# Patient Record
Sex: FEMALE | Race: WHITE | NOT HISPANIC OR LATINO | Employment: OTHER | URBAN - METROPOLITAN AREA
[De-identification: names, ages, dates, MRNs, and addresses within clinical notes are randomized per-mention and may not be internally consistent; named-entity substitution may affect disease eponyms.]

---

## 2017-03-30 ENCOUNTER — TRANSCRIBE ORDERS (OUTPATIENT)
Dept: ADMINISTRATIVE | Facility: OTHER | Age: 72
End: 2017-03-30

## 2017-10-31 LAB — HCV AB SER-ACNC: 0.02

## 2018-11-12 LAB
LEFT EYE DIABETIC RETINOPATHY: NORMAL
RIGHT EYE DIABETIC RETINOPATHY: NORMAL
SEVERITY (EYE EXAM): NORMAL

## 2018-12-24 LAB
CREAT ?TM UR-SCNC: 136 UMOL/L
EXT MICROALBUMIN URINE RANDOM: 1.7
HBA1C MFR BLD HPLC: 9.9 %
MICROALBUMIN/CREAT UR: 13 MG/G{CREAT}

## 2019-03-13 PROBLEM — G45.1 HEMISPHERIC CAROTID ARTERY SYNDROME: Status: ACTIVE | Noted: 2017-10-10

## 2019-03-13 RX ORDER — OLMESARTAN MEDOXOMIL AND HYDROCHLOROTHIAZIDE 20/12.5 20; 12.5 MG/1; MG/1
1 TABLET ORAL DAILY
COMMUNITY
Start: 2019-02-18 | End: 2019-03-15 | Stop reason: ALTCHOICE

## 2019-03-13 RX ORDER — ALBUTEROL SULFATE 90 UG/1
2 AEROSOL, METERED RESPIRATORY (INHALATION) EVERY 4 HOURS PRN
COMMUNITY
Start: 2017-04-26 | End: 2019-04-29

## 2019-03-13 RX ORDER — FLUOCINONIDE GEL 0.5 MG/G
1 GEL TOPICAL DAILY PRN
COMMUNITY
Start: 2018-12-24 | End: 2019-05-10

## 2019-03-13 RX ORDER — LEVOTHYROXINE SODIUM 112 UG/1
112 TABLET ORAL DAILY
COMMUNITY
Start: 2019-01-07 | End: 2019-03-15

## 2019-03-13 RX ORDER — MULTIVIT-MIN/IRON/FOLIC ACID/K 18-600-40
1 CAPSULE ORAL DAILY
COMMUNITY
Start: 2014-06-23 | End: 2019-03-15 | Stop reason: ALTCHOICE

## 2019-03-13 RX ORDER — CHOLECALCIFEROL (VITAMIN D3) 1250 MCG
50000 CAPSULE ORAL WEEKLY
COMMUNITY
Start: 2018-12-31 | End: 2020-01-22

## 2019-03-13 RX ORDER — ATORVASTATIN CALCIUM 10 MG/1
10 TABLET, FILM COATED ORAL 3 TIMES WEEKLY
COMMUNITY
Start: 2018-07-31 | End: 2019-04-29

## 2019-03-15 ENCOUNTER — OFFICE VISIT (OUTPATIENT)
Dept: FAMILY MEDICINE CLINIC | Facility: CLINIC | Age: 74
End: 2019-03-15
Payer: MEDICARE

## 2019-03-15 VITALS
TEMPERATURE: 97.4 F | BODY MASS INDEX: 31.31 KG/M2 | HEIGHT: 68 IN | HEART RATE: 72 BPM | SYSTOLIC BLOOD PRESSURE: 130 MMHG | DIASTOLIC BLOOD PRESSURE: 70 MMHG | OXYGEN SATURATION: 95 % | WEIGHT: 206.6 LBS | RESPIRATION RATE: 14 BRPM

## 2019-03-15 DIAGNOSIS — Z12.11 COLON CANCER SCREENING: ICD-10-CM

## 2019-03-15 DIAGNOSIS — Z00.00 MEDICARE ANNUAL WELLNESS VISIT, SUBSEQUENT: Primary | ICD-10-CM

## 2019-03-15 DIAGNOSIS — E11.65 UNCONTROLLED TYPE 2 DIABETES MELLITUS WITH HYPERGLYCEMIA (HCC): ICD-10-CM

## 2019-03-15 DIAGNOSIS — E03.9 HYPOTHYROIDISM, UNSPECIFIED TYPE: ICD-10-CM

## 2019-03-15 DIAGNOSIS — E55.9 VITAMIN D DEFICIENCY: ICD-10-CM

## 2019-03-15 DIAGNOSIS — I10 BENIGN ESSENTIAL HYPERTENSION: ICD-10-CM

## 2019-03-15 PROCEDURE — G0439 PPPS, SUBSEQ VISIT: HCPCS | Performed by: FAMILY MEDICINE

## 2019-03-15 PROCEDURE — 99213 OFFICE O/P EST LOW 20 MIN: CPT | Performed by: FAMILY MEDICINE

## 2019-03-15 RX ORDER — BRIMONIDINE TARTRATE/TIMOLOL 0.2%-0.5%
1 DROPS OPHTHALMIC (EYE) 2 TIMES DAILY
Refills: 3 | COMMUNITY
Start: 2018-12-28

## 2019-03-15 RX ORDER — MINERAL OIL AND PETROLATUM 150; 830 MG/G; MG/G
OINTMENT OPHTHALMIC
COMMUNITY
End: 2019-04-29

## 2019-03-15 RX ORDER — LOSARTAN POTASSIUM AND HYDROCHLOROTHIAZIDE 12.5; 5 MG/1; MG/1
TABLET ORAL DAILY
Refills: 1 | COMMUNITY
Start: 2019-01-14 | End: 2019-03-15

## 2019-03-15 RX ORDER — PEN NEEDLE, DIABETIC 32GX 5/32"
NEEDLE, DISPOSABLE MISCELLANEOUS
Qty: 90 EACH | Refills: 2 | Status: SHIPPED | OUTPATIENT
Start: 2019-03-15 | End: 2020-01-22

## 2019-03-15 RX ORDER — BIMATOPROST 0.01 %
1 DROPS OPHTHALMIC (EYE)
Refills: 3 | COMMUNITY
Start: 2018-12-28

## 2019-03-15 RX ORDER — CYCLOSPORINE 0.5 MG/ML
1 EMULSION OPHTHALMIC 2 TIMES DAILY
Refills: 4 | COMMUNITY
Start: 2018-12-19 | End: 2019-05-10

## 2019-03-15 RX ORDER — IBUPROFEN 200 MG
TABLET ORAL EVERY 6 HOURS PRN
COMMUNITY
End: 2021-03-30

## 2019-03-15 RX ORDER — PEN NEEDLE, DIABETIC 32GX 5/32"
NEEDLE, DISPOSABLE MISCELLANEOUS
Refills: 2 | COMMUNITY
Start: 2018-12-31 | End: 2019-03-15 | Stop reason: SDUPTHER

## 2019-03-15 RX ORDER — LOTEPREDNOL ETABONATE 5 MG/ML
SUSPENSION/ DROPS OPHTHALMIC 3 TIMES DAILY
Refills: 0 | COMMUNITY
Start: 2019-02-16 | End: 2019-04-11 | Stop reason: ALTCHOICE

## 2019-03-15 RX ORDER — LEVOTHYROXINE SODIUM 112 UG/1
112 TABLET ORAL DAILY
Qty: 90 TABLET | Refills: 1 | Status: SHIPPED | OUTPATIENT
Start: 2019-03-15 | End: 2019-08-27 | Stop reason: SDUPTHER

## 2019-03-15 RX ORDER — OLMESARTAN MEDOXOMIL AND HYDROCHLOROTHIAZIDE 20/12.5 20; 12.5 MG/1; MG/1
1 TABLET ORAL DAILY
Qty: 90 TABLET | Refills: 1 | Status: SHIPPED | OUTPATIENT
Start: 2019-03-15 | End: 2019-05-10

## 2019-03-15 NOTE — ASSESSMENT & PLAN NOTE
Reviewed last labs  Not tolerating victoza well  Better at 1 2 dose than 1 8  Nausea/vomiting is subsiding somewhat  BS remains high   Encouraged to make endocrine appt  She is reluctantly willing, noting she prefers to see endocrinologist that she has seen at [facility] in the past- she'll make the appt

## 2019-03-15 NOTE — PROGRESS NOTES
Assessment and Plan:        Other Visit Diagnoses     Medicare annual wellness visit, subsequent    -  Primary  See medicare wellness planning as noted  Reviewed health maint  FIT test given to patient  She declines flu vaccine, shingrix, mammography  She will bring in a copy of her advanced directive  Colon cancer screening        Relevant Orders    Occult Blood, Fecal Immunochemical        Health Maintenance Due   Topic Date Due    Medicare Annual Wellness Visit (AWV)  1945    Diabetic Foot Exam  12/10/1956    DM Eye Exam  12/10/1956    URINE MICROALBUMIN  12/10/1956    BMI: Followup Plan  12/10/1964    Urinary Incontinence Screening  12/10/2011         HPI:  Nelson Greene is a 68 y o  female here for her Subsequent Wellness Visit      Patient Active Problem List   Diagnosis    Benign essential hypertension    Hemispheric carotid artery syndrome    Uncontrolled type 2 diabetes mellitus with hyperglycemia ([facility] [address] [facility]    Hypothyroidism    Vitamin D deficiency     Past Medical History:   Diagnosis Date    Diabetes mellitus ([address] )     Glaucoma      Past Surgical History:   Procedure Laterality Date    CATARACT EXTRACTION Right     5 years ago    EAR SURGERY  1970    Left ear cyst removal      Family History   Problem Relation Age of Onset    Rheum arthritis Mother     Cancer Brother     Heart attack Paternal Grandmother      Social History     Tobacco Use   Smoking Status Former Smoker    Packs/day: 3 00    Years: 50 00    Pack years: 150 00    Last attempt to quit: 3/15/2010    Years since quittin 0   Smokeless Tobacco Never Used     Social History     Substance and Sexual Activity   Alcohol Use Yes    Alcohol/week: 0 6 oz    Types: 1 Glasses of wine per week    Comment: very rare      Social History     Substance and Sexual Activity   Drug Use Never       Current Outpatient Medications   Medication Sig Dispense Refill    albuterol (PROAIR HFA) 90 mcg/act inhaler Inhale 2 puffs every 4 (four) hours as needed for wheezing      artificial tear (LUBRIFRESH P M ) 83-15 % ophthalmic ointment daily at bedtime      aspirin 81 MG tablet Take 81 mg by mouth daily      atorvastatin (LIPITOR) 10 mg tablet Take 10 mg by mouth 3 (three) times a week      BD PEN NEEDLE ALPHONSO U/F 32G X 4 MM MISC USE WITH VICTOZA PEN  2    Cholecalciferol (VITAMIN D3) 77896 units CAPS Take 50,000 Units by mouth once a week      COMBIGAN 0 2-0 5 % 2 (two) times a day  3    fluocinonide (LIDEX) 0 05 % gel Apply 1 application topically daily as needed      glucose blood (ONETOUCH VERIO) test strip 1 strip by Does not apply route daily      ibuprofen (MOTRIN) 200 mg tablet Take by mouth every 6 (six) hours as needed for mild pain      levothyroxine 112 mcg tablet Take 112 mcg by mouth daily      liraglutide (VICTOZA) injection Inject 1 2 mg under the skin daily       LOTEMAX 0 5 % ophthalmic suspension 3 (three) times a day  0    LUMIGAN 0 01 % ophthalmic drops 2 (two) times a day  3    RESTASIS 0 05 % ophthalmic emulsion   4     No current facility-administered medications for this visit  Allergies   Allergen Reactions    Albumen, Egg     Antihistamines, Diphenhydramine-Type     Epinephrine     Lidocaine      Syncope, tachycardia with local anesthetic with epinephrine    Zinc Rash     Immunization History   Administered Date(s) Administered    Pneumococcal Conjugate 13-Valent 03/22/2017    Pneumococcal Polysaccharide PPV23 01/01/2009, 07/19/2018    Tdap 03/27/2014       Patient Care Team:  Donny Carvalho MD as PCP - General (Family Medicine)    Medicare Screening Tests and Risk Assessments:  Neftaly Cook is here for her Subsequent Wellness visit  Health Risk Assessment:  Patient rates overall health as fair  Patient feels that their physical health rating is Slightly worse  Eyesight was rated as Much worse  Hearing was rated as Same  Patient feels that their emotional and mental health rating is Same  Pain experienced by patient in the last 7 days has been None  Patient states that she has experienced no weight loss or gain in last 6 months  Emotional/Mental Health:  Patient has been feeling nervous/anxious  PHQ-9 Depression Screening:    Frequency of the following problems over the past two weeks:      1  Little interest or pleasure in doing things: 0 - not at all      2  Feeling down, depressed, or hopeless: 0 - not at all  PHQ-2 Score: 0          Broken Bones/Falls: Fall Risk Assessment:    In the past year, patient has experienced: No history of falling in past year          Bladder/Bowel:  Patient has not leaked urine accidently in the last six months  Patient reports no loss of bowel control  Immunizations:  Patient has not had a flu vaccination within the last year  Patient has received a pneumonia shot  Patient has not received a shingles shot  Patient has received tetanus/diphtheria shot  Home Safety:  Patient does not have trouble with stairs inside or outside of their home  Patient currently reports that there are no safety hazards present in home, working smoke alarms, working carbon monoxide detectors  Preventative Screenings:   No breast cancer screening performed, colon cancer screen completed, cholesterol screen completed, glaucoma eye exam completed,     Nutrition:  Current diet: Diabetic with servings of the following:  (Additional Comments: Soft foods due to dental concerns)    Medications:  Patient is currently taking over-the-counter supplements  List of OTC medications includes: MVI  Patient is able to manage medications  Lifestyle Choices:  Patient reports no tobacco use  Patient has smoked or used tobacco in the past   Patient has stopped her tobacco use  Tobacco use quit date: 2/11/2010  Patient reports alcohol use  Alcohol use per week: 0-1  Patient drives a vehicle  Patient wears seat belt          Activities of Daily Living:  Can get out of bed by his or her self, able to dress self, able to make own meals, able to do own shopping, able to bathe self, can do own laundry/housekeeping, can manage own money, pay bills and track expenses    Previous Hospitalizations:  No hospitalization or ED visit in past 12 months        Advanced Directives:  Patient has decided on a power of   Patient has spoken to designated power of   Patient has completed advanced directive  Additional Comments: Pt to bring in copy    Preventative Screening/Counseling:      Cardiovascular:      General: Screening Current          Colorectal Cancer:      General: Risks and Benefits Discussed      Due for studies: Fecal Occult Blood          Breast Cancer:      General: Patient Declines          Cervical Cancer:      General: Screening Not Indicated          Osteoporosis:      General: Screening Current      Comments: 4/2017- normal bone density        AAA:      General: Screening Not Indicated          Glaucoma:      General: Screening Current      Comments: Following with ophtho        HIV:      General: Patient Declines          Hepatitis C:      General: Screening Current      Additional Comments: 10/2017    Advanced Directives:   Patient has living will for healthcare, Information on ACP and/or AD provided       Immunizations:      Influenza: Patient Declines

## 2019-03-15 NOTE — PROGRESS NOTES
Assessment/Plan:       Problem List Items Addressed This Visit        Endocrine    Uncontrolled type 2 diabetes mellitus with hyperglycemia ([facility] [address] )     Reviewed last labs  Not tolerating victoza well  Better at 1 2 dose than 1 8  Nausea/vomiting is subsiding somewhat  BS remains high   Encouraged to make endocrine appt  She is reluctantly willing, noting she prefers to see endocrinologist that she has seen at [facility] in the past- she'll make the appt  Relevant Medications    liraglutide (VICTOZA) injection    BD PEN NEEDLE ALPHONSO U/F 32G X 4 MM MISC    glucose blood (ONETOUCH VERIO) test strip    Other Relevant Orders    Microalbumin / creatinine urine ratio    Comprehensive metabolic panel    Hemoglobin A1C    Lipid Panel with Direct LDL reflex    Hypothyroidism     Refilled synthroid  Check tsh         Relevant Medications    levothyroxine (SYNTHROID) 112 mcg tablet    Other Relevant Orders    TSH, 3rd generation with Free T4 reflex       Cardiovascular and Mediastinum    Benign essential hypertension     Recall of losartan-hctz, switch to olmesartan-hctz         Relevant Medications    olmesartan-hydrochlorothiazide (BENICAR HCT) 20-12 5 MG per tablet       Other    Vitamin D deficiency     Check labs         Relevant Orders    Vitamin D 25 hydroxy      Other Visit Diagnoses         Colon cancer screening        FIT test given to patient    Relevant Orders    Occult Blood, Fecal Immunochemical         Will write letter for patient regarding vision loss/nausea and vomiting  Most recent labs reviewed from care everywhere        Subjective:     Effie Moreno is a 68 y o  female here today for a follow-up on her chronic conditions:    Patient Active Problem List   Diagnosis    Benign essential hypertension    Hemispheric carotid artery syndrome    Uncontrolled type 2 diabetes mellitus with hyperglycemia ([facility] [address]    Hypothyroidism    Vitamin D deficiency     Current Outpatient Medications   Medication Sig Dispense Refill    albuterol (PROAIR HFA) 90 mcg/act inhaler Inhale 2 puffs every 4 (four) hours as needed for wheezing      artificial tear (LUBRIFRESH P M ) 83-15 % ophthalmic ointment daily at bedtime      aspirin 81 MG tablet Take 81 mg by mouth daily      atorvastatin (LIPITOR) 10 mg tablet Take 10 mg by mouth 3 (three) times a week      BD PEN NEEDLE ALPHONSO U/F 32G X 4 MM MISC USE WITH VICTOZA PEN  2    Cholecalciferol (VITAMIN D3) 47345 units CAPS Take 50,000 Units by mouth once a week      COMBIGAN 0 2-0 5 % 2 (two) times a day  3    fluocinonide (LIDEX) 0 05 % gel Apply 1 application topically daily as needed      glucose blood (ONETOUCH VERIO) test strip 1 strip by Does not apply route daily      ibuprofen (MOTRIN) 200 mg tablet Take by mouth every 6 (six) hours as needed for mild pain      levothyroxine 112 mcg tablet Take 112 mcg by mouth daily      liraglutide (VICTOZA) injection Inject 1 2 mg under the skin daily       losartan-hydrochlorothiazide (HYZAAR) 50-12 5 mg per tablet daily  1    LOTEMAX 0 5 % ophthalmic suspension 3 (three) times a day  0    LUMIGAN 0 01 % ophthalmic drops 2 (two) times a day  3    RESTASIS 0 05 % ophthalmic emulsion   4     No current facility-administered medications for this visit  HPI:  Chief Complaint   Patient presents with   [facility] Wellness Visit     Patient here for Venkata Tijerina  wellness exam      - CC above per clinical staff and reviewed  Trouble tolerating victoza, had to decrease from 1 8 to 1 2  GI upset- nausea/vomiting  The nausea and vomiting is improved at the 1 2 dose  But still has it  Gets afraid to eat  Was keeping a trash can by her desk to vomit  Hard to eat  BS is staying around 200 or so  This is an improvement from previous  Wants to try the lower dose of victoza for a few more weeks before seeing endocrine  Vision difficulty with vision loss in her R eye      Had to go out on short term disability last Monday  Is going to get records from prior office and from today as well as from Dr Adalberto Mcmahan lovely  Needs a letter sent to insurance company stating that she is unable to work  Her losartan was recalled  She didn't want to switch meds  She declines DM foot exam       The following portions of the patient's history were reviewed and updated as appropriate: allergies, current medications, past family history, past medical history, past social history, past surgical history and problem list     ROS:  Review of Systems   Constitutional: Negative for chills and fever  HENT: Negative for congestion, ear pain, postnasal drip, rhinorrhea and sore throat  Eyes: Positive for visual disturbance  Respiratory: Negative for chest tightness and shortness of breath  Cardiovascular: Negative for chest pain and leg swelling  Gastrointestinal: Positive for nausea and vomiting  Negative for abdominal pain, blood in stool, constipation and diarrhea  Genitourinary: Negative for difficulty urinating  Musculoskeletal: Negative for myalgias  Skin: Negative for rash  Neurological: Negative for light-headedness  Psychiatric/Behavioral: Negative for dysphoric mood  The patient is not nervous/anxious  All other systems reviewed and are negative  Objective:      /70   Pulse 72   Temp (!) 97 4 °F (36 3 °C)   Resp 14   Ht 5' 8" (1 727 m)   Wt 93 7 kg (206 lb 9 6 oz)   SpO2 95%   BMI 31 41 kg/m²   BP Readings from Last 3 Encounters:   03/15/19 130/70     Wt Readings from Last 3 Encounters:   03/15/19 93 7 kg (206 lb 9 6 oz)             Physical Exam:   Physical ExamAssessment and Plan:    Problem List Items Addressed This Visit        Endocrine    Uncontrolled type 2 diabetes mellitus with hyperglycemia ([address] )     Reviewed last labs  Not tolerating victoza well  Better at 1 2 dose than 1 8  Nausea/vomiting is subsiding somewhat  BS remains high   Encouraged to make endocrine appt    She is reluctantly willing, noting she prefers to see endocrinologist that she has seen at [facility] in the past- she'll make the appt             Relevant Medications    liraglutide (VICTOZA) injection    BD PEN NEEDLE ALPHONSO U/F 32G X 4 MM MISC    glucose blood (ONETOUCH VERIO) test strip    Other Relevant Orders    Microalbumin / creatinine urine ratio    Comprehensive metabolic panel    Hemoglobin A1C    Lipid Panel with Direct LDL reflex    Hypothyroidism     Refilled synthroid  Check tsh         Relevant Medications    levothyroxine (SYNTHROID) 112 mcg tablet    Other Relevant Orders    TSH, 3rd generation with Free T4 reflex       Cardiovascular and Mediastinum    Benign essential hypertension     Recall of losartan-hctz, switch to olmesartan-hctz         Relevant Medications    olmesartan-hydrochlorothiazide (BENICAR HCT) 20-12 5 MG per tablet       Other    Vitamin D deficiency     Check labs         Relevant Orders    Vitamin D 25 hydroxy      Other Visit Diagnoses         Colon cancer screening        FIT test given to patient    Relevant Orders    Occult Blood, Fecal Immunochemical        Health Maintenance Due   Topic Date Due    Medicare Annual Wellness Visit (AWV)  1945    Diabetic Foot Exam  12/10/1956    DM Eye Exam  12/10/1956    URINE MICROALBUMIN  12/10/1956    BMI: Followup Plan  12/10/1964    Urinary Incontinence Screening  12/10/2011         HPI:  Meka Reina is a 68 y o  female here for her AMW as well as concerns about DM2, vision loss, bp follow up    Patient Active Problem List   Diagnosis    Benign essential hypertension    Hemispheric carotid artery syndrome    Uncontrolled type 2 [facility] mellitus with hyperglycemia ([facility] [address]    Hypothyroidism    Vitamin D deficiency     Past Medical History:   Diagnosis Date    Diabetes mellitus ([address] )     Glaucoma      Past Surgical History:   Procedure Laterality Date    CATARACT EXTRACTION Right     5 years ago   [address]    Left ear cyst removal      Family History   Problem Relation Age of Onset    Rheum arthritis Mother     Cancer Brother     Heart attack Paternal Grandmother      Social History     Tobacco Use   Smoking Status Former Smoker    Packs/day: 3 00    Years: 50 00    Pack years: 150 00    Last attempt to quit: 3/15/2010    Years since quittin 0   Smokeless Tobacco Never Used     Social History     Substance and Sexual Activity   Alcohol Use Yes    Alcohol/week: 0 6 oz    Types: 1 Glasses of wine per week    Comment: very rare      Social History     Substance and Sexual Activity   Drug Use Never       Current Outpatient Medications   Medication Sig Dispense Refill    albuterol (PROAIR HFA) 90 mcg/act inhaler Inhale 2 puffs every 4 (four) hours as needed for wheezing      artificial tear (LUBRIFRESH P M ) 83-15 % ophthalmic ointment daily at bedtime      aspirin 81 MG tablet Take 81 mg by mouth daily      atorvastatin (LIPITOR) 10 mg tablet Take 10 mg by mouth 3 (three) times a week      BD PEN NEEDLE ALPHONSO U/F 32G X 4 MM MISC USE WITH VICTOZA PEN  2    Cholecalciferol (VITAMIN D3) 63329 units CAPS Take 50,000 Units by mouth once a week      COMBIGAN 0 2-0 5 % 2 (two) times a day  3    fluocinonide (LIDEX) 0 05 % gel Apply 1 application topically daily as needed      glucose blood (ONETOUCH VERIO) test strip 1 strip by Does not apply route daily      ibuprofen (MOTRIN) 200 mg tablet Take by mouth every 6 (six) hours as needed for mild pain      levothyroxine 112 mcg tablet Take 112 mcg by mouth daily      liraglutide (VICTOZA) injection Inject 1 2 mg under the skin daily       losartan-hydrochlorothiazide (HYZAAR) 50-12 5 mg per tablet daily  1    LOTEMAX 0 5 % ophthalmic suspension 3 (three) times a day  0    LUMIGAN 0 01 % ophthalmic drops 2 (two) times a day  3    RESTASIS 0 05 % ophthalmic emulsion   4     No current facility-administered medications for this visit        Allergies   Allergen Reactions    Albumen, Egg     Antihistamines, Diphenhydramine-Type     Epinephrine     Lidocaine      Syncope, tachycardia with local anesthetic with epinephrine    Zinc Rash     Immunization History   Administered Date(s) Administered    Pneumococcal Conjugate 13-Valent 03/22/2017    Pneumococcal Polysaccharide PPV23 01/01/2009, 07/19/2018    Tdap 03/27/2014       Patient Care Team:  Nelson Peña MD as PCP - General (Family Medicine)

## 2019-03-18 ENCOUNTER — TELEPHONE (OUTPATIENT)
Dept: INTERNAL MEDICINE CLINIC | Facility: CLINIC | Age: 74
End: 2019-03-18

## 2019-03-18 NOTE — PROGRESS NOTES
Care Everywhere (CE) procedure/test/imaging document can be found within Chart Review under the Encounters Tab

## 2019-03-21 ENCOUNTER — TELEPHONE (OUTPATIENT)
Dept: FAMILY MEDICINE CLINIC | Facility: CLINIC | Age: 74
End: 2019-03-21

## 2019-03-21 ENCOUNTER — OFFICE VISIT (OUTPATIENT)
Dept: FAMILY MEDICINE CLINIC | Facility: CLINIC | Age: 74
End: 2019-03-21
Payer: MEDICARE

## 2019-03-21 VITALS — DIASTOLIC BLOOD PRESSURE: 80 MMHG | OXYGEN SATURATION: 97 % | HEART RATE: 79 BPM | SYSTOLIC BLOOD PRESSURE: 158 MMHG

## 2019-03-21 DIAGNOSIS — I10 BENIGN ESSENTIAL HYPERTENSION: Primary | ICD-10-CM

## 2019-03-21 PROCEDURE — 99211 OFF/OP EST MAY X REQ PHY/QHP: CPT | Performed by: FAMILY MEDICINE

## 2019-03-21 NOTE — TELEPHONE ENCOUNTER
The patient is scheduled next week for wart removal, however I do not believe we have these supplies  If supplies are not stocked by next week, we will be unable to do this visit

## 2019-03-25 NOTE — PROGRESS NOTES
S/w patient   She will start the Benicar 1/2 a dose   Has an appt in 2 weeks for wart removal  Will bring in her readings

## 2019-03-25 NOTE — PROGRESS NOTES
BP quite elevated  She had one episode of a low BP- let her know to start taking half dose of the benicar-hct for now  Be sure to drink enough water  Send in BP log after 1-2 weeks on a half dose daily

## 2019-04-11 ENCOUNTER — OFFICE VISIT (OUTPATIENT)
Dept: FAMILY MEDICINE CLINIC | Facility: CLINIC | Age: 74
End: 2019-04-11
Payer: MEDICARE

## 2019-04-11 ENCOUNTER — TELEPHONE (OUTPATIENT)
Dept: FAMILY MEDICINE CLINIC | Facility: CLINIC | Age: 74
End: 2019-04-11

## 2019-04-11 VITALS
WEIGHT: 206 LBS | HEIGHT: 68 IN | OXYGEN SATURATION: 98 % | HEART RATE: 67 BPM | DIASTOLIC BLOOD PRESSURE: 72 MMHG | BODY MASS INDEX: 31.22 KG/M2 | RESPIRATION RATE: 14 BRPM | TEMPERATURE: 98.3 F | SYSTOLIC BLOOD PRESSURE: 130 MMHG

## 2019-04-11 DIAGNOSIS — R11.2 NON-INTRACTABLE VOMITING WITH NAUSEA, UNSPECIFIED VOMITING TYPE: Primary | ICD-10-CM

## 2019-04-11 DIAGNOSIS — E11.65 UNCONTROLLED TYPE 2 DIABETES MELLITUS WITH HYPERGLYCEMIA (HCC): Primary | ICD-10-CM

## 2019-04-11 PROCEDURE — 99213 OFFICE O/P EST LOW 20 MIN: CPT | Performed by: FAMILY MEDICINE

## 2019-04-12 RX ORDER — ONDANSETRON 4 MG/1
4 TABLET, ORALLY DISINTEGRATING ORAL EVERY 8 HOURS PRN
Qty: 40 TABLET | Refills: 0 | Status: SHIPPED | OUTPATIENT
Start: 2019-04-12 | End: 2020-04-28 | Stop reason: ALTCHOICE

## 2019-04-29 ENCOUNTER — CONSULT (OUTPATIENT)
Dept: ENDOCRINOLOGY | Facility: CLINIC | Age: 74
End: 2019-04-29
Payer: MEDICARE

## 2019-04-29 VITALS
WEIGHT: 206 LBS | HEIGHT: 68 IN | DIASTOLIC BLOOD PRESSURE: 72 MMHG | BODY MASS INDEX: 31.22 KG/M2 | HEART RATE: 90 BPM | SYSTOLIC BLOOD PRESSURE: 128 MMHG

## 2019-04-29 DIAGNOSIS — E55.9 VITAMIN D DEFICIENCY: ICD-10-CM

## 2019-04-29 DIAGNOSIS — I10 BENIGN ESSENTIAL HYPERTENSION: ICD-10-CM

## 2019-04-29 DIAGNOSIS — E03.9 HYPOTHYROIDISM, UNSPECIFIED TYPE: ICD-10-CM

## 2019-04-29 DIAGNOSIS — E11.65 UNCONTROLLED TYPE 2 DIABETES MELLITUS WITH HYPERGLYCEMIA (HCC): Primary | ICD-10-CM

## 2019-04-29 PROCEDURE — 99205 OFFICE O/P NEW HI 60 MIN: CPT | Performed by: INTERNAL MEDICINE

## 2019-04-29 RX ORDER — METFORMIN HYDROCHLORIDE 500 MG/1
TABLET, EXTENDED RELEASE ORAL
Qty: 30 TABLET | Refills: 0 | Status: SHIPPED | OUTPATIENT
Start: 2019-04-29 | End: 2019-05-10

## 2019-05-01 ENCOUNTER — TELEPHONE (OUTPATIENT)
Dept: ENDOCRINOLOGY | Facility: CLINIC | Age: 74
End: 2019-05-01

## 2019-05-07 RX ORDER — ATORVASTATIN CALCIUM 10 MG/1
TABLET, FILM COATED ORAL
COMMUNITY
Start: 2018-07-31 | End: 2019-05-10

## 2019-05-07 RX ORDER — LOSARTAN POTASSIUM AND HYDROCHLOROTHIAZIDE 12.5; 5 MG/1; MG/1
1 TABLET ORAL DAILY
Refills: 1 | COMMUNITY
Start: 2019-04-30 | End: 2019-08-27 | Stop reason: SDUPTHER

## 2019-05-07 RX ORDER — BLOOD SUGAR DIAGNOSTIC
STRIP MISCELLANEOUS
COMMUNITY
Start: 2018-12-31 | End: 2020-01-22

## 2019-05-07 RX ORDER — CYCLOSPORINE 0.5 MG/ML
1 EMULSION OPHTHALMIC EVERY 12 HOURS
COMMUNITY
Start: 2018-12-19 | End: 2021-07-19

## 2019-05-09 ENCOUNTER — TELEPHONE (OUTPATIENT)
Dept: FAMILY MEDICINE CLINIC | Facility: CLINIC | Age: 74
End: 2019-05-09

## 2019-05-10 ENCOUNTER — OFFICE VISIT (OUTPATIENT)
Dept: FAMILY MEDICINE CLINIC | Facility: CLINIC | Age: 74
End: 2019-05-10
Payer: MEDICARE

## 2019-05-10 VITALS
HEIGHT: 68 IN | TEMPERATURE: 97.2 F | OXYGEN SATURATION: 97 % | SYSTOLIC BLOOD PRESSURE: 122 MMHG | HEART RATE: 70 BPM | BODY MASS INDEX: 30.83 KG/M2 | WEIGHT: 203.4 LBS | DIASTOLIC BLOOD PRESSURE: 62 MMHG | RESPIRATION RATE: 16 BRPM

## 2019-05-10 DIAGNOSIS — B07.9 VIRAL WART ON FINGER: ICD-10-CM

## 2019-05-10 DIAGNOSIS — E11.65 UNCONTROLLED TYPE 2 DIABETES MELLITUS WITH HYPERGLYCEMIA (HCC): Primary | ICD-10-CM

## 2019-05-10 PROBLEM — H40.9 GLAUCOMA OF RIGHT EYE: Status: ACTIVE | Noted: 2019-01-04

## 2019-05-10 PROCEDURE — 99214 OFFICE O/P EST MOD 30 MIN: CPT | Performed by: FAMILY MEDICINE

## 2019-05-10 PROCEDURE — 17110 DESTRUCTION B9 LES UP TO 14: CPT | Performed by: FAMILY MEDICINE

## 2019-05-10 RX ORDER — DORZOLAMIDE HCL 20 MG/ML
1 SOLUTION/ DROPS OPHTHALMIC 3 TIMES DAILY
Refills: 1 | COMMUNITY
Start: 2019-05-03

## 2019-05-24 ENCOUNTER — TELEPHONE (OUTPATIENT)
Dept: ENDOCRINOLOGY | Facility: CLINIC | Age: 74
End: 2019-05-24

## 2019-07-23 ENCOUNTER — TELEPHONE (OUTPATIENT)
Dept: ENDOCRINOLOGY | Facility: CLINIC | Age: 74
End: 2019-07-23

## 2019-07-24 ENCOUNTER — TELEPHONE (OUTPATIENT)
Dept: ENDOCRINOLOGY | Facility: CLINIC | Age: 74
End: 2019-07-24

## 2019-07-24 NOTE — TELEPHONE ENCOUNTER
Left patient message received notification that appt for 7/26 has been cancelled and to call back at her earliest convenience to reschedule

## 2019-07-26 DIAGNOSIS — E11.65 UNCONTROLLED TYPE 2 DIABETES MELLITUS WITH HYPERGLYCEMIA (HCC): ICD-10-CM

## 2019-07-26 RX ORDER — LIRAGLUTIDE 6 MG/ML
INJECTION SUBCUTANEOUS
Qty: 9 ML | Refills: 0 | OUTPATIENT
Start: 2019-07-26

## 2019-07-27 NOTE — TELEPHONE ENCOUNTER
Please let her know that she needs to make a follow up appointment with endocrinologist   She has cancelled there multiple times (had appts 5/29, 6/25, 7/26- all cancelled)  She needs to make a follow up appointment and attend the follow up appointment and get her lab work done  She should call endo- make plan for follow up appointment and get the labwork done and ask about refill    If they will not refill, I will refill enough to last her until the appointment, but I want to ensure that she is following up (I worry if I refill it for a longer supply she will continue to avoid follow up)

## 2019-08-23 LAB — HBA1C MFR BLD HPLC: 7.9 %

## 2019-08-26 LAB
25(OH)D3 SERPL-MCNC: 25 NG/ML (ref 30–100)
ALBUMIN SERPL-MCNC: 4.2 G/DL (ref 3.6–5.1)
ALBUMIN/CREAT UR: 6 MCG/MG CREAT
ALBUMIN/GLOB SERPL: 1.5 (CALC) (ref 1–2.5)
ALP SERPL-CCNC: 82 U/L (ref 33–130)
ALT SERPL-CCNC: 17 U/L (ref 6–29)
AST SERPL-CCNC: 14 U/L (ref 10–35)
BILIRUB SERPL-MCNC: 0.8 MG/DL (ref 0.2–1.2)
BUN SERPL-MCNC: 18 MG/DL (ref 7–25)
BUN/CREAT SERPL: ABNORMAL (CALC) (ref 6–22)
C PEPTIDE SERPL-MCNC: 2.16 NG/ML (ref 0.8–3.85)
CALCIUM SERPL-MCNC: 8.9 MG/DL (ref 8.6–10.4)
CHLORIDE SERPL-SCNC: 101 MMOL/L (ref 98–110)
CHOLEST SERPL-MCNC: 142 MG/DL
CHOLEST/HDLC SERPL: 3.4 (CALC)
CO2 SERPL-SCNC: 31 MMOL/L (ref 20–32)
CREAT SERPL-MCNC: 0.86 MG/DL (ref 0.6–0.93)
CREAT UR-MCNC: 84 MG/DL (ref 20–275)
GLOBULIN SER CALC-MCNC: 2.8 G/DL (CALC) (ref 1.9–3.7)
GLUCOSE SERPL-MCNC: 224 MG/DL (ref 65–99)
HBA1C MFR BLD: 7.9 % OF TOTAL HGB
HDLC SERPL-MCNC: 42 MG/DL
LDLC SERPL CALC-MCNC: 78 MG/DL (CALC)
MICROALBUMIN UR-MCNC: 0.5 MG/DL
NONHDLC SERPL-MCNC: 100 MG/DL (CALC)
POTASSIUM SERPL-SCNC: 4.6 MMOL/L (ref 3.5–5.3)
PROT SERPL-MCNC: 7 G/DL (ref 6.1–8.1)
SL AMB EGFR AFRICAN AMERICAN: 78 ML/MIN/1.73M2
SL AMB EGFR NON AFRICAN AMERICAN: 67 ML/MIN/1.73M2
SODIUM SERPL-SCNC: 138 MMOL/L (ref 135–146)
TRIGL SERPL-MCNC: 128 MG/DL
TSH SERPL-ACNC: 0.52 MIU/L (ref 0.4–4.5)

## 2019-08-27 ENCOUNTER — OFFICE VISIT (OUTPATIENT)
Dept: FAMILY MEDICINE CLINIC | Facility: CLINIC | Age: 74
End: 2019-08-27
Payer: MEDICARE

## 2019-08-27 VITALS
SYSTOLIC BLOOD PRESSURE: 126 MMHG | TEMPERATURE: 97.5 F | DIASTOLIC BLOOD PRESSURE: 74 MMHG | WEIGHT: 204 LBS | HEART RATE: 63 BPM | HEIGHT: 68 IN | RESPIRATION RATE: 16 BRPM | OXYGEN SATURATION: 97 % | BODY MASS INDEX: 30.92 KG/M2

## 2019-08-27 DIAGNOSIS — E11.65 UNCONTROLLED TYPE 2 DIABETES MELLITUS WITH HYPERGLYCEMIA (HCC): Primary | ICD-10-CM

## 2019-08-27 DIAGNOSIS — E03.9 HYPOTHYROIDISM, UNSPECIFIED TYPE: ICD-10-CM

## 2019-08-27 DIAGNOSIS — I10 BENIGN ESSENTIAL HYPERTENSION: ICD-10-CM

## 2019-08-27 PROCEDURE — 99214 OFFICE O/P EST MOD 30 MIN: CPT | Performed by: FAMILY MEDICINE

## 2019-08-27 RX ORDER — LIRAGLUTIDE 6 MG/ML
INJECTION SUBCUTANEOUS
Refills: 2 | COMMUNITY
Start: 2019-06-18 | End: 2019-08-27

## 2019-08-27 RX ORDER — LOSARTAN POTASSIUM AND HYDROCHLOROTHIAZIDE 12.5; 5 MG/1; MG/1
1 TABLET ORAL DAILY
Qty: 90 TABLET | Refills: 1 | Status: SHIPPED | OUTPATIENT
Start: 2019-08-27 | End: 2020-01-22 | Stop reason: SDUPTHER

## 2019-08-27 RX ORDER — LEVOTHYROXINE SODIUM 0.1 MG/1
100 TABLET ORAL DAILY
Qty: 90 TABLET | Refills: 1 | Status: SHIPPED | OUTPATIENT
Start: 2019-08-27 | End: 2020-01-22 | Stop reason: SDUPTHER

## 2019-08-27 NOTE — PROGRESS NOTES
Assessment/Plan:       Problem List Items Addressed This Visit        Endocrine    Uncontrolled type 2 diabetes mellitus with hyperglycemia ([facility] [address] ) - Primary     Lab Results   Component Value Date    HGBA1C 7 9 (H) 08/23/2019     Patient has opted to stop medication for her DM  She is monitoring BS, watching diet  I have strongly advised to make an appointment with endo  She thinks she will reschedule with [facility] and would like to see an NP there  She has declined other medications for treatment of her DM2  She is willing to recheck labs in 4 months  She is aware she can call at any time to start medication or review concerns  Refuses DM foot exam         Relevant Medications    glucose blood (ONETOUCH VERIO) test strip    Other Relevant Orders    Comprehensive metabolic panel    Hemoglobin A1C    Lipid Panel with Direct LDL reflex    Microalbumin / creatinine urine ratio    Hypothyroidism     Pt feels uncomfortable with TSH being on low end of normal  Ok to decrease synthroid to 100 mcg daily from 112 mcg and recheck in 2 months  Relevant Medications    levothyroxine 100 mcg tablet    Other Relevant Orders    TSH, 3rd generation with Free T4 reflex    TSH, 3rd generation with Free T4 reflex       Cardiovascular and Mediastinum    Benign essential hypertension     Stable at present, no changes         Relevant Medications    losartan-hydrochlorothiazide (HYZAAR) 50-12 5 mg per tablet    Other Relevant Orders    Comprehensive metabolic panel    Lipid Panel with Direct LDL reflex         Pt refuses mammo, DM foot exam, low dose CT lung cancer screening  Has FIT card at home and says she will complete this      Labs reviewed w/ patient      Subjective:     Fallon Reyes is a 68 y o  female here today and has the below chronic conditions:    Patient Active Problem List   Diagnosis    Benign essential hypertension    Hemispheric carotid artery syndrome    Uncontrolled type 2 diabetes mellitus with hyperglycemia ([address] )    Hypothyroidism    Vitamin D deficiency    Glaucoma of right eye     Current Outpatient Medications   Medication Sig Dispense Refill    aspirin 81 MG tablet Take 81 mg by mouth daily      BD PEN NEEDLE ALPHONSO U/F 32G X 4 MM MISC To use with victoza pen daily 90 each 2    Cholecalciferol (VITAMIN D3) 21489 units CAPS Take 50,000 Units by mouth once a week      COMBIGAN 0 2-0 5 % 2 (two) times a day   3    cycloSPORINE (RESTASIS) 0 05 % ophthalmic emulsion       dorzolamide (TRUSOPT) 2 % ophthalmic solution INSTILL 1 DROP TO OD TID  1    ibuprofen (MOTRIN) 200 mg tablet Take by mouth every 6 (six) hours as needed for mild pain      Insulin Pen Needle (PEN NEEDLES) 32G X 5 MM MISC To use with victoza pen      levothyroxine (SYNTHROID) 112 mcg tablet Take 1 tablet (112 mcg total) by mouth daily BRAND SYNTHROID PLEASE 90 tablet 1    LUMIGAN 0 01 % ophthalmic drops Administer 1 drop to both eyes daily at bedtime   3    ondansetron (ZOFRAN-ODT) 4 mg disintegrating tablet Take 1 tablet (4 mg total) by mouth every 8 (eight) hours as needed for nausea or vomiting 40 tablet 0    ONETOUCH DELICA LANCETS FINE MISC TEST D  5    glucose blood (ONETOUCH VERIO) test strip To test three times daily for uncontrolled DM2 90 each 11    losartan-hydrochlorothiazide (HYZAAR) 50-12 5 mg per tablet Take 1 tablet by mouth daily 90 tablet 1    VICTOZA injection INJECT  1 2 MG SC D  2     No current facility-administered medications for this visit  HPI:  Chief Complaint   Patient presents with    Follow-up     DM f/u,    Wart removal     needs wart removed on finger     - CC above per clinical staff and reviewed  Hypothyroid- would like to decrease synthroid bc tsh is suppressed more  Saw endo- was advised to switch meds, but copay was very high  Not willing to take metformin b/c read that she shouldn't take it with hx stroke  She had trouble keeping appts    Then ran out of victoza bc she hadn't followed up  She says she called here for refill of losartan-hctz but no record of this  Needs refill of losartan-hctz    She says she will find an endocrinologist that she can get to easier  Her A1C has improved to 7 9  She is still seeing numbers in the [id number]  Says that she was eating more to tolerate the side effects of victoza better  Now she is off victoza and is eating better and now her blood sugar is down to 170  She feels she should stay off meds entirely  Feels that the medications 'serve no purpose'  She is not willing to take insulin  Worries about cost     She says she faviola manage her DM the way she wants now  She says she is testing up to 10 times a day  Checks before and after eating  Fasting -200, this is highest number of her day now  Says she is going to manage her DM only by diet now  Says she takes vit D when she remembers    The following portions of the patient's history were reviewed and updated as appropriate: allergies, current medications, past family history, past medical history, past social history, past surgical history and problem list     ROS:  Review of Systems   No fever, chills, congestion, chest pain, shortness of breath, nausea, vomiting, diarrhea, constipation, blood in stool, urinary concerns, mood changes  Rest of ROS neg except as above  Objective:      /74   Pulse 63   Temp 97 5 °F (36 4 °C) (Tympanic)   Resp 16   Ht 5' 8" (1 727 m)   Wt 92 5 kg (204 lb)   SpO2 97%   BMI 31 02 kg/m²   BP Readings from Last 3 Encounters:   08/27/19 126/74   05/10/19 122/62   04/29/19 128/72     Wt Readings from Last 3 Encounters:   08/27/19 92 5 kg (204 lb)   05/10/19 92 3 kg (203 lb 6 4 oz)   04/29/19 93 4 kg (206 lb)               Physical Exam:   Physical Exam   Constitutional: She is oriented to person, place, and time  She appears well-developed and well-nourished  HENT:   Head: Normocephalic and atraumatic  Neck: Neck supple  Cardiovascular: Normal rate, regular rhythm and normal heart sounds  No murmur heard  Pulmonary/Chest: Effort normal and breath sounds normal  No respiratory distress  She has no wheezes  Abdominal: Soft  There is no tenderness  There is no rebound and no guarding  Musculoskeletal: She exhibits no edema  Lymphadenopathy:     She has no cervical adenopathy  Neurological: She is alert and oriented to person, place, and time  Skin: Skin is warm and dry  Psychiatric: She has a normal mood and affect  Her behavior is normal    Nursing note and vitals reviewed  BMI Counseling: Body mass index is 31 02 kg/m²  Discussed the patient's BMI with her  The BMI is above average  BMI counseling and education was provided to the patient  Nutrition recommendations include moderation in carbohydrate intake

## 2019-08-27 NOTE — PATIENT INSTRUCTIONS
Decrease your thyroid medication to 100 mcg and recheck TSH in 2 months  Recheck routine labs in 4 months  Follow up in 4 months

## 2019-08-29 NOTE — ASSESSMENT & PLAN NOTE
Pt feels uncomfortable with TSH being on low end of normal  Ok to decrease synthroid to 100 mcg daily from 112 mcg and recheck in 2 months

## 2019-08-29 NOTE — ASSESSMENT & PLAN NOTE
Lab Results   Component Value Date    HGBA1C 7 9 (H) 08/23/2019     Patient has opted to stop medication for her DM  She is monitoring BS, watching diet  I have strongly advised to make an appointment with endo  She thinks she will reschedule with [facility] and would like to see an NP there  She has declined other medications for treatment of her DM2  She is willing to recheck labs in 4 months  She is aware she can call at any time to start medication or review concerns    Refuses DM foot exam

## 2019-09-10 ENCOUNTER — TELEPHONE (OUTPATIENT)
Dept: FAMILY MEDICINE CLINIC | Facility: CLINIC | Age: 74
End: 2019-09-10

## 2020-01-21 LAB
ALBUMIN SERPL-MCNC: 4 G/DL (ref 3.6–5.1)
ALBUMIN/CREAT UR: 19 MCG/MG CREAT
ALBUMIN/GLOB SERPL: 1.4 (CALC) (ref 1–2.5)
ALP SERPL-CCNC: 83 U/L (ref 33–130)
ALT SERPL-CCNC: 18 U/L (ref 6–29)
AST SERPL-CCNC: 17 U/L (ref 10–35)
BILIRUB SERPL-MCNC: 0.9 MG/DL (ref 0.2–1.2)
BUN SERPL-MCNC: 15 MG/DL (ref 7–25)
BUN/CREAT SERPL: ABNORMAL (CALC) (ref 6–22)
CALCIUM SERPL-MCNC: 9.3 MG/DL (ref 8.6–10.4)
CHLORIDE SERPL-SCNC: 102 MMOL/L (ref 98–110)
CHOLEST SERPL-MCNC: 153 MG/DL
CHOLEST/HDLC SERPL: 3.3 (CALC)
CO2 SERPL-SCNC: 29 MMOL/L (ref 20–32)
CREAT SERPL-MCNC: 0.72 MG/DL (ref 0.6–0.93)
CREAT UR-MCNC: 36 MG/DL (ref 20–275)
GLOBULIN SER CALC-MCNC: 2.9 G/DL (CALC) (ref 1.9–3.7)
GLUCOSE SERPL-MCNC: 234 MG/DL (ref 65–99)
HBA1C MFR BLD: 9.9 % OF TOTAL HGB
HDLC SERPL-MCNC: 46 MG/DL
LDLC SERPL CALC-MCNC: 82 MG/DL (CALC)
MICROALBUMIN UR-MCNC: 0.7 MG/DL
NONHDLC SERPL-MCNC: 107 MG/DL (CALC)
POTASSIUM SERPL-SCNC: 4.6 MMOL/L (ref 3.5–5.3)
PROT SERPL-MCNC: 6.9 G/DL (ref 6.1–8.1)
SL AMB EGFR AFRICAN AMERICAN: 96 ML/MIN/1.73M2
SL AMB EGFR NON AFRICAN AMERICAN: 83 ML/MIN/1.73M2
SODIUM SERPL-SCNC: 139 MMOL/L (ref 135–146)
TRIGL SERPL-MCNC: 150 MG/DL
TSH SERPL-ACNC: 1.65 MIU/L (ref 0.4–4.5)

## 2020-01-22 ENCOUNTER — OFFICE VISIT (OUTPATIENT)
Dept: FAMILY MEDICINE CLINIC | Facility: CLINIC | Age: 75
End: 2020-01-22
Payer: MEDICARE

## 2020-01-22 VITALS
HEIGHT: 68 IN | SYSTOLIC BLOOD PRESSURE: 116 MMHG | TEMPERATURE: 97.5 F | DIASTOLIC BLOOD PRESSURE: 72 MMHG | HEART RATE: 58 BPM | OXYGEN SATURATION: 97 % | WEIGHT: 199.2 LBS | RESPIRATION RATE: 16 BRPM | BODY MASS INDEX: 30.19 KG/M2

## 2020-01-22 DIAGNOSIS — E03.9 HYPOTHYROIDISM, UNSPECIFIED TYPE: ICD-10-CM

## 2020-01-22 DIAGNOSIS — I10 BENIGN ESSENTIAL HYPERTENSION: ICD-10-CM

## 2020-01-22 DIAGNOSIS — E11.65 UNCONTROLLED TYPE 2 DIABETES MELLITUS WITH HYPERGLYCEMIA (HCC): ICD-10-CM

## 2020-01-22 DIAGNOSIS — R10.10 UPPER ABDOMINAL PAIN: Primary | ICD-10-CM

## 2020-01-22 DIAGNOSIS — E55.9 VITAMIN D DEFICIENCY: ICD-10-CM

## 2020-01-22 PROCEDURE — 99214 OFFICE O/P EST MOD 30 MIN: CPT | Performed by: FAMILY MEDICINE

## 2020-01-22 RX ORDER — GLIPIZIDE 5 MG/1
5 TABLET, FILM COATED, EXTENDED RELEASE ORAL DAILY
Qty: 30 TABLET | Refills: 5 | Status: SHIPPED | OUTPATIENT
Start: 2020-01-22 | End: 2020-01-23

## 2020-01-22 RX ORDER — LEVOTHYROXINE SODIUM 0.1 MG/1
100 TABLET ORAL DAILY
Qty: 90 TABLET | Refills: 1 | Status: SHIPPED | OUTPATIENT
Start: 2020-01-22 | End: 2020-07-15 | Stop reason: SDUPTHER

## 2020-01-22 RX ORDER — LOSARTAN POTASSIUM AND HYDROCHLOROTHIAZIDE 12.5; 5 MG/1; MG/1
1 TABLET ORAL DAILY
Qty: 90 TABLET | Refills: 1 | Status: SHIPPED | OUTPATIENT
Start: 2020-01-22 | End: 2020-07-15 | Stop reason: SDUPTHER

## 2020-01-22 NOTE — PROGRESS NOTES
Assessment/Plan:       Problem List Items Addressed This Visit        Endocrine    Uncontrolled type 2 diabetes mellitus with hyperglycemia ([address] )     Cannot afford any brand name meds at all  Cannot tolerate metformin  Start glipizide xl 5 mg daily  Reviewed use/ side effects including hypoglycemia  Take 30 min before first meal of the day  Discussed my concern of her uncontrolled DM and risks associated w this  She is not willing to return to endocrine office  She agrees to send BS log via [employer]  Refuses DM foot exam    Lab Results   Component Value Date    HGBA1C 9 9 (H) 01/20/2020            Relevant Medications    glucose blood (ONETOUCH VERIO) test strip    glipiZIDE (GLUCOTROL XL) 5 mg 24 hr tablet    Other Relevant Orders    Comprehensive metabolic panel    Hemoglobin A1C    Lipid Panel with Direct LDL reflex    Microalbumin / creatinine urine ratio    Hypothyroidism     Stable, recheck in 4 months  Continue synthroid  Relevant Medications    levothyroxine 100 mcg tablet    Other Relevant Orders    TSH, 3rd generation with Free T4 reflex       Cardiovascular and Mediastinum    Benign essential hypertension     Stable on hyzaar  Continue  Relevant Medications    losartan-hydrochlorothiazide (HYZAAR) 50-12 5 mg per tablet       Other    Vitamin D deficiency     Check level w next labs  Relevant Orders    Vitamin D 25 hydroxy      Other Visit Diagnoses     Upper abdominal pain    -  Primary- she had relief w h2 blocker    She will  otc h2 blocker that is not on current recall list and start this again and check stool hpylori    Relevant Orders    H  pylori antigen, stool               Most recent labs reviewed     Subjective:     Dorcas Caldwell is a 69 y o  female here today and has the below chronic conditions:    Patient Active Problem List   Diagnosis    Benign essential hypertension    Hemispheric carotid artery syndrome    Uncontrolled type 2 diabetes mellitus with hyperglycemia ([address] )    Hypothyroidism    Vitamin D deficiency    Glaucoma of right eye     Current Outpatient Medications   Medication Sig Dispense Refill    COMBIGAN 0 2-0 5 % 2 (two) times a day   3    cycloSPORINE (RESTASIS) 0 05 % ophthalmic emulsion       dorzolamide (TRUSOPT) 2 % ophthalmic solution INSTILL 1 DROP TO OD TID  1    glucose blood (ONETOUCH VERIO) test strip To test three times daily for uncontrolled DM2 90 each 11    ibuprofen (MOTRIN) 200 mg tablet Take by mouth every 6 (six) hours as needed for mild pain      levothyroxine 100 mcg tablet Take 1 tablet (100 mcg total) by mouth daily BRAND SYNTHROID PLEASE 90 tablet 1    losartan-hydrochlorothiazide (HYZAAR) 50-12 5 mg per tablet Take 1 tablet by mouth daily 90 tablet 1    LUMIGAN 0 01 % ophthalmic drops Administer 1 drop to both eyes daily at bedtime   3    ONETOUCH DELICA LANCETS FINE MISC TEST D  5    aspirin 81 MG tablet Take 81 mg by mouth daily      ondansetron (ZOFRAN-ODT) 4 mg disintegrating tablet Take 1 tablet (4 mg total) by mouth every 8 (eight) hours as needed for nausea or vomiting (Patient not taking: Reported on 1/22/2020) 40 tablet 0     No current facility-administered medications for this visit  HPI:  Chief Complaint   Patient presents with    Follow-up     Diabetes check, no concerns  - CC above per clinical staff and reviewed  Pt here for f/u  Says she has been eating terribly due to stress  Layoffs at work  She was monitoring BS and she started seeing high 200s and low 300s  Three weeks ago, she cleared out the fridge and freezer  Started drinking premier protein drink for breakfast   She is eating sea bass, shrimp, scallops, baked chicken and cauliflower rice and broccoli  She is eating more veggies again and proteins  Her blood sugars are now down to 100 or less  She finds if she eats strictly, she can get her BS down lower  She is cutting out diet coke    Drinking more water     Just saw Dr Fregoso Angry- eye pressures are improved  She finds she has trouble driving at night  Cataract in her L eye  Says that her morning BS is still in the low 200s, but then improves  Can't afford any brand name meds  Can't tolerate any metformin    Getting pain in upper abdomen, wonders about ulcer  Was taking zantac  This helped  Stopped b/c of recall and isn't taking anything  Gets pain like a bad hunger pain  Drinking protein drink helps  The following portions of the patient's history were reviewed and updated as appropriate: allergies, current medications, past family history, past medical history, past social history, past surgical history and problem list     ROS:  Review of Systems   No fever, chills, congestion, chest pain, shortness of breath, nausea, vomiting, diarrhea, constipation, blood in stool, urinary concerns, mood changes  Rest of ROS neg except as above  Objective:      /72   Pulse 58   Temp 97 5 °F (36 4 °C) (Tympanic)   Resp 16   Ht 5' 8" (1 727 m)   Wt 90 4 kg (199 lb 3 2 oz)   SpO2 97%   BMI 30 29 kg/m²   BP Readings from Last 3 Encounters:   01/22/20 116/72   08/27/19 126/74   05/10/19 122/62     Wt Readings from Last 3 Encounters:   01/22/20 90 4 kg (199 lb 3 2 oz)   08/27/19 92 5 kg (204 lb)   05/10/19 92 3 kg (203 lb 6 4 oz)               Physical Exam:   Physical Exam   Constitutional: She is oriented to person, place, and time  She appears well-developed and well-nourished  HENT:   Head: Normocephalic and atraumatic  Neck: Neck supple  Cardiovascular: Normal rate, regular rhythm and normal heart sounds  No murmur heard  Pulmonary/Chest: Effort normal and breath sounds normal  No respiratory distress  She has no wheezes  Abdominal: Soft  There is tenderness (mild epigastric)  There is no rebound and no guarding  Musculoskeletal: She exhibits no edema  Lymphadenopathy:     She has no cervical adenopathy     Neurological: She is alert and oriented to person, place, and time  Skin: Skin is warm and dry  Psychiatric: She has a normal mood and affect  Her behavior is normal    Nursing note and vitals reviewed  BMI Counseling: Body mass index is 30 29 kg/m²  The BMI is above normal  Nutrition recommendations include encouraging healthy choices of fruits and vegetables and moderation in carbohydrate intake

## 2020-01-22 NOTE — ASSESSMENT & PLAN NOTE
Cannot afford any brand name meds at all  Cannot tolerate metformin  Start glipizide xl 5 mg daily  Reviewed use/ side effects including hypoglycemia  Take 30 min before first meal of the day  Discussed my concern of her uncontrolled DM and risks associated w this  She is not willing to return to endocrine office  She agrees to send BS log via [employer]      Lab Results   Component Value Date    HGBA1C 9 9 (H) 01/20/2020

## 2020-01-23 DIAGNOSIS — E11.65 UNCONTROLLED TYPE 2 DIABETES MELLITUS WITH HYPERGLYCEMIA (HCC): ICD-10-CM

## 2020-01-23 RX ORDER — GLIPIZIDE 5 MG/1
TABLET, FILM COATED, EXTENDED RELEASE ORAL
Qty: 90 TABLET | Refills: 0 | Status: SHIPPED | OUTPATIENT
Start: 2020-01-23 | End: 2020-04-28

## 2020-02-11 ENCOUNTER — HOSPITAL ENCOUNTER (OUTPATIENT)
Dept: RADIOLOGY | Facility: HOSPITAL | Age: 75
Discharge: HOME | End: 2020-02-11
Attending: FAMILY MEDICINE
Payer: MEDICARE

## 2020-02-11 ENCOUNTER — TELEPHONE (OUTPATIENT)
Dept: FAMILY MEDICINE CLINIC | Facility: CLINIC | Age: 75
End: 2020-02-11

## 2020-02-11 ENCOUNTER — OFFICE VISIT (OUTPATIENT)
Dept: FAMILY MEDICINE CLINIC | Facility: CLINIC | Age: 75
End: 2020-02-11
Payer: MEDICARE

## 2020-02-11 VITALS
TEMPERATURE: 97.3 F | DIASTOLIC BLOOD PRESSURE: 76 MMHG | HEART RATE: 63 BPM | BODY MASS INDEX: 30.34 KG/M2 | HEIGHT: 68 IN | OXYGEN SATURATION: 96 % | SYSTOLIC BLOOD PRESSURE: 128 MMHG | RESPIRATION RATE: 16 BRPM | WEIGHT: 200.2 LBS

## 2020-02-11 DIAGNOSIS — R05.9 COUGH: ICD-10-CM

## 2020-02-11 DIAGNOSIS — E11.65 UNCONTROLLED TYPE 2 DIABETES MELLITUS WITH HYPERGLYCEMIA (HCC): ICD-10-CM

## 2020-02-11 DIAGNOSIS — Z12.2 ENCOUNTER FOR SCREENING FOR LUNG CANCER: ICD-10-CM

## 2020-02-11 DIAGNOSIS — J01.01 ACUTE RECURRENT MAXILLARY SINUSITIS: Primary | ICD-10-CM

## 2020-02-11 DIAGNOSIS — Z87.891 HX OF TOBACCO USE, PRESENTING HAZARDS TO HEALTH: ICD-10-CM

## 2020-02-11 PROCEDURE — 71046 X-RAY EXAM CHEST 2 VIEWS: CPT

## 2020-02-11 PROCEDURE — 4040F PNEUMOC VAC/ADMIN/RCVD: CPT | Performed by: FAMILY MEDICINE

## 2020-02-11 PROCEDURE — 3074F SYST BP LT 130 MM HG: CPT | Performed by: FAMILY MEDICINE

## 2020-02-11 PROCEDURE — 1036F TOBACCO NON-USER: CPT | Performed by: FAMILY MEDICINE

## 2020-02-11 PROCEDURE — 3008F BODY MASS INDEX DOCD: CPT | Performed by: FAMILY MEDICINE

## 2020-02-11 PROCEDURE — 1160F RVW MEDS BY RX/DR IN RCRD: CPT | Performed by: FAMILY MEDICINE

## 2020-02-11 PROCEDURE — 99214 OFFICE O/P EST MOD 30 MIN: CPT | Performed by: FAMILY MEDICINE

## 2020-02-11 PROCEDURE — 3078F DIAST BP <80 MM HG: CPT | Performed by: FAMILY MEDICINE

## 2020-02-11 RX ORDER — LANCETS 30 GAUGE
EACH MISCELLANEOUS
Qty: 100 EACH | Refills: 5 | Status: SHIPPED | OUTPATIENT
Start: 2020-02-11 | End: 2020-07-15 | Stop reason: SDUPTHER

## 2020-02-11 RX ORDER — LANCETS 30 GAUGE
EACH MISCELLANEOUS
COMMUNITY
End: 2020-02-11 | Stop reason: SDUPTHER

## 2020-02-11 RX ORDER — ALBUTEROL SULFATE 90 UG/1
2 AEROSOL, METERED RESPIRATORY (INHALATION) EVERY 4 HOURS PRN
Qty: 1 INHALER | Refills: 1 | Status: SHIPPED | OUTPATIENT
Start: 2020-02-11

## 2020-02-11 NOTE — TELEPHONE ENCOUNTER
Bolivar Caballero called patient study report is in [facility], there is no acute pulmonary disease

## 2020-02-11 NOTE — PROGRESS NOTES
Assessment/Plan:       Problem List Items Addressed This Visit        Endocrine    Uncontrolled type 2 diabetes mellitus with hyperglycemia ([address] )     Uncontrolled  Refilled lancets per request  BS improving with diet/exercise, but she has not started glipizide  Encouraged to try medication  Can't afford any brand name meds and can't tolerate metformin  Lab Results   Component Value Date    HGBA1C 9 9 (H) 01/20/2020            Relevant Medications    ONETOUCH DELICA LANCETS 82G MISC      Other Visit Diagnoses     Acute recurrent maxillary sinusitis    -  Primary- rx erythromycin  Pt feels strongly that she needs abx at this time  Relevant Medications    erythromycin (PCE) 333 MG EC tablet    Cough    - refill albuterol, check chest x-ray  Relevant Medications    albuterol (PROVENTIL HFA,VENTOLIN HFA) 90 mcg/act inhaler    Other Relevant Orders    XR chest pa & lateral (Completed)    Encounter for screening for lung cancer    - at baseline, she is asymptomatic    (current upper respiratory infection)  Reviewed ct lung screening guidelines  She is agreeable to screening  She will schedule when well  Relevant Orders    CT lung screening program    Hx of tobacco use, presenting hazards to health        Relevant Orders    CT lung screening program                   Subjective:     Lore Villarreal is a 69 y o  female here today with chief complaint below:  Chief Complaint   Patient presents with    Swollen Glands     x3 days  glands are swollen    Sore Throat     x3 days  Feels like razor   Nasal Congestion     x3 days  Chest feels heavy, wheezing  feels worse when sharron down  - CC above per clinical staff and reviewed  HPI:    On Friday night last week didn't feel right  Saturday night started coughing  Cough woke her from sleep at night  Throat felt like razorblades  Had erythromycin tabs at home (had two left at home from a prior rx)- took one sat, one Sunday  Coughing when she is in bed    Throat is sore  Chest hurts to breathe  She went to refill her inhaler but it had  at the pharmacy  Tried steam   Ears hurt, glands hurt, throat hurts, head hurts  Not sure if wheezing but chest is sore  Face hurts  Gets recurrent sinus infections  Would like erythromycin as this works well for her  No fever  Body aches- same as usual, but behind the R scapula  Cough is still minimally productive, more dry than initially  This is 10 years next month since she quit smoking  She is interested in low dose CT lung ca screening  Advised not to get done while fighting this acute illness  Her BS remain elevated, hasn't started to take medication yet  Answers for HPI/ROS submitted by the patient on 2020   Cough  Chronicity: new  Onset: yesterday  Progression since onset: gradually worsening  Frequency: hourly  Cough characteristics: productive of sputum  Aggravated by: other    The following portions of the patient's history were reviewed and updated as appropriate: allergies, current medications, past family history, past medical history, past social history, past surgical history and problem list     ROS:  Review of Systems   Constitutional: Positive for fatigue  HENT: Positive for postnasal drip and sore throat  Respiratory: Positive for cough  No fever, congestion, chest pain, shortness of breath, nausea, vomiting, diarrhea, constipation, blood in stool, urinary concerns, mood changes  Rest of ROS neg except as above      Objective:      /76   Pulse 63   Temp (!) 97 3 °F (36 3 °C) (Tympanic)   Resp 16   Ht 5' 8" (1 727 m)   Wt 90 8 kg (200 lb 3 2 oz)   SpO2 96%   BMI 30 44 kg/m²   BP Readings from Last 3 Encounters:   20 128/76   20 116/72   19 126/74     Wt Readings from Last 3 Encounters:   20 90 8 kg (200 lb 3 2 oz)   20 90 4 kg (199 lb 3 2 oz)   19 92 5 kg (204 lb)               Physical Exam:   Physical Exam Constitutional: She is oriented to person, place, and time  She appears well-developed and well-nourished  HENT:   Head: Normocephalic and atraumatic  Cobblestoning posterior oropharynx  TMs- effusions, no erythema  Maxillary sinus tenderness   Eyes: Conjunctivae are normal    Cardiovascular: Normal rate, regular rhythm and normal heart sounds  No murmur heard  Pulmonary/Chest: Effort normal and breath sounds normal  No respiratory distress  She has no wheezes  Abdominal: Soft  There is no tenderness  There is no rebound and no guarding  Musculoskeletal: She exhibits no edema  Lymphadenopathy:     She has no cervical adenopathy  Neurological: She is alert and oriented to person, place, and time  Psychiatric: She has a normal mood and affect  Her behavior is normal    Nursing note and vitals reviewed

## 2020-02-12 NOTE — ASSESSMENT & PLAN NOTE
Uncontrolled  Refilled lancets per request  BS improving with diet/exercise, but she has not started glipizide  Encouraged to try medication    Can't afford any brand name meds and can't tolerate metformin  Lab Results   Component Value Date    HGBA1C 9 9 (H) 01/20/2020

## 2020-04-28 ENCOUNTER — HOSPITAL ENCOUNTER (EMERGENCY)
Facility: HOSPITAL | Age: 75
Discharge: HOME | End: 2020-04-28
Attending: EMERGENCY MEDICINE
Payer: MEDICARE

## 2020-04-28 ENCOUNTER — APPOINTMENT (EMERGENCY)
Dept: RADIOLOGY | Facility: HOSPITAL | Age: 75
End: 2020-04-28
Payer: MEDICARE

## 2020-04-28 ENCOUNTER — TELEMEDICINE (OUTPATIENT)
Dept: FAMILY MEDICINE CLINIC | Facility: CLINIC | Age: 75
End: 2020-04-28
Payer: MEDICARE

## 2020-04-28 VITALS
WEIGHT: 199.96 LBS | DIASTOLIC BLOOD PRESSURE: 105 MMHG | HEART RATE: 82 BPM | BODY MASS INDEX: 30.4 KG/M2 | OXYGEN SATURATION: 99 % | SYSTOLIC BLOOD PRESSURE: 141 MMHG | TEMPERATURE: 98.1 F | RESPIRATION RATE: 18 BRPM

## 2020-04-28 VITALS
HEIGHT: 68 IN | BODY MASS INDEX: 30.31 KG/M2 | WEIGHT: 200 LBS | TEMPERATURE: 98.1 F | SYSTOLIC BLOOD PRESSURE: 120 MMHG | HEART RATE: 65 BPM | DIASTOLIC BLOOD PRESSURE: 75 MMHG

## 2020-04-28 DIAGNOSIS — R07.89 CHEST PRESSURE: Primary | ICD-10-CM

## 2020-04-28 DIAGNOSIS — Z12.11 SCREEN FOR COLON CANCER: ICD-10-CM

## 2020-04-28 DIAGNOSIS — R42 LIGHTHEADEDNESS: ICD-10-CM

## 2020-04-28 DIAGNOSIS — J06.9 UPPER RESPIRATORY INFECTION, ACUTE: Primary | ICD-10-CM

## 2020-04-28 LAB
ANION GAP SERPL CALCULATED.3IONS-SCNC: 9 MMOL/L (ref 4–13)
ATRIAL RATE: 62 BPM
BASOPHILS # BLD AUTO: 0.07 THOUSANDS/ΜL (ref 0–0.1)
BASOPHILS NFR BLD AUTO: 1 % (ref 0–1)
BUN SERPL-MCNC: 20 MG/DL (ref 5–25)
CALCIUM SERPL-MCNC: 9.8 MG/DL (ref 8.3–10.1)
CHLORIDE SERPL-SCNC: 100 MMOL/L (ref 100–108)
CO2 SERPL-SCNC: 29 MMOL/L (ref 21–32)
CREAT SERPL-MCNC: 0.73 MG/DL (ref 0.6–1.3)
EOSINOPHIL # BLD AUTO: 0.17 THOUSAND/ΜL (ref 0–0.61)
EOSINOPHIL NFR BLD AUTO: 2 % (ref 0–6)
ERYTHROCYTE [DISTWIDTH] IN BLOOD BY AUTOMATED COUNT: 12.6 % (ref 11.6–15.1)
GFR SERPL CREATININE-BSD FRML MDRD: 82 ML/MIN/1.73SQ M
GLUCOSE SERPL-MCNC: 210 MG/DL (ref 65–140)
HCT VFR BLD AUTO: 46.3 % (ref 34.8–46.1)
HGB BLD-MCNC: 15.7 G/DL (ref 11.5–15.4)
IMM GRANULOCYTES # BLD AUTO: 0.04 THOUSAND/UL (ref 0–0.2)
IMM GRANULOCYTES NFR BLD AUTO: 0 % (ref 0–2)
LYMPHOCYTES # BLD AUTO: 2.26 THOUSANDS/ΜL (ref 0.6–4.47)
LYMPHOCYTES NFR BLD AUTO: 22 % (ref 14–44)
MCH RBC QN AUTO: 33.5 PG (ref 26.8–34.3)
MCHC RBC AUTO-ENTMCNC: 33.9 G/DL (ref 31.4–37.4)
MCV RBC AUTO: 99 FL (ref 82–98)
MONOCYTES # BLD AUTO: 0.76 THOUSAND/ΜL (ref 0.17–1.22)
MONOCYTES NFR BLD AUTO: 7 % (ref 4–12)
NEUTROPHILS # BLD AUTO: 7 THOUSANDS/ΜL (ref 1.85–7.62)
NEUTS SEG NFR BLD AUTO: 68 % (ref 43–75)
NRBC BLD AUTO-RTO: 0 /100 WBCS
P AXIS: 75 DEGREES
PLATELET # BLD AUTO: 322 THOUSANDS/UL (ref 149–390)
PMV BLD AUTO: 10.6 FL (ref 8.9–12.7)
POTASSIUM SERPL-SCNC: 3.8 MMOL/L (ref 3.5–5.3)
PR INTERVAL: 158 MS
QRS AXIS: 65 DEGREES
QRSD INTERVAL: 80 MS
QT INTERVAL: 406 MS
QTC INTERVAL: 412 MS
RBC # BLD AUTO: 4.69 MILLION/UL (ref 3.81–5.12)
SARS-COV-2 RNA RESP QL NAA+PROBE: NEGATIVE
SODIUM SERPL-SCNC: 138 MMOL/L (ref 136–145)
T WAVE AXIS: 74 DEGREES
TROPONIN I SERPL-MCNC: <0.02 NG/ML
VENTRICULAR RATE: 62 BPM
WBC # BLD AUTO: 10.3 THOUSAND/UL (ref 4.31–10.16)

## 2020-04-28 PROCEDURE — 36415 COLL VENOUS BLD VENIPUNCTURE: CPT | Performed by: EMERGENCY MEDICINE

## 2020-04-28 PROCEDURE — 85025 COMPLETE CBC W/AUTO DIFF WBC: CPT | Performed by: EMERGENCY MEDICINE

## 2020-04-28 PROCEDURE — 71045 X-RAY EXAM CHEST 1 VIEW: CPT

## 2020-04-28 PROCEDURE — 99284 EMERGENCY DEPT VISIT MOD MDM: CPT

## 2020-04-28 PROCEDURE — 93005 ELECTROCARDIOGRAM TRACING: CPT

## 2020-04-28 PROCEDURE — 87635 SARS-COV-2 COVID-19 AMP PRB: CPT | Performed by: EMERGENCY MEDICINE

## 2020-04-28 PROCEDURE — 99214 OFFICE O/P EST MOD 30 MIN: CPT | Performed by: FAMILY MEDICINE

## 2020-04-28 PROCEDURE — 93010 ELECTROCARDIOGRAM REPORT: CPT | Performed by: INTERNAL MEDICINE

## 2020-04-28 PROCEDURE — 99284 EMERGENCY DEPT VISIT MOD MDM: CPT | Performed by: EMERGENCY MEDICINE

## 2020-04-28 PROCEDURE — 84484 ASSAY OF TROPONIN QUANT: CPT | Performed by: EMERGENCY MEDICINE

## 2020-04-28 PROCEDURE — 80048 BASIC METABOLIC PNL TOTAL CA: CPT | Performed by: EMERGENCY MEDICINE

## 2020-04-29 ENCOUNTER — TELEPHONE (OUTPATIENT)
Dept: FAMILY MEDICINE CLINIC | Facility: CLINIC | Age: 75
End: 2020-04-29

## 2020-04-30 ENCOUNTER — TELEMEDICINE (OUTPATIENT)
Dept: FAMILY MEDICINE CLINIC | Facility: CLINIC | Age: 75
End: 2020-04-30
Payer: MEDICARE

## 2020-04-30 VITALS — DIASTOLIC BLOOD PRESSURE: 73 MMHG | TEMPERATURE: 98.1 F | SYSTOLIC BLOOD PRESSURE: 127 MMHG

## 2020-04-30 DIAGNOSIS — U07.1 CLINICAL DIAGNOSIS OF COVID-19: Primary | ICD-10-CM

## 2020-04-30 PROCEDURE — 99214 OFFICE O/P EST MOD 30 MIN: CPT | Performed by: FAMILY MEDICINE

## 2020-05-04 ENCOUNTER — TELEMEDICINE (OUTPATIENT)
Dept: FAMILY MEDICINE CLINIC | Facility: CLINIC | Age: 75
End: 2020-05-04
Payer: MEDICARE

## 2020-05-04 VITALS
WEIGHT: 199 LBS | SYSTOLIC BLOOD PRESSURE: 107 MMHG | TEMPERATURE: 98.6 F | HEART RATE: 74 BPM | BODY MASS INDEX: 30.16 KG/M2 | DIASTOLIC BLOOD PRESSURE: 65 MMHG | HEIGHT: 68 IN

## 2020-05-04 DIAGNOSIS — U07.1 CLINICAL DIAGNOSIS OF COVID-19: Primary | ICD-10-CM

## 2020-05-04 PROCEDURE — 99213 OFFICE O/P EST LOW 20 MIN: CPT | Performed by: FAMILY MEDICINE

## 2020-05-07 ENCOUNTER — TELEMEDICINE (OUTPATIENT)
Dept: FAMILY MEDICINE CLINIC | Facility: CLINIC | Age: 75
End: 2020-05-07
Payer: MEDICARE

## 2020-05-07 VITALS — WEIGHT: 199 LBS | HEIGHT: 68 IN | TEMPERATURE: 98.2 F | BODY MASS INDEX: 30.16 KG/M2

## 2020-05-07 DIAGNOSIS — U07.1 COVID-19 VIRUS INFECTION: Primary | ICD-10-CM

## 2020-05-07 PROCEDURE — 99213 OFFICE O/P EST LOW 20 MIN: CPT | Performed by: FAMILY MEDICINE

## 2020-05-11 ENCOUNTER — TELEMEDICINE (OUTPATIENT)
Dept: FAMILY MEDICINE CLINIC | Facility: CLINIC | Age: 75
End: 2020-05-11
Payer: MEDICARE

## 2020-05-11 DIAGNOSIS — U07.1 COVID-19 VIRUS INFECTION: Primary | ICD-10-CM

## 2020-05-11 PROCEDURE — 99213 OFFICE O/P EST LOW 20 MIN: CPT | Performed by: FAMILY MEDICINE

## 2020-05-18 ENCOUNTER — TELEMEDICINE (OUTPATIENT)
Dept: FAMILY MEDICINE CLINIC | Facility: CLINIC | Age: 75
End: 2020-05-18
Payer: MEDICARE

## 2020-05-18 DIAGNOSIS — U07.1 CLINICAL DIAGNOSIS OF COVID-19: ICD-10-CM

## 2020-05-18 DIAGNOSIS — U07.1 COVID-19 VIRUS INFECTION: Primary | ICD-10-CM

## 2020-05-18 PROCEDURE — 99213 OFFICE O/P EST LOW 20 MIN: CPT | Performed by: FAMILY MEDICINE

## 2020-07-15 DIAGNOSIS — E11.65 UNCONTROLLED TYPE 2 DIABETES MELLITUS WITH HYPERGLYCEMIA (HCC): ICD-10-CM

## 2020-07-15 DIAGNOSIS — E03.9 HYPOTHYROIDISM, UNSPECIFIED TYPE: ICD-10-CM

## 2020-07-15 DIAGNOSIS — I10 BENIGN ESSENTIAL HYPERTENSION: ICD-10-CM

## 2020-07-16 RX ORDER — LANCETS 30 GAUGE
EACH MISCELLANEOUS
Qty: 200 EACH | Refills: 5 | Status: SHIPPED | OUTPATIENT
Start: 2020-07-16 | End: 2022-04-21 | Stop reason: SDUPTHER

## 2020-07-16 RX ORDER — LEVOTHYROXINE SODIUM 0.1 MG/1
100 TABLET ORAL DAILY
Qty: 90 TABLET | Refills: 1 | Status: SHIPPED | OUTPATIENT
Start: 2020-07-16 | End: 2020-10-29 | Stop reason: SDUPTHER

## 2020-07-16 RX ORDER — LOSARTAN POTASSIUM AND HYDROCHLOROTHIAZIDE 12.5; 5 MG/1; MG/1
1 TABLET ORAL DAILY
Qty: 90 TABLET | Refills: 1 | Status: SHIPPED | OUTPATIENT
Start: 2020-07-16 | End: 2021-01-07 | Stop reason: SDUPTHER

## 2020-07-23 DIAGNOSIS — E03.9 HYPOTHYROIDISM, UNSPECIFIED TYPE: ICD-10-CM

## 2020-07-23 RX ORDER — LEVOTHYROXINE SODIUM 100 MCG
TABLET ORAL
Qty: 90 TABLET | Refills: 1 | OUTPATIENT
Start: 2020-07-23

## 2020-07-23 NOTE — TELEPHONE ENCOUNTER
Automated refill request received from pharmacy  Please let patient know that we don't take automated refill requests and see if a refill is needed  Thanks    (synthroid)

## 2020-07-24 NOTE — TELEPHONE ENCOUNTER
Placed call to patient, left a detailed voice message (okay per communication form) advising we received a refill request from the patients pharmacy and it was denied as we do not accept pharmacy refill requests  Requested a return call or portal message if the patient is in need of a refill

## 2020-08-20 ENCOUNTER — TELEPHONE (OUTPATIENT)
Dept: FAMILY MEDICINE CLINIC | Facility: CLINIC | Age: 75
End: 2020-08-20

## 2020-08-20 NOTE — TELEPHONE ENCOUNTER
Patient is overdue for AWV  Please call to schedule  Once scheduled, please route to clinical team for follow up with provider to see if labs are needed to be ordered for the upcoming appointment

## 2020-10-01 ENCOUNTER — TELEPHONE (OUTPATIENT)
Dept: FAMILY MEDICINE CLINIC | Facility: CLINIC | Age: 75
End: 2020-10-01

## 2020-10-05 DIAGNOSIS — E11.65 UNCONTROLLED TYPE 2 DIABETES MELLITUS WITH HYPERGLYCEMIA (HCC): ICD-10-CM

## 2020-10-05 RX ORDER — BLOOD SUGAR DIAGNOSTIC
STRIP MISCELLANEOUS
Qty: 300 EACH | Refills: 1 | Status: SHIPPED | OUTPATIENT
Start: 2020-10-05 | End: 2022-04-21 | Stop reason: SDUPTHER

## 2020-10-14 LAB
LEFT EYE DIABETIC RETINOPATHY: NORMAL
RIGHT EYE DIABETIC RETINOPATHY: NORMAL

## 2020-10-27 ENCOUNTER — PATIENT MESSAGE (OUTPATIENT)
Dept: FAMILY MEDICINE CLINIC | Facility: CLINIC | Age: 75
End: 2020-10-27

## 2020-10-28 LAB
25(OH)D3 SERPL-MCNC: 19 NG/ML (ref 30–100)
ALBUMIN SERPL-MCNC: 4.4 G/DL (ref 3.6–5.1)
ALBUMIN/GLOB SERPL: 1.6 (CALC) (ref 1–2.5)
ALP SERPL-CCNC: 94 U/L (ref 37–153)
ALT SERPL-CCNC: 19 U/L (ref 6–29)
AST SERPL-CCNC: 17 U/L (ref 10–35)
BILIRUB SERPL-MCNC: 1 MG/DL (ref 0.2–1.2)
BUN SERPL-MCNC: 16 MG/DL (ref 7–25)
BUN/CREAT SERPL: ABNORMAL (CALC) (ref 6–22)
CALCIUM SERPL-MCNC: 8.9 MG/DL (ref 8.6–10.4)
CHLORIDE SERPL-SCNC: 100 MMOL/L (ref 98–110)
CHOLEST SERPL-MCNC: 156 MG/DL
CHOLEST/HDLC SERPL: 3.3 (CALC)
CO2 SERPL-SCNC: 28 MMOL/L (ref 20–32)
CREAT SERPL-MCNC: 0.65 MG/DL (ref 0.6–0.93)
GLOBULIN SER CALC-MCNC: 2.7 G/DL (CALC) (ref 1.9–3.7)
GLUCOSE SERPL-MCNC: 289 MG/DL (ref 65–99)
HBA1C MFR BLD: 10.1 % OF TOTAL HGB
HDLC SERPL-MCNC: 48 MG/DL
LDLC SERPL CALC-MCNC: 84 MG/DL (CALC)
NONHDLC SERPL-MCNC: 108 MG/DL (CALC)
POTASSIUM SERPL-SCNC: 4 MMOL/L (ref 3.5–5.3)
PROT SERPL-MCNC: 7.1 G/DL (ref 6.1–8.1)
SL AMB EGFR AFRICAN AMERICAN: 102 ML/MIN/1.73M2
SL AMB EGFR NON AFRICAN AMERICAN: 88 ML/MIN/1.73M2
SODIUM SERPL-SCNC: 135 MMOL/L (ref 135–146)
TRIGL SERPL-MCNC: 141 MG/DL
TSH SERPL-ACNC: 4.39 MIU/L (ref 0.4–4.5)

## 2020-10-29 ENCOUNTER — OFFICE VISIT (OUTPATIENT)
Dept: FAMILY MEDICINE CLINIC | Facility: CLINIC | Age: 75
End: 2020-10-29
Payer: MEDICARE

## 2020-10-29 VITALS
TEMPERATURE: 98.7 F | HEART RATE: 64 BPM | WEIGHT: 201.4 LBS | BODY MASS INDEX: 30.52 KG/M2 | HEIGHT: 68 IN | SYSTOLIC BLOOD PRESSURE: 126 MMHG | DIASTOLIC BLOOD PRESSURE: 74 MMHG | RESPIRATION RATE: 16 BRPM | OXYGEN SATURATION: 96 %

## 2020-10-29 DIAGNOSIS — E11.65 UNCONTROLLED TYPE 2 DIABETES MELLITUS WITH HYPERGLYCEMIA (HCC): ICD-10-CM

## 2020-10-29 DIAGNOSIS — E78.2 MIXED HYPERLIPIDEMIA: ICD-10-CM

## 2020-10-29 DIAGNOSIS — E03.9 HYPOTHYROIDISM, UNSPECIFIED TYPE: ICD-10-CM

## 2020-10-29 DIAGNOSIS — E11.65 UNCONTROLLED TYPE 2 DIABETES MELLITUS WITH HYPERGLYCEMIA (HCC): Primary | ICD-10-CM

## 2020-10-29 DIAGNOSIS — I10 BENIGN ESSENTIAL HYPERTENSION: ICD-10-CM

## 2020-10-29 PROCEDURE — 99214 OFFICE O/P EST MOD 30 MIN: CPT | Performed by: FAMILY MEDICINE

## 2020-10-29 RX ORDER — ACETAZOLAMIDE 500 MG/1
CAPSULE, EXTENDED RELEASE ORAL
COMMUNITY
Start: 2020-10-16 | End: 2021-03-09

## 2020-10-29 RX ORDER — ROSUVASTATIN CALCIUM 5 MG/1
TABLET, COATED ORAL
Qty: 90 TABLET | Refills: 0 | Status: SHIPPED | OUTPATIENT
Start: 2020-10-29 | End: 2021-01-18

## 2020-10-29 RX ORDER — PREDNISOLONE ACETATE 10 MG/ML
SUSPENSION/ DROPS OPHTHALMIC
COMMUNITY
Start: 2020-10-16 | End: 2021-03-09

## 2020-10-29 RX ORDER — ROSUVASTATIN CALCIUM 5 MG/1
5 TABLET, COATED ORAL DAILY
Qty: 30 TABLET | Refills: 5 | Status: SHIPPED | OUTPATIENT
Start: 2020-10-29 | End: 2020-10-29 | Stop reason: SDUPTHER

## 2020-10-29 RX ORDER — KETOROLAC TROMETHAMINE 5 MG/ML
SOLUTION OPHTHALMIC
COMMUNITY
Start: 2020-10-16 | End: 2021-03-09

## 2020-10-29 RX ORDER — GATIFLOXACIN 5 MG/ML
SOLUTION/ DROPS OPHTHALMIC
COMMUNITY
Start: 2020-10-16 | End: 2021-04-28

## 2020-10-29 RX ORDER — ERGOCALCIFEROL 1.25 MG/1
50000 CAPSULE ORAL WEEKLY
Qty: 12 CAPSULE | Refills: 0 | Status: SHIPPED | OUTPATIENT
Start: 2020-10-29 | End: 2021-03-09

## 2020-10-29 RX ORDER — LOTEPREDNOL ETABONATE 5 MG/G
GEL OPHTHALMIC
COMMUNITY
Start: 2020-08-29 | End: 2021-03-09

## 2020-10-29 RX ORDER — LEVOTHYROXINE SODIUM 112 UG/1
112 TABLET ORAL DAILY
Qty: 90 TABLET | Refills: 1 | Status: SHIPPED | OUTPATIENT
Start: 2020-10-29 | End: 2021-03-09 | Stop reason: SDUPTHER

## 2020-10-29 RX ORDER — ROSUVASTATIN CALCIUM 5 MG/1
5 TABLET, COATED ORAL DAILY
Qty: 30 TABLET | Refills: 5 | Status: SHIPPED | OUTPATIENT
Start: 2020-10-29 | End: 2020-10-29

## 2020-10-31 ENCOUNTER — TELEPHONE (OUTPATIENT)
Dept: FAMILY MEDICINE CLINIC | Facility: CLINIC | Age: 75
End: 2020-10-31

## 2020-12-07 ENCOUNTER — TELEPHONE (OUTPATIENT)
Dept: FAMILY MEDICINE CLINIC | Facility: CLINIC | Age: 75
End: 2020-12-07

## 2021-01-07 ENCOUNTER — TELEMEDICINE (OUTPATIENT)
Dept: FAMILY MEDICINE CLINIC | Facility: CLINIC | Age: 76
End: 2021-01-07
Payer: MEDICARE

## 2021-01-07 VITALS
HEIGHT: 68 IN | BODY MASS INDEX: 30.46 KG/M2 | WEIGHT: 201 LBS | DIASTOLIC BLOOD PRESSURE: 72 MMHG | TEMPERATURE: 97.5 F | HEART RATE: 80 BPM | SYSTOLIC BLOOD PRESSURE: 120 MMHG

## 2021-01-07 DIAGNOSIS — Z12.31 SCREENING MAMMOGRAM, ENCOUNTER FOR: Primary | ICD-10-CM

## 2021-01-07 DIAGNOSIS — I10 BENIGN ESSENTIAL HYPERTENSION: ICD-10-CM

## 2021-01-07 DIAGNOSIS — E11.65 UNCONTROLLED TYPE 2 DIABETES MELLITUS WITH HYPERGLYCEMIA (HCC): ICD-10-CM

## 2021-01-07 PROCEDURE — 99214 OFFICE O/P EST MOD 30 MIN: CPT | Performed by: FAMILY MEDICINE

## 2021-01-07 RX ORDER — LOSARTAN POTASSIUM AND HYDROCHLOROTHIAZIDE 12.5; 5 MG/1; MG/1
1 TABLET ORAL DAILY
Qty: 90 TABLET | Refills: 1 | Status: SHIPPED | OUTPATIENT
Start: 2021-01-07 | End: 2021-03-09 | Stop reason: SDUPTHER

## 2021-01-07 NOTE — PATIENT INSTRUCTIONS
Decrease metformin to 500 mg twice a day with breakfast and dinner  You can add victoza 0 6 mg daily   Let me know how your blood sugar readings look after 2-3 weeks  (let me know sooner if you have bothersome side effects)  Let me know how you are doing in terms of appetite and GI side effects

## 2021-01-07 NOTE — PROGRESS NOTES
Virtual Regular Visit      Assessment/Plan:    Problem List Items Addressed This Visit        Endocrine    Uncontrolled type 2 diabetes mellitus with hyperglycemia ([address] )     After her last A1C , patient was agreeable to start medication for DM2, which she had resisted for some time and had been controlling with diet  She started metformin and was able to increase to a total of 1500 mg a day, but with significant diarrhea from this  She was previously on victoza  Did have some GI effects with this but is willing to try restarting  Will see if she tolerates cutting down on metformin and adding victoza, titrating up victoza as tolerable  Reviewed plan below:   Patient Instructions   Decrease metformin to 500 mg twice a day with breakfast and dinner  You can add victoza 0 6 mg daily   Let me know how your blood sugar readings look after 2-3 weeks  (let me know sooner if you have bothersome side effects)  Let me know how you are doing in terms of appetite and GI side effects          Lab Results   Component Value Date    HGBA1C 10 1 (H) 10/27/2020            Relevant Medications    metFORMIN (GLUCOPHAGE) 500 mg tablet       Cardiovascular and Mediastinum    Benign essential hypertension     Controlled well on medication and she requests refill today of lisinopril-hctz         Relevant Medications    losartan-hydrochlorothiazide (HYZAAR) 50-12 5 mg per tablet      Other Visit Diagnoses     Screening mammogram, encounter for    -  Primary    Relevant Orders    Mammo screening bilateral w 3d & cad               Reason for visit is   Chief Complaint   Patient presents with    Diarrhea     started metformin, titrate up to 1000 mg twice a day, BS this morning 140    Virtual Regular Visit        Encounter provider Israel Banks MD    Provider located at [address] [place] [id number] [phone]      Recent Visits  Date Type Provider Dept 01/07/21 Telemedicine Rachelle Russell MD [facility]   Showing recent visits within past 7 days and meeting all other requirements     Future Appointments  No visits were found meeting these conditions  Showing future appointments within next 150 days and meeting all other requirements        The patient was identified by name and date of birth  Jerzy Rouse was informed that this is a telemedicine visit and that the visit is being conducted through [facility] and patient was informed that this is a secure, HIPAA-compliant platform  She agrees to proceed     My office door was closed  No one else was in the room  She acknowledged consent and understanding of privacy and security of the video platform  The patient has agreed to participate and understands they can discontinue the visit at any time  Patient is aware this is a billable service  Subjective  Jerzy Speaker is a 77 y o  female with concerns of diarrhea related to metformin  She started with 500 mg daily, then 1000 mg, then 1000 mg in the AM, 500 mg dinner  She is having bothersome side effects  She is taking with food which did help somewhat  Didn't increase to 1000 mg twice a day due to GI side effects  She notes on a positive note- her BS is improved  She is testing up to 8 times a day to monitor  This AM her BS was 140  Typically seeing fasting -160 which is a bit improvement  Has only had three occasions since starting metformin where her BS increased >200  Says she will have good days with no diarrhea  Never more than two days in a row  Then will have a few days of diarrhea within an hour of eating  She is having diarrhea at night, hard to make it to the bathroom in time  She says that she is having trouble eating  Feels nausea just thinking about certain foods  Good with meatloaf, whole wheat pasta, crackers/cheese, soups  It has been since she started metformin      Drinks no sugar gatorade when she is having more diarrhea      She has appointment Feb 10th for review of labs, repeat     HPI     Past Medical History:   Diagnosis Date    Diabetes mellitus ([facility] )     Diabetes mellitus type 2, controlled ([facility] )     Disease of thyroid gland     GERD (gastroesophageal reflux disease)     Glaucoma     H/O tooth extraction     Had top row of teeth removed around April 2016    Headache(784 0)     Hypertension     Pneumonia     Stroke ([facility] )     Visual impairment        Past Surgical History:   Procedure Laterality Date    CATARACT EXTRACTION Right     5 years ago    CYST REMOVAL Left 04/15/2014    ear lobe      EAR SURGERY  1970    Left ear cyst removal     EYE SURGERY      OTHER SURGICAL HISTORY       right upper arm          Current Outpatient Medications   Medication Sig Dispense Refill    acetaZOLAMIDE (DIAMOX) 500 mg capsule TK ONE C PO  AFTER SURGERY      albuterol (PROVENTIL HFA,VENTOLIN HFA) 90 mcg/act inhaler Inhale 2 puffs every 4 (four) hours as needed for wheezing or shortness of breath 1 Inhaler 1    aspirin 81 MG tablet Take 81 mg by mouth daily      COMBIGAN 0 2-0 5 % Administer 1 drop to the right eye 2 (two) times a day   3    cycloSPORINE (RESTASIS) 0 05 % ophthalmic emulsion Administer 1 drop to both eyes every 12 (twelve) hours       dorzolamide (TRUSOPT) 2 % ophthalmic solution Administer 1 drop to the right eye 3 (three) times a day   1    ergocalciferol (VITAMIN D2) 50,000 units Take 1 capsule (50,000 Units total) by mouth once a week for 12 doses 12 capsule 0    gatifloxacin (ZYMAXID) 0 5 %       glucose blood (OneTouch Verio) test strip USE TO TEST BLOOD SUGAR UP TO FOUR TIMES DAILY 300 each 1    ketorolac (ACULAR) 0 5 % ophthalmic solution INT 1 GTT IN OS QID AFTER SURGERY      levothyroxine 112 mcg tablet Take 1 tablet (112 mcg total) by mouth daily BRAND SYNTHROID PLEASE 90 tablet 1    losartan-hydrochlorothiazide (HYZAAR) 50-12 5 mg per tablet Take 1 tablet by mouth daily 90 tablet 1    Lotemax 0 5 % ophth gel INSTILL 1 GTT OD AS DIRECTED BEFORE LASER  AFTER LASER BID F OR 4 DAYS      LUMIGAN 0 01 % ophthalmic drops Administer 1 drop to both eyes daily at bedtime   3    metFORMIN (GLUCOPHAGE) 500 mg tablet Take 1 tablet (500 mg total) by mouth 2 (two) times a day with meals START WITH 1 TABLET BY MOUTH DAILY FOR 1 TABLET WEEK, THEN 1 TABLET BY MOUTH TWICE DAILY FOR 1 WEEK, THEN 2 TABLETS BY MOUTH TWICE DAILY 180 tablet 1    OneTouch Delica Lancets 09B MISC To test BS up to four times daily- E11 65 200 each 5    prednisoLONE acetate (PRED FORTE) 1 % ophthalmic suspension INSTILL 1 GTT INTO LEFT EYE Q 1-2 H AFTER SURGERY AS DIRECTED      rosuvastatin (CRESTOR) 5 mg tablet TAKE 1 TABLET(5 MG) BY MOUTH DAILY 90 tablet 0    TobraDex ophthalmic ointment       ibuprofen (MOTRIN) 200 mg tablet Take by mouth every 6 (six) hours as needed for mild pain      liraglutide (VICTOZA) injection Start with 0 6 mg daily, increase as directed to 1 2 mg (call physician first) (Patient not taking: Reported on 1/7/2021) 3 pen 3     No current facility-administered medications for this visit  Allergies   Allergen Reactions    Eggs Or Egg-Derived Products Anaphylaxis    Albumen, Egg Hives    Antihistamines, Diphenhydramine-Type     Epinephrine Syncope     Root canal 25 years ago     Fluzone [Flu Virus Vaccine] Hives, Abdominal Pain and Facial Swelling    Lidocaine      Syncope, tachycardia with local anesthetic with epinephrine    Zinc Rash       Review of Systems    Video Exam    Vitals:    01/07/21 0833   BP: 120/72   Pulse: 80   Temp: 97 5 °F (36 4 °C)   Weight: 91 2 kg (201 lb)   Height: 5' 8" (1 727 m)       Physical Exam  Vitals signs and nursing note reviewed  Constitutional:       General: She is not in acute distress  Appearance: Normal appearance  HENT:      Head: Normocephalic and atraumatic        Mouth/Throat:      Mouth: Mucous membranes are moist    Eyes: Conjunctiva/sclera: Conjunctivae normal    Pulmonary:      Effort: Pulmonary effort is normal  No respiratory distress  Breath sounds: No stridor  Neurological:      Mental Status: She is alert and oriented to person, place, and time  Psychiatric:         Mood and Affect: Mood normal          Behavior: Behavior normal           I spent 30 minutes directly with the patient during this visit with over 50% of this visit spent in counseling patient regarding medication options, BS control, future plan to titrate medication, etc       VIRTUAL VISIT DISCLAIMER    Romana Garcia acknowledges that she has consented to an online visit or consultation  She understands that the online visit is based solely on information provided by her, and that, in the absence of a face-to-face physical evaluation by the physician, the diagnosis she receives is both limited and provisional in terms of accuracy and completeness  This is not intended to replace a full medical face-to-face evaluation by the physician  Romana Garcia understands and accepts these terms

## 2021-01-08 NOTE — ASSESSMENT & PLAN NOTE
After her last A1C , patient was agreeable to start medication for DM2, which she had resisted for some time and had been controlling with diet  She started metformin and was able to increase to a total of 1500 mg a day, but with significant diarrhea from this  She was previously on victoza  Did have some GI effects with this but is willing to try restarting  Will see if she tolerates cutting down on metformin and adding victoza, titrating up victoza as tolerable  Reviewed plan below:   Patient Instructions   Decrease metformin to 500 mg twice a day with breakfast and dinner  You can add victoza 0 6 mg daily   Let me know how your blood sugar readings look after 2-3 weeks  (let me know sooner if you have bothersome side effects)  Let me know how you are doing in terms of appetite and GI side effects          Lab Results   Component Value Date    HGBA1C 10 1 (H) 10/27/2020

## 2021-01-14 ENCOUNTER — PATIENT MESSAGE (OUTPATIENT)
Dept: FAMILY MEDICINE CLINIC | Facility: CLINIC | Age: 76
End: 2021-01-14

## 2021-01-14 NOTE — TELEPHONE ENCOUNTER
From: Nasim Dailey  To: Grazyna Singleton MD  Sent: 1/14/2021 12:59 PM EST  Subject: Visit Cloyolie Sotelmaign    Dr Xiomy Rinaldi,    Started the Victoza injections (0 06 once a day an d reduced the Metformin to one 500 mg Metformin in the morning and one 500 mg Metformin at dinner  Good news is, the diarrea has stopped  Bad news is that my blood sugar has crashed on 3 occasions to below 120  Tuesday night, woke me up with the shakes, dizziness, cold sweats and wobbliness  Glucose level was 116  I had only had one 500mg Metformin in the morning and a Victoza injection at 4pm  I ate a baby Reeses candy bar and had a protein drink to bring my glucose levels up  Glucose levels kept crashing throughout the day  Same happened last night in the middle of the night  Scary  Today, I woke up with glucose reading of 110  Same shakes, etc  Ate a lot of Ritz crackers, protein drink and chocolate to bring my sugar up  Worked/ It went to 195 in an hour  Today, I'm only going to take one Metformin 500mg in morning and one Metformin 500mg at dinner and see what happens  NO Victoza  I'll let you know what happens after a couple of days  Your thoughts?   Julianne Mixon

## 2021-01-17 DIAGNOSIS — E78.2 MIXED HYPERLIPIDEMIA: ICD-10-CM

## 2021-01-18 RX ORDER — ROSUVASTATIN CALCIUM 5 MG/1
TABLET, COATED ORAL
Qty: 90 TABLET | Refills: 0 | Status: SHIPPED | OUTPATIENT
Start: 2021-01-18 | End: 2021-03-09 | Stop reason: SDUPTHER

## 2021-01-21 ENCOUNTER — TELEPHONE (OUTPATIENT)
Dept: FAMILY MEDICINE CLINIC | Facility: CLINIC | Age: 76
End: 2021-01-21

## 2021-01-21 NOTE — TELEPHONE ENCOUNTER
Patient is scheduled for preop clearance, f/u DM and EKG in February in a 20 min appointment time- please reschedule to 40 min

## 2021-01-27 ENCOUNTER — TELEMEDICINE (OUTPATIENT)
Dept: FAMILY MEDICINE CLINIC | Facility: CLINIC | Age: 76
End: 2021-01-27
Payer: MEDICARE

## 2021-01-27 VITALS — HEIGHT: 68 IN | WEIGHT: 189 LBS | BODY MASS INDEX: 28.64 KG/M2

## 2021-01-27 DIAGNOSIS — E11.65 UNCONTROLLED TYPE 2 DIABETES MELLITUS WITH HYPERGLYCEMIA (HCC): ICD-10-CM

## 2021-01-27 DIAGNOSIS — B34.9 VIRAL INFECTION, UNSPECIFIED: Primary | ICD-10-CM

## 2021-01-27 DIAGNOSIS — B34.9 VIRAL INFECTION, UNSPECIFIED: ICD-10-CM

## 2021-01-27 PROCEDURE — U0005 INFEC AGEN DETEC AMPLI PROBE: HCPCS | Performed by: FAMILY MEDICINE

## 2021-01-27 PROCEDURE — U0003 INFECTIOUS AGENT DETECTION BY NUCLEIC ACID (DNA OR RNA); SEVERE ACUTE RESPIRATORY SYNDROME CORONAVIRUS 2 (SARS-COV-2) (CORONAVIRUS DISEASE [COVID-19]), AMPLIFIED PROBE TECHNIQUE, MAKING USE OF HIGH THROUGHPUT TECHNOLOGIES AS DESCRIBED BY CMS-2020-01-R: HCPCS | Performed by: FAMILY MEDICINE

## 2021-01-27 PROCEDURE — 99214 OFFICE O/P EST MOD 30 MIN: CPT | Performed by: FAMILY MEDICINE

## 2021-01-27 NOTE — PROGRESS NOTES
COVID-19 Virtual Visit     Assessment/Plan:    Problem List Items Addressed This Visit        Endocrine    Uncontrolled type 2 diabetes mellitus with hyperglycemia ([facility] [address] )     Pt notes she is currently taking metformin 500 mg twice a day and victoza 0 6 mg daily and tolerating this well with improved BS control  Lab Results   Component Value Date    HGBA1C 10 1 (H) 10/27/2020            Relevant Medications    liraglutide (VICTOZA) injection      Other Visit Diagnoses     Viral infection, unspecified    -  Primary    Relevant Orders    Novel Coronavirus (Covid-19),[facility]         Disposition:     I referred patient to one of our centralized sites for a COVID-19 swab  Reviewed quarantine/isolation precautions with patient for self and household members  Testing ordered for centralized testing site  Ok to use tylenol as needed, fluids, supportive care, symptomatic treatment  Will notify with results  Discussed monoclonal ab treatment- pt is on day 6 currently  After tomorrow, is aware would not be eligible  I have spent 25 minutes directly with the patient  Greater than 50% of this time was spent in counseling/coordination of care regarding: risks and benefits of treatment options and instructions for management  Encounter provider Betty Vazquez MD    Provider located at [address] [place] [id number] [phone]    Recent Visits  Date Type Provider Dept   01/21/21 Telephone Betty Vazquez MD [facility] recent visits within past 7 days and meeting all other requirements     Today's Visits  Date Type Provider Dept   01/27/21 Telemedicine Betty Vazquez MD [facility] today's visits and meeting all other requirements     Future Appointments  No visits were found meeting these conditions     Showing future appointments within next 150 days and meeting all other requirements      This virtual check-in was done via [employer] and patient was informed that this is a secure, HIPAA-compliant platform  She agrees to proceed  Patient agrees to participate in a virtual check in via telephone or video visit instead of presenting to the office to address urgent/immediate medical needs  Patient is aware this is a billable service  After connecting through [facility], the patient was identified by name and date of birth  Janny Rojas was informed that this was a telemedicine visit and that the exam was being conducted confidentially over secure lines  My office door was closed  No one else was in the room  Janny Rojas acknowledged consent and understanding of privacy and security of the telemedicine visit  I informed the patient that I have reviewed her record in Epic and presented the opportunity for her to ask any questions regarding the visit today  The patient agreed to participate  Subjective:   Janny Rojas is a 77 y o  female who is concerned about COVID-19  Patient's symptoms include fatigue, malaise, nasal congestion, rhinorrhea, sore throat, cough, chest tightness (with deep breaths), nausea, myalgias and headache  Patient denies fever, chills, anosmia, loss of taste, shortness of breath, abdominal pain, vomiting and diarrhea  Date of symptom onset: 1/22/2021    Exposure:   Contact with a person who is under investigation (PUI) for or who is positive for COVID-19 within the last 14 days?: No    Hospitalized recently for fever and/or lower respiratory symptoms?: No      Currently a healthcare worker that is involved in direct patient care?: No      Works in a special setting where the risk of COVID-19 transmission may be high? (this may include long-term care, correctional and long-term facilities; homeless shelters; assisted-living facilities and group homes ): No      Resident in a special setting where the risk of COVID-19 transmission may be high? (this may include long-term care, correctional and long term facilities; homeless shelters; assisted-living facilities and group homes ): No      On Friday, felt herself get a cold sore  Started Gurvinder Mulling  Over the weekend, increased to three cold sores  Has sore throat  She started with fatigue, cough, chest congestion- productive cough sometimes, sometimes dry  She is getting JENSEN but attributed this to victoza  Nausea  Ear pressure  Joints aches  Sore when taking a deep breath  The past three days she has been in bed    No known contacts      Lab Results   Component Value Date    SARSCOV2 Negative 04/28/2020     Past Medical History:   Diagnosis Date    [facility] [address] )     Diabetes mellitus type 2, controlled ([facility] [address] )     Disease of thyroid gland     GERD (gastroesophageal reflux disease)     Glaucoma     H/O tooth extraction     Had top row of teeth removed around April 2016    Headache(784 0)     Hypertension     Pneumonia     Stroke ([facility] [address] )     Visual impairment      Past Surgical History:   Procedure Laterality Date    CATARACT EXTRACTION Right     5 years ago    CYST REMOVAL Left 04/15/2014    ear lobe      EAR SURGERY  1970    Left ear cyst removal     EYE SURGERY      OTHER SURGICAL HISTORY       right upper arm        Current Outpatient Medications   Medication Sig Dispense Refill    COMBIGAN 0 2-0 5 % Administer 1 drop to the right eye 2 (two) times a day   3    cycloSPORINE (RESTASIS) 0 05 % ophthalmic emulsion Administer 1 drop to both eyes every 12 (twelve) hours       glucose blood (OneTouch Verio) test strip USE TO TEST BLOOD SUGAR UP TO FOUR TIMES DAILY 300 each 1    ibuprofen (MOTRIN) 200 mg tablet Take by mouth every 6 (six) hours as needed for mild pain      levothyroxine 112 mcg tablet Take 1 tablet (112 mcg total) by mouth daily BRAND SYNTHROID PLEASE 90 tablet 1    liraglutide (VICTOZA) injection Inject 0 1 mL (0 6 mg total) under the skin daily 3 pen 3    losartan-hydrochlorothiazide (HYZAAR) 50-12 5 mg per tablet Take 1 tablet by mouth daily 90 tablet 1    LUMIGAN 0 01 % ophthalmic drops Administer 1 drop to both eyes daily at bedtime   3    metFORMIN (GLUCOPHAGE) 500 mg tablet Take 1 tablet (500 mg total) by mouth 2 (two) times a day with meals START WITH 1 TABLET BY MOUTH DAILY FOR 1 TABLET WEEK, THEN 1 TABLET BY MOUTH TWICE DAILY FOR 1 WEEK, THEN 2 TABLETS BY MOUTH TWICE DAILY 180 tablet 1    OneTouch Delica Lancets 20T MISC To test BS up to four times daily- E11 65 200 each 5    rosuvastatin (CRESTOR) 5 mg tablet TAKE 1 TABLET(5 MG) BY MOUTH DAILY 90 tablet 0    acetaZOLAMIDE (DIAMOX) 500 mg capsule TK ONE C PO  AFTER SURGERY      albuterol (PROVENTIL HFA,VENTOLIN HFA) 90 mcg/act inhaler Inhale 2 puffs every 4 (four) hours as needed for wheezing or shortness of breath (Patient not taking: Reported on 1/27/2021) 1 Inhaler 1    aspirin 81 MG tablet Take 81 mg by mouth daily      dorzolamide (TRUSOPT) 2 % ophthalmic solution Administer 1 drop to the right eye 3 (three) times a day   1    ergocalciferol (VITAMIN D2) 50,000 units Take 1 capsule (50,000 Units total) by mouth once a week for 12 doses 12 capsule 0    gatifloxacin (ZYMAXID) 0 5 %       ketorolac (ACULAR) 0 5 % ophthalmic solution INT 1 GTT IN OS QID AFTER SURGERY      Lotemax 0 5 % ophth gel INSTILL 1 GTT OD AS DIRECTED BEFORE LASER  AFTER LASER BID F OR 4 DAYS      prednisoLONE acetate (PRED FORTE) 1 % ophthalmic suspension INSTILL 1 GTT INTO LEFT EYE Q 1-2 H AFTER SURGERY AS DIRECTED      TobraDex ophthalmic ointment        No current facility-administered medications for this visit        Allergies   Allergen Reactions    Eggs Or Egg-Derived Products Anaphylaxis    Albumen, Egg Hives    Antihistamines, Diphenhydramine-Type     Epinephrine Syncope     Root canal 25 years ago     Fluzone [Flu Virus Vaccine] Hives, Abdominal Pain and Facial Swelling    Lidocaine      Syncope, tachycardia with local anesthetic with epinephrine    Zinc Rash       Review of Systems   Constitutional: Positive for fatigue  Negative for chills and fever  HENT: Positive for congestion, rhinorrhea and sore throat  Respiratory: Positive for cough and chest tightness (with deep breaths)  Negative for shortness of breath  Gastrointestinal: Positive for nausea  Negative for abdominal pain, diarrhea and vomiting  Musculoskeletal: Positive for myalgias  Neurological: Positive for headaches  Objective:    Vitals:    01/27/21 0951   Weight: 85 7 kg (189 lb)   Height: 5' 8" (1 727 m)       Physical Exam  Vitals signs and nursing note reviewed  Constitutional:       Appearance: She is well-developed  HENT:      Head: Normocephalic and atraumatic  Nose: Congestion present  Mouth/Throat:      Comments: Cold sores lower lip  Eyes:      Conjunctiva/sclera: Conjunctivae normal    Pulmonary:      Effort: Pulmonary effort is normal  No respiratory distress  Breath sounds: No stridor  Neurological:      Mental Status: She is alert and oriented to person, place, and time  Psychiatric:         Mood and Affect: Mood normal          Behavior: Behavior normal        VIRTUAL VISIT DISCLAIMER    Jeri Lyn acknowledges that she has consented to an online visit or consultation  She understands that the online visit is based solely on information provided by her, and that, in the absence of a face-to-face physical evaluation by the physician, the diagnosis she receives is both limited and provisional in terms of accuracy and completeness  This is not intended to replace a full medical face-to-face evaluation by the physician  Jeri Lyn understands and accepts these terms

## 2021-01-27 NOTE — ASSESSMENT & PLAN NOTE
Pt notes she is currently taking metformin 500 mg twice a day and victoza 0 6 mg daily and tolerating this well with improved BS control      Lab Results   Component Value Date    HGBA1C 10 1 (H) 10/27/2020

## 2021-01-28 LAB — SARS-COV-2 RNA RESP QL NAA+PROBE: NEGATIVE

## 2021-03-03 ENCOUNTER — TELEPHONE (OUTPATIENT)
Dept: FAMILY MEDICINE CLINIC | Facility: CLINIC | Age: 76
End: 2021-03-03

## 2021-03-03 NOTE — TELEPHONE ENCOUNTER
Patient self-scheduled a follow up with medicare wellness visit for 20 mins on 3/9  Needs 40 min visit

## 2021-03-05 ENCOUNTER — OFFICE VISIT (OUTPATIENT)
Dept: URGENT CARE | Facility: CLINIC | Age: 76
End: 2021-03-05
Payer: MEDICARE

## 2021-03-05 ENCOUNTER — APPOINTMENT (OUTPATIENT)
Dept: RADIOLOGY | Facility: CLINIC | Age: 76
End: 2021-03-05
Payer: MEDICARE

## 2021-03-05 ENCOUNTER — LAB (OUTPATIENT)
Dept: LAB | Facility: CLINIC | Age: 76
End: 2021-03-05
Payer: MEDICARE

## 2021-03-05 VITALS
RESPIRATION RATE: 18 BRPM | OXYGEN SATURATION: 98 % | TEMPERATURE: 96.9 F | SYSTOLIC BLOOD PRESSURE: 126 MMHG | DIASTOLIC BLOOD PRESSURE: 84 MMHG | HEART RATE: 71 BPM

## 2021-03-05 DIAGNOSIS — E03.9 HYPOTHYROIDISM, UNSPECIFIED TYPE: ICD-10-CM

## 2021-03-05 DIAGNOSIS — E11.65 UNCONTROLLED TYPE 2 DIABETES MELLITUS WITH HYPERGLYCEMIA (HCC): ICD-10-CM

## 2021-03-05 DIAGNOSIS — R20.2 PARESTHESIA OF FINGER: ICD-10-CM

## 2021-03-05 DIAGNOSIS — I10 BENIGN ESSENTIAL HYPERTENSION: ICD-10-CM

## 2021-03-05 DIAGNOSIS — E78.2 MIXED HYPERLIPIDEMIA: ICD-10-CM

## 2021-03-05 DIAGNOSIS — S69.92XA INJURY OF FINGER OF LEFT HAND, INITIAL ENCOUNTER: Primary | ICD-10-CM

## 2021-03-05 DIAGNOSIS — S69.92XA INJURY OF FINGER OF LEFT HAND, INITIAL ENCOUNTER: ICD-10-CM

## 2021-03-05 LAB
ALBUMIN SERPL BCP-MCNC: 4 G/DL (ref 3.5–5)
ALP SERPL-CCNC: 80 U/L (ref 46–116)
ALT SERPL W P-5'-P-CCNC: 24 U/L (ref 12–78)
ANION GAP SERPL CALCULATED.3IONS-SCNC: 5 MMOL/L (ref 4–13)
AST SERPL W P-5'-P-CCNC: 13 U/L (ref 5–45)
BASOPHILS # BLD AUTO: 0.12 THOUSANDS/ΜL (ref 0–0.1)
BASOPHILS NFR BLD AUTO: 1 % (ref 0–1)
BILIRUB SERPL-MCNC: 0.73 MG/DL (ref 0.2–1)
BUN SERPL-MCNC: 18 MG/DL (ref 5–25)
CALCIUM SERPL-MCNC: 9.4 MG/DL (ref 8.3–10.1)
CHLORIDE SERPL-SCNC: 105 MMOL/L (ref 100–108)
CHOLEST SERPL-MCNC: 102 MG/DL (ref 50–200)
CO2 SERPL-SCNC: 31 MMOL/L (ref 21–32)
CREAT SERPL-MCNC: 0.76 MG/DL (ref 0.6–1.3)
CREAT UR-MCNC: 144 MG/DL
EOSINOPHIL # BLD AUTO: 0.16 THOUSAND/ΜL (ref 0–0.61)
EOSINOPHIL NFR BLD AUTO: 2 % (ref 0–6)
ERYTHROCYTE [DISTWIDTH] IN BLOOD BY AUTOMATED COUNT: 12.6 % (ref 11.6–15.1)
EST. AVERAGE GLUCOSE BLD GHB EST-MCNC: 146 MG/DL
GFR SERPL CREATININE-BSD FRML MDRD: 78 ML/MIN/1.73SQ M
GLUCOSE P FAST SERPL-MCNC: 162 MG/DL (ref 65–99)
HBA1C MFR BLD: 6.7 %
HCT VFR BLD AUTO: 41.6 % (ref 34.8–46.1)
HDLC SERPL-MCNC: 49 MG/DL
HGB BLD-MCNC: 13.5 G/DL (ref 11.5–15.4)
IMM GRANULOCYTES # BLD AUTO: 0.03 THOUSAND/UL (ref 0–0.2)
IMM GRANULOCYTES NFR BLD AUTO: 0 % (ref 0–2)
LDLC SERPL CALC-MCNC: 31 MG/DL (ref 0–100)
LYMPHOCYTES # BLD AUTO: 2.11 THOUSANDS/ΜL (ref 0.6–4.47)
LYMPHOCYTES NFR BLD AUTO: 21 % (ref 14–44)
MCH RBC QN AUTO: 32.5 PG (ref 26.8–34.3)
MCHC RBC AUTO-ENTMCNC: 32.5 G/DL (ref 31.4–37.4)
MCV RBC AUTO: 100 FL (ref 82–98)
MICROALBUMIN UR-MCNC: 16.2 MG/L (ref 0–20)
MICROALBUMIN/CREAT 24H UR: 11 MG/G CREATININE (ref 0–30)
MONOCYTES # BLD AUTO: 0.8 THOUSAND/ΜL (ref 0.17–1.22)
MONOCYTES NFR BLD AUTO: 8 % (ref 4–12)
NEUTROPHILS # BLD AUTO: 6.87 THOUSANDS/ΜL (ref 1.85–7.62)
NEUTS SEG NFR BLD AUTO: 68 % (ref 43–75)
NRBC BLD AUTO-RTO: 0 /100 WBCS
PLATELET # BLD AUTO: 383 THOUSANDS/UL (ref 149–390)
PMV BLD AUTO: 11 FL (ref 8.9–12.7)
POTASSIUM SERPL-SCNC: 4 MMOL/L (ref 3.5–5.3)
PROT SERPL-MCNC: 7.6 G/DL (ref 6.4–8.2)
RBC # BLD AUTO: 4.15 MILLION/UL (ref 3.81–5.12)
SODIUM SERPL-SCNC: 141 MMOL/L (ref 136–145)
T4 FREE SERPL-MCNC: 1.36 NG/DL (ref 0.76–1.46)
TRIGL SERPL-MCNC: 111 MG/DL
TSH SERPL DL<=0.05 MIU/L-ACNC: 0.09 UIU/ML (ref 0.36–3.74)
WBC # BLD AUTO: 10.09 THOUSAND/UL (ref 4.31–10.16)

## 2021-03-05 PROCEDURE — G0463 HOSPITAL OUTPT CLINIC VISIT: HCPCS | Performed by: PHYSICIAN ASSISTANT

## 2021-03-05 PROCEDURE — 80061 LIPID PANEL: CPT

## 2021-03-05 PROCEDURE — 36415 COLL VENOUS BLD VENIPUNCTURE: CPT

## 2021-03-05 PROCEDURE — 82570 ASSAY OF URINE CREATININE: CPT

## 2021-03-05 PROCEDURE — 84443 ASSAY THYROID STIM HORMONE: CPT

## 2021-03-05 PROCEDURE — 80053 COMPREHEN METABOLIC PANEL: CPT

## 2021-03-05 PROCEDURE — 99212 OFFICE O/P EST SF 10 MIN: CPT | Performed by: PHYSICIAN ASSISTANT

## 2021-03-05 PROCEDURE — 84439 ASSAY OF FREE THYROXINE: CPT

## 2021-03-05 PROCEDURE — 73140 X-RAY EXAM OF FINGER(S): CPT

## 2021-03-05 PROCEDURE — 83036 HEMOGLOBIN GLYCOSYLATED A1C: CPT

## 2021-03-05 PROCEDURE — 82043 UR ALBUMIN QUANTITATIVE: CPT

## 2021-03-05 PROCEDURE — 85025 COMPLETE CBC W/AUTO DIFF WBC: CPT

## 2021-03-05 NOTE — PROGRESS NOTES
[id number] [employer]    NAME: Jaime Hill is a 77 y o  female  : 1945    MRN: [id number]  DATE: 2021  TIME: 10:27 AM    Assessment and Plan   Injury of finger of left hand, initial encounter [S69 [id number]]  1  Injury of finger of left hand, initial encounter  XR finger left third digit-middle   2  Paresthesia of finger         X-ray left mid finger: No acute bony  Patient Instructions   Patient Instructions   Sensation left mid finger may take weeks to return if it eventually does  Warm Epsom salt soaks 5-10 minutes twice a day for the next 3-5 days may be helpful  Protection of finger as needed to avoid additional trauma  Follow-up with your primary care as needed  Chief Complaint     Chief Complaint   Patient presents with    Finger Injury     Patient crushed the left 3rd finger in the dog crate MOnday night when she was asembling it       History of Present Illness   Jaime Hill presents to the clinic c/o    56-year-old right-hand-dominant female comes in with  Tenderness, numbness distal aspect of left long finger  She says she was putting a metal crate together and got her finger caught between the rungs  Creitz nap down on her finger and she was unable to get it out  Had to pull the whole crate across the floor to get to a screwdriver and a wrenching was finally able to free herself  She immediately used ice  Since then she has some tenderness around the knuckle but she has significant numbness distal aspect of her left mid finger  Review of Systems   Review of Systems   Constitutional: Negative  Musculoskeletal: Positive for joint swelling  Skin: Positive for color change  Neurological: Positive for numbness         Current Medications     Long-Term Medications   Medication Sig Dispense Refill    acetaZOLAMIDE (DIAMOX) 500 mg capsule TK ONE C PO  AFTER SURGERY      aspirin 81 MG tablet Take 81 mg by mouth daily      COMBIGAN 0 2-0 5 % Administer 1 drop to the right eye 2 (two) times a day   3    cycloSPORINE (RESTASIS) 0 05 % ophthalmic emulsion Administer 1 drop to both eyes every 12 (twelve) hours       dorzolamide (TRUSOPT) 2 % ophthalmic solution Administer 1 drop to the right eye 3 (three) times a day   1    ibuprofen (MOTRIN) 200 mg tablet Take by mouth every 6 (six) hours as needed for mild pain      ketorolac (ACULAR) 0 5 % ophthalmic solution INT 1 GTT IN OS QID AFTER SURGERY      levothyroxine 112 mcg tablet Take 1 tablet (112 mcg total) by mouth daily BRAND SYNTHROID PLEASE 90 tablet 1    liraglutide (VICTOZA) injection Inject 0 1 mL (0 6 mg total) under the skin daily 3 pen 3    losartan-hydrochlorothiazide (HYZAAR) 50-12 5 mg per tablet Take 1 tablet by mouth daily 90 tablet 1    LUMIGAN 0 01 % ophthalmic drops Administer 1 drop to both eyes daily at bedtime   3    metFORMIN (GLUCOPHAGE) 500 mg tablet Take 1 tablet (500 mg total) by mouth 2 (two) times a day with meals START WITH 1 TABLET BY MOUTH DAILY FOR 1 TABLET WEEK, THEN 1 TABLET BY MOUTH TWICE DAILY FOR 1 WEEK, THEN 2 TABLETS BY MOUTH TWICE DAILY 180 tablet 1    OneTouch Delica Lancets 70E MISC To test BS up to four times daily- E11 65 200 each 5    prednisoLONE acetate (PRED FORTE) 1 % ophthalmic suspension INSTILL 1 GTT INTO LEFT EYE Q 1-2 H AFTER SURGERY AS DIRECTED      rosuvastatin (CRESTOR) 5 mg tablet TAKE 1 TABLET(5 MG) BY MOUTH DAILY 90 tablet 0    TobraDex ophthalmic ointment       ergocalciferol (VITAMIN D2) 50,000 units Take 1 capsule (50,000 Units total) by mouth once a week for 12 doses 12 capsule 0       Current Allergies     Allergies as of 03/05/2021 - Reviewed 03/05/2021   Allergen Reaction Noted    Eggs or egg-derived products Anaphylaxis 01/07/2021    Albumen, egg Hives 03/10/2015    Antihistamines, diphenhydramine-type  06/27/2016    Epinephrine Syncope 03/10/2015    Fluzone [flu virus vaccine] Hives, Abdominal Pain, and Facial Swelling 01/07/2021    Lidocaine 04/26/2017    Zinc Rash 03/22/2017          The following portions of the patient's history were reviewed and updated as appropriate: allergies, current medications, past family history, past medical history, past social history, past surgical history and problem list   Past Medical History:   Diagnosis Date    Diabetes mellitus ([facility] [facility] )     Diabetes mellitus type 2, controlled ([facility] )     Disease of thyroid gland     GERD (gastroesophageal reflux disease)     Glaucoma     H/O tooth extraction     Had top row of teeth removed around April 2016    Headache(784 0)     Hypertension     Pneumonia     Stroke ([facility] [address] [facility] )     Visual impairment      Past Surgical History:   Procedure Laterality Date    CATARACT EXTRACTION Right     5 years ago    CYST REMOVAL Left 04/15/2014    ear lobe      EAR SURGERY  1970    Left ear cyst removal     EYE SURGERY      OTHER SURGICAL HISTORY       right upper arm        Family History   Problem Relation Age of Onset    Rheum arthritis Mother     Cancer Brother     Heart attack Paternal Grandmother        Objective   /84   Pulse 71   Temp (!) 96 9 °F (36 1 °C) (Tympanic)   Resp 18   SpO2 98%   No LMP recorded  Patient is postmenopausal        Physical Exam     Physical Exam  Vitals signs and nursing note reviewed  Constitutional:       General: She is not in acute distress  Appearance: Normal appearance  She is well-developed  She is not ill-appearing, toxic-appearing or diaphoretic  Cardiovascular:      Rate and Rhythm: Normal rate  Pulmonary:      Effort: Pulmonary effort is normal  No respiratory distress  Musculoskeletal:         General: Tenderness present  No swelling  Comments: D IP joint left mid finger with TTP  Decreased sensation to light touch distal aspect of left mid finger  No gross swelling noted  Moderate limited range of motion at D IP  Some pain with resisted flexion D IP but not extension D IP  Skin:     Findings: No bruising  Comments: Capillary refill less than 2 seconds distal aspect left mid finger   Neurological:      Mental Status: She is alert and oriented to person, place, and time  Sensory: Sensory deficit present        Comments: Decreased sensation left mid finger starting at D IP joint and distally   Psychiatric:         Mood and Affect: Mood normal          Behavior: Behavior normal

## 2021-03-05 NOTE — PATIENT INSTRUCTIONS
Sensation left mid finger may take weeks to return if it eventually does  Warm Epsom salt soaks 5-10 minutes twice a day for the next 3-5 days may be helpful  Protection of finger as needed to avoid additional trauma  Follow-up with your primary care as needed

## 2021-03-09 ENCOUNTER — OFFICE VISIT (OUTPATIENT)
Dept: FAMILY MEDICINE CLINIC | Facility: CLINIC | Age: 76
End: 2021-03-09
Payer: MEDICARE

## 2021-03-09 VITALS
WEIGHT: 190 LBS | HEIGHT: 67 IN | OXYGEN SATURATION: 97 % | TEMPERATURE: 96.7 F | RESPIRATION RATE: 20 BRPM | HEART RATE: 71 BPM | DIASTOLIC BLOOD PRESSURE: 70 MMHG | BODY MASS INDEX: 29.82 KG/M2 | SYSTOLIC BLOOD PRESSURE: 120 MMHG

## 2021-03-09 DIAGNOSIS — I10 BENIGN ESSENTIAL HYPERTENSION: ICD-10-CM

## 2021-03-09 DIAGNOSIS — R29.898 WEAKNESS OF LEFT UPPER EXTREMITY: ICD-10-CM

## 2021-03-09 DIAGNOSIS — E78.2 MIXED HYPERLIPIDEMIA: ICD-10-CM

## 2021-03-09 DIAGNOSIS — Z78.0 ASYMPTOMATIC POSTMENOPAUSAL STATE: ICD-10-CM

## 2021-03-09 DIAGNOSIS — Z00.00 MEDICARE ANNUAL WELLNESS VISIT, SUBSEQUENT: Primary | ICD-10-CM

## 2021-03-09 DIAGNOSIS — I77.9 MILD CAROTID ARTERY DISEASE (HCC): ICD-10-CM

## 2021-03-09 DIAGNOSIS — Z87.891 HX OF TOBACCO USE, PRESENTING HAZARDS TO HEALTH: ICD-10-CM

## 2021-03-09 DIAGNOSIS — E03.9 HYPOTHYROIDISM, UNSPECIFIED TYPE: ICD-10-CM

## 2021-03-09 DIAGNOSIS — E11.9 CONTROLLED TYPE 2 DIABETES MELLITUS WITHOUT COMPLICATION, WITHOUT LONG-TERM CURRENT USE OF INSULIN (HCC): ICD-10-CM

## 2021-03-09 DIAGNOSIS — R29.5 TRANSIENT PARALYSIS: ICD-10-CM

## 2021-03-09 PROCEDURE — G0439 PPPS, SUBSEQ VISIT: HCPCS | Performed by: FAMILY MEDICINE

## 2021-03-09 PROCEDURE — 99214 OFFICE O/P EST MOD 30 MIN: CPT | Performed by: FAMILY MEDICINE

## 2021-03-09 PROCEDURE — 1123F ACP DISCUSS/DSCN MKR DOCD: CPT | Performed by: FAMILY MEDICINE

## 2021-03-09 RX ORDER — LEVOTHYROXINE SODIUM 0.1 MG/1
100 TABLET ORAL DAILY
Qty: 90 TABLET | Refills: 1 | Status: SHIPPED | OUTPATIENT
Start: 2021-03-09 | End: 2021-07-19 | Stop reason: SDUPTHER

## 2021-03-09 NOTE — PATIENT INSTRUCTIONS
Medicare Preventive Visit Patient Instructions  Thank you for completing your Welcome to Medicare Visit or Medicare Annual Wellness Visit today  Your next wellness visit will be due in one year (3/10/2022)  The screening/preventive services that you may require over the next 5-10 years are detailed below  Some tests may not apply to you based off risk factors and/or age  Screening tests ordered at today's visit but not completed yet may show as past due  Also, please note that scanned in results may not display below  Preventive Screenings:  Service Recommendations Previous Testing/Comments   Colorectal Cancer Screening  * Colonoscopy    * Fecal Occult Blood Test (FOBT)/Fecal Immunochemical Test (FIT)  * Fecal DNA/Cologuard Test  * Flexible Sigmoidoscopy Age: 54-65 years old   Colonoscopy: every 10 years (may be performed more frequently if at higher risk)  OR  FOBT/FIT: every 1 year  OR  Cologuard: every 3 years  OR  Sigmoidoscopy: every 5 years  Screening may be recommended earlier than age 48 if at higher risk for colorectal cancer  Also, an individualized decision between you and your healthcare provider will decide whether screening between the ages of 74-80 would be appropriate  Colonoscopy: Not on file  FOBT/FIT: Not on file  Cologuard: 06/15/2020  Sigmoidoscopy: Not on file          Breast Cancer Screening Age: 36 years old  Frequency: every 1-2 years  Not required if history of left and right mastectomy Mammogram: Not on file        Cervical Cancer Screening Between the ages of 21-29, pap smear recommended once every 3 years  Between the ages of 33-67, can perform pap smear with HPV co-testing every 5 years     Recommendations may differ for women with a history of total hysterectomy, cervical cancer, or abnormal pap smears in past  Pap Smear: Not on file        Hepatitis C Screening Once for adults born between [facility]  More frequently in patients at high risk for Hepatitis C Hep C Antibody: 10/31/2017        Diabetes Screening 1-2 times per year if you're at risk for diabetes or have pre-diabetes Fasting glucose: 162 mg/dL   A1C: 6 7 %        Cholesterol Screening Once every 5 years if you don't have a lipid disorder  May order more often based on risk factors  Lipid panel: 03/05/2021          Other Preventive Screenings Covered by Medicare:  1  Abdominal Aortic Aneurysm (AAA) Screening: covered once if your at risk  You're considered to be at risk if you have a family history of AAA  2  Lung Cancer Screening: covers low dose CT scan once per year if you meet all of the following conditions: (1) Age 50-69; (2) No signs or symptoms of lung cancer; (3) Current smoker or have quit smoking within the last 15 years; (4) You have a tobacco smoking history of at least 30 pack years (packs per day multiplied by number of years you smoked); (5) You get a written order from a healthcare provider  3  Glaucoma Screening: covered annually if you're considered high risk: (1) You have diabetes OR (2) Family history of glaucoma OR (3)  aged 48 and older OR (3)  American aged 72 and older  3  Osteoporosis Screening: covered every 2 years if you meet one of the following conditions: (1) You're estrogen deficient and at risk for osteoporosis based off medical history and other findings; (2) Have a vertebral abnormality; (3) On glucocorticoid therapy for more than 3 months; (4) Have primary hyperparathyroidism; (5) On osteoporosis medications and need to assess response to drug therapy  · Last bone density test (DXA Scan): 03/30/2017  5  HIV Screening: covered annually if you're between the age of 12-76  Also covered annually if you are younger than 13 and older than 72 with risk factors for HIV infection  For pregnant patients, it is covered up to 3 times per pregnancy      Immunizations:  Immunization Recommendations   Influenza Vaccine Annual influenza vaccination during flu season is recommended for all persons aged >= 6 months who do not have contraindications   Pneumococcal Vaccine (Prevnar and Pneumovax)  * Prevnar = PCV13  * Pneumovax = PPSV23   Adults 25-60 years old: 1-3 doses may be recommended based on certain risk factors  Adults 72 years old: Prevnar (PCV13) vaccine recommended followed by Pneumovax (PPSV23) vaccine  If already received PPSV23 since turning 65, then PCV13 recommended at least one year after PPSV23 dose  Hepatitis B Vaccine 3 dose series if at intermediate or high risk (ex: diabetes, end stage renal disease, liver disease)   Tetanus (Td) Vaccine - COST NOT COVERED BY MEDICARE PART B Following completion of primary series, a booster dose should be given every 10 years to maintain immunity against tetanus  Td may also be given as tetanus wound prophylaxis  Tdap Vaccine - COST NOT COVERED BY MEDICARE PART B Recommended at least once for all adults  For pregnant patients, recommended with each pregnancy  Shingles Vaccine (Shingrix) - COST NOT COVERED BY MEDICARE PART B  2 shot series recommended in those aged 48 and above     Health Maintenance Due:      Topic Date Due    MAMMOGRAM  1945    Lung Cancer Screening  12/10/2001    DXA SCAN  03/30/2022    Colorectal Cancer Screening  06/15/2023    Hepatitis C Screening  Completed     Immunizations Due:      Topic Date Due    COVID-19 Vaccine (1 of 2) 12/10/1962     Advance Directives   What are advance directives? Advance directives are legal documents that state your wishes and plans for medical care  These plans are made ahead of time in case you lose your ability to make decisions for yourself  Advance directives can apply to any medical decision, such as the treatments you want, and if you want to donate organs  What are the types of advance directives? There are many types of advance directives, and each state has rules about how to use them   You may choose a combination of any of the following:  · Living will: This is a written record of the treatment you want  You can also choose which treatments you do not want, which to limit, and which to stop at a certain time  This includes surgery, medicine, IV fluid, and tube feedings  · Durable power of  for healthcare [facility]): This is a written record that states who you want to make healthcare choices for you when you are unable to make them for yourself  This person, called a proxy, is usually a family member or a friend  You may choose more than 1 proxy  · Do not resuscitate (DNR) order:  A DNR order is used in case your heart stops beating or you stop breathing  It is a request not to have certain forms of treatment, such as CPR  A DNR order may be included in other types of advance directives  · Medical directive: This covers the care that you want if you are in a coma, near death, or unable to make decisions for yourself  You can list the treatments you want for each condition  Treatment may include pain medicine, surgery, blood transfusions, dialysis, IV or tube feedings, and a ventilator (breathing machine)  · Values history: This document has questions about your views, beliefs, and how you feel and think about life  This information can help others choose the care that you would choose  Why are advance directives important? An advance directive helps you control your care  Although spoken wishes may be used, it is better to have your wishes written down  Spoken wishes can be misunderstood, or not followed  Treatments may be given even if you do not want them  An advance directive may make it easier for your family to make difficult choices about your care  Weight Management   Why it is important to manage your weight:  Being overweight increases your risk of health conditions such as heart disease, high blood pressure, type 2 diabetes, and certain types of cancer   It can also increase your risk for osteoarthritis, sleep apnea, and other respiratory problems  Aim for a slow, steady weight loss  Even a small amount of weight loss can lower your risk of health problems  How to lose weight safely:  A safe and healthy way to lose weight is to eat fewer calories and get regular exercise  You can lose up about 1 pound a week by decreasing the number of calories you eat by 500 calories each day  Healthy meal plan for weight management:  A healthy meal plan includes a variety of foods, contains fewer calories, and helps you stay healthy  A healthy meal plan includes the following:  · Eat whole-grain foods more often  A healthy meal plan should contain fiber  Fiber is the part of grains, fruits, and vegetables that is not broken down by your body  Whole-grain foods are healthy and provide extra fiber in your diet  Some examples of whole-grain foods are whole-wheat breads and pastas, oatmeal, brown rice, and bulgur  · Eat a variety of vegetables every day  Include dark, leafy greens such as spinach, kale, avery greens, and mustard greens  Eat yellow and orange vegetables such as carrots, sweet potatoes, and winter squash  · Eat a variety of fruits every day  Choose fresh or canned fruit (canned in its own juice or light syrup) instead of juice  Fruit juice has very little or no fiber  · Eat low-fat dairy foods  Drink fat-free (skim) milk or 1% milk  Eat fat-free yogurt and low-fat cottage cheese  Try low-fat cheeses such as mozzarella and other reduced-fat cheeses  · Choose meat and other protein foods that are low in fat  Choose beans or other legumes such as split peas or lentils  Choose fish, skinless poultry (chicken or turkey), or lean cuts of red meat (beef or pork)  Before you cook meat or poultry, cut off any visible fat  · Use less fat and oil  Try baking foods instead of frying them  Add less fat, such as margarine, sour cream, regular salad dressing and mayonnaise to foods  Eat fewer high-fat foods   Some examples of high-fat foods include french fries, doughnuts, ice cream, and cakes  · Eat fewer sweets  Limit foods and drinks that are high in sugar  This includes candy, cookies, regular soda, and sweetened drinks  Exercise:  Exercise at least 30 minutes per day on most days of the week  Some examples of exercise include walking, biking, dancing, and swimming  You can also fit in more physical activity by taking the stairs instead of the elevator or parking farther away from stores  Ask your healthcare provider about the best exercise plan for you  © Copyright [employer] 2018 Information is for End User's use only and may not be sold, redistributed or otherwise used for commercial purposes   All illustrations and images included in CareNotes® are the copyrighted property of A D A M , Inc  or [address] [place], [place] [address]simon evelio

## 2021-03-09 NOTE — PROGRESS NOTES
Assessment/Plan:       Problem List Items Addressed This Visit        Endocrine    Controlled type 2 diabetes mellitus without complication, without long-term current use of insulin (HCC)     a1c much improved  She is tolerating metformin and victoza at current doses and is controlling carbs in diet  No further changes today  Lab Results   Component Value Date    HGBA1C 6 7 (H) 03/05/2021            Hypothyroidism     Lab Results   Component Value Date    [id number] 0 094 (L) 03/05/2021     Thyroid dose adjusted down, recheck in 2 months  Relevant Medications    levothyroxine 100 mcg tablet    Other Relevant Orders    TSH, 3rd generation with Free T4 reflex       Cardiovascular and Mediastinum    Benign essential hypertension     Well controlled, no changes today  Labs reviewed  Relevant Medications    losartan-hydrochlorothiazide (HYZAAR) 50-12 5 mg per tablet    Other Relevant Orders    CBC and differential       Other    Mixed hyperlipidemia    Relevant Medications    rosuvastatin (CRESTOR) 5 mg tablet           Weakness of left upper extremity        Concern for possible TIA  strength back to 5/5 on today's exam    Relevant Orders    MRI brain w wo contrast    VAS carotid complete study    Mild carotid artery disease ([address] )        notes hx of mild carotid dz- check carotid u/s particularly in light of possible tia  Relevant Orders    VAS carotid complete study    Transient paralysis         Given cardiovascular risk I am concerned that she may have had a TIA  check MRI  reviewed ER precautions    Relevant Orders    VAS carotid complete study             Most recent labs reviewed      Subjective:     Ponce Galeana is a 77 y o  female here today with chief complaint below:  Chief Complaint   Patient presents with    Medicare Wellness Visit    Follow-up     DM      - CC above per clinical staff and reviewed      HPI:  Here for medicare wellness visit (see second note) as well as follow up on chronic conditions  Also notes a recent episode of weakness in her L arm  BS much improved  She is feeling much better overall  Watching diet closely  High protein, low carb  Tolerating victoza and metformin at current doses of 0 6 mg and 500 mg twice a day respectively  Some constipation  BM every 3 days or so  No blood  High protein diet    Losing weight intentionally    Not much exercise- working long hours at present  Works form home  She crushed her finger while putting her dog's crate together  L middle finger crushed between two sides of the crate  Went to urgent care- neg xray  Improving now     2/2010 quit smoking    Has had two episodes where she suddenly loses strength in her L arm, arm will drop, has dropped coffee cup when it happens  No dizziness  No numbness  Says this did happen even in her 20s at times  The last time it happened she had just taken her dog out, took off his leash and suddenly her arm fell like a wet noodle  It lasts seconds to maybe a minute  She has had an MRI and MRA previously  The following portions of the patient's history were reviewed and updated as appropriate: allergies, current medications, past family history, past medical history, past social history, past surgical history and problem list     ROS:  Review of Systems   No fever, chills, congestion, chest pain, shortness of breath, nausea, vomiting, diarrhea, blood in stool, urinary concerns, mood changes  Rest of ROS neg except as above      Objective:      /70   Pulse 71   Temp (!) 96 7 °F (35 9 °C)   Resp 20   Ht 5' 6 54" (1 69 m)   Wt 86 2 kg (190 lb)   SpO2 97%   BMI 30 18 kg/m²   BP Readings from Last 3 Encounters:   03/09/21 120/70   03/05/21 126/84   01/07/21 120/72     Wt Readings from Last 3 Encounters:   03/09/21 86 2 kg (190 lb)   01/27/21 85 7 kg (189 lb)   01/07/21 91 2 kg (201 lb)               Physical Exam:   Physical Exam  Vitals signs and nursing note reviewed  Constitutional:       General: She is not in acute distress  Appearance: Normal appearance  She is well-developed  HENT:      Head: Normocephalic and atraumatic  Eyes:      Conjunctiva/sclera: Conjunctivae normal    Neck:      Musculoskeletal: Neck supple  Vascular: No carotid bruit  Cardiovascular:      Rate and Rhythm: Normal rate and regular rhythm  Heart sounds: Normal heart sounds  No murmur  Pulmonary:      Effort: Pulmonary effort is normal  No respiratory distress  Breath sounds: Normal breath sounds  No wheezing  Abdominal:      Palpations: Abdomen is soft  Tenderness: There is no abdominal tenderness  There is no guarding or rebound  Musculoskeletal:      Right lower leg: No edema  Left lower leg: No edema  Comments: B/l UE strength 5/5 prox and distal  Normal gait  Lymphadenopathy:      Cervical: No cervical adenopathy  Skin:     General: Skin is warm and dry  Neurological:      Mental Status: She is alert and oriented to person, place, and time  Motor: No weakness        Gait: Gait normal    Psychiatric:         Mood and Affect: Mood normal          Behavior: Behavior normal

## 2021-03-09 NOTE — PROGRESS NOTES
Assessment and Plan:                  Hx of tobacco use, presenting hazards to health    -  Primary    reviewed CT lung cancer screening  she would like to proceed  ordered  Relevant Orders    CT lung screening program    Medicare annual wellness visit, subsequent               Preventive health issues were discussed with patient, and age appropriate screening tests were ordered as noted in patient's After Visit Summary  Personalized health advice and appropriate referrals for health education or preventive services given if needed, as noted in patient's After Visit Summary  History of Present Illness:     Patient presents for Welcome to Medicare visit       Patient Care Team:  Charli Bass MD as PCP - General (Family Medicine)     Review of Systems:     Review of Systems   Problem List:     Patient Active Problem List   Diagnosis    Benign essential hypertension    Hemispheric carotid artery syndrome    Uncontrolled type 2 diabetes mellitus with hyperglycemia ([address] )    Hypothyroidism    Vitamin D deficiency    Glaucoma of right eye    Mixed hyperlipidemia      Past Medical and Surgical History:     Past Medical History:   Diagnosis Date    Diabetes mellitus ([address] )     Diabetes mellitus type 2, controlled ([address] )     Disease of thyroid gland     GERD (gastroesophageal reflux disease)     Glaucoma     H/O tooth extraction     Had top row of teeth removed around April 2016    Headache(784 0)     Hypertension     Pneumonia     Stroke ([address] )     Visual impairment      Past Surgical History:   Procedure Laterality Date    CATARACT EXTRACTION Right     5 years ago    CYST REMOVAL Left 04/15/2014    ear lobe      EAR SURGERY  1970    Left ear cyst removal     EYE SURGERY      OTHER SURGICAL HISTORY       right upper arm         Family History:     Family History   Problem Relation Age of Onset    Rheum arthritis Mother     Cancer Brother     Heart attack Paternal Grandmother       Social History:     E-Cigarette/Vaping    E-Cigarette Use Never User      E-Cigarette/Vaping Substances    Nicotine No     THC No     CBD No     Flavoring No     Other No     Unknown No      Social History     Socioeconomic History    Marital status:      Spouse name: None    Number of children: None    Years of education: None    Highest education level: None   Occupational History    None   Social Needs    Financial resource strain: None    Food insecurity     Worry: None     Inability: None    Transportation needs     Medical: None     Non-medical: None   Tobacco Use    Smoking status: Former Smoker     Packs/day: 3 00     Years: 50 00     Pack years: 150 00     Types: Cigarettes     Quit date: 3/15/2010     Years since quitting: 10 9    Smokeless tobacco: Never Used   Substance and Sexual Activity    Alcohol use:  Yes     Alcohol/week: 1 0 standard drinks     Types: 1 Glasses of wine per week     Frequency: Never     Drinks per session: 1 or 2     Binge frequency: Never     Comment: very rare    Drug use: Never    Sexual activity: Never   Lifestyle    Physical activity     Days per week: None     Minutes per session: None    Stress: None   Relationships    Social connections     Talks on phone: None     Gets together: None     Attends [employer] service: None     Active member of club or organization: None     Attends meetings of clubs or organizations: None     Relationship status: None    Intimate partner violence     Fear of current or ex partner: None     Emotionally abused: None     Physically abused: None     Forced sexual activity: None   Other Topics Concern    None   Social History Narrative    Former smoker: Quit 3/10/2011 - As per Care Everywhere       Medications and Allergies:     Current Outpatient Medications   Medication Sig Dispense Refill    albuterol (PROVENTIL HFA,VENTOLIN HFA) 90 mcg/act inhaler Inhale 2 puffs every 4 (four) hours as needed for wheezing or shortness of breath 1 Inhaler 1    COMBIGAN 0 2-0 5 % Administer 1 drop to the right eye 2 (two) times a day   3    dorzolamide (TRUSOPT) 2 % ophthalmic solution Administer 1 drop to the right eye 3 (three) times a day   1    glucose blood (OneTouch Verio) test strip USE TO TEST BLOOD SUGAR UP TO FOUR TIMES DAILY 300 each 1    ibuprofen (MOTRIN) 200 mg tablet Take by mouth every 6 (six) hours as needed for mild pain      levothyroxine 112 mcg tablet Take 1 tablet (112 mcg total) by mouth daily BRAND SYNTHROID PLEASE 90 tablet 1    liraglutide (VICTOZA) injection Inject 0 1 mL (0 6 mg total) under the skin daily 3 pen 3    losartan-hydrochlorothiazide (HYZAAR) 50-12 5 mg per tablet Take 1 tablet by mouth daily 90 tablet 1    LUMIGAN 0 01 % ophthalmic drops Administer 1 drop to both eyes daily at bedtime   3    metFORMIN (GLUCOPHAGE) 500 mg tablet Take 1 tablet (500 mg total) by mouth 2 (two) times a day with meals START WITH 1 TABLET BY MOUTH DAILY FOR 1 TABLET WEEK, THEN 1 TABLET BY MOUTH TWICE DAILY FOR 1 WEEK, THEN 2 TABLETS BY MOUTH TWICE DAILY 180 tablet 1    OneTouch Delica Lancets 94L MISC To test BS up to four times daily- E11 65 200 each 5    rosuvastatin (CRESTOR) 5 mg tablet TAKE 1 TABLET(5 MG) BY MOUTH DAILY 90 tablet 0    acetaZOLAMIDE (DIAMOX) 500 mg capsule TK ONE C PO  AFTER SURGERY      aspirin 81 MG tablet Take 81 mg by mouth daily      cycloSPORINE (RESTASIS) 0 05 % ophthalmic emulsion Administer 1 drop to both eyes every 12 (twelve) hours       ergocalciferol (VITAMIN D2) 50,000 units Take 1 capsule (50,000 Units total) by mouth once a week for 12 doses (Patient not taking: Reported on 3/9/2021) 12 capsule 0    gatifloxacin (ZYMAXID) 0 5 %       ketorolac (ACULAR) 0 5 % ophthalmic solution INT 1 GTT IN OS QID AFTER SURGERY      Lotemax 0 5 % ophth gel INSTILL 1 GTT OD AS DIRECTED BEFORE LASER   AFTER LASER BID F OR 4 DAYS      prednisoLONE acetate (PRED FORTE) 1 % ophthalmic suspension INSTILL 1 GTT INTO LEFT EYE Q 1-2 H AFTER SURGERY AS DIRECTED      TobraDex ophthalmic ointment        No current facility-administered medications for this visit  Allergies   Allergen Reactions    Eggs Or Egg-Derived Products Anaphylaxis    Albumen, Egg Hives    Antihistamines, Diphenhydramine-Type     Epinephrine Syncope     Root canal 25 years ago     Fluzone [Flu Virus Vaccine] Hives, Abdominal Pain and Facial Swelling    Lidocaine      Syncope, tachycardia with local anesthetic with epinephrine    Zinc Rash      Immunizations:     Immunization History   Administered Date(s) Administered    Pneumococcal Conjugate 13-Valent 03/22/2017    Pneumococcal Polysaccharide PPV23 01/01/2009, 07/19/2018    Tdap 03/27/2014      Health Maintenance:         Topic Date Due    MAMMOGRAM  1945    Lung Cancer Screening  12/10/2001    DXA SCAN  03/30/2022    Colorectal Cancer Screening  06/15/2023    Hepatitis C Screening  Completed         Topic Date Due    COVID-19 Vaccine (1 of 2) 12/10/1962      Medicare Screening Tests and Risk Assessments:     Amarjit Garrison is here for her Subsequent Wellness visit  Health Risk Assessment:   Patient rates overall health as good  Patient feels that their physical health rating is much better  Patient is very satisfied with their life  Eyesight was rated as much worse  Hearing was rated as same  Patient feels that their emotional and mental health rating is same  Patients states they are never, rarely angry  Patient states they are sometimes unusually tired/fatigued  Pain experienced in the last 7 days has been a lot  Patient's pain rating has been 4/10  Patient states that she has experienced no weight loss or gain in last 6 months  Finger pain s/p crush injury at home (now healed, no fracture, nontender on exam)    Depression Screening:   PHQ-2 Score: 0      Fall Risk Screening:    In the past year, patient has experienced: no history of falling in past year Urinary Incontinence Screening:   Patient has not leaked urine accidently in the last six months  Home Safety:  Patient has trouble with stairs inside or outside of their home  Patient has working smoke alarms and has working carbon monoxide detector  Home safety hazards include: none  Nutrition:   Current diet is Diabetic and Low Carb  Medications:   Patient is not currently taking any over-the-counter supplements  Patient is able to manage medications  Activities of Daily Living (ADLs)/Instrumental Activities of Daily Living (IADLs):   Walk and transfer into and out of bed and chair?: Yes  Dress and groom yourself?: Yes    Bathe or shower yourself?: Yes    Feed yourself? Yes  Do your laundry/housekeeping?: Yes  Manage your money, pay your bills and track your expenses?: Yes  Make your own meals?: Yes    Do your own shopping?: Yes    Previous Hospitalizations:   Any hospitalizations or ED visits within the last 12 months?: Yes    How many hospitalizations have you had in the last year?: 1-2    Advance Care Planning:   Living will: Yes    Durable POA for healthcare:  Yes    Advanced directive: Yes    Advanced directive counseling given: Yes      Cognitive Screening:   Provider or family/friend/caregiver concerned regarding cognition?: No    PREVENTIVE SCREENINGS      Cardiovascular Screening:    General: Screening Not Indicated and History Lipid Disorder      Diabetes Screening:     General: Screening Not Indicated and History Diabetes      Colorectal Cancer Screening:     General: Screening Current      Breast Cancer Screening:     General: Patient Declines      Cervical Cancer Screening:    General: Screening Not Indicated      Osteoporosis Screening:    General: Screening Current    Due for: DXA Axial      Abdominal Aortic Aneurysm (AAA) Screening:        General: Screening Not Indicated      Lung Cancer Screening:     General: Risks and Benefits Discussed    Due for: Low Dose CT (LDCT) Hepatitis C Screening:    General: Screening Current    Screening, Brief Intervention, and Referral to Treatment (SBIRT)    Screening  Typical number of drinks in a day: 0  Typical number of drinks in a week: 0  Interpretation: Low risk drinking behavior      AUDIT-C Screenin) How often did you have a drink containing alcohol in the past year? never  2) How many drinks did you have on a typical day when you were drinking in the past year? never  3) How often did you have 6 or more drinks on one occasion in the past year? never    AUDIT-C Score: 0  Interpretation: Score 0-2 (female): Negative screen for alcohol misuse    Single Item Drug Screening:  How often have you used an illegal drug (including marijuana) or a prescription medication for non-medical reasons in the past year? never    Single Item Drug Screen Score: 0  Interpretation: Negative screen for possible drug use disorder    No exam data present     Physical Exam:     /70   Pulse 71   Temp (!) 96 7 °F (35 9 °C)   Resp 20   Ht 5' 6 54" (1 69 m)   Wt 86 2 kg (190 lb)   SpO2 97%   BMI 30 18 kg/m²     Physical Exam     Lilia Morley MD

## 2021-03-10 DIAGNOSIS — Z23 ENCOUNTER FOR IMMUNIZATION: ICD-10-CM

## 2021-03-13 RX ORDER — ROSUVASTATIN CALCIUM 5 MG/1
5 TABLET, COATED ORAL DAILY
Qty: 90 TABLET | Refills: 0 | Status: SHIPPED | OUTPATIENT
Start: 2021-03-13 | End: 2021-07-19 | Stop reason: SDUPTHER

## 2021-03-13 RX ORDER — LOSARTAN POTASSIUM AND HYDROCHLOROTHIAZIDE 12.5; 5 MG/1; MG/1
1 TABLET ORAL DAILY
Qty: 90 TABLET | Refills: 1 | Status: SHIPPED | OUTPATIENT
Start: 2021-03-13 | End: 2021-05-17

## 2021-03-13 NOTE — ASSESSMENT & PLAN NOTE
Lab Results   Component Value Date    AOL2CRLNBDQP 0 094 (L) 03/05/2021     Thyroid dose adjusted down, recheck in 2 months

## 2021-03-13 NOTE — ASSESSMENT & PLAN NOTE
a1c much improved  She is tolerating metformin and victoza at current doses and is controlling carbs in diet  No further changes today    Lab Results   Component Value Date    HGBA1C 6 7 (H) 03/05/2021

## 2021-03-25 ENCOUNTER — HOSPITAL ENCOUNTER (OUTPATIENT)
Dept: MRI IMAGING | Facility: HOSPITAL | Age: 76
Discharge: HOME | End: 2021-03-25
Payer: MEDICARE

## 2021-03-25 ENCOUNTER — HOSPITAL ENCOUNTER (OUTPATIENT)
Dept: NON INVASIVE DIAGNOSTICS | Facility: HOSPITAL | Age: 76
Discharge: HOME | End: 2021-03-25
Payer: MEDICARE

## 2021-03-25 ENCOUNTER — HOSPITAL ENCOUNTER (OUTPATIENT)
Dept: CT IMAGING | Facility: HOSPITAL | Age: 76
Discharge: HOME | End: 2021-03-25
Payer: MEDICARE

## 2021-03-25 DIAGNOSIS — Z87.891 HX OF TOBACCO USE, PRESENTING HAZARDS TO HEALTH: ICD-10-CM

## 2021-03-25 DIAGNOSIS — I77.9 MILD CAROTID ARTERY DISEASE (HCC): ICD-10-CM

## 2021-03-25 DIAGNOSIS — R29.5 TRANSIENT PARALYSIS: ICD-10-CM

## 2021-03-25 DIAGNOSIS — R29.898 WEAKNESS OF LEFT UPPER EXTREMITY: ICD-10-CM

## 2021-03-25 PROCEDURE — G1004 CDSM NDSC: HCPCS

## 2021-03-25 PROCEDURE — 70553 MRI BRAIN STEM W/O & W/DYE: CPT

## 2021-03-25 PROCEDURE — 93880 EXTRACRANIAL BILAT STUDY: CPT

## 2021-03-25 PROCEDURE — 93880 EXTRACRANIAL BILAT STUDY: CPT | Performed by: SURGERY

## 2021-03-25 PROCEDURE — A9585 GADOBUTROL INJECTION: HCPCS | Performed by: FAMILY MEDICINE

## 2021-03-25 PROCEDURE — 71271 CT THORAX LUNG CANCER SCR C-: CPT

## 2021-03-25 RX ADMIN — GADOBUTROL 8 ML: 604.72 INJECTION INTRAVENOUS at 14:11

## 2021-03-30 ENCOUNTER — TELEMEDICINE (OUTPATIENT)
Dept: FAMILY MEDICINE CLINIC | Facility: CLINIC | Age: 76
End: 2021-03-30
Payer: MEDICARE

## 2021-03-30 VITALS
HEIGHT: 67 IN | SYSTOLIC BLOOD PRESSURE: 127 MMHG | BODY MASS INDEX: 29.82 KG/M2 | WEIGHT: 190 LBS | TEMPERATURE: 97.2 F | OXYGEN SATURATION: 96 % | HEART RATE: 89 BPM | DIASTOLIC BLOOD PRESSURE: 82 MMHG

## 2021-03-30 DIAGNOSIS — G45.9 TIA (TRANSIENT ISCHEMIC ATTACK): Primary | ICD-10-CM

## 2021-03-30 PROCEDURE — 99214 OFFICE O/P EST MOD 30 MIN: CPT | Performed by: FAMILY MEDICINE

## 2021-03-30 NOTE — PROGRESS NOTES
Virtual Regular Visit      Assessment/Plan:    Problem List Items Addressed This Visit     None      Visit Diagnoses     TIA (transient ischemic attack)    -  Primary    Relevant Orders    Echo complete with contrast if indicated      has had two recent episodes, plus a more remote episode  Reviewed imaging studies with patient  Continue good BP, lipid, BS control  She will start taking the asa 81mg more regularly  Last echo 1/2017-- will repeat  Refer neuro       Reason for visit is   Chief Complaint   Patient presents with    Follow-up     Review MRI and lab work, will obtain VS    Virtual Regular Visit        Encounter provider Ivet Wiley MD    Provider located at [address] [place] [address],[address]  [address]  [facility] [id number] [phone]      Recent Visits  No visits were found meeting these conditions  Showing recent visits within past 7 days and meeting all other requirements     Today's Visits  Date Type Provider Dept   03/30/21 Telemedicine Ivet Wiley MD Pg  [address] [facility] today's visits and meeting all other requirements     Future Appointments  No visits were found meeting these conditions  Showing future appointments within next 150 days and meeting all other requirements        The patient was identified by name and date of birth  Kayleen Porras was informed that this is a telemedicine visit and that the visit is being conducted [place] [facility] and patient was informed that this is a secure, HIPAA-compliant platform  She agrees to proceed     My office door was closed  No one else was in the room  She acknowledged consent and understanding of privacy and security of the video platform  The patient has agreed to participate and understands they can discontinue the visit at any time  Patient is aware this is a billable service  Subjective  Kayleen Porras is a 77 y o  female following up on imaging    Pt is waiting on CT lung cancer screening result  Has MRI and carotid result  She has concerns about both tests  Carotids- she says that she had no more than a 4% stenosis on a lifeline screening  Is concerned at the <50% reading  She is concerned because she has had a few episodes of arm weakness, dizziness, feels weird  Had two of these  Admits she has been missing taking her asa 81 mg daily- she will start putting this in     BP has been stable   Taking crestor without difficulty  BS well controlled     Lab Results   Component Value Date    HGBA1C 6 7 (H) 03/05/2021           HPI     Past Medical History:   Diagnosis Date    [facility] mellitus [facility]     Diabetes mellitus type 2, controlled ([facility] )     Disease of thyroid gland     GERD (gastroesophageal reflux disease)     Glaucoma     H/O tooth extraction     Had top row of teeth removed around April 2016    Headache(784 0)     Hypertension     Pneumonia     Stroke ([facility]     Visual impairment        Past Surgical History:   Procedure Laterality Date    CATARACT EXTRACTION Right     5 years ago    CYST REMOVAL Left 04/15/2014    ear lobe      EAR SURGERY  1970    Left ear cyst removal     EYE SURGERY      OTHER SURGICAL HISTORY       right upper arm          Current Outpatient Medications   Medication Sig Dispense Refill    albuterol (PROVENTIL HFA,VENTOLIN HFA) 90 mcg/act inhaler Inhale 2 puffs every 4 (four) hours as needed for wheezing or shortness of breath 1 Inhaler 1    aspirin 81 MG tablet Take 81 mg by mouth daily      COMBIGAN 0 2-0 5 % Administer 1 drop to the right eye 2 (two) times a day   3    cycloSPORINE (RESTASIS) 0 05 % ophthalmic emulsion Administer 1 drop to both eyes every 12 (twelve) hours       dorzolamide (TRUSOPT) 2 % ophthalmic solution Administer 1 drop to the right eye 3 (three) times a day   1    gatifloxacin (ZYMAXID) 0 5 %       glucose blood (OneTouch Verio) test strip USE TO TEST BLOOD SUGAR UP TO FOUR TIMES DAILY 300 each 1    levothyroxine 100 mcg tablet Take 1 tablet (100 mcg total) by mouth daily BRAND SYNTHROID PLEASE 90 tablet 1    liraglutide (VICTOZA) injection Inject 0 1 mL (0 6 mg total) under the skin daily 3 pen 3    losartan-hydrochlorothiazide (HYZAAR) 50-12 5 mg per tablet Take 1 tablet by mouth daily 90 tablet 1    LUMIGAN 0 01 % ophthalmic drops Administer 1 drop to both eyes daily at bedtime   3    metFORMIN (GLUCOPHAGE) 500 mg tablet Take 1 tablet (500 mg total) by mouth 2 (two) times a day with meals START WITH 1 TABLET BY MOUTH DAILY FOR 1 TABLET WEEK, THEN 1 TABLET BY MOUTH TWICE DAILY FOR 1 WEEK, THEN 2 TABLETS BY MOUTH TWICE DAILY 180 tablet 1    OneTouch Delica Lancets 26J MISC To test BS up to four times daily- E11 65 200 each 5    rosuvastatin (CRESTOR) 5 mg tablet Take 1 tablet (5 mg total) by mouth daily 90 tablet 0     No current facility-administered medications for this visit  Allergies   Allergen Reactions    Eggs Or Egg-Derived Products - Food Allergy Anaphylaxis    Albumen, Egg - Food Allergy Hives    Antihistamines, Diphenhydramine-Type     Epinephrine Syncope     Root canal 25 years ago     Fluzone [Flu Virus Vaccine] Hives, Abdominal Pain and Facial Swelling    Lidocaine      Syncope, tachycardia with local anesthetic with epinephrine    Zinc Rash       Review of Systems    Video Exam    Vitals:    03/30/21 1628   Weight: 86 2 kg (190 lb)   Height: 5' 6 54" (1 69 m)       Physical Exam  Vitals signs and nursing note reviewed  Constitutional:       Appearance: She is well-developed  HENT:      Head: Normocephalic and atraumatic  Eyes:      Conjunctiva/sclera: Conjunctivae normal    Pulmonary:      Effort: Pulmonary effort is normal  No respiratory distress  Breath sounds: No stridor  Neurological:      Mental Status: She is alert and oriented to person, place, and time     Psychiatric:         Mood and Affect: Mood normal          Behavior: Behavior normal           I spent 30 minutes directly with the patient during this visit      VIRTUAL VISIT DISCLAIMER    Josefa Bah acknowledges that she has consented to an online visit or consultation  She understands that the online visit is based solely on information provided by her, and that, in the absence of a face-to-face physical evaluation by the physician, the diagnosis she receives is both limited and provisional in terms of accuracy and completeness  This is not intended to replace a full medical face-to-face evaluation by the physician  Josefa Bah understands and accepts these terms

## 2021-03-30 NOTE — PATIENT INSTRUCTIONS
You can call [facility] central scheduling to schedule your test at [phone]  They are available M-F 7AM-7PM and Saturday 8AM-noon    [facility] Neu[facility]: [phone]

## 2021-03-31 ENCOUNTER — TELEPHONE (OUTPATIENT)
Dept: ADMINISTRATIVE | Facility: OTHER | Age: 76
End: 2021-03-31

## 2021-03-31 NOTE — LETTER
Diabetic Eye Exam Form    Date Requested: 21  Patient: Raeann Varghese  Patient : 1945   Referring Provider: Nona Roman MD    Dilated Retinal Exam, Optomap-Iris Exam, or Fundus Photography Done         Yes ([place] one above)         No     Date of Diabetic Eye Exam ______________________________  Left Eye      Exam did show retinopathy    Exam did not show retinopathy         Mild       Moderate       None       Proliferative       Severe     Right Eye     Exam did show retinopathy    Exam did not show retinopathy         Mild       Moderate       None       Proliferative       Severe     Comments __________________________________________________________    Practice Providing Exam ______________________________________________    Exam Performed By (print name) _______________________________________      Provider Signature ___________________________________________________      These reports are needed for  compliance    Please fax this completed form and a copy of the Diabetic Eye Exam report to our office located at [place] [address] as soon as possible to [phone] tavares Islas: Phone [phone]    We thank you for your assistance in treating our mutual patient

## 2021-03-31 NOTE — TELEPHONE ENCOUNTER
----- Message from Karin Buckley LPN sent at   4:24 PM EDT -----  [facility] [address] [place] [address]  21 4:25 PM    Hello, our patient Marcus Phelan has had Diabetic Eye Exam completed/performed  Please assist in updating the patient chart by making an External outreach [facility]  The date of service is 21      Thank you,  Karin Buckley LPN  [facility]

## 2021-03-31 NOTE — TELEPHONE ENCOUNTER
Upon review of the In Basket request and the patient's chart, initial outreach has been made via fax, please see Contacts section for details       Thank you  aWlter Davalos

## 2021-04-04 DIAGNOSIS — Z87.891 HX OF TOBACCO USE, PRESENTING HAZARDS TO HEALTH: Primary | ICD-10-CM

## 2021-04-06 NOTE — TELEPHONE ENCOUNTER
Upon review of the In Basket request we were able to locate, review, and update the patient chart as requested for Diabetic Eye Exam  The DOS returned by this provider is 10/14/2020  Please check with patient about most recent location or DOS  Any additional questions or concerns should be emailed to the Practice Liaisons via [email or web address] email, please do not reply via In Basket      Thank you  Vipin Momin

## 2021-04-10 DIAGNOSIS — E03.9 HYPOTHYROIDISM, UNSPECIFIED TYPE: ICD-10-CM

## 2021-04-11 RX ORDER — LEVOTHYROXINE SODIUM 112 MCG
TABLET ORAL
Qty: 90 TABLET | Refills: 1 | OUTPATIENT
Start: 2021-04-11

## 2021-04-19 ENCOUNTER — APPOINTMENT (EMERGENCY)
Dept: CT IMAGING | Facility: HOSPITAL | Age: 76
End: 2021-04-19
Payer: MEDICARE

## 2021-04-19 ENCOUNTER — TELEPHONE (OUTPATIENT)
Dept: NEUROLOGY | Facility: CLINIC | Age: 76
End: 2021-04-19

## 2021-04-19 ENCOUNTER — HOSPITAL ENCOUNTER (EMERGENCY)
Facility: HOSPITAL | Age: 76
Discharge: HOME | End: 2021-04-19
Attending: EMERGENCY MEDICINE
Payer: MEDICARE

## 2021-04-19 ENCOUNTER — TELEPHONE (OUTPATIENT)
Dept: FAMILY MEDICINE CLINIC | Facility: CLINIC | Age: 76
End: 2021-04-19

## 2021-04-19 VITALS
BODY MASS INDEX: 29.79 KG/M2 | DIASTOLIC BLOOD PRESSURE: 61 MMHG | SYSTOLIC BLOOD PRESSURE: 132 MMHG | RESPIRATION RATE: 20 BRPM | HEART RATE: 70 BPM | OXYGEN SATURATION: 98 % | WEIGHT: 187.61 LBS

## 2021-04-19 DIAGNOSIS — R79.89 LOW TSH LEVEL: ICD-10-CM

## 2021-04-19 DIAGNOSIS — R29.898 LEFT ARM WEAKNESS: Primary | ICD-10-CM

## 2021-04-19 DIAGNOSIS — R20.0 LEFT ARM NUMBNESS: ICD-10-CM

## 2021-04-19 DIAGNOSIS — R42 DIZZINESS: ICD-10-CM

## 2021-04-19 LAB
ALBUMIN SERPL BCP-MCNC: 3.8 G/DL (ref 3.5–5)
ALP SERPL-CCNC: 78 U/L (ref 46–116)
ALT SERPL W P-5'-P-CCNC: 12 U/L (ref 12–78)
ANION GAP SERPL CALCULATED.3IONS-SCNC: 10 MMOL/L (ref 4–13)
AST SERPL W P-5'-P-CCNC: 17 U/L (ref 5–45)
ATRIAL RATE: 63 BPM
BASOPHILS # BLD AUTO: 0.08 THOUSANDS/ΜL (ref 0–0.1)
BASOPHILS NFR BLD AUTO: 1 % (ref 0–1)
BILIRUB SERPL-MCNC: 0.72 MG/DL (ref 0.2–1)
BUN SERPL-MCNC: 21 MG/DL (ref 5–25)
CALCIUM SERPL-MCNC: 9.2 MG/DL (ref 8.3–10.1)
CHLORIDE SERPL-SCNC: 101 MMOL/L (ref 100–108)
CO2 SERPL-SCNC: 29 MMOL/L (ref 21–32)
CREAT SERPL-MCNC: 0.75 MG/DL (ref 0.6–1.3)
EOSINOPHIL # BLD AUTO: 0.16 THOUSAND/ΜL (ref 0–0.61)
EOSINOPHIL NFR BLD AUTO: 2 % (ref 0–6)
ERYTHROCYTE [DISTWIDTH] IN BLOOD BY AUTOMATED COUNT: 12.5 % (ref 11.6–15.1)
GFR SERPL CREATININE-BSD FRML MDRD: 79 ML/MIN/1.73SQ M
GLUCOSE SERPL-MCNC: 168 MG/DL (ref 65–140)
HCT VFR BLD AUTO: 39.1 % (ref 34.8–46.1)
HGB BLD-MCNC: 13.2 G/DL (ref 11.5–15.4)
IMM GRANULOCYTES # BLD AUTO: 0.03 THOUSAND/UL (ref 0–0.2)
IMM GRANULOCYTES NFR BLD AUTO: 0 % (ref 0–2)
LYMPHOCYTES # BLD AUTO: 1.92 THOUSANDS/ΜL (ref 0.6–4.47)
LYMPHOCYTES NFR BLD AUTO: 21 % (ref 14–44)
MCH RBC QN AUTO: 32.8 PG (ref 26.8–34.3)
MCHC RBC AUTO-ENTMCNC: 33.8 G/DL (ref 31.4–37.4)
MCV RBC AUTO: 97 FL (ref 82–98)
MONOCYTES # BLD AUTO: 0.76 THOUSAND/ΜL (ref 0.17–1.22)
MONOCYTES NFR BLD AUTO: 8 % (ref 4–12)
NEUTROPHILS # BLD AUTO: 6.31 THOUSANDS/ΜL (ref 1.85–7.62)
NEUTS SEG NFR BLD AUTO: 68 % (ref 43–75)
NRBC BLD AUTO-RTO: 0 /100 WBCS
P AXIS: 77 DEGREES
PLATELET # BLD AUTO: 341 THOUSANDS/UL (ref 149–390)
PMV BLD AUTO: 10.5 FL (ref 8.9–12.7)
POTASSIUM SERPL-SCNC: 3.4 MMOL/L (ref 3.5–5.3)
PR INTERVAL: 158 MS
PROT SERPL-MCNC: 7.5 G/DL (ref 6.4–8.2)
QRS AXIS: 67 DEGREES
QRSD INTERVAL: 82 MS
QT INTERVAL: 398 MS
QTC INTERVAL: 407 MS
RBC # BLD AUTO: 4.02 MILLION/UL (ref 3.81–5.12)
SODIUM SERPL-SCNC: 140 MMOL/L (ref 136–145)
T WAVE AXIS: 81 DEGREES
TROPONIN I SERPL-MCNC: <0.02 NG/ML
TSH SERPL DL<=0.05 MIU/L-ACNC: 0.23 UIU/ML (ref 0.36–3.74)
VENTRICULAR RATE: 63 BPM
WBC # BLD AUTO: 9.26 THOUSAND/UL (ref 4.31–10.16)

## 2021-04-19 PROCEDURE — 70498 CT ANGIOGRAPHY NECK: CPT

## 2021-04-19 PROCEDURE — 99284 EMERGENCY DEPT VISIT MOD MDM: CPT

## 2021-04-19 PROCEDURE — 84484 ASSAY OF TROPONIN QUANT: CPT | Performed by: PHYSICIAN ASSISTANT

## 2021-04-19 PROCEDURE — 93005 ELECTROCARDIOGRAM TRACING: CPT

## 2021-04-19 PROCEDURE — 93010 ELECTROCARDIOGRAM REPORT: CPT | Performed by: INTERNAL MEDICINE

## 2021-04-19 PROCEDURE — 36415 COLL VENOUS BLD VENIPUNCTURE: CPT | Performed by: PHYSICIAN ASSISTANT

## 2021-04-19 PROCEDURE — 80053 COMPREHEN METABOLIC PANEL: CPT | Performed by: PHYSICIAN ASSISTANT

## 2021-04-19 PROCEDURE — 85025 COMPLETE CBC W/AUTO DIFF WBC: CPT | Performed by: PHYSICIAN ASSISTANT

## 2021-04-19 PROCEDURE — 84443 ASSAY THYROID STIM HORMONE: CPT | Performed by: PHYSICIAN ASSISTANT

## 2021-04-19 PROCEDURE — 70496 CT ANGIOGRAPHY HEAD: CPT

## 2021-04-19 PROCEDURE — 99285 EMERGENCY DEPT VISIT HI MDM: CPT | Performed by: PHYSICIAN ASSISTANT

## 2021-04-19 RX ADMIN — IOHEXOL 85 ML: 350 INJECTION, SOLUTION INTRAVENOUS at 13:10

## 2021-04-19 NOTE — TELEPHONE ENCOUNTER
Patient called, she believes she had a stroke yesterday but did not want to go to the ER and wanted an appointment  After speaking with Dr Glo Anaya, patient was sent to the ER for this reason and for testing  I explained this to the patient and she was ok and stated that she would be going over to [facility]

## 2021-04-19 NOTE — ED PROVIDER NOTES
History  Chief Complaint   Patient presents with    Numbness     patient reports that she had a TIA last night; patient's PCP told patient come to the ED: patient reports had a MRI and CT about 5 weeks ago due to TIA 5 weeks; patient reports left arm numbness and no movement; patient states that symptoms lasted about 5-7 mins; patient reports that she feels like the another TIA is coming on      Patient is a 78 y/o female with hx of DM2, CVA/TIA, GERD, glaucoma, hypothyroidism, HTN, presents to the ED for evaluation of numbness/weakness of left arm, headache and dizziness  Pt states she had CVA 3 years ago, 5 weeks ago she had 2 episodes of TIAs  Pt was seen by her PCP for these episodes, patient states episodes included numbness/weakness of left arm  Pt had outpatient MRI which was negative for acute disease and had outpatient carotid U/S which showed <50% stenosis bilaterally  Pt states she is supposed to be taking 81mg ASA, but has not been compliant with taking this  Pt states around 8PM last night, she began with another episode of left arm weakness/numbness which lasted approx  7-10 minutes and resolved  Pt states she woke up this morning with dizziness sensation and headache  Pt states she is asymptomatic at this time  Pt without fever, chills, vision changes (different than normal - hx glaucoma), gait abnormality, speech changes, facial asymmetry  Prior to Admission Medications   Prescriptions Last Dose Informant Patient Reported? Taking?    COMBIGAN 0 2-0 5 %   Yes Yes   Sig: Administer 1 drop to the right eye 2 (two) times a day    LUMIGAN 0 01 % ophthalmic drops   Yes No   Sig: Administer 1 drop to both eyes daily at bedtime    OneTouch Delica Lancets 42J MISC   No No   Sig: To test BS up to four times daily- E11 65   albuterol (PROVENTIL HFA,VENTOLIN HFA) 90 mcg/act inhaler   No Yes   Sig: Inhale 2 puffs every 4 (four) hours as needed for wheezing or shortness of breath   aspirin 81 MG tablet Yes Yes   Sig: Take 81 mg by mouth daily   cycloSPORINE (RESTASIS) 0 05 % ophthalmic emulsion   Yes Yes   Sig: Administer 1 drop to both eyes every 12 (twelve) hours    dorzolamide (TRUSOPT) 2 % ophthalmic solution   Yes Yes   Sig: Administer 1 drop to the right eye 3 (three) times a day    gatifloxacin (ZYMAXID) 0 5 %   Yes Yes   glucose blood (OneTouch Verio) test strip   No No   Sig: USE TO TEST BLOOD SUGAR UP TO FOUR TIMES DAILY   levothyroxine 100 mcg tablet   No Yes   Sig: Take 1 tablet (100 mcg total) by mouth daily BRAND SYNTHROID PLEASE   liraglutide (VICTOZA) injection   No Yes   Sig: Inject 0 1 mL (0 6 mg total) under the skin daily   losartan-hydrochlorothiazide (HYZAAR) 50-12 5 mg per tablet   No Yes   Sig: Take 1 tablet by mouth daily   metFORMIN (GLUCOPHAGE) 500 mg tablet   No Yes   Sig: Take 1 tablet (500 mg total) by mouth 2 (two) times a day with meals START WITH 1 TABLET BY MOUTH DAILY FOR 1 TABLET WEEK, THEN 1 TABLET BY MOUTH TWICE DAILY FOR 1 WEEK, THEN 2 TABLETS BY MOUTH TWICE DAILY   rosuvastatin (CRESTOR) 5 mg tablet   No Yes   Sig: Take 1 tablet (5 mg total) by mouth daily      Facility-Administered Medications: None       Past Medical History:   Diagnosis Date    Diabetes mellitus ([facility] [address] )     Diabetes mellitus type 2, controlled ([address] )     Disease of thyroid gland     GERD (gastroesophageal reflux disease)     Glaucoma     H/O tooth extraction     Had top row of teeth removed around April 2016    Headache(784 0)     Hypertension     Pneumonia     Stroke ([address] )     TIA (transient ischemic attack)     Visual impairment        Past Surgical History:   Procedure Laterality Date    CATARACT EXTRACTION Right     5 years ago    CYST REMOVAL Left 04/15/2014    ear lobe      EAR SURGERY  1970    Left ear cyst removal     EYE SURGERY      OTHER SURGICAL HISTORY       right upper arm          Family History   Problem Relation Age of Onset    Rheum arthritis Mother     Cancer Brother  Heart attack Paternal Grandmother      I have reviewed and agree with the history as documented  E-Cigarette/Vaping    E-Cigarette Use Never User      E-Cigarette/Vaping Substances    Nicotine No     THC No     CBD No     Flavoring No     Other No     Unknown No      Social History     Tobacco Use    Smoking status: Former Smoker     Packs/day: 3 00     Years: 50 00     Pack years: 150 00     Types: Cigarettes     Start date: 3/30/1960     Quit date: 3/15/2010     Years since quittin 1    Smokeless tobacco: Never Used   Substance Use Topics    Alcohol use: Yes     Alcohol/week: 1 0 standard drinks     Types: 1 Glasses of wine per week     Frequency: Never     Drinks per session: 1 or 2     Binge frequency: Never     Comment: very rare    Drug use: Never       Review of Systems   Constitutional: Negative for chills and fever  HENT: Negative for ear pain and sore throat  Eyes: Negative for visual disturbance  Respiratory: Negative for cough and shortness of breath  Cardiovascular: Negative for chest pain, palpitations and leg swelling  Gastrointestinal: Negative for abdominal pain, diarrhea, nausea and vomiting  Genitourinary: Negative for dysuria and hematuria  Musculoskeletal: Negative for back pain and neck pain  Skin: Negative for rash  Neurological: Positive for dizziness, weakness, numbness and headaches  Negative for tremors, seizures, syncope, facial asymmetry, speech difficulty and light-headedness  Psychiatric/Behavioral: Negative for confusion  Physical Exam  Physical Exam  Constitutional:       General: She is not in acute distress  Appearance: She is well-developed  She is not ill-appearing or toxic-appearing  HENT:      Head: Normocephalic and atraumatic  Right Ear: External ear normal       Left Ear: External ear normal       Nose: Nose normal       Mouth/Throat:      Lips: Pink        Mouth: Mucous membranes are moist    Eyes:      General: Vision grossly intact  Extraocular Movements: Extraocular movements intact  Conjunctiva/sclera: Conjunctivae normal       Pupils: Pupils are equal, round, and reactive to light  Neck:      Musculoskeletal: Normal range of motion and neck supple  Cardiovascular:      Rate and Rhythm: Normal rate and regular rhythm  Pulmonary:      Effort: Pulmonary effort is normal       Breath sounds: Normal breath sounds  No decreased breath sounds, wheezing, rhonchi or rales  Musculoskeletal: Normal range of motion  Skin:     General: Skin is warm and dry  Capillary Refill: Capillary refill takes less than 2 seconds  Neurological:      General: No focal deficit present  Mental Status: She is alert and oriented to person, place, and time  GCS: GCS eye subscore is 4  GCS verbal subscore is 5  GCS motor subscore is 6  Sensory: Sensation is intact  Motor: Motor function is intact  No weakness or pronator drift  Coordination: Coordination is intact  Finger-Nose-Finger Test and Heel to [place] kyree Test normal       Gait: Gait is intact  Comments: NIHSS = 0  GCS 15  AAOx4  No focal neurological deficits  PERRL  EOMI  No pronator drift   strength 5/5 bilaterally  B/L UE strength 5/5 throughout  Finger to nose normal  Cerebellar function normal  Ambulates without difficulty  B/L LE strength 5/5 throughout   Gross sensation to b/l upper and lower extremities intact      Psychiatric:         Mood and Affect: Mood and affect normal          Vital Signs  ED Triage Vitals [04/19/21 1102]   Temp Pulse Respirations Blood Pressure SpO2   -- 80 20 155/77 96 %      Temp src Heart Rate Source Patient Position - Orthostatic VS BP Location FiO2 (%)   -- Monitor -- -- --      Pain Score       --           Vitals:    04/19/21 1102 04/19/21 1145 04/19/21 1245 04/19/21 1400   BP: 155/77 133/58 132/61    Pulse: 80 72 64 70         Visual Acuity      ED Medications  Medications   iohexol (OMNIPAQUE) 350 MG/ML injection (MULTI-DOSE) 85 mL (85 mL Intravenous Given 4/19/21 1310)       Diagnostic Studies  Results Reviewed     Procedure Component Value Units Date/Time    TSH [[id number]]  (Abnormal) Collected: 04/19/21 1159    Lab Status: Final result Specimen: Blood from Arm, Left Updated: 04/19/21 1252     TSH 3RD GENERATON 0 227 uIU/mL     Narrative:      Patients undergoing fluorescein dye angiography may retain small amounts of fluorescein in the body for 48-72 hours post procedure  Samples containing fluorescein can produce falsely depressed TSH values  If the patient had this procedure,a specimen should be resubmitted post fluorescein clearance        Comprehensive metabolic panel [[id number]]  (Abnormal) Collected: 04/19/21 1159    Lab Status: Final result Specimen: Blood from Arm, Left Updated: 04/19/21 1244     Sodium 140 mmol/L      Potassium 3 4 mmol/L      Chloride 101 mmol/L      CO2 29 mmol/L      ANION GAP 10 mmol/L      BUN 21 mg/dL      Creatinine 0 75 mg/dL      Glucose 168 mg/dL      Calcium 9 2 mg/dL      AST 17 U/L      ALT 12 U/L      Alkaline Phosphatase 78 U/L      Total Protein 7 5 g/dL      Albumin 3 8 g/dL      Total Bilirubin 0 72 mg/dL      eGFR 79 ml/min/1 73sq m     Narrative:      [place] guidelines for Chronic Kidney Disease (CKD):     Stage 1 with normal or high GFR (GFR > 90 mL/min/1 73 square meters)    Stage 2 Mild CKD (GFR = 60-89 mL/min/1 73 square meters)    Stage 3A Moderate CKD (GFR = 45-59 mL/min/1 73 square meters)    Stage 3B Moderate CKD (GFR = 30-44 mL/min/1 73 square meters)    Stage 4 Severe CKD (GFR = 15-29 mL/min/1 73 square meters)    Stage 5 End Stage CKD (GFR <15 mL/min/1 73 square meters)  Note: GFR calculation is accurate only with a steady state creatinine    Troponin I [[id number]]  (Normal) Collected: 04/19/21 1159    Lab Status: Final result Specimen: Blood from Arm, Left Updated: 04/19/21 1244     Troponin I <0 02 ng/mL     CBC and differential [[id number]] Collected: 04/19/21 1159    Lab Status: Final result Specimen: Blood from Arm, Left Updated: 04/19/21 1212     WBC 9 26 Thousand/uL      RBC 4 02 Million/uL      Hemoglobin 13 2 g/dL      Hematocrit 39 1 %      MCV 97 fL      MCH 32 8 pg      MCHC 33 8 g/dL      RDW 12 5 %      MPV 10 5 fL      Platelets 192 Thousands/uL      nRBC 0 /100 WBCs      Neutrophils Relative 68 %      Immat GRANS % 0 %      Lymphocytes Relative 21 %      Monocytes Relative 8 %      Eosinophils Relative 2 %      Basophils Relative 1 %      Neutrophils Absolute 6 31 Thousands/µL      Immature Grans Absolute 0 03 Thousand/uL      Lymphocytes Absolute 1 92 Thousands/µL      Monocytes Absolute 0 76 Thousand/µL      Eosinophils Absolute 0 16 Thousand/µL      Basophils Absolute 0 08 Thousands/µL                  CTA head and neck with and without contrast   Final Result by Jose Alfredo Heaton DO (04/19 1425)      Chronic lacunar infarct in the right centrum semiovale  No acute intracranial abnormality  Atherosclerotic calcification left carotid bifurcation without significant stenosis  No focal intracranial stenosis or aneurysm  Emphysematous changes in the lung apices              Workstation performed: [id number]                    Procedures  ECG 12 Lead Documentation Only    Date/Time: 4/19/2021 11:53 AM  Performed by: Vipul Jackson PA-C  Authorized by: Vipul Jackson PA-C     Indications / Diagnosis:  Dizziness/left arm weakness  ECG reviewed by me, the ED Provider: yes    Patient location:  ED  Previous ECG:     Previous ECG:  Compared to current    Comparison ECG info:  28-april-2020    Similarity:  No change    Comparison to cardiac monitor: Yes    Interpretation:     Interpretation: normal    Quality:     Tracing quality:  Limited by artifact  Rate:     ECG rate:  63    ECG rate assessment: normal    Rhythm:     Rhythm: sinus rhythm    Ectopy:     Ectopy: none    QRS:     QRS axis:  Normal QRS intervals:  Normal  Conduction:     Conduction: normal    ST segments:     ST segments:  Normal  T waves:     T waves: normal    Comments:      QT/QTc: 398/407  No STEMI             ED Course  ED Course as of Apr 20 0739   Mon Apr 19, 2021   1204 [place] text to Neurology on-call      [phone] Neurology on-call, Dr Marie Feldman, agrees with plan  Reviewed chart, patient with several episodes since 2017, has been non-complaint with her medications  If CT angio unremarkable, can give patient 300mg Plavix and 325mg ASA and tell patient to continue taking 81mg ASA and 75mg plavix at home for 3 weeks and then just 81mg ASA daily  Will have clinic reach out to obtain earlier appointment (patient has appointment with Dr Chaitanya Keenan in October), will order EEG to be done prior to appointment  1427 Chronic lacunar infarct in the right centrum semiovale  No acute intracranial abnormality  Atherosclerotic calcification left carotid bifurcation without significant stenosis  No focal intracranial stenosis or aneurysm  Emphysematous changes in the lung apices  CTA head and neck with and without contrast   1434 Patient refusing plavix and ASA, states she wants to talk with her PCP first  Explained to patient to risks of refusing medication that could prevent her from having a stroke  Patient states she will call her PCP tomorrow to discuss                   HEART Risk Score      Most Recent Value   Heart Score Risk Calculator   History  0 Filed at: 04/20/2021 0735   ECG  0 Filed at: 04/20/2021 0735   Age  2 Filed at: 04/20/2021 0735   Risk Factors  1 Filed at: 04/20/2021 0735   Troponin  0 Filed at: 04/20/2021 0735   HEART Score  3 Filed at: 04/20/2021 0735          Stroke Assessment     Row Name 04/19/21 1142             NIH Stroke Scale    Interval  Baseline      Level of Consciousness (1a )  0      LOC Questions (1b )  0      LOC Commands (1c )  0      Best Gaze (2 )  0      Visual (3 )  0      Facial Palsy (4 )  0 Motor Arm, Left (5a )  0      Motor Arm, Right (5b )  0      Motor Leg, Left (6a )  0      Motor Leg, Right (6b )  0      Limb Ataxia (7 )  0      Sensory (8 )  0      Best Language (9 )  0      Dysarthria (10 )  0      Extinction and Inattention (11 ) (Formerly Neglect)  0      Total  0          First Filed Value   TPA Decision  Patient not a TPA candidate  Patient is not a candidate options  Symptoms resolved/clearly non disabling  SBIRT 22yo+      Most Recent Value   SBIRT (24 yo +)   In order to provide better care to our patients, we are screening all of our patients for alcohol and drug use  Would it be okay to ask you these screening questions? No Filed at: 04/19/2021 1129                    MDM  Number of Diagnoses or Management Options  Dizziness: established and improving  Left arm numbness: established and improving  Left arm weakness: established and improving  Low TSH level: new and requires workup  Diagnosis management comments: Patient is a 78 y/o female with hx of DM2, CVA/TIA, GERD, glaucoma, hypothyroidism, HTN, presents to the ED for evaluation of numbness/weakness of left arm, headache and dizziness  Pt states she had CVA 3 years ago, 5 weeks ago she had 2 episodes of TIAs  Pt was seen by her PCP for these episodes, patient states episodes included numbness/weakness of left arm  Pt had outpatient MRI which was negative for acute disease and had outpatient carotid U/S which showed <50% stenosis bilaterally  Pt states she is supposed to be taking 81mg ASA, but has not been compliant with taking this  Pt states around 8PM last night, she began with another episode of left arm weakness/numbness which lasted approx  7-10 minutes and resolved  Pt states she woke up this morning with dizziness sensation and headache  Pt states she is asymptomatic at this time  Neurology on-call, Dr Avila Aguilar, agrees with plan   Reviewed chart, patient with several episodes since 2017, has been non-complaint with her medications  If CT angio unremarkable, can give patient 300mg Plavix and 325mg ASA and tell patient to continue taking 81mg ASA and 75mg plavix at home for 3 weeks and then just 81mg ASA daily  Will have clinic reach out to obtain earlier appointment (patient has appointment with Dr Cassi Aguilar in October), will order EEG to be done prior to appointment  NIHSS = 0 (patient asymptomatic - all symptoms have resolved)  EKG shows no acute ST abnormality  Troponin negative, do not suspect ACS etiology of sx  TSH low (better than previous - patient recently decreased levothyroxine) - recommended f/u with PCP again for possible medication decrease again to improve TSH levels as may be contributing to some of patients symptoms  CT angiogram head shows Chronic lacunar infarct in the right centrum semiovale  No acute intracranial abnormality  Atherosclerotic calcification left carotid bifurcation without significant stenosis  No focal intracranial stenosis or aneurysm  Emphysematous changes in the lung apices  Discussed imaging findings with patient at bedside  No acute intracranial abnormality  Discussed recommendations from Neurology  Patient declining high dose ASA and plavix at this time despite giving patient the risks vs benefits of starting both ASA and plavix  Patient states she would like to discuss with her PCP first, states she will call her tomorrow regarding this  Explained to patient again the risks of not taking this medication which can prevent a stroke if she did have a TIA as her risk of having CVA after TIA are increased, patient verbalizes understanding and continues to decline medication at this time  Also discussed observation to the hospital with patient, patient declines, states she has a dog at home and there is no way she can stay in the hospital  Discussed strict return precautions with patient   Neurology clinic will reach out to patient to obtain appointment with EEG outpatient done first  Stressed importance of medication compliance with patient as well as per chart review, when she takes the ASA it helps her not get left arm symptoms, patient agrees and states she will be more compliant with medication, specifically ASA  Patient verbalizes understanding and agrees with plan  The management plan was discussed in detail with the patient at bedside and all questions were answered  Prior to discharge, I provided both verbal and written instructions  I discussed with the patient the signs and symptoms for which to return to the emergency department  All questions were answered and patient was comfortable with the plan of care and discharged to home  The patient agrees to return to the Emergency Department for concerns and/or progression of illness          Disposition  Final diagnoses:   Left arm weakness   Left arm numbness   Dizziness   Low TSH level     Time reflects when diagnosis was documented in both MDM as applicable and the Disposition within this note     Time User Action Codes Description Comment    4/19/2021  2:32 PM Jerrod Arnt Add [R29 898] Left arm weakness     4/19/2021  2:32 PM Jerrod Arnt Add [R20 0] Left arm numbness     4/19/2021  2:32 PM Jerrod Arnt Add [R42] Dizziness     4/19/2021  2:33 PM Jerrod Arnt Add [R79 89] Low TSH level       ED Disposition     ED Disposition Condition Date/Time Comment    Discharge Stable Mon Apr 19, 2021  2:32 PM Kellen Pelaez discharge to home/self care              Follow-up Information     Follow up With Specialties Details Why Margaret 64, DO Neurology Schedule an appointment as soon as possible for a visit  clinic will call to make appointment Thi Damon N Aaron   [phone]            Discharge Medication List as of 4/19/2021  2:33 PM      CONTINUE these medications which have NOT CHANGED    Details   albuterol (PROVENTIL HFA,VENTOLIN HFA) 90 mcg/act inhaler Inhale 2 puffs every 4 (four) hours as needed for wheezing or shortness of breath, Starting Tue 2/11/2020, Normal      aspirin 81 MG tablet Take 81 mg by mouth daily, Starting Wed 8/17/2016, Historical Med      COMBIGAN 0 2-0 5 % Administer 1 drop to the right eye 2 (two) times a day , Starting Fri 12/28/2018, Historical Med      cycloSPORINE (RESTASIS) 0 05 % ophthalmic emulsion Administer 1 drop to both eyes every 12 (twelve) hours , Starting Wed 12/19/2018, Historical Med      dorzolamide (TRUSOPT) 2 % ophthalmic solution Administer 1 drop to the right eye 3 (three) times a day , Starting Fri 5/3/2019, Historical Med      gatifloxacin (ZYMAXID) 0 5 % Starting Fri 10/16/2020, Historical Med      levothyroxine 100 mcg tablet Take 1 tablet (100 mcg total) by mouth daily BRAND SYNTHROID PLEASE, Starting Tue 3/9/2021, Normal      liraglutide (VICTOZA) injection Inject 0 1 mL (0 6 mg total) under the skin daily, Starting Wed 1/27/2021, No Print      losartan-hydrochlorothiazide (HYZAAR) 50-12 5 mg per tablet Take 1 tablet by mouth daily, Starting Sat 3/13/2021, Normal      metFORMIN (GLUCOPHAGE) 500 mg tablet Take 1 tablet (500 mg total) by mouth 2 (two) times a day with meals START WITH 1 TABLET BY MOUTH DAILY FOR 1 TABLET WEEK, THEN 1 TABLET BY MOUTH TWICE DAILY FOR 1 WEEK, THEN 2 TABLETS BY MOUTH TWICE DAILY, Starting Thu 1/7/2021, No Print      rosuvastatin (CRESTOR) 5 mg tablet Take 1 tablet (5 mg total) by mouth daily, Starting Sat 3/13/2021, Normal      glucose blood (OneTouch Verio) test strip USE TO TEST BLOOD SUGAR UP TO FOUR TIMES DAILY, Normal      LUMIGAN 0 01 % ophthalmic drops Administer 1 drop to both eyes daily at bedtime , Starting Fri 12/28/2018, Historical Med      OneTouch Delica Lancets 35I MISC To test BS up to four times daily- E11 65, Normal           No discharge procedures on file      PDMP Review     None          ED Provider  Electronically Signed by           Maxine Em PA-C  04/20/21 7876

## 2021-04-19 NOTE — TELEPHONE ENCOUNTER
Best contact number for patient:  [phone]  Emergency Contact name and number:  Denis Whitmore [phone]  Referring provider and telephone number:  [phone]  Primary Care Provider Name and if affiliated with [facility]:   Block Yes  Reason for Appointment/Dx:  TIA   Have you seen and followed up with a pediatric Neurologist for this disease in the past?      No (If yes ok to schedule with Dr Jj Dominguez)    Neurology Location patient would like to be seen:    Order received? Yes                                                 Records Received? Yes    Have you ever seen another Neurologist?       No    Insurance Information    Insurance Name:Medicare A/B     ID/Policy #:[id number]    Secondary Insurance:    ID/Policy#: Workman's Comp/ Accident/ School  Information      Workman's Comp/Accident/School related?        No    If yes name of Insurance company:    Claim #:    Date of Injury:    Type of Injury:     Name and Telephone Number:    Notes:                   Appointment date:   10/1/2021

## 2021-04-22 ENCOUNTER — TELEPHONE (OUTPATIENT)
Dept: NEUROLOGY | Facility: CLINIC | Age: 76
End: 2021-04-22

## 2021-04-22 NOTE — TELEPHONE ENCOUNTER
Spoke with patient and she is anxious about having another TIA  Was in the ER 4/19 - I told patient that I would try and find her another appointment sooner - patient has been scheduled with Dr Sulaiman Castillo 7/8 @ 10a - will keep on cancellation list for sooner appt

## 2021-04-28 ENCOUNTER — OFFICE VISIT (OUTPATIENT)
Dept: FAMILY MEDICINE CLINIC | Facility: CLINIC | Age: 76
End: 2021-04-28
Payer: MEDICARE

## 2021-04-28 VITALS
HEART RATE: 87 BPM | DIASTOLIC BLOOD PRESSURE: 60 MMHG | OXYGEN SATURATION: 97 % | RESPIRATION RATE: 16 BRPM | SYSTOLIC BLOOD PRESSURE: 114 MMHG | BODY MASS INDEX: 29.03 KG/M2 | WEIGHT: 185 LBS | HEIGHT: 67 IN | TEMPERATURE: 97.2 F

## 2021-04-28 DIAGNOSIS — E11.9 CONTROLLED TYPE 2 DIABETES MELLITUS WITHOUT COMPLICATION, WITHOUT LONG-TERM CURRENT USE OF INSULIN (HCC): ICD-10-CM

## 2021-04-28 DIAGNOSIS — G45.9 TIA (TRANSIENT ISCHEMIC ATTACK): Primary | ICD-10-CM

## 2021-04-28 PROCEDURE — 99215 OFFICE O/P EST HI 40 MIN: CPT | Performed by: FAMILY MEDICINE

## 2021-04-28 RX ORDER — PEN NEEDLE, DIABETIC 30 GX3/16"
NEEDLE, DISPOSABLE MISCELLANEOUS
Qty: 100 EACH | Refills: 3 | Status: SHIPPED | OUTPATIENT
Start: 2021-04-28 | End: 2021-07-19 | Stop reason: SDUPTHER

## 2021-04-28 NOTE — PROGRESS NOTES
Assessment/Plan:       Problem List Items Addressed This Visit        Endocrine    Controlled type 2 diabetes mellitus without complication, without long-term current use of insulin (HCC)    Relevant Medications    Insulin Pen Needle (Pen Needles) 32G X 4 MM MISC      Other Visit Diagnoses     TIA (transient ischemic attack)    -  Primary    Relevant Orders    Ambulatory referral to Physical Therapy    Ambulatory referral to Occupational Therapy      recurrent TIAs, most recently concern for possible CVA given residual weakness on exam   Echo is scheduled in June    Has had CTA, MRI brain, carotid duplex  Recheck TSH, dose was adjusted with last labs  Call for earlier neuro appointment- will have office staff call  Continue asa 81 mg (prefers not to take plavix as she has had difficulty with nosebleeds even on just asa 81 mg)    Forms for work completed with return to work input pending neuro eval     Continue to use cane to ambulate as she feels more stable w this  Over 50% of this total 45 min visit spent in counseling/coordination of care regarding the above and including work paperwork         Subjective:     Destiney Bolden is a 77 y o  female here today with chief complaint below:  Chief Complaint   Patient presents with    Follow-up     - CC above per clinical staff and reviewed  HPI:  Pt is here s/p ER visit for likely TIA involving her LUE  She had CTA in the ER  She has had two-three prior episodes of LUE sudden weakness, arm drops and she needs to lift it with her RUE  Her TSH was suppressed, though less so than previous  She is frustrated that she did not see a physician or a neurologist in the ER  She is concerned that she has had to start using a cane, feels unsteady/unstable  Her LUE strength is not back to normal after this event  It feels like a milder form of what she has had with the LUE sudden weakness  She feels exhausted  Finds she is napping mid-day    This event was the 4th episode with her LUE  Prior episodes lasted about 5-7 minutes- arm dropped and she would have to use her RUE to put the L arm on her lap  This episode she improved somewhat in the 24 hours after the event, but at that point plateaued in terms of strength  She does have a hx in 6/2017 of possible stroke - in [facility] ER    She had an appointment scheduled in October at neurology in regard to the prior TIAs  She called to move up appointment- earliest available 7/8/21  She was told she needs an EEG by ER provider  She notes that her vision has gotten worse  Her boss made her go on medical leave for two weeks  Sent paperwork  She notes that she wasn't taking the asa 81 mg regularly prior to the TIA most recently  She started taking it regularly after the ER viist       Stopped taking advil regularly  Switched to tylenol and has only taken it once in two weeks  Used to take advil minimum of 4x/day  She is hesitant to take asa and plavix given her nosebleeds  She is comfortable staying on asa 81 mg    Her legs feel shaky, unstable  Not sure if weak  She is using a cane even in her apartment now  Worried she will fall  Her BP had been stable 120s/70s on home meter  Running lower some days now  Not feeling lightheaded        The following portions of the patient's history were reviewed and updated as appropriate: allergies, current medications, past family history, past medical history, past social history, past surgical history and problem list     ROS:  Review of Systems   No fever    Objective:      /60   Pulse 87   Temp (!) 97 2 °F (36 2 °C)   Resp 16   Ht 5' 6 54" (1 69 m)   Wt 83 9 kg (185 lb)   SpO2 97%   BMI 29 38 kg/m²   BP Readings from Last 3 Encounters:   04/28/21 114/60   04/19/21 132/61   03/30/21 127/82     Wt Readings from Last 3 Encounters:   04/28/21 83 9 kg (185 lb)   04/19/21 85 1 kg (187 lb 9 8 oz)   03/30/21 86 2 kg (190 lb)               Physical Exam: Physical Exam  Vitals signs and nursing note reviewed  Constitutional:       General: She is not in acute distress  Appearance: Normal appearance  HENT:      Head: Normocephalic and atraumatic  Eyes:      Extraocular Movements: Extraocular movements intact  Pupils: Pupils are equal, round, and reactive to light  Neck:      Vascular: No carotid bruit  Cardiovascular:      Rate and Rhythm: Normal rate and regular rhythm  Heart sounds: No murmur  Pulmonary:      Effort: Pulmonary effort is normal  No respiratory distress  Breath sounds: Normal breath sounds  No wheezing  Musculoskeletal:      Right lower leg: No edema  Left lower leg: No edema  Lymphadenopathy:      Cervical: No cervical adenopathy  Neurological:      Mental Status: She is alert and oriented to person, place, and time  Comments: Ambulates with cane      Notes diminished sensation to sharp and cold LUE (distal) and LLE (distal) in comparison to the RUE and RLE   5/5 UE and LE strength right side both prox and distal   4+/5 UE and LE strength on the left both prox and distal   Mild dysmetria R    Psychiatric:      Comments: anxious

## 2021-04-28 NOTE — PATIENT INSTRUCTIONS
We will call neurology to try to move up your appointment  Schedule PT and OT  Continue the aspirin 81 mg daily  Keep the echo appointment

## 2021-04-29 ENCOUNTER — TELEPHONE (OUTPATIENT)
Dept: FAMILY MEDICINE CLINIC | Facility: CLINIC | Age: 76
End: 2021-04-29

## 2021-04-29 NOTE — TELEPHONE ENCOUNTER
Spoke to Jamison to schedule patient a sooner appointment for neurology, she will call back and send an encounter once she finds a sooner date

## 2021-04-29 NOTE — TELEPHONE ENCOUNTER
Vadim Estevez from PCP Dr Lucretia Wyman office called and states that pt was given an appt in July  PCP would like pt be seen sooner  I don't see sooner appt  Gerson Cline can you pls try to accommodate this pt to Dr Lasha Doll schedule?      Once pt is scheduled, pls forward this enc to Dr Adrianna Babinski    Thank you                    [phone]

## 2021-05-03 ENCOUNTER — EVALUATION (OUTPATIENT)
Dept: PHYSICAL THERAPY | Facility: REHABILITATION | Age: 76
End: 2021-05-03
Payer: MEDICARE

## 2021-05-03 DIAGNOSIS — G45.9 TIA (TRANSIENT ISCHEMIC ATTACK): Primary | ICD-10-CM

## 2021-05-03 PROCEDURE — 97162 PT EVAL MOD COMPLEX 30 MIN: CPT | Performed by: PHYSICAL THERAPIST

## 2021-05-03 PROCEDURE — 97110 THERAPEUTIC EXERCISES: CPT | Performed by: PHYSICAL THERAPIST

## 2021-05-03 NOTE — PROGRESS NOTES
PT Evaluation     Today's date: 5/3/2021  Patient name: Juan Ramon Abbott  : 1945  MRN: [id number]  Referring provider: Severiano Alcide, MD  Dx:   Encounter Diagnosis     ICD-10-CM    1  TIA (transient ischemic attack)  G45 9 Ambulatory referral to Physical Therapy                  Assessment  Assessment details:  Denies reports to PT with CC of imbalance and L sided weakness  Results of evaluation in indicate LLE weakness based on MMT, impaired coordination, impaired tactile sensation, impaired dynamic balance, and impaired functional endurance  She demonstrated better LLE strength on functional outcome measures compared to MMT  She also reported feeling stronger with improving her confidence in her movement during eval  Her BP was normal today, instructed to continue monitor and follow up with PCP accordingly  These deficits are resulting in difficulty with community ambulation, negotiating stairs, and performing her work duties  The patient would benefit from skilled PT to address these deficits and maximize function  Understanding of Dx/Px/POC: good   Prognosis: good    Goals  STGs (4 weeks)  Pt will be independent with comprehensive HEP   Pt will demonstrate improved TUG by at least 3 sec  Pt will demonstrate at least 5s improvement on 5xSTS    LTG's (to be achieved by d/c)  Pt will be able to self manage sx's independently   Pt will return to ambulation in community and home w/o use of AD  Pt will demonstrate at least 4 point improvement on FGA  Pt will be able to walk her dog again for at least half a mile  Pt will be able to return to normal work duties     Plan  Plan details: Initiate POC  Is already referred and scheduled for OT eval for LUE     Patient would benefit from: skilled physical therapy and OT eval  Planned therapy interventions: balance, home exercise program, gait training, coordination, therapeutic activities, therapeutic exercise, strengthening, patient education and neuromuscular re-education  Frequency: 2x week  Duration in weeks: 8  Plan of Care beginning date: 5/3/2021  Plan of Care expiration date: 7/9/2021  Treatment plan discussed with: patient        Subjective Evaluation    History of Present Illness  Mechanism of injury: Comes to PT with CC of L sided weakness and instability following multiple TIA's  Most recent was 4/19  Went to ED and brain CT, which was negative for vascular changes or infarct  She has had past CASTILLO with contrast which was also negative  She has been scheduled for doppler ultrasound  She was discharged from the ED that day  States her blood sugar has been higher and her blood pressure has been lower since being hospitalized  She has been monitoring these at home  Feels lightheaded sometimes  She has had to start using a SPC because she feels unsteady  Is worried about falling  Is exhausted all the time now  Hx of glaucoma which has caused issues with depth perception and peripheral vision  Also has hx of COPD which makes her short of breath at times  Works as an  for hospitals  She is off from work and usually works 60 hours a week  Works primarily at [employer]  States she was active before and now is very inactive due to her fatigue  If she goes to the store she has to nap afterwards  Used to walk her dog 3x a day for about a mile     Pain  No pain reported          Objective    Flowsheet Rows      Most Recent Value   PT/OT G-Codes   Current Score  45   Projected Score  63         Vitals at rest:  BP - 124/69  HR - 73  O2 - 97%    Manual Muscle Testing - Hip Left Right   Flexion 3+ 5   Extension     Abduction     External Rotation       Manual Muscle Testing - Knee Left Right   Flexion 3+ 5   Extension 3+ 5     Manual Muscle Testing - Ankle Left Right   Doriflexion 3+ 5   Plantarflexion 3+ 5         Coordination Left Right   Heel To Shin Impaired  Intact    Finger To Nose     Rapid Alternating Supination     Alt toe tapping         Sensation Left Right   Kinesthesia     Light Touch Impaired  Intact    Sharp/Dull     2 Point Discrimination         Muscle Tone (Modified Vern Scale**) Left Right   Hamstring 0 0   Gastroc 0 0   Quad 0 0   **  0 = no increase in muscle tone  1 = slight increase in muscle tone, minimal resistance at the end of ROM when moved  1+ = minimal resistance throughout the remainder (less than half) of ROM when moved  2 =  Eduardo increase in muscle tone through most of the ROM, but still moved easily  3 = considerable increase in muscle tone, movement difficult  4 = affects parts rigid       Balance Test Initial Eval      5x Sit to Stand: NO UE's 21in surface  37s      TUG: 15 9 no AD      Gait Speed:        2 Minute Walk Test:       6 Minute Walk Test: Stopped at 4:00, 800ft  HR-79  O2- 96%      Liu Balance Scale:       FGA:  18/30      Modified Handley Scale Score:       Patient-Reported Outcome Measure  Stroke Impact Scale:                Gait Assessment: Demonstrates normal gait pattern  Some instability noted when fatigued  Precautions: fluctuations in BP, type II DM, vision loss       HEP: STS, heel raises, step ups     Manuals                                                                 Neuro Re-Ed             Walking w/o AD             Hurdles              Static balance              Step ups                                                     Ther Ex             Scifit              Treadmill              STS                                                                              Ther Activity                                       Gait Training                                       Modalities

## 2021-05-05 ENCOUNTER — OFFICE VISIT (OUTPATIENT)
Dept: NEUROLOGY | Facility: CLINIC | Age: 76
End: 2021-05-05
Payer: MEDICARE

## 2021-05-05 VITALS
BODY MASS INDEX: 29.73 KG/M2 | HEART RATE: 76 BPM | DIASTOLIC BLOOD PRESSURE: 70 MMHG | HEIGHT: 66 IN | WEIGHT: 185 LBS | SYSTOLIC BLOOD PRESSURE: 120 MMHG

## 2021-05-05 DIAGNOSIS — R20.0 LEFT SIDED NUMBNESS: Primary | ICD-10-CM

## 2021-05-05 DIAGNOSIS — E78.2 MIXED HYPERLIPIDEMIA: ICD-10-CM

## 2021-05-05 DIAGNOSIS — I73.9 SMALL VESSEL DISEASE (HCC): ICD-10-CM

## 2021-05-05 PROCEDURE — 99205 OFFICE O/P NEW HI 60 MIN: CPT | Performed by: PSYCHIATRY & NEUROLOGY

## 2021-05-05 NOTE — PROGRESS NOTES
Patient ID: Shmuel Bravo is a 77 y o  female  Assessment/Plan: This is a 76 y/o F who is here as a new patient to establish care with us  Patient here with L sided weakness, along with numbness which last for 15-20 minutes, and has had several episodes of this which are very stereotypical, she does not have any prodrome symptoms and completely resolves after this episode usually but last episode she had weakness and she continues to have numbness/weakness  I personally reviewed the MRI brain which was negative for any DWI changes  She does have chronic neck pain - patient cannot lay down flat, and will need to rule out seizures and cervical radiculopathy  PLAN:      Diagnoses and all orders for this visit:    Left sided numbness  -will need to rule out seizure vs cervical radiculopathy (she does have chronic neck pain she states)  -continue with PT for now  -obtain routine EEG and MRI C-spine  -     EEG Routine and awake; Future  -     MRI cervical spine wo contrast; Future    Mixed hyperlipidemia    Small vessel disease ([address] )  -continue with aspirin 81mg PO qdaily        Follow up - 3 months  I would be happy to see the patient sooner if any new questions/concerns arise  Patient/Guardian was advised to the call the office if they have any questions and concerns in the meantime  Patient/Guardian does understand that if they have any new stroke like symptoms such as facial droop on one side, weakness/paralysis on either side, speech trouble, numbness on one side, balance issues, any vision changes, extreme dizziness or any new headache, to call 9-1-1 immediately or to proceed to the nearest ER immediately  Subjective:    Shmuel Bravo is a 77 y o  female is seen in neurology clinic today as a new consultation for further assessment and management for episodes of left sided upper and lower extremities weakness      Patient was recently seen in in the Emergency department in [facility] for similar symptoms  Initial onset of symptoms was 3 month ago, at which time she had left upper extremity would be weakness  She denies any numbness ,tingling associated with it  It lasts for 7-10 minutes prior to the return to baseline  She denies any precipitating factor  She states that there was not associated headache, no vision changes, no involvement of the the contralateral side, she denies any facial droop or speech changes  She states that it would occur instantly  No associated Urinary incontinence or LOC  No head strike  She had another episode after 3 weeks and at the time it was the same symptoms as noted above  At the time she was seen by her PCP and an Mri was done in 3/25/2021 which did not reveal any acute pathology,Very mild, age-appropriate volume loss, minimal degree of what likely represents chronic small vessel disease  She states that up until 1 5 weeks ago she was at her baseline health when she experienced another another episode  This episode also started with the left arm went weak, no associated numbness or tingling associated with it  She states that however that this time was the first time she had leg involvement, she initially didn't realize that the leg was involved until she stood up and she felt the leg weakness  She states that this episode also lasted 7-10 minutes  She states this time however the weakness did not resolve,  She still left that 20 % of her strength had not returned  She denies again any other associated features including headache, facial droop, speech change, urinary incontinence, or LOC  She did not fall  She states that when the arm goes, she continues to have some abnormal sensation which she has a hard time describing  The next morning she went to the ED, and in the ED, she had a CTA head and neck which showed: Chronic lacunar infarct in the right centrum semiovale  No acute intracranial abnormality   Atherosclerotic calcification left carotid bifurcation without significant stenosis  No focal intracranial stenosis or aneurysm  Emphysematous changes in the lung apices  Lab workup at the time was negative for Troponin, CBC and CMP were within normal limits  Apart from this 3 episodes she had an episode 4 years ago with weakness in bilaterally in her legs along with bilateral arms, she states that that episode lasted 10-15 minutes  She had seen neurology in [facility], she states that at the time they told her that "she had a stroke  She denies any previous history seizures  She was a former smoker 3 PPD for 50 years  She denies alcohol use  The following portions of the patient's history were reviewed and updated as appropriate:   She  has a past medical history of Diabetes mellitus ([address] ), Diabetes mellitus type 2, controlled ([address] ), Disease of thyroid gland, GERD (gastroesophageal reflux disease), Glaucoma, H/O tooth extraction, Headache(784 0), Hypertension, Pneumonia, Stroke ([address] ), TIA (transient ischemic attack), and Visual impairment  She   Patient Active Problem List    Diagnosis Date Noted    Left sided numbness 05/05/2021    Small vessel disease ([address] ) 05/05/2021    Mixed hyperlipidemia 10/29/2020    Glaucoma of right eye 01/04/2019    Hemispheric carotid artery syndrome 10/10/2017    Vitamin D deficiency 10/21/2013    Benign essential hypertension 09/25/2013    Controlled type 2 diabetes mellitus without complication, without long-term current use of insulin ([address] ) 09/25/2013    Hypothyroidism 09/25/2013     She  has a past surgical history that includes Cataract extraction (Right); EAR SURGERY (1970); Other surgical history; Cyst Removal (Left, 04/15/2014); and Eye surgery  Her family history includes Cancer in her brother; Heart attack in her paternal grandmother; Rheum arthritis in her mother  She  reports that she quit smoking about 11 years ago  Her smoking use included cigarettes  She started smoking about 61 years ago  She has a 150 00 pack-year smoking history  She has never used smokeless tobacco  She reports previous alcohol use of about 1 0 standard drinks of alcohol per week  She reports that she does not use drugs  Current Outpatient Medications   Medication Sig Dispense Refill    aspirin 81 MG tablet Take 81 mg by mouth daily      COMBIGAN 0 2-0 5 % Administer 1 drop to the right eye 2 (two) times a day   3    cycloSPORINE (RESTASIS) 0 05 % ophthalmic emulsion Administer 1 drop to both eyes every 12 (twelve) hours       dorzolamide (TRUSOPT) 2 % ophthalmic solution Administer 1 drop to the right eye 3 (three) times a day   1    glucose blood (OneTouch Verio) test strip USE TO TEST BLOOD SUGAR UP TO FOUR TIMES DAILY 300 each 1    Insulin Pen Needle (Pen Needles) 32G X 4 MM MISC To use daily with victoza 100 each 3    levothyroxine 100 mcg tablet Take 1 tablet (100 mcg total) by mouth daily BRAND SYNTHROID PLEASE 90 tablet 1    liraglutide (VICTOZA) injection Inject 0 1 mL (0 6 mg total) under the skin daily 3 pen 3    losartan-hydrochlorothiazide (HYZAAR) 50-12 5 mg per tablet Take 1 tablet by mouth daily 90 tablet 1    LUMIGAN 0 01 % ophthalmic drops Administer 1 drop to both eyes daily at bedtime   3    metFORMIN (GLUCOPHAGE) 500 mg tablet Take 1 tablet (500 mg total) by mouth 2 (two) times a day with meals START WITH 1 TABLET BY MOUTH DAILY FOR 1 TABLET WEEK, THEN 1 TABLET BY MOUTH TWICE DAILY FOR 1 WEEK, THEN 2 TABLETS BY MOUTH TWICE DAILY 180 tablet 1    OneTouch Delica Lancets 96V MISC To test BS up to four times daily- E11 65 200 each 5    rosuvastatin (CRESTOR) 5 mg tablet Take 1 tablet (5 mg total) by mouth daily 90 tablet 0    albuterol (PROVENTIL HFA,VENTOLIN HFA) 90 mcg/act inhaler Inhale 2 puffs every 4 (four) hours as needed for wheezing or shortness of breath (Patient not taking: Reported on 5/5/2021) 1 Inhaler 1     No current facility-administered medications for this visit        Current Outpatient Medications on File Prior to Visit   Medication Sig    aspirin 81 MG tablet Take 81 mg by mouth daily    COMBIGAN 0 2-0 5 % Administer 1 drop to the right eye 2 (two) times a day     cycloSPORINE (RESTASIS) 0 05 % ophthalmic emulsion Administer 1 drop to both eyes every 12 (twelve) hours     dorzolamide (TRUSOPT) 2 % ophthalmic solution Administer 1 drop to the right eye 3 (three) times a day     glucose blood (OneTouch Verio) test strip USE TO TEST BLOOD SUGAR UP TO FOUR TIMES DAILY    Insulin Pen Needle (Pen Needles) 32G X 4 MM MISC To use daily with victoza    levothyroxine 100 mcg tablet Take 1 tablet (100 mcg total) by mouth daily BRAND SYNTHROID PLEASE    liraglutide (VICTOZA) injection Inject 0 1 mL (0 6 mg total) under the skin daily    losartan-hydrochlorothiazide (HYZAAR) 50-12 5 mg per tablet Take 1 tablet by mouth daily    LUMIGAN 0 01 % ophthalmic drops Administer 1 drop to both eyes daily at bedtime     metFORMIN (GLUCOPHAGE) 500 mg tablet Take 1 tablet (500 mg total) by mouth 2 (two) times a day with meals START WITH 1 TABLET BY MOUTH DAILY FOR 1 TABLET WEEK, THEN 1 TABLET BY MOUTH TWICE DAILY FOR 1 WEEK, THEN 2 TABLETS BY MOUTH TWICE DAILY    OneTouch Delica Lancets 35C MISC To test BS up to four times daily- E11 65    rosuvastatin (CRESTOR) 5 mg tablet Take 1 tablet (5 mg total) by mouth daily    albuterol (PROVENTIL HFA,VENTOLIN HFA) 90 mcg/act inhaler Inhale 2 puffs every 4 (four) hours as needed for wheezing or shortness of breath (Patient not taking: Reported on 5/5/2021)     No current facility-administered medications on file prior to visit        She is allergic to eggs or egg-derived products - food allergy; albumen, egg - food allergy; antihistamines, diphenhydramine-type; epinephrine; fluzone [flu virus vaccine]; iv contrast [iodinated diagnostic agents]; lidocaine; and zinc          Objective:    Blood pressure 120/70, pulse 76, height 5' 6" (1 676 m), weight 83 9 kg (185 lb)  Physical Exam  Vitals signs and nursing note reviewed  Constitutional:       Appearance: Normal appearance  She is well-developed  HENT:      Head: Normocephalic and atraumatic  Right Ear: Tympanic membrane normal       Left Ear: Tympanic membrane normal       Nose: Nose normal       Mouth/Throat:      Mouth: Mucous membranes are dry  Eyes:      General: Lids are normal       Extraocular Movements: Extraocular movements intact  Pupils: Pupils are equal, round, and reactive to light  Neck:      Musculoskeletal: Normal range of motion and neck supple  Musculoskeletal: Normal range of motion  General: No swelling, tenderness, deformity or signs of injury  Skin:     General: Skin is warm and dry  Coloration: Skin is not pale  Neurological:      Mental Status: She is alert  Deep Tendon Reflexes: Reflexes are normal and symmetric  Psychiatric:         Speech: Speech normal          Neurological Exam  Mental Status  Alert  Oriented to person, place and time  Speech is normal  Language is fluent with no aphasia  Cranial Nerves  CN II: Visual acuity is normal  Visual fields full to confrontation  Right funduscopic exam: disc intact  Left funduscopic exam: disc intact  CN III, IV, VI: Extraocular movements intact bilaterally  Normal lids and orbits bilaterally  Pupils equal round and reactive to light bilaterally  CN V: Facial sensation is normal   CN VII: Full and symmetric facial movement  CN VIII: Hearing is normal   CN XI: Shoulder shrug strength is normal   CN XII: Tongue midline without atrophy or fasciculations  CN grossly intact   Motor  Normal muscle bulk throughout  No fasciculations present  Normal muscle tone  Strength is 5/5 in all four extremities except as noted    Patient noted to have left sided decreased strength in left upper extremity 4-/5 Lower extremity 4+/5  Noted to have a drift on left upper extremity, mildly inconsistent examination could be poor effort vs ?weakness   Sensory  Light touch is normal in upper and lower extremities  Pinprick is normal in upper and lower extremities  Temperature is normal in upper and lower extremities  States that the the temperature is decreased on the left upper and lower extremity   Vibration abnormality: Vibration she states that she feels like is stronger in her right side upper and lower extremity as compared to her left side however she does feel it  At her left ankle the vibration sensation is 5 seconds   Proprioception abnormality:     Reflexes  Deep tendon reflexes are 2+ and symmetric in all four extremities with downgoing toes bilaterally  Coordination  Right: Finger-to-nose normal  Rapid alternating movement normal  Heel-to-shin normal   Left: Finger-to-nose normal  Rapid alternating movement normal  Heel-to-shin normal     Gait  Casual gait is normal including stance, stride, and arm swing  Normal toe walking  Normal heel walking  Normal tandem gait  ROS:  I reviewed the below ROS and what is mentioned in HPI, the remainder of ROS was negative  Review of Systems   Constitutional: Negative  Negative for appetite change and fever  HENT: Negative  Negative for hearing loss, tinnitus, trouble swallowing and voice change  Eyes: Negative  Negative for photophobia and pain  Respiratory: Negative  Negative for shortness of breath  Cardiovascular: Negative  Negative for palpitations  Gastrointestinal: Negative  Negative for nausea and vomiting  Endocrine: Negative  Negative for cold intolerance  Genitourinary: Negative  Negative for dysuria, frequency and urgency  Musculoskeletal: Positive for gait problem  Negative for myalgias and neck pain  Patient stated that she feels unsteady  Skin: Negative  Negative for rash  Neurological: Positive for weakness   Negative for dizziness, tremors, seizures, syncope, facial asymmetry, speech difficulty, light-headedness, numbness and headaches  Patient stated that she has left arm and leg weakness  Hematological: Negative  Does not bruise/bleed easily  Psychiatric/Behavioral: Negative  Negative for confusion, hallucinations and sleep disturbance

## 2021-05-07 ENCOUNTER — TELEPHONE (OUTPATIENT)
Dept: NEUROLOGY | Facility: CLINIC | Age: 76
End: 2021-05-07

## 2021-05-07 NOTE — TELEPHONE ENCOUNTER
Received STD form from [place]  Scanned into chart  Sending to [facility] for review to see if Dr Josee Lockett is agreeable to form since it is mostly restrictions and limitations  There is one page that I think she may be able to completed  Routed to [facility]

## 2021-05-09 ENCOUNTER — OFFICE VISIT (OUTPATIENT)
Dept: URGENT CARE | Facility: MEDICAL CENTER | Age: 76
End: 2021-05-09
Payer: MEDICARE

## 2021-05-09 VITALS
RESPIRATION RATE: 16 BRPM | TEMPERATURE: 98.7 F | HEART RATE: 90 BPM | DIASTOLIC BLOOD PRESSURE: 78 MMHG | SYSTOLIC BLOOD PRESSURE: 171 MMHG | OXYGEN SATURATION: 95 %

## 2021-05-09 DIAGNOSIS — N39.0 URINARY TRACT INFECTION WITHOUT HEMATURIA, SITE UNSPECIFIED: Primary | ICD-10-CM

## 2021-05-09 LAB
SL AMB  POCT GLUCOSE, UA: ABNORMAL
SL AMB LEUKOCYTE ESTERASE,UA: ABNORMAL
SL AMB POCT BILIRUBIN,UA: ABNORMAL
SL AMB POCT BLOOD,UA: ABNORMAL
SL AMB POCT CLARITY,UA: CLEAR
SL AMB POCT COLOR,UA: YELLOW
SL AMB POCT KETONES,UA: ABNORMAL
SL AMB POCT NITRITE,UA: ABNORMAL
SL AMB POCT PH,UA: 5
SL AMB POCT SPECIFIC GRAVITY,UA: 1.02
SL AMB POCT URINE PROTEIN: ABNORMAL
SL AMB POCT UROBILINOGEN: 0.2

## 2021-05-09 PROCEDURE — G0463 HOSPITAL OUTPT CLINIC VISIT: HCPCS | Performed by: PHYSICIAN ASSISTANT

## 2021-05-09 PROCEDURE — 99213 OFFICE O/P EST LOW 20 MIN: CPT | Performed by: PHYSICIAN ASSISTANT

## 2021-05-09 PROCEDURE — 81002 URINALYSIS NONAUTO W/O SCOPE: CPT | Performed by: PHYSICIAN ASSISTANT

## 2021-05-09 PROCEDURE — 87077 CULTURE AEROBIC IDENTIFY: CPT | Performed by: PHYSICIAN ASSISTANT

## 2021-05-09 PROCEDURE — 87086 URINE CULTURE/COLONY COUNT: CPT | Performed by: PHYSICIAN ASSISTANT

## 2021-05-09 PROCEDURE — 87186 SC STD MICRODIL/AGAR DIL: CPT | Performed by: PHYSICIAN ASSISTANT

## 2021-05-09 RX ORDER — DORZOLAMIDE HYDROCHLORIDE AND TIMOLOL MALEATE 20; 5 MG/ML; MG/ML
SOLUTION/ DROPS OPHTHALMIC
COMMUNITY
Start: 2021-04-29

## 2021-05-09 RX ORDER — SULFAMETHOXAZOLE AND TRIMETHOPRIM 800; 160 MG/1; MG/1
1 TABLET ORAL EVERY 12 HOURS SCHEDULED
Qty: 14 TABLET | Refills: 0 | Status: SHIPPED | OUTPATIENT
Start: 2021-05-09 | End: 2021-05-17

## 2021-05-09 NOTE — PROGRESS NOTES
[facility]        NAME: Kali Morgan is a 77 y o  female  : 1945    MRN: [id number]  DATE: May 9, 2021  TIME: 7:20 PM    BP (!) 171/78   Pulse 90   Temp 98 7 °F (37 1 °C) (Tympanic)   Resp 16   SpO2 95%     Assessment and Plan   Urinary tract infection without hematuria, site unspecified [N39 0]  1  Urinary tract infection without hematuria, site unspecified  POCT urine dip    Urine culture    sulfamethoxazole-trimethoprim (BACTRIM DS) 800-160 mg per tablet         Patient Instructions       Follow up with PCP in 3-5 days  Proceed to  ER if symptoms worsen  Chief Complaint     Chief Complaint   Patient presents with    Possible UTI     Patient presents with UTI symptoms since Friday  She reports burning and pain with urination  History of Present Illness       Pt with urine burning and frequency x 2-3 days       Review of Systems   Review of Systems   Constitutional: Negative  HENT: Negative  Eyes: Negative  Respiratory: Negative  Cardiovascular: Negative  Gastrointestinal: Negative  Endocrine: Negative  Genitourinary: Positive for dysuria  Musculoskeletal: Negative  Skin: Negative  Allergic/Immunologic: Negative  Neurological: Negative  Hematological: Negative  Psychiatric/Behavioral: Negative  All other systems reviewed and are negative          Current Medications       Current Outpatient Medications:     aspirin 81 MG tablet, Take 81 mg by mouth daily, Disp: , Rfl:     COMBIGAN 0 2-0 5 %, Administer 1 drop to the right eye 2 (two) times a day , Disp: , Rfl: 3    cycloSPORINE (RESTASIS) 0 05 % ophthalmic emulsion, Administer 1 drop to both eyes every 12 (twelve) hours , Disp: , Rfl:     dorzolamide (TRUSOPT) 2 % ophthalmic solution, Administer 1 drop to the right eye 3 (three) times a day , Disp: , Rfl: 1    levothyroxine 100 mcg tablet, Take 1 tablet (100 mcg total) by mouth daily BRAND SYNTHROID PLEASE, Disp: 90 tablet, Rfl: 1   liraglutide (VICTOZA) injection, Inject 0 1 mL (0 6 mg total) under the skin daily, Disp: 3 pen, Rfl: 3    losartan-hydrochlorothiazide (HYZAAR) 50-12 5 mg per tablet, Take 1 tablet by mouth daily, Disp: 90 tablet, Rfl: 1    LUMIGAN 0 01 % ophthalmic drops, Administer 1 drop to both eyes daily at bedtime , Disp: , Rfl: 3    metFORMIN (GLUCOPHAGE) 500 mg tablet, Take 1 tablet (500 mg total) by mouth 2 (two) times a day with meals START WITH 1 TABLET BY MOUTH DAILY FOR 1 TABLET WEEK, THEN 1 TABLET BY MOUTH TWICE DAILY FOR 1 WEEK, THEN 2 TABLETS BY MOUTH TWICE DAILY, Disp: 180 tablet, Rfl: 1    OneTouch Delica Lancets 82H MISC, To test BS up to four times daily- E11 65, Disp: 200 each, Rfl: 5    rosuvastatin (CRESTOR) 5 mg tablet, Take 1 tablet (5 mg total) by mouth daily, Disp: 90 tablet, Rfl: 0    albuterol (PROVENTIL HFA,VENTOLIN HFA) 90 mcg/act inhaler, Inhale 2 puffs every 4 (four) hours as needed for wheezing or shortness of breath (Patient not taking: Reported on 5/5/2021), Disp: 1 Inhaler, Rfl: 1    dorzolamide-timolol (COSOPT) 22 3-6 8 MG/ML ophthalmic solution, , Disp: , Rfl:     glucose blood (OneTouch Verio) test strip, USE TO TEST BLOOD SUGAR UP TO FOUR TIMES DAILY (Patient not taking: Reported on 5/9/2021), Disp: 300 each, Rfl: 1    Insulin Pen Needle (Pen Needles) 32G X 4 MM MISC, To use daily with victoza (Patient not taking: Reported on 5/9/2021), Disp: 100 each, Rfl: 3    sulfamethoxazole-trimethoprim (BACTRIM DS) 800-160 mg per tablet, Take 1 tablet by mouth every 12 (twelve) hours for 7 days, Disp: 14 tablet, Rfl: 0    Current Allergies     Allergies as of 05/09/2021 - Reviewed 05/09/2021   Allergen Reaction Noted    Eggs or egg-derived products - food allergy Anaphylaxis 01/07/2021    Albumen, egg - food allergy Hives 03/10/2015    Antihistamines, diphenhydramine-type  06/27/2016    Epinephrine Syncope 03/10/2015    Fluzone [flu virus vaccine] Hives, Abdominal Pain, and Facial Swelling 01/07/2021    Iv contrast [iodinated diagnostic agents] Headache 04/28/2021    Lidocaine  04/26/2017    Zinc Rash 03/22/2017            The following portions of the patient's history were reviewed and updated as appropriate: allergies, current medications, past family history, past medical history, past social history, past surgical history and problem list      Past Medical History:   Diagnosis Date    Diabetes mellitus ([facility] )     Diabetes mellitus type 2, controlled ([facility] )     Disease of thyroid gland     GERD (gastroesophageal reflux disease)     Glaucoma     H/O tooth extraction     Had top row of teeth removed around April 2016    Headache(784 0)     Hypertension     Pneumonia     Stroke ([facility] )     TIA (transient ischemic attack)     Visual impairment        Past Surgical History:   Procedure Laterality Date    CATARACT EXTRACTION Right     5 years ago    CYST REMOVAL Left 04/15/2014    ear lobe      EAR SURGERY  1970    Left ear cyst removal     EYE SURGERY      OTHER SURGICAL HISTORY       right upper arm          Family History   Problem Relation Age of Onset    Rheum arthritis Mother     Cancer Brother     Heart attack Paternal Grandmother          Medications have been verified  Objective   BP (!) 171/78   Pulse 90   Temp 98 7 °F (37 1 °C) (Tympanic)   Resp 16   SpO2 95%        Physical Exam     Physical Exam  Vitals signs and nursing note reviewed  Constitutional:       Appearance: Normal appearance  She is normal weight  Comments: Pt has had bactrim before,  Pt thinks may gave a problem with pcn, not sure    HENT:      Head: Normocephalic and atraumatic  Right Ear: Tympanic membrane, ear canal and external ear normal       Left Ear: Tympanic membrane, ear canal and external ear normal       Nose: Nose normal       Mouth/Throat:      Mouth: Mucous membranes are moist       Pharynx: Oropharynx is clear     Eyes:      Extraocular Movements: Extraocular movements intact  Conjunctiva/sclera: Conjunctivae normal       Pupils: Pupils are equal, round, and reactive to light  Neck:      Musculoskeletal: Normal range of motion and neck supple  Cardiovascular:      Rate and Rhythm: Normal rate and regular rhythm  Pulses: Normal pulses  Heart sounds: Normal heart sounds  Pulmonary:      Effort: Pulmonary effort is normal       Breath sounds: Normal breath sounds  Abdominal:      General: Abdomen is flat  Palpations: Abdomen is soft  Musculoskeletal: Normal range of motion  Skin:     General: Skin is warm  Capillary Refill: Capillary refill takes less than 2 seconds  Neurological:      General: No focal deficit present  Mental Status: She is alert     Psychiatric:         Mood and Affect: Mood normal          Behavior: Behavior normal

## 2021-05-09 NOTE — PATIENT INSTRUCTIONS
Urinary Tract Infection in Older Adults   WHAT YOU NEED TO KNOW:   A urinary tract infection (UTI) is caused by bacteria that get inside your urinary tract  Your urinary tract includes your kidneys, ureters, bladder, and urethra  Urine is made in your kidneys, and it flows from the ureters to the bladder  Urine leaves the bladder through the urethra  A UTI is more common in your lower urinary tract, which includes your bladder and urethra  DISCHARGE INSTRUCTIONS:   Return to the emergency department if:   · You are urinating very little or not at all  · You are vomiting  · You have a high fever with shaking chills  · You have side or back pain that gets worse  Call your doctor if:   · You have a fever  · You are a woman and you have increased white or yellow discharge from your vagina  · You do not feel better after 2 days of taking antibiotics  · You have questions or concerns about your condition or care  Medicines:   · Medicines  help treat the bacterial infection or decrease pain and burning when you urinate  You may also need medicines to decrease the urge to urinate often  If you have UTIs often (called recurrent UTIs), you may be given antibiotics to take regularly  You will be given directions for when and how to use antibiotics  The goal is to prevent UTIs but not cause antibiotic resistance by using antibiotics too often  · Take your medicine as directed  Contact your healthcare provider if you think your medicine is not helping or if you have side effects  Tell him or her if you are allergic to any medicine  Keep a list of the medicines, vitamins, and herbs you take  Include the amounts, and when and why you take them  Bring the list or the pill bottles to follow-up visits  Carry your medicine list with you in case of an emergency  Self-care:   · Drink liquids as directed  Liquids can help flush bacteria from your urinary tract   Ask how much liquid to drink each day and which liquids are best for you  You may need to drink more liquids than usual to help flush out the bacteria  Do not drink alcohol, caffeine, and citrus juices  These can irritate your bladder and increase your symptoms  · Apply heat  on your abdomen for 20 to 30 minutes every 2 hours for as many days as directed  Heat helps decrease discomfort and pressure in your bladder  Prevent a UTI:   · Urinate when you feel the urge  Do not hold your urine  Bacteria can grow if urine stays in the bladder too long  It may be helpful to urinate at least every 3 to 4 hours  · Urinate after you have sex  to flush away bacteria that can enter your urinary tract during sex  · Wear cotton underwear and clothes that are loose  Tight pants and nylon underwear can trap moisture and cause bacteria to grow  · Cranberry juice or cranberry supplements  may help prevent UTIs  Your healthcare provider can recommend the right juice or supplement for you  · Women should wipe front to back  after urinating or having a bowel movement  This may prevent germs from getting into the urinary tract  Do not douche or use feminine deodorants  These can change the chemical balance in your vagina  You may also be given vaginal estrogen medicine  This medicine helps prevent recurrent UTIs in women who have gone through menopause or are in bao-menopause  Follow up with your healthcare provider as directed:  Write down your questions so you remember to ask them during your visits  © Copyright [address] Information is for End User's use only and may not be sold, redistributed or otherwise used for commercial purposes  All illustrations and images included in CareNotes® are the copyrighted property of A D A M , Inc  or [facility] Christine Walters   The above information is an  only  It is not intended as medical advice for individual conditions or treatments   Talk to your doctor, nurse or pharmacist before following any medical regimen to see if it is safe and effective for you

## 2021-05-11 ENCOUNTER — APPOINTMENT (OUTPATIENT)
Dept: PHYSICAL THERAPY | Facility: REHABILITATION | Age: 76
End: 2021-05-11
Payer: MEDICARE

## 2021-05-12 ENCOUNTER — EVALUATION (OUTPATIENT)
Dept: OCCUPATIONAL THERAPY | Facility: REHABILITATION | Age: 76
End: 2021-05-12
Payer: MEDICARE

## 2021-05-12 DIAGNOSIS — G45.9 TIA (TRANSIENT ISCHEMIC ATTACK): Primary | ICD-10-CM

## 2021-05-12 LAB
BACTERIA UR CULT: ABNORMAL
BACTERIA UR CULT: ABNORMAL

## 2021-05-12 PROCEDURE — 97166 OT EVAL MOD COMPLEX 45 MIN: CPT | Performed by: OCCUPATIONAL THERAPIST

## 2021-05-12 NOTE — PROGRESS NOTES
OCCUPATIONAL THERAPY INITIAL EVALUATION:    05/12/2021  Parish Harper  1945  [id number]  Janelle Ramires MD      Diagnosis ICD-10-CM Associated Orders   1  TIA (transient ischemic attack)  G45 9 Ambulatory referral to Occupational Therapy         Assessment  Assessment details: See skilled analysis for further details  Skilled Analysis:  Pt is a 77 y o  female referred to Occupational Therapy s/p TIA (transient ischemic attack) [G45 9]  Pt participated in skilled OT evaluation and following formalized testing as well as clinical observation, Pt presents with the following areas of deficit: decreased L proximal/distal/intrinsic strength, decreased L AROM with significant decreased muscle endurance, impaired proprioception of LUE affecting hand to target precision, learned non-use patterns of LUE with tasks performed unilaterally with RUE in home environment, decreased automaticity with L open/close grasp functions, impaired concentric/eccentric motor control, heightened fatigue levels with frequent naps taken during afternoon hours, decreased functional performance with typing demands 2* poor speed and accuracy, and decreased FMC/prehension with frequent droppge reported in home environment  Pt will benefit from skilled Occupational Therapy services 2x/week for 8-10 weeks with focus on thera ex, thera act, sensory re-education, neuro re-ed, self-care management, Pt education for increased activity/energy conservation techniques, and use of LUE for home and work functional tasks for eventual return to work role       Short Term Goals:  - Pt will increase proprioception of LUE hand to target for improved functional reach vision occluded with ADL/IADL/grooming tasks  x 65% accuracy 4 weeks  - Pt will demo with G carryover of Home Exercise Program to improve functional progression towards goals in Plan of care, increase proximal/distal strength, and for improved functional use of LUE  x 65% 4 weeks  - Pt will demo with G tolerance to supine, seated, and in stance exercise x 45 minutes with minimal rest breaks required for increased engagement in weekly exercise regimen and increased engagement in life roles 4 weeks  · Pt will increase LUE prehension patterns for improved tripod with utensil management/item retrieval with <15% droppage in 4 weeks  · Pt will increase automaticity of LUE to 65% for improved grasp release of tabletop items for improved functional performance with salient tasks 4 weeks  · Pt will increase LUE rate of manipulation for all FM tests and improved speed of typing bilaterally for improved functional performance/independence with salient tasks/worker roles x 50% 4 weeks  · Pt will increase LUE to refined assist with <5% cuing for tabletop tasks for improved functional performance of life roles and salient tasks 4 weeks  · Pt will increase L UE strength to 3+/5 and  strength to #33, through the use of strengthening exercises and home program for eventual return to worker role 4 weeks       Long Term Goals:    - Pt will increase proprioception of LUE hand to target for improved functional reach vision occluded with ADL/IADL/grooming tasks  x 85% accuracy   - Pt will demo with G carryover of Home Exercise Program to improve functional progression towards goals in Plan of care, increase proximal/distal strength, and for improved functional use of LUE  x 85%   - Pt will demo with G tolerance to supine, seated, and in stance exercise x 60 minutes with minimal rest breaks required for increased engagement in weekly exercise regimen and increased engagement in life roles   · Pt will increase LUE prehension patterns for improved tripod with utensil management/item retrieval with <2% droppage in   · Pt will increase automaticity of LUE to 85% for improved grasp release of tabletop items for improved functional performance with salient tasks  · Pt will increase LUE rate of manipulation for all FM tests and improved speed of typing bilaterally for improved functional performance/independence with salient tasks/worker roles x 75%   · Pt will increase LUE to Mod I  with 0% cuing for tabletop tasks for improved functional performance of life roles and salient tasks   · Pt will increase L UE strength to 4/5 and  strength to #38, through the use of strengthening exercises and home program for eventual return to worker role     Subjective Evaluation    Quality of life: good          SUBJECTIVE: "I want to be able to use my left arm at full strength "    PATIENT GOAL: "I want to return to my full-time job in July "    Patient-Specific Functional Scale   Task is scored 0 (unable to perform activity) to 10 (ability to perform activity independently)    Activity Date: 05/12/2021 Date:   1  Typing for full-time work role 4    2  Applying makeup 7    3  Getting self dressed 8             HISTORY OF PRESENT ILLNESS:     Pt is a 77 y o  female who was referred to Occupational Therapy s/p  TIA (transient ischemic attack) [G45 9]  As per hospital reports, Pt with hx of DM2, CVA/TIA, GERD, glaucoma, hypothyroidism, HTN, presented to the ED for evaluation of numbness/weakness of left arm, headache and dizziness on 04/19/2021  Pt states she had CVA 3 years ago and 5 weeks ago she had 2 episodes of TIAs  Pt was seen by her PCP for these episodes with the following symptoms: numbness/weakness of left arm  Pt had outpatient MRI which was negative for acute disease and had outpatient carotid U/S which showed <50% stenosis bilaterally  Pt reports L sided weakness continued up until now for this last episode experienced  Pt self-reports having difficulties with all tasks requiring use of LUE including self-care and household tasks   Pt with self-report of frequent droppage when using L hand for tasks or for item retrieval  Pt with self-report of heighten fatigue levels with frequent napping during afternoon hours needed  Pt lives in one story apartment independently  Pt uses [employer] Guardian service with device worn for emergency call purposes  Pt performing ADLs at Mod I status using RUE to complete tasks and is currently receiving no assistance  Pt performing IADLs at Mod I status using RUE to complete tasks and currently receives assistance for home maintenance via  coming in as needed  Pt is not currently working 2* recovery from recent TIA and accompanying symptoms, but intends to return to full time work as an  for [employer] 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  Pt is currently on medical leave from full-time occupation through   Pt currently drives self with no modifications or restrictions  PMH:   Past Medical History:   Diagnosis Date    [facility] mellitus [facility] [address] [facility]     [facility]     Disease of thyroid gland     GERD (gastroesophageal reflux disease)     Glaucoma     H/O tooth extraction     Had top row of teeth removed around 2016    Headache(784 0)     Hypertension     Pneumonia     Stroke ([facility] [facility] )     TIA (transient ischemic attack)     Visual impairment          Pain Levels:     Restin    With Activity:  0    Objective     Functional Assessment        Comments  See impairment section for further details         Impairment Observations:        IE RUE IE LUE  Comments                 UPPER EXTREMITY FXN Intact Impaired  R Hand Dominant                           /Pinch Strength           Dynamometer       - Gross Grasp 60 lbs 28 lbs  abnormal   Pinch Meter        - PINCER 9 5 lbs 4 5 lbs  abnormal    - TRIPOD 12 lbs 4 lbs  abnormal    - LATERAL 17 lbs  6 lbs  abnormal               AROM (seated)        Elevation [facility]     Shoulder FF WFL  108*     Shoulder Ext WFL 35*     Shoulder Abd WFL  110 *     Shoulder Add       Horizontal Abd [facility] WFL     Horizontal Add [facility] WFL     Elbow Flex [facility] Elbow Ext [facility] WFL     Pronation WFL WFL     Supination [facility] WFL     Wrist Flex WFL WFL     Wrist Ext [facility] WFL     Digit Flex [facility] WFL     Digit Ex [facility] WFL     Composite Grasp [facility] WFL     Hook Grasp [facility] WFL     Opposition WFL WFL     Dysdiadochokinesia WFL WFL     Subluxation  None  None                    AROM (supine)           Shoulder FF       Shoulder Ext       Shoulder ABD       Shoulder ADD       Horizontal ABD       Horizontal ADD       Elbow Flex       Elbow Ext       Pronation       Supination       Wrist Flex       Wrist Ext       Digit Flex       Digit  Ext                                     PROM ( position)           Shoulder FF       Shoulder Ext       Shoulder ABD       Shoulder ADD       Horizontal ABD       Horizontal ADD       Elbow Flex       Elbow Ext       Wrist Flex       Wrist Ext       Digit Flex       Digit Ext       Supination       Pronation                                        MMT           Shoulder FF 5/5 3/5     Shoulder Ext 5/5 3+/5     Shoulder Abd 5/5 3/5     Shoulder ADd 5/5 3/5     Elbow Flex 5/5 3+/5     Elbow Ext 5/5 3/5     Wrist Flex 5/5 3/5     Wrist Ext 5/5 3/5     Gross Grasp       Isolated Movement       SENSATION       Myofilaments (3 61 Wnl)  3 61     Sharp Dull   Intact     Proprioception  Impaired     Hot/Cold Temp  Intact            COORDINATION       9 Hole Peg Test 24 61 seconds 43 83 seconds  abnormal   Fxnl Dexterity Test 27 31 seconds 44 68 seconds;  Pt required to utilize board to A with in-hand manipulation  abnormal   Brian Hand fxn Test          - Handwriting          - Card Turning          - Small Item p/u          - Simulated Feeding          - Stacking Checkers          - Moving Light Objects          - Moving Heavy Objects       Concentric Circles Test (tremors)       MODIFIED MIKEY SCALE (TONE)       No increase in muscle tone (0)       Slight Increase in muscle tone with catch and release or min resist at end range (1)       Slight Increase in muscle tone with catch and release, followed by min resistance through remainder of range (1+)       Increased muscle tone through full range, able to be moved easily (2)       Considerable increase in tone, difficult to move (3)       Rigid in Flexion/Extension (4)                                           OTHER PLANNED THERAPY INTERVENTIONS:   Supine, seated, and in stance neuro re-ed  IASTM  FMC/prehension  Timed Trials  Manual tx  Hand to target  Endurance training  Seated functional reach: crossing midline  Supine place and hold  WBearing strategies   Closed chain activities  Open chain activities      INTERVENTION COMMENTS:  Diagnosis: TIA (transient ischemic attack) [G45 9]  Precautions: DM, Glaucoma, HTN, possible seizure activity-- NO STIM until further notice  FOTO: not performed  Insurance: Payor: Lina Litter / Plan: MEDICARE A AND B / Product Type: Medicare A & B Fee for Service /   1 of 10 visits, PN due 06/12/2021

## 2021-05-13 ENCOUNTER — TELEPHONE (OUTPATIENT)
Dept: FAMILY MEDICINE CLINIC | Facility: CLINIC | Age: 76
End: 2021-05-13

## 2021-05-13 ENCOUNTER — OFFICE VISIT (OUTPATIENT)
Dept: PHYSICAL THERAPY | Facility: REHABILITATION | Age: 76
End: 2021-05-13
Payer: MEDICARE

## 2021-05-13 DIAGNOSIS — G45.9 TIA (TRANSIENT ISCHEMIC ATTACK): Primary | ICD-10-CM

## 2021-05-13 PROCEDURE — 97112 NEUROMUSCULAR REEDUCATION: CPT | Performed by: PHYSICAL THERAPIST

## 2021-05-13 PROCEDURE — 97110 THERAPEUTIC EXERCISES: CPT | Performed by: PHYSICAL THERAPIST

## 2021-05-13 NOTE — TELEPHONE ENCOUNTER
Placed call to patient regarding FMLA form - neurology to have it filled since they are following up with patient  Left non detailed message to contact office

## 2021-05-13 NOTE — PROGRESS NOTES
Daily Note     Today's date: 2021  Patient name: Parish Harper  : 1945  MRN: [id number]  Referring provider: Janelle Ramires MD  Dx:   Encounter Diagnosis     ICD-10-CM    1  TIA (transient ischemic attack)  G45 9                   Subjective: Reports she missed her last visit due to a UTI  She was out of it for a few days and not doing her HEP  States the sit to stands are hard to to w/o UE support  Still using her cane due to feeling unsteady  Has not walked outside in her neighborhood due to fear of falling  Had a lung scan since last visit which revealed emphysema from her hx of smoking  Objective: See treatment diary below    BP - 130/70  HR - 80bpm    Assessment: Tolerated treatment well  Pt reported some pain in L quadriceps with sci fit, but was able to tolerated recumbent bike for a few mintues before requesting to stop due to increased pain  She demonstrates fair balance when walking w/o AD on level surfaces, but fatigues very quickly  Was initially hesitant to reduce UE support with balance exercises, but performed fair with practice  Greatly challenged with sit to stands due to weakness needing elevated surface  Patient would benefit from continued PT      Plan: Continue per plan of care  Precautions: fluctuations in BP, type II DM, vision loss       HEP: STS, heel raises, step ups     Manuals                                                                 Neuro Re-Ed             Walking w/o AD 200ft x2 with CG            Hurdles  5 laps no UE support            Static balance              Step ups                                                     Ther Ex             Scifit  recumbent bike 3' L0            Treadmill              STS 12x from hi/low table    O2-96%                LAQ  2x10 5" on L                                                                Ther Activity                                       Gait Training                                       Modalities

## 2021-05-14 NOTE — TELEPHONE ENCOUNTER
Placed call to patient, she will go over info with PCP on her virtual visit on 5/17  She has been having some disagreements with what neurology is saying

## 2021-05-17 ENCOUNTER — TELEMEDICINE (OUTPATIENT)
Dept: FAMILY MEDICINE CLINIC | Facility: CLINIC | Age: 76
End: 2021-05-17
Payer: MEDICARE

## 2021-05-17 VITALS
WEIGHT: 181 LBS | DIASTOLIC BLOOD PRESSURE: 90 MMHG | HEART RATE: 82 BPM | OXYGEN SATURATION: 96 % | TEMPERATURE: 97.6 F | SYSTOLIC BLOOD PRESSURE: 102 MMHG | BODY MASS INDEX: 29.09 KG/M2 | HEIGHT: 66 IN

## 2021-05-17 DIAGNOSIS — R53.1 LEFT-SIDED WEAKNESS: Primary | ICD-10-CM

## 2021-05-17 DIAGNOSIS — I10 BENIGN ESSENTIAL HYPERTENSION: ICD-10-CM

## 2021-05-17 DIAGNOSIS — R05.9 COUGH: ICD-10-CM

## 2021-05-17 PROCEDURE — 99214 OFFICE O/P EST MOD 30 MIN: CPT | Performed by: FAMILY MEDICINE

## 2021-05-17 RX ORDER — LOSARTAN POTASSIUM AND HYDROCHLOROTHIAZIDE 12.5; 5 MG/1; MG/1
0.5 TABLET ORAL DAILY
Start: 2021-05-17 | End: 2021-06-30

## 2021-05-17 NOTE — ASSESSMENT & PLAN NOTE
Continue PT and OT  She will follow through the with MRI c-spine, EEG, echo   Has neuro follow up in 3 months

## 2021-05-17 NOTE — PROGRESS NOTES
Virtual Regular Visit      Assessment/Plan:    Problem List Items Addressed This Visit        Cardiovascular and Mediastinum    Benign essential hypertension     Continue home BP monitoring  Continue taking the half dose of the hyzaar   (med list updated)         Relevant Medications    losartan-hydrochlorothiazide (HYZAAR) 50-12 5 mg per tablet       Nervous and Auditory    Left-sided weakness - Primary     Continue PT and OT  She will follow through the with MRI c-spine, EEG, echo   Has neuro follow up in 3 months             Other Visit Diagnoses     Cough        try zyrtec or claritin and to call if not improving               Reason for visit is   Chief Complaint   Patient presents with    Follow-up     Seen at urgent care for UTI     Hypertension     low reading    Virtual Regular Visit        Encounter provider Dory Tanner MD    Provider located at [address] [place] [address],[address]  [facility] [address] [facility] Krista Cruz 1159  [phone]      Recent Visits  Date Type Provider Dept   05/13/21 Telephone Janey Espinal LPN 56 [facility] recent visits within past 7 days and meeting all other requirements     Today's Visits  Date Type Provider Dept   05/17/21 Telemedicine Dory Tanner MD [facility] 121 [facility] today's visits and meeting all other requirements     Future Appointments  No visits were found meeting these conditions  Showing future appointments within next 150 days and meeting all other requirements        The patient was identified by name and date of birth  Meka Reina was informed that this is a telemedicine visit and that the visit is being conducted through [facility] and patient was informed that this is a secure, HIPAA-compliant platform  She agrees to proceed     My office door was closed  No one else was in the room  She acknowledged consent and understanding of privacy and security of the video platform   The patient has agreed to participate and understands they can discontinue the visit at any time  Patient is aware this is a billable service  Subjective  Martínez Arredondo is a 77 y o  female following up on L sided numbness and weakness  She is going to PT and OT  She has seen neurology- has EEG and MRI cervical spine ordered  She has the MRI scheduled  Notes that neurologist felt it was less likely cva and wanted to get further w/u  She is worried about BP- says she had some readings of 80s/60s  She cut the losartan dose in half and now readings are 118/68, 107/56, 102/70  HR 70-90  She does not feel lightheaded  She notes she went to [facility] for UTI  Her BP was elevated there, but was low at home after she was at [facility]  Took bactrim with relief  She has asked PT/OT to take her BP at her visits  Pt hasn't had mammo since 1986 (she has chosen not to in the past) but a friend recently was dx with breast cancer and now she feels that she would like to get a mammo done  Has echo and the EEG scheduled for early June  Pt notes that she copied the info from her studies, neurologist daisy, my note, PT note and sent them in to her company for leave  She notes that her leave is approved through 7/9 based on the paperwork that she sent in  She is being re-evaluated after 8 weeks of PT/OT  She is doing home exercises  She notes that she initially was feeling very depressed  Was getting fatigued easily  She feels more optimistic now  She notes she has a little bit of cough and congestion- coughs in the morning  Just for two weeks  No fever  Had CT lung cancer screening less than 2 months ago- mild chronic bronchitis/emphysema changes  Stopped smoking about 10 years ago      HPI     Past Medical History:   Diagnosis Date    Diabetes mellitus [facility] [address] )     Diabetes mellitus type 2, controlled ([facility] [address] )     Disease of thyroid gland     GERD (gastroesophageal reflux disease)     Glaucoma     H/O tooth extraction     Had top row of teeth removed around April 2016    Headache(784 0)     Hypertension     Pneumonia     Stroke ([address] )     TIA (transient ischemic attack)     Visual impairment        Past Surgical History:   Procedure Laterality Date    CATARACT EXTRACTION Right     5 years ago    CYST REMOVAL Left 04/15/2014    ear lobe      EAR SURGERY  1970    Left ear cyst removal     EYE SURGERY      OTHER SURGICAL HISTORY       right upper arm          Current Outpatient Medications   Medication Sig Dispense Refill    aspirin 81 MG tablet Take 81 mg by mouth daily      COMBIGAN 0 2-0 5 % Administer 1 drop to the right eye 2 (two) times a day   3    cycloSPORINE (RESTASIS) 0 05 % ophthalmic emulsion Administer 1 drop to both eyes every 12 (twelve) hours       dorzolamide (TRUSOPT) 2 % ophthalmic solution Administer 1 drop to the right eye 3 (three) times a day   1    dorzolamide-timolol (COSOPT) 22 3-6 8 MG/ML ophthalmic solution       glucose blood (OneTouch Verio) test strip USE TO TEST BLOOD SUGAR UP TO FOUR TIMES DAILY 300 each 1    Insulin Pen Needle (Pen Needles) 32G X 4 MM MISC To use daily with victoza 100 each 3    levothyroxine 100 mcg tablet Take 1 tablet (100 mcg total) by mouth daily BRAND SYNTHROID PLEASE 90 tablet 1    liraglutide (VICTOZA) injection Inject 0 1 mL (0 6 mg total) under the skin daily 3 pen 3    losartan-hydrochlorothiazide (HYZAAR) 50-12 5 mg per tablet Take 1 tablet by mouth daily (Patient taking differently: Take 0 5 tablets by mouth daily ) 90 tablet 1    LUMIGAN 0 01 % ophthalmic drops Administer 1 drop to both eyes daily at bedtime   3    metFORMIN (GLUCOPHAGE) 500 mg tablet Take 1 tablet (500 mg total) by mouth 2 (two) times a day with meals START WITH 1 TABLET BY MOUTH DAILY FOR 1 TABLET WEEK, THEN 1 TABLET BY MOUTH TWICE DAILY FOR 1 WEEK, THEN 2 TABLETS BY MOUTH TWICE DAILY 180 tablet 1    OneTouch Delica Lancets 37M MISC To test BS up to four times daily- E11 65 200 each 5  rosuvastatin (CRESTOR) 5 mg tablet Take 1 tablet (5 mg total) by mouth daily 90 tablet 0    sulfamethoxazole-trimethoprim (BACTRIM DS) 800-160 mg per tablet Take 1 tablet by mouth every 12 (twelve) hours for 7 days 14 tablet 0    albuterol (PROVENTIL HFA,VENTOLIN HFA) 90 mcg/act inhaler Inhale 2 puffs every 4 (four) hours as needed for wheezing or shortness of breath (Patient not taking: Reported on 5/5/2021) 1 Inhaler 1     No current facility-administered medications for this visit  Allergies   Allergen Reactions    Eggs Or Egg-Derived Products - Food Allergy Anaphylaxis    Albumen, Egg - Food Allergy Hives    Antihistamines, Diphenhydramine-Type     Epinephrine Syncope     Root canal 25 years ago     Fluzone [Flu Virus Vaccine] Hives, Abdominal Pain and Facial Swelling    Iv Contrast [Iodinated Diagnostic Agents] Headache     Headache, swelling around eyes, eye burning- with CT contrast   No rxn to MRI contrast    Lidocaine      Syncope, tachycardia with local anesthetic with epinephrine    Zinc Rash       Review of Systems    Video Exam    Vitals:    05/17/21 1657   BP: 102/90   Pulse: 82   Temp: 97 6 °F (36 4 °C)   SpO2: 96%   Weight: 82 1 kg (181 lb)   Height: 5' 6" (1 676 m)       Physical Exam  Vitals signs and nursing note reviewed  Constitutional:       Appearance: Normal appearance  She is not ill-appearing  Neurological:      Mental Status: She is alert and oriented to person, place, and time  Comments: No facial droop  Normal speech   Psychiatric:         Mood and Affect: Mood normal          Behavior: Behavior normal           I spent 25 minutes directly with the patient during this visit      VIRTUAL VISIT DISCLAIMER    Faisal Link acknowledges that she has consented to an online visit or consultation   She understands that the online visit is based solely on information provided by her, and that, in the absence of a face-to-face physical evaluation by the physician, the diagnosis she receives is both limited and provisional in terms of accuracy and completeness  This is not intended to replace a full medical face-to-face evaluation by the physician  Tangela Coleman understands and accepts these terms

## 2021-05-17 NOTE — PATIENT INSTRUCTIONS
Try claritin or zyrtec 10 mg at night to see if this helps with the congestion and allergies  Let me know if this doesn't help

## 2021-05-18 ENCOUNTER — APPOINTMENT (OUTPATIENT)
Dept: PHYSICAL THERAPY | Facility: REHABILITATION | Age: 76
End: 2021-05-18
Payer: MEDICARE

## 2021-05-18 ENCOUNTER — APPOINTMENT (OUTPATIENT)
Dept: OCCUPATIONAL THERAPY | Facility: REHABILITATION | Age: 76
End: 2021-05-18
Payer: MEDICARE

## 2021-05-19 ENCOUNTER — HOSPITAL ENCOUNTER (OUTPATIENT)
Dept: MRI IMAGING | Facility: HOSPITAL | Age: 76
Discharge: HOME | End: 2021-05-19
Attending: PSYCHIATRY & NEUROLOGY
Payer: MEDICARE

## 2021-05-19 DIAGNOSIS — R20.0 LEFT SIDED NUMBNESS: ICD-10-CM

## 2021-05-19 PROCEDURE — G1004 CDSM NDSC: HCPCS

## 2021-05-19 PROCEDURE — 72141 MRI NECK SPINE W/O DYE: CPT

## 2021-05-20 ENCOUNTER — OFFICE VISIT (OUTPATIENT)
Dept: OCCUPATIONAL THERAPY | Facility: REHABILITATION | Age: 76
End: 2021-05-20
Payer: MEDICARE

## 2021-05-20 ENCOUNTER — OFFICE VISIT (OUTPATIENT)
Dept: PHYSICAL THERAPY | Facility: REHABILITATION | Age: 76
End: 2021-05-20
Payer: MEDICARE

## 2021-05-20 DIAGNOSIS — G45.9 TIA (TRANSIENT ISCHEMIC ATTACK): Primary | ICD-10-CM

## 2021-05-20 PROCEDURE — 97110 THERAPEUTIC EXERCISES: CPT | Performed by: OCCUPATIONAL THERAPIST

## 2021-05-20 PROCEDURE — 97110 THERAPEUTIC EXERCISES: CPT | Performed by: PHYSICAL THERAPIST

## 2021-05-20 PROCEDURE — 97530 THERAPEUTIC ACTIVITIES: CPT | Performed by: OCCUPATIONAL THERAPIST

## 2021-05-20 PROCEDURE — 97112 NEUROMUSCULAR REEDUCATION: CPT | Performed by: PHYSICAL THERAPIST

## 2021-05-20 NOTE — PROGRESS NOTES
Daily Note     Today's date: 2021  Patient name: Nelson Greene  : 1945  MRN: [id number]  Referring provider: Saurabh Hill MD  Dx:   Encounter Diagnosis     ICD-10-CM    1  TIA (transient ischemic attack)  G45 9                   Subjective: Pt reports she walked from her car to her patio this morning w/o her cane  She began sneezing which made her unsteady and she fell  She denies injury  Objective: See treatment diary below    BP - 120/70  HR - 80bpm     Assessment: Tolerated treatment well  Pt fatigues quickly particularly in LLE  With this demonstrates instability at L knee  Was challenged with weight bearing to LLE in brief periods of SL stance during exercises  Reports soreness and mild tingling in her LLE during visit which resolved with seated rest break  Patient would benefit from continued PT      Plan: Continue per plan of care  Precautions: fluctuations in BP, type II DM, vision loss, emphysema       HEP: STS, heel raises, step ups     Manuals                                                                Neuro Re-Ed             Walking w/o AD 200ft x2 with CG 300ft x2 with CG           Hurdles  5 laps no UE support 4 laps fw and lat, no UE support           Static balance   FT 30"x2           Step ups              Side stepping              Cone taps  2in cones 15x ea                        Ther Ex             Scifit  recumbent bike 3' L0 recumbent bike 3:45' L0           Treadmill              STS 12x from hi/low table    O2-96%     12x from hi/low table             LAQ  2x10 5" on L                                                                Ther Activity                                       Gait Training                                       Modalities

## 2021-05-20 NOTE — PROGRESS NOTES
Daily Note     Today's date: 2021  Patient name: Raquel Bolton  : 1945  MRN: [id number]  Referring provider: Janice Villalba MD  Dx:   Encounter Diagnosis     ICD-10-CM    1  TIA (transient ischemic attack)  G45 9                   Subjective: Sometimes my L hand has a mind of it's own "      Objective: See treatment description below    UBE x 3 minutes prograde and x 3 minutes retrograde in seated position bilaterally with 1 3 resistance focusing on improved proximal strength/endurance, improved B/L coordination, and improved sustained grasp to handle  OTR educated Pt on theraputty HEP to improved distal/intrinsic strength of L hand  OTR provided Pt on handout with written/visual cues and provided Pt with physical demonstration of each exercise to improve understanding/carryover to home environment  OTR educated Pt on the importance of performing HEP 1x/day for 2-3x/week  Pt transitioned to performing 5 reps for each exercise focusing on improved distal/intrinsic strength  Pt transitioned to removing various sized items from yellow/green theraputty bilaterally focusing on improved B/L motor function, increased intrinsic/distal strength/endurance, and improved attention to LUE  Pt transitioned to various item retrieval and placement/push down into theraputty utilizing pincer grasp to tweezers focusing on refined prehension  Assessment: Tolerated treatment well  Patient would benefit from continued OT    Pt demo with fair tolerance to UBE demands with abilities to complete 6 minutes with resistance maintaining at 1 3 throughout with Mod-Max fatigue reported upon completion  Pt with self-report of increased challenges with performing in retrograde manner  Pt demo with G understanding of exercises with abilities to perform with proper form throughout  Pt demo with increased intrinsic weakness more ulnarly of L hand    Pt demo with improved attention to L hand throughout full duration of tx session with no verbal cues/reminders required to incorporate LUE in functional task  Pt demo with excellent B/L motor function for various sized item removal from theraputty and abilities to utilize pincer grasp to tweezers to place items back into theraputty with 2% droppage present throughout  Plan: Continue per plan of care        INTERVENTION COMMENTS:  Diagnosis: TIA (transient ischemic attack) [G45 9]  Precautions: DM, Glaucoma, HTN, possible seizure activity-- NO STIM until further notice  FOTO: not performed  Insurance: Payor: Carlton Issa / Plan: MEDICARE A AND B / Product Type: Medicare A & B Fee for Service /   2 of 10 visits, PN due 06/12/2021

## 2021-05-25 ENCOUNTER — OFFICE VISIT (OUTPATIENT)
Dept: OCCUPATIONAL THERAPY | Facility: REHABILITATION | Age: 76
End: 2021-05-25
Payer: MEDICARE

## 2021-05-25 ENCOUNTER — OFFICE VISIT (OUTPATIENT)
Dept: PHYSICAL THERAPY | Facility: REHABILITATION | Age: 76
End: 2021-05-25
Payer: MEDICARE

## 2021-05-25 DIAGNOSIS — G45.9 TIA (TRANSIENT ISCHEMIC ATTACK): Primary | ICD-10-CM

## 2021-05-25 PROCEDURE — 97150 GROUP THERAPEUTIC PROCEDURES: CPT | Performed by: OCCUPATIONAL THERAPIST

## 2021-05-25 PROCEDURE — 97140 MANUAL THERAPY 1/> REGIONS: CPT | Performed by: OCCUPATIONAL THERAPIST

## 2021-05-25 PROCEDURE — 97110 THERAPEUTIC EXERCISES: CPT | Performed by: PHYSICAL THERAPIST

## 2021-05-25 PROCEDURE — 97112 NEUROMUSCULAR REEDUCATION: CPT | Performed by: PHYSICAL THERAPIST

## 2021-05-25 PROCEDURE — 97110 THERAPEUTIC EXERCISES: CPT | Performed by: OCCUPATIONAL THERAPIST

## 2021-05-25 NOTE — PROGRESS NOTES
Daily Note     Today's date: 2021  Patient name: Ken Song  : 1945  MRN: [id number]  Referring provider: Marielle Ugarte MD  Dx:   Encounter Diagnosis     ICD-10-CM    1  TIA (transient ischemic attack)  G45 9                   Subjective: Pt reports she had significant fatigue and neck pain after last visit  This had her resting for 3 days  Believes her neck pain was from using the arm bike in OT  Objective: See treatment diary below       Assessment: Tolerated treatment well  Some regressions made to exercise volume today due to previous response  Demonstrates instability with fatigue when walking and difficulty with weight bearing on LLE due to weakness  Reported moderate fatigue by end of visit  Patient would benefit from continued PT      Plan: Continue per plan of care  Precautions: fluctuations in BP, type II DM, vision loss, emphysema       HEP: STS, heel raises, step ups, FT balance      Manuals                                                               Neuro Re-Ed             Walking w/o AD 200ft x2 with CG 300ft x2 with CG In pakring with SPC 300ft              Hurdles  5 laps no UE support 4 laps fw and lat, no UE support 3 laps fw, no UE support          Static balance   FT 30"x2 FT 30"x2          Step ups              Side stepping              Cone taps  2in cones 15x ea 2 in cones 10x ea                       Ther Ex             Scifit  recumbent bike 3' L0 recumbent bike 3:45' L0 Held           Treadmill              STS 12x from hi/low table    O2-96%     12x from hi/low table   8x from hi/low table          LAQ  2x10 5" on L  4x5 5" on L                                                              Ther Activity                                       Gait Training                                       Modalities

## 2021-05-25 NOTE — PROGRESS NOTES
Daily Note     Today's date: 2021  Patient name: David Ghosh  : 1945  MRN: [id number]  Referring provider: Ethel Mckeon MD  Dx:   Encounter Diagnosis     ICD-10-CM    1  TIA (transient ischemic attack)  G45 9                   Subjective: "That bike gave me some discomfort in my neck "      Objective: See treatment description below    Seated thera ex: Pt performed 3 sets x 10 reps of sustained grasp with x 3 second isometric hold to blue hand strengthener with L hand, followed by 3 sets x 8 reps of digit extension with blue extensor focusing on improved intrinsic/distal strength/endurance and improved attention to LUE/hand  IASTM to L hand/digits focusing on stiffness/dicomfort reduction and to improve overall motor function  Seated functional reach crossing midline for dowel retrieval and placement in dowel board utilizing calibrated hand gripper at level 1 resistance focusing on improved distal strength/endurance, improved tolerance to forward functional reach, and improved hand to target precision  Assessment: Tolerated treatment well  Patient would benefit from continued OT    Pt demo fair tolerance to sustained grasp to blue hand strengthener with x 3 second isometric hold with abilities to complete 2 sets x 10 reps and 1 sets x 5 reps with Mod-Max fatigue reported in L hand  Pt demo with abilities to complete 3 sets x 8 reps of digit extension with Max difficulties extending D4-5 throughout  Pt responded positively to IASTM to L hand with self-report of decreased stiffness and discomfort improving overall functional performance with prehension tasks directly following  Pt demo with fair tolerance to seated functional reach for dowel retrieval with Mod-Max fatigue reported with forward functional reach and sustained grasp demands with L hand    Pt presented with excellent sustained grasp maintenance utilizing calibrated hand gripper at level 1 resistance with 0% droppage present throughout  Plan: Continue per plan of care        INTERVENTION COMMENTS:  Diagnosis: TIA (transient ischemic attack) [G45 9]  Precautions: DM, Glaucoma, HTN, possible seizure activity-- NO STIM until further notice  FOTO: not performed  Insurance: Payor: Wang Shepard / Plan: MEDICARE A AND B / Product Type: Medicare A & B Fee for Service /   3 of 10 visits, PN due 06/12/2021    [id number]  [id number] [facility]  0930-1000 1:1

## 2021-05-26 ENCOUNTER — HOSPITAL ENCOUNTER (OUTPATIENT)
Dept: MAMMOGRAPHY | Facility: MEDICAL CENTER | Age: 76
Discharge: HOME | End: 2021-05-26
Payer: MEDICARE

## 2021-05-26 VITALS — BODY MASS INDEX: 29.09 KG/M2 | WEIGHT: 181 LBS | HEIGHT: 66 IN

## 2021-05-26 DIAGNOSIS — Z12.31 SCREENING MAMMOGRAM, ENCOUNTER FOR: ICD-10-CM

## 2021-05-26 PROCEDURE — 77067 SCR MAMMO BI INCL CAD: CPT

## 2021-05-26 PROCEDURE — 77063 BREAST TOMOSYNTHESIS BI: CPT

## 2021-05-27 ENCOUNTER — APPOINTMENT (OUTPATIENT)
Dept: OCCUPATIONAL THERAPY | Facility: REHABILITATION | Age: 76
End: 2021-05-27
Payer: MEDICARE

## 2021-05-27 ENCOUNTER — APPOINTMENT (OUTPATIENT)
Dept: PHYSICAL THERAPY | Facility: REHABILITATION | Age: 76
End: 2021-05-27
Payer: MEDICARE

## 2021-06-02 ENCOUNTER — OFFICE VISIT (OUTPATIENT)
Dept: OCCUPATIONAL THERAPY | Facility: REHABILITATION | Age: 76
End: 2021-06-02
Payer: MEDICARE

## 2021-06-02 ENCOUNTER — OFFICE VISIT (OUTPATIENT)
Dept: PHYSICAL THERAPY | Facility: REHABILITATION | Age: 76
End: 2021-06-02
Payer: MEDICARE

## 2021-06-02 DIAGNOSIS — G45.9 TIA (TRANSIENT ISCHEMIC ATTACK): Primary | ICD-10-CM

## 2021-06-02 PROCEDURE — 97112 NEUROMUSCULAR REEDUCATION: CPT | Performed by: PHYSICAL THERAPIST

## 2021-06-02 PROCEDURE — 97140 MANUAL THERAPY 1/> REGIONS: CPT | Performed by: OCCUPATIONAL THERAPIST

## 2021-06-02 PROCEDURE — 97110 THERAPEUTIC EXERCISES: CPT | Performed by: OCCUPATIONAL THERAPIST

## 2021-06-02 PROCEDURE — 97530 THERAPEUTIC ACTIVITIES: CPT | Performed by: OCCUPATIONAL THERAPIST

## 2021-06-02 PROCEDURE — 97530 THERAPEUTIC ACTIVITIES: CPT | Performed by: PHYSICAL THERAPIST

## 2021-06-02 NOTE — PROGRESS NOTES
Daily Note     Today's date: 2021  Patient name: Nelson Greene  : 1945  MRN: [id number]  Referring provider: Saurabh Hill MD  Dx:   Encounter Diagnosis     ICD-10-CM    1  TIA (transient ischemic attack)  G45 9                   Subjective: "I'm very sore from sitting in my car for 4 hours yesterday  "      Objective: See treatment description below    Pt? began tx session performing supine thera ex bilaterally?with #2 T-bar performing shoulder FF to 90* with 3 second isometric hold using slowed pace eccentric control 3 sets x 10 reps focusing on increased proximal strength/endurance, improved concentric/eccentric movement, and improved sustained bilateral grasp to bar  ?Pt attempted supine thera ex bilaterally with 2# T-bar performing chest press with 3 second iso hold using slowed pace eccentric control  Pt performed downgraded task with 1#T-bar for chest press with 3 sec iso hold 3 sets x 10 reps for increased bilateral motor control and improved bilateral tricep facilitation  IASTM to L hand/digits for increased distal mobility and discomfort reduction in preparation for functional tasks  Pt performed seated? thera ex unilaterally with blue hand strengthener with 3-second isometric hold upgraded to 3 sets?x 12 reps in L hand/digits for improved distal/intrinsic strength/endurance  ?Pt transitioned to seated thera ex unilaterally with blue digit extensor upgraded to 3 sets x 10 reps for increased distal strength/endurance and improved LUE attention      Pt concluded tx session performing in stance neuro re-ed unilaterally for peg placement/retrieval with calibrated hand gripper at #1 resistance setting in PNF D1 and D2 flexion/extension movement planes; task upgraded by increasing calibrated hand gripper to #2 resistance setting for increased activity tolerance via massed practice, increased L composite grasp strength/endurance, improved functional reach, and improved hand to target precision  Assessment: Tolerated treatment well  Patient would benefit from continued OT    Pt demo with Fair tolerance to supine thera ex with 2# T-bar for B/L shoulder FF 2* reported 4/10 R shoulder pain, bilateral proximal weakness, and decreased concentric/eccentric control evidenced by short, jerky movement throughout ROM  Pt demo with improved concentric/eccentric control and pain reduction of R shoulder (3/10 pain level) performing downgraded task of supine chest press with 1# T-bar with verbal cues required to maintain proper form and increase L tricep facilitation throughout task  Pt demo with continued excellent tolerance to IASTM with report of reduced stiffness/pain of L hand/digits post-application  Pt demo with increased LUE distal strength/endurance performing seated thera ex with blue hand strengthener with abilities to perform 3 sets x 12 reps with min pain/discomfort of L hand/digits reported at conclusion of task  Pt demo with deficits in digit extension of L thumb & D 4-5 performing seated thera ex with blue digit extensor; Pt performance of task improved slightly with verbal cues encouraging use of visual feedback for L digits to facilitate consistent engagement of digit extensors  Pt demo with increased composite grasp strength/endurance and improved proximal endurance using functional reach for in stance neuro re-ed using calibrated hand gripper at #1 setting for peg retrieval and tolerated hand gripper resistance upgraded to #2 setting for x 5 minutes with 5% droppage for duration of task  Plan: Continue per plan of care        INTERVENTION COMMENTS:  Diagnosis: TIA (transient ischemic attack) [G45 9]  Precautions: DM, Glaucoma, HTN, possible seizure activity-- NO STIM until further notice  FOTO: not performed  Insurance: Payor: Lina Litter / Plan: MEDICARE A AND B / Product Type: Medicare A & B Fee for Service /   4 of 10 visits, PN due 06/12/2021       Pt treated by Avelina Roman BRANDY Asif under direct supervision of Angelita Posadas MS, OTR/L

## 2021-06-02 NOTE — PROGRESS NOTES
Daily Note     Today's date: 2021  Patient name: Luz Fong  : 1945  MRN: [id number]  Referring provider: Mile Carolina MD  Dx:   Encounter Diagnosis     ICD-10-CM    1  TIA (transient ischemic attack)  G45 9                   Subjective: Pt reports she had significant fatigue and neck pain after last visit  This had her resting for 3 days  Believes her neck pain was from using the arm bike in OT  Objective: See treatment diary below       Assessment: Tolerated treatment well  Pt continues to be limited in tolerating significant progressions due to quick onset of weakness and fatigue in LLE  Demonstrated buckling of L knee at times when fatigued along with instability particularly towards the end of visit when fatigued  Encouraged pt to increase HEP frequency and provided additional print out  Occasional cues needed to perform exercises properly  Patient would benefit from continued PT      Plan: Continue per plan of care  Precautions: fluctuations in BP, type II DM, vision loss, emphysema       HEP: STS, heel raises, step ups, FT balance      Manuals  6                                                   Neuro Re-Ed           Walking w/o AD 200ft x2 with CG 300ft x2 with CG In pakring lot with SPC 300ft     400ft x1 with CG    100ft x1       Hurdles  5 laps no UE support 4 laps fw and lat, no UE support 3 laps fw, no UE support 2 laps fw no UE support       Static balance   FT 30"x2 FT 30"x2 FT 30"x3       Step ups     4in 2x5 no UE's       Side stepping            Cone taps  2in cones 15x ea 2 in cones 10x ea 2 in cones 10x ea                  Ther Ex           Scifit  recumbent bike 3' L0 recumbent bike 3:45' L0 Held         Treadmill            STS 12x from hi/low table    O2-96%     12x from hi/low table   8x from hi/low table 2x5 from hi/low table       LAQ  2x10 5" on L  4x5 5" on L                                                    Ther Activity Gait Training                                 Modalities

## 2021-06-03 ENCOUNTER — APPOINTMENT (OUTPATIENT)
Dept: OCCUPATIONAL THERAPY | Facility: REHABILITATION | Age: 76
End: 2021-06-03
Payer: MEDICARE

## 2021-06-03 ENCOUNTER — APPOINTMENT (OUTPATIENT)
Dept: PHYSICAL THERAPY | Facility: REHABILITATION | Age: 76
End: 2021-06-03
Payer: MEDICARE

## 2021-06-07 ENCOUNTER — OFFICE VISIT (OUTPATIENT)
Dept: OCCUPATIONAL THERAPY | Facility: REHABILITATION | Age: 76
End: 2021-06-07
Payer: MEDICARE

## 2021-06-07 ENCOUNTER — EVALUATION (OUTPATIENT)
Dept: PHYSICAL THERAPY | Facility: REHABILITATION | Age: 76
End: 2021-06-07
Payer: MEDICARE

## 2021-06-07 DIAGNOSIS — G45.9 TIA (TRANSIENT ISCHEMIC ATTACK): Primary | ICD-10-CM

## 2021-06-07 PROCEDURE — 97110 THERAPEUTIC EXERCISES: CPT

## 2021-06-07 PROCEDURE — 97112 NEUROMUSCULAR REEDUCATION: CPT

## 2021-06-07 PROCEDURE — 97110 THERAPEUTIC EXERCISES: CPT | Performed by: PHYSICAL THERAPIST

## 2021-06-07 PROCEDURE — 97112 NEUROMUSCULAR REEDUCATION: CPT | Performed by: PHYSICAL THERAPIST

## 2021-06-07 PROCEDURE — 97530 THERAPEUTIC ACTIVITIES: CPT

## 2021-06-07 NOTE — PROGRESS NOTES
Progress Update     Today's date: 2021  Patient name: Raeann Varghese  : 1945  MRN: [id number]  Referring provider: Mihai Marcum MD  Dx:   Encounter Diagnosis     ICD-10-CM    1  TIA (transient ischemic attack)  G45 9                   Subjective: Reports feeling some improvement since starting PT, but feels her progress has been slow  Feels more stable some days than others with her LLE  Still has been doing walking at home, sit to stands  Is still not back to work  Tires easily and has difficulty typing with L hand which limits her return to work  Had an MRI of the neck which only indicated spondylosis  Reports feeling a little more tired today than usual        Objective: See treatment diary below     Balance Test Initial Eval         5x Sit to Stand: NO UE's 21in surface  37s  No UE's 21in surface  43s (1 fialed attempt       TUG: 15 9 no AD  15 7s no AD       Gait Speed:            2 Minute Walk Test:           6 Minute Walk Test: Stopped at 4:00, 800ft       HR-79  O2- 96%  Finished full 6min  980ft       Liu Balance Scale:           FGA:           Modified Maine Scale Score:           Patient-Reported Outcome Measure  Stroke Impact Scale:                   Goals  STGs (4 weeks)  Pt will be independent with comprehensive HEP - progressing   Pt will demonstrate improved TUG by at least 3 sec - not met  Pt will demonstrate at least 5s improvement on 5xSTS - not met    LTG's (to be achieved by d/c)  Pt will be able to self manage sx's independently - progressing   Pt will return to ambulation in community and home w/o use of AD - not met  Pt will demonstrate at least 4 point improvement on FGA - not tested today  Pt will be able to walk her dog again for at least half a mile - not met  Pt will be able to return to normal work duties - not met    Assessment: Since starting PT, Effie Moreno has showed some improvements, but overall has been slow to progress   She has been limited due to quick onset of LLE fatigue and particularly muscle soreness with exercise  Overall her physical performance during PT fluctuates considerably with mostly normalized gait at some times, and then L knee instability at others (mostly when fatigued)  She demonstrated less L knee buckling and instability when she was more distracted during overground walking walking today  I advised her to avoid hyper fixating on her LLE while exercising, which appears to be beneficial    She demonstrated improved functional endurance base don 6MWT today with some L knee instability observed  Has yet to show significant improvement yet on other outcome measures  She continues to be considerably limited in household and community ambulation, and would benefit from continued PT to further address balance, gait, and strength  Plan: Continue per plan of care  Continue original POC of 2x per week for 4 more weeks  Precautions: fluctuations in BP, type II DM, vision loss, emphysema  HEP: STS, heel raises, step ups, FT balance      Manuals 5/13 5/20 5/25 6/2 6/7                                                  Neuro Re-Ed           Walking w/o AD 200ft x2 with CG 300ft x2 with CG In [place] with SPC 300ft     400ft x1 with CG    100ft x1       Hurdles  5 laps no UE support 4 laps fw and lat, no UE support 3 laps fw, no UE support 2 laps fw no UE support       Static balance   FT 30"x2 FT 30"x2 FT 30"x3       Step ups     4in 2x5 no UE's 4in 2x5 no UE's      Side stepping      3 laps in //bars no UE      Cone taps  2in cones 15x ea 2 in cones 10x ea 2 in cones 10x ea       SOLO?                Ther Ex           Scifit  recumbent bike 3' L0 recumbent bike 3:45' L0 Held         Treadmill            STS 12x from hi/low table    O2-96%     12x from hi/low table   8x from hi/low table 2x5 from hi/low table       LAQ  2x10 5" on L  4x5 5" on L                                                    Ther Activity Gait Training                                 Modalities

## 2021-06-07 NOTE — PROGRESS NOTES
Daily Note     Today's date: 2021  Patient name: Kd Arevalo  : 1945  MRN: [id number]  Referring provider: Robbie Anand MD  Dx:   Encounter Diagnosis     ICD-10-CM    1  TIA (transient ischemic attack)  G45 9          Subjective: "I was so sore from the last session "      Objective: See treatment description below    Pt?engaged in UBE at L 1 0 resistance prograde x 3 minutes and retrograde x 2 minutes focusing on increased proximal strength/endurance and improved sustained grasp to handles  ?    IASTM to L hand/digits for increased distal mobility and discomfort reduction in preparation for functional tasks  Pt transitioned to seated therex unilaterally utilizing orange theraband using PNF D1 and D2 flexion/extension movement planes focusing on proximal strengthening/endurance, improved concentric/eccentric motor control, and sustained grasp 1x 8 reps each direction  Pt performed seated? thera ex unilaterally with green hand strengthener with 3-second isometric hold  3 sets?x 10 reps in L hand/digits followed by blue digit extensor 3 set x 10 reps for improved distal/intrinsic strength/endurance  ?    Pt concluded tx session performing seated functional task utilizing L hand with targeted demands of using red resistive clothespins to  pom-poms with 3-jaw kylie  and drop in container placed in PNF D1 and D2 flexion/extension movement planes  Task focused on increased activity tolerance via massed practice, increased L tripod grasp strength/endurance, improved functional reach, and improved hand to target precision  Assessment: Tolerated treatment well  Patient would benefit from continued OT    Pt demo with Fair tolerance to UBE due to bilateral proximal weakness and reported pain in upper trapezius region of L UE  Pt demo with continued excellent tolerance to IASTM with report of reduced stiffness/pain of L hand/digits post-application    Pt demo G- endurance during seated therex with orange theraband and mod difficulty with concentric/eccentric control evidenced by short, jerky movement throughout ROM  Pt demo with decreased LUE distal strength/endurance performing seated thera ex with green hand strengthener this session with abilities to perform 3 sets x 10 reps only  Min fatigue in L hand noted with sustained tripod grasp during resistive clothespin activity  Plan: Continue per plan of care        INTERVENTION COMMENTS:  Diagnosis: TIA (transient ischemic attack) [G45 9]  Precautions: DM, Glaucoma, HTN, possible seizure activity-- NO STIM until further notice  FOTO: not performed  Insurance: Payor: Latrice Gamboa / Plan: MEDICARE A AND B / Product Type: Medicare A & B Fee for Service /   5 of 10 visits, PN due 06/12/2021    [id number]  [id number] 1:1   [id number] unsup

## 2021-06-08 ENCOUNTER — APPOINTMENT (OUTPATIENT)
Dept: OCCUPATIONAL THERAPY | Facility: REHABILITATION | Age: 76
End: 2021-06-08
Payer: MEDICARE

## 2021-06-08 ENCOUNTER — APPOINTMENT (OUTPATIENT)
Dept: PHYSICAL THERAPY | Facility: REHABILITATION | Age: 76
End: 2021-06-08
Payer: MEDICARE

## 2021-06-09 ENCOUNTER — HOSPITAL ENCOUNTER (OUTPATIENT)
Dept: NON INVASIVE DIAGNOSTICS | Facility: HOSPITAL | Age: 76
Discharge: HOME | End: 2021-06-09
Payer: MEDICARE

## 2021-06-09 DIAGNOSIS — G45.9 TIA (TRANSIENT ISCHEMIC ATTACK): ICD-10-CM

## 2021-06-09 PROCEDURE — 93306 TTE W/DOPPLER COMPLETE: CPT

## 2021-06-09 PROCEDURE — 93306 TTE W/DOPPLER COMPLETE: CPT | Performed by: INTERNAL MEDICINE

## 2021-06-10 ENCOUNTER — APPOINTMENT (OUTPATIENT)
Dept: PHYSICAL THERAPY | Facility: REHABILITATION | Age: 76
End: 2021-06-10
Payer: MEDICARE

## 2021-06-10 ENCOUNTER — APPOINTMENT (OUTPATIENT)
Dept: OCCUPATIONAL THERAPY | Facility: REHABILITATION | Age: 76
End: 2021-06-10
Payer: MEDICARE

## 2021-06-14 ENCOUNTER — OFFICE VISIT (OUTPATIENT)
Dept: PHYSICAL THERAPY | Facility: REHABILITATION | Age: 76
End: 2021-06-14
Payer: MEDICARE

## 2021-06-14 ENCOUNTER — OFFICE VISIT (OUTPATIENT)
Dept: OCCUPATIONAL THERAPY | Facility: REHABILITATION | Age: 76
End: 2021-06-14
Payer: MEDICARE

## 2021-06-14 DIAGNOSIS — G45.9 TIA (TRANSIENT ISCHEMIC ATTACK): Primary | ICD-10-CM

## 2021-06-14 PROCEDURE — 97530 THERAPEUTIC ACTIVITIES: CPT | Performed by: OCCUPATIONAL THERAPIST

## 2021-06-14 PROCEDURE — 97112 NEUROMUSCULAR REEDUCATION: CPT | Performed by: PHYSICAL THERAPIST

## 2021-06-14 PROCEDURE — 97150 GROUP THERAPEUTIC PROCEDURES: CPT | Performed by: OCCUPATIONAL THERAPIST

## 2021-06-14 PROCEDURE — 97140 MANUAL THERAPY 1/> REGIONS: CPT | Performed by: OCCUPATIONAL THERAPIST

## 2021-06-14 NOTE — PROGRESS NOTES
Daily Note     Today's date: 2021  Patient name: Jcak Longoria  : 1945  MRN: [id number]  Referring provider: Justyn Bunch MD  Dx:   Encounter Diagnosis     ICD-10-CM    1  TIA (transient ischemic attack)  G45 9                   Subjective: "Between this therapy and PT, I have much more stamina now  "      Objective: See treatment description below    Pt?performed supine thera ex bilaterally?with 1# T-bar performing shoulder FF to 90* with 3 second isometric hold using slowed pace eccentric control 3 sets x 10 reps focusing on increased proximal strength/endurance, improved concentric/eccentric movement, and improved sustained bilateral grasp to bar   ?  IASTM to B/L hand/digits for increased distal mobility and discomfort reduction in preparation for functional tasks  Pt concluded tx session performing seated functional task unilaterally for small peg placement/retrieval on pegboard anchored to vertical easel with demands of using L hand/digits for palm-to-digit translation for placement and tweezers for retrieval focusing on improved LUE fine motor control, improved hand manipulation, and improved refined grasp for carryover to improved utensil use  Assessment: Tolerated treatment well  Patient would benefit from continued OT    Pt demo with improved bilateral wrist stability and increased proximal strength/endurance performing supine shoulder FF with 1# T-Bar with verbal cues required to improve eccentric control of movement and reduce speed/pace of reps  Pt demo with improved distal mobility and stiffness reduction post-IASTM and reported long-term improvements in hand/digit mobility facilitated by IASTM application  Pt demo with G tolerance to seated functional task for small peg placement/retrieval with increased LUE fine motor control for peg retrieval/tweezer use and >5% droppage for duration of task    Pt reported preference for lateral pinch grasp with tweezers used in L hand/digits to improve accuracy and comfort of grasp  Plan: Continue per plan of care        INTERVENTION COMMENTS:  Diagnosis: TIA (transient ischemic attack) [G45 9]  Precautions: DM, Glaucoma, HTN, possible seizure activity-- NO STIM until further notice  FOTO: not performed  Insurance: Payor: Albaro Villanueva / Plan: MEDICARE A AND B / Product Type: Medicare A & B Fee for Service /   6 of 10 visits, PN due 06/12/2021    [id number]  [id number] 1:1  [id number] [facility]  [id number] unsupervised       Pt treated by BRANDY Mcdonough under direct supervision of Nirmal Soriano MS, OTR/L

## 2021-06-14 NOTE — PROGRESS NOTES
Daily Note     Today's date: 2021  Patient name: Cecilia Yuan  : 1945  MRN: [id number]  Referring provider: Sami Allison MD  Dx:   Encounter Diagnosis     ICD-10-CM    1  TIA (transient ischemic attack)  G45 9                   Subjective: Reports she has been very fatigued and not had much appetite the past 3 days, unsure why  Is having an EEG tomorrow in order to rule out seizure disorder  States her BP has been low when at home  Objective: See treatment diary below     BP: 125/76  HR: 81    Assessment: Tolerated session well  Pt able to tolerate more exercise volume and intensity today, which she attributed to starting PT before OT treatment today  Reported generalized fatigue through out needed seated rest breaks  Fatigue is less specific to LLE at this time  Did not demonstrate any knee buckling/instability when walking today, but had difficulty with step ups due to weakness  Recommended follow up with PCP if fatigue and loss of appetite if does not improve  Would benefit from continued PT  Plan: Continue per plan of care  Continue original POC of 2x per week for 4 more weeks  Precautions: fluctuations in BP, type II DM, vision loss, emphysema       HEP: STS, heel raises, step ups, FT balance      Manuals  6                                                 Neuro Re-Ed           Walking w/o AD 200ft x2 with CG 300ft x2 with CG In [place] with SPC 300ft     400ft x1 with CG    100ft x1  300ft x3 in SOLO    O2:96%  HR:86     Hurdles  5 laps no UE support 4 laps fw and lat, no UE support 3 laps fw, no UE support 2 laps fw no UE support  4 laps fw in SOLO     Static balance   FT 30"x2 FT 30"x2 FT 30"x3  FT foam 30"x3     Step ups     4in 2x5 no UE's 4in 2x5 no UE's 4in 3x5 no UE's     Side stepping      3 laps in //bars no UE 4 laps in //bars no UE     Cone taps  2in cones 15x ea 2 in cones 10x ea 2 in cones 10x ea                      Ther Ex Scifit  recumbent bike 3' L0 recumbent bike 3:45' L0 Held         Treadmill            STS 12x from hi/low table    O2-96%     12x from hi/low table   8x from hi/low table 2x5 from hi/low table       LAQ  2x10 5" on L  4x5 5" on L                                                    Ther Activity                                 Gait Training                                 Modalities

## 2021-06-15 ENCOUNTER — APPOINTMENT (OUTPATIENT)
Dept: OCCUPATIONAL THERAPY | Facility: REHABILITATION | Age: 76
End: 2021-06-15
Payer: MEDICARE

## 2021-06-15 ENCOUNTER — HOSPITAL ENCOUNTER (OUTPATIENT)
Dept: NEUROLOGY | Facility: CLINIC | Age: 76
Discharge: HOME | End: 2021-06-15
Payer: MEDICARE

## 2021-06-15 ENCOUNTER — APPOINTMENT (OUTPATIENT)
Dept: PHYSICAL THERAPY | Facility: REHABILITATION | Age: 76
End: 2021-06-15
Payer: MEDICARE

## 2021-06-15 DIAGNOSIS — R20.0 LEFT SIDED NUMBNESS: ICD-10-CM

## 2021-06-15 PROCEDURE — 95816 EEG AWAKE AND DROWSY: CPT | Performed by: PSYCHIATRY & NEUROLOGY

## 2021-06-15 PROCEDURE — 95816 EEG AWAKE AND DROWSY: CPT

## 2021-06-16 ENCOUNTER — OFFICE VISIT (OUTPATIENT)
Dept: PHYSICAL THERAPY | Facility: REHABILITATION | Age: 76
End: 2021-06-16
Payer: MEDICARE

## 2021-06-16 ENCOUNTER — OFFICE VISIT (OUTPATIENT)
Dept: OCCUPATIONAL THERAPY | Facility: REHABILITATION | Age: 76
End: 2021-06-16
Payer: MEDICARE

## 2021-06-16 DIAGNOSIS — G45.9 TIA (TRANSIENT ISCHEMIC ATTACK): Primary | ICD-10-CM

## 2021-06-16 PROCEDURE — 97110 THERAPEUTIC EXERCISES: CPT | Performed by: OCCUPATIONAL THERAPIST

## 2021-06-16 PROCEDURE — 97140 MANUAL THERAPY 1/> REGIONS: CPT | Performed by: OCCUPATIONAL THERAPIST

## 2021-06-16 PROCEDURE — 97112 NEUROMUSCULAR REEDUCATION: CPT | Performed by: PHYSICAL THERAPIST

## 2021-06-16 PROCEDURE — 97530 THERAPEUTIC ACTIVITIES: CPT | Performed by: OCCUPATIONAL THERAPIST

## 2021-06-16 NOTE — PROGRESS NOTES
Daily Note     Today's date: 2021  Patient name: Johnny Chavez  : 1945  MRN: [id number]  Referring provider: Yenni Guadarrama MD  Dx:   Encounter Diagnosis     ICD-10-CM    1  TIA (transient ischemic attack)  G45 9                   Subjective: "My neck and back hurt so much after the last session  I can't be on my back anymore  "      Objective: See treatment description below     Pt reports 3/10 pain in RUE and across neck/shoulders at start of tx session  Pt began tx session performing seated thera ex unilaterally for LUE shoulder FF to 90* with 1# DB 3 sets x 10 reps followed immediately by performing seated thera ex with movement combination (elbow flexion/shoulder FF and elbow extension, reverse to return to starting position) with 1# DB 2 sets x 10 reps for increased proximal strength/endurance, improved functional reach, and improved composite grasp  IASTM to L thenar/hypothenar eminence, palm and dorsum of hand, and thumb/digits for increased distal mobility and discomfort reduction in preparation for functional tasks  Pt transitioned to seated thera ex unilaterally performing composite grasp with green hand strengthener and 5 second iso hold 2 sets x 10 reps and digit extension with green digit extensor and 5 second iso hold 2 sets x 10 reps focusing on increased distal strength/mobility and improved muscle endurance  Pt concluded tx session performing seated functional task bilaterally for [employer] online typing practice program and document dictation x 15 minutes focusing on improved BUE fine motor control/coordination, increased speed/accuracy of typing, and improved manual dexterity to facilitate goal of participating in electronic communication for work role  Assessment: Tolerated treatment well   Patient would benefit from continued OT    Pt reported abnormal pain/discomfort of neck, L shoulder, and LUE after last tx session and requested cessation of supine thera ex/activities  Pt continues to present with intolerance to thera ex demands at this time  Pt reported normal result of EEG performed on 06/15/2021 with no seizure activity detected  Pt demo with G tolerance to seated thera ex for shoulder FF/movement combo with 1# DB with abilities to complete all sets/reps with improved postural stability and increased proximal endurance with increased discomfort of L hand/digits 2* sustained grasp of 1# DB reported  Pt demo with reduced LUE discomfort and increased distal mobility post-IASTM  Pt demo with min difficulty performing seated thera ex for composite grasp and digit extension 2* decreased extension of L digits 4-5 and increased distal fatigue with x 1 rest break given post-task  Pt demo with F tolerance to seated functional task for typing practice with decreased L wrist strength/stability and tendency to rest L wrist on keyboard during performance of task  Pt given verbal cues to use visual feedback and reduce pressure on L wrist via increased wrist flexion during typing activities;  OTS recommended wrist cushion use for keyboard tasks  Pt reported 0/10 pain level and mild discomfort at conclusion of tx session  Plan: Continue per plan of care        INTERVENTION COMMENTS:  Diagnosis: TIA (transient ischemic attack) [G45 9]  Precautions: DM, Glaucoma, HTN, possible seizure activity-- NO STIM until further notice  FOTO: not performed  Insurance: Payor: Albaro Villanueva / Plan: MEDICARE A AND B / Product Type: Medicare A & B Fee for Service /   7 of 10 visits, PN due 06/12/2021       Pt treated by BRANDY Mcdonough under direct supervision of Nirmal Soriano MS, OTR/L

## 2021-06-16 NOTE — PROGRESS NOTES
Daily Note     Today's date: 2021  Patient name: Johnny Chavez  : 1945  MRN: [id number]  Referring provider: Yenni Guadarrama MD  Dx:   Encounter Diagnosis     ICD-10-CM    1  TIA (transient ischemic attack)  G45 9                   Subjective: Reports low back pain today which she attributes to an exercise from OT last visit  Objective: See treatment diary below    Assessment: Tolerated session fair  Was somewhat limited today by low back pain which was increased with sit to stands  Pt also had increased difficulty with hurdles and balance reporting fatigue and instability in LLE  As a results small regressions in exercise volume were made  Would benefit from continued PT  Plan: Continue per plan of care  Continue original POC of 2x per week for 4 more weeks  Precautions: fluctuations in BP, type II DM, vision loss, emphysema       HEP: STS, heel raises, step ups, FT balance      Manuals                                                 Neuro Re-Ed           Walking w/o AD 200ft x2 with CG 300ft x2 with CG In [place] lot with SPC 300ft     400ft x1 with CG    100ft x1  300ft x3 in SOLO    O2:96%  HR:86 [id number]    300ft x2 in SOLO        Hurdles  5 laps no UE support 4 laps fw and lat, no UE support 3 laps fw, no UE support 2 laps fw no UE support  4 laps fw in SOLO 3 laps fw in SOLO     Static balance   FT 30"x2 FT 30"x2 FT 30"x3  FT foam 30"x3 FT foam 30"x3    Step ups     4in 2x5 no UE's 4in 2x5 no UE's 4in 3x5 no UE's 4in, LLE 3x5    RLE     Side stepping      3 laps in //bars no UE 4 laps in //bars no UE     Cone taps  2in cones 15x ea 2 in cones 10x ea 2 in cones 10x ea                      Ther Ex           Scifit  recumbent bike 3' L0 recumbent bike 3:45' L0 Held         Treadmill            STS 12x from hi/low table    O2-96%     12x from hi/low table   8x from hi/low table 2x5 from hi/low table   x5 from 21in surface (pain)    LAQ  2x10 5" on L 4x5 5" on L                                                    Ther Activity                                 Gait Training                                 Modalities

## 2021-06-17 ENCOUNTER — TELEPHONE (OUTPATIENT)
Dept: FAMILY MEDICINE CLINIC | Facility: CLINIC | Age: 76
End: 2021-06-17

## 2021-06-17 ENCOUNTER — APPOINTMENT (OUTPATIENT)
Dept: OCCUPATIONAL THERAPY | Facility: REHABILITATION | Age: 76
End: 2021-06-17
Payer: MEDICARE

## 2021-06-17 ENCOUNTER — APPOINTMENT (OUTPATIENT)
Dept: PHYSICAL THERAPY | Facility: REHABILITATION | Age: 76
End: 2021-06-17
Payer: MEDICARE

## 2021-06-21 ENCOUNTER — TELEPHONE (OUTPATIENT)
Dept: FAMILY MEDICINE CLINIC | Facility: CLINIC | Age: 76
End: 2021-06-21

## 2021-06-21 DIAGNOSIS — E78.2 MIXED HYPERLIPIDEMIA: ICD-10-CM

## 2021-06-21 DIAGNOSIS — E03.9 HYPOTHYROIDISM, UNSPECIFIED TYPE: ICD-10-CM

## 2021-06-21 DIAGNOSIS — E11.9 CONTROLLED TYPE 2 DIABETES MELLITUS WITHOUT COMPLICATION, WITHOUT LONG-TERM CURRENT USE OF INSULIN (HCC): Primary | ICD-10-CM

## 2021-06-21 NOTE — TELEPHONE ENCOUNTER
Patient is being seen on July 19 and would like to have her lab work done prior to that visit, would you please put an order in for a full work up of blood work that way it can be reviewed at this appointment  She is ok waiting until Dr Cherry Ruiz gets back from vacation for this to go in

## 2021-06-22 ENCOUNTER — APPOINTMENT (OUTPATIENT)
Dept: PHYSICAL THERAPY | Facility: REHABILITATION | Age: 76
End: 2021-06-22
Payer: MEDICARE

## 2021-06-22 ENCOUNTER — APPOINTMENT (OUTPATIENT)
Dept: OCCUPATIONAL THERAPY | Facility: REHABILITATION | Age: 76
End: 2021-06-22
Payer: MEDICARE

## 2021-06-24 ENCOUNTER — APPOINTMENT (OUTPATIENT)
Dept: PHYSICAL THERAPY | Facility: REHABILITATION | Age: 76
End: 2021-06-24
Payer: MEDICARE

## 2021-06-24 ENCOUNTER — APPOINTMENT (OUTPATIENT)
Dept: OCCUPATIONAL THERAPY | Facility: REHABILITATION | Age: 76
End: 2021-06-24
Payer: MEDICARE

## 2021-06-29 ENCOUNTER — APPOINTMENT (OUTPATIENT)
Dept: OCCUPATIONAL THERAPY | Facility: REHABILITATION | Age: 76
End: 2021-06-29
Payer: MEDICARE

## 2021-06-29 ENCOUNTER — TELEPHONE (OUTPATIENT)
Dept: FAMILY MEDICINE CLINIC | Facility: CLINIC | Age: 76
End: 2021-06-29

## 2021-06-29 ENCOUNTER — APPOINTMENT (OUTPATIENT)
Dept: PHYSICAL THERAPY | Facility: REHABILITATION | Age: 76
End: 2021-06-29
Payer: MEDICARE

## 2021-06-29 NOTE — TELEPHONE ENCOUNTER
Spoke to patient and obtained the below Pre-op information:  Name of procedure: Cataract left eye    Diagnosis:  Cataract left eye - right eye Glaucoma    Date of procedure (within 30 days): 8/17/2021    Surgeon: Dr Mona Vamra    Location of procedure (hospital or out patient facility name): [facility] at Dr Denae Prasad office    PATs date/location/ type (Which labs? EKG? CXR?): N/A    Records (on epic or requested):  Unknown    Type of anaesthesia:  General    Paperwork to complete for Pre-op (patient bringing vs  calling office to obtain prior to appointment): patient will bring    Pre-op appointment scheduled (date/time): July 19th, 9:am - [facility] CONFIRMED THIS DATE IS OK - PER PATIENT

## 2021-06-30 DIAGNOSIS — I10 BENIGN ESSENTIAL HYPERTENSION: ICD-10-CM

## 2021-06-30 RX ORDER — LOSARTAN POTASSIUM AND HYDROCHLOROTHIAZIDE 12.5; 5 MG/1; MG/1
TABLET ORAL
Qty: 90 TABLET | Refills: 0 | Status: SHIPPED | OUTPATIENT
Start: 2021-06-30 | End: 2021-07-19 | Stop reason: SDUPTHER

## 2021-07-01 ENCOUNTER — APPOINTMENT (OUTPATIENT)
Dept: OCCUPATIONAL THERAPY | Facility: REHABILITATION | Age: 76
End: 2021-07-01
Payer: MEDICARE

## 2021-07-01 ENCOUNTER — OFFICE VISIT (OUTPATIENT)
Dept: PHYSICAL THERAPY | Facility: REHABILITATION | Age: 76
End: 2021-07-01
Payer: MEDICARE

## 2021-07-01 DIAGNOSIS — G45.9 TIA (TRANSIENT ISCHEMIC ATTACK): Primary | ICD-10-CM

## 2021-07-01 PROCEDURE — 97112 NEUROMUSCULAR REEDUCATION: CPT | Performed by: PHYSICAL THERAPIST

## 2021-07-01 NOTE — PROGRESS NOTES
Daily Note     Today's date: 2021  Patient name: Irina Sparrow  : 1945  MRN: [id number]  Referring provider: Loyd Cantu MD  Dx:   Encounter Diagnosis     ICD-10-CM    1  TIA (transient ischemic attack)  G45 9                 Pt treated 1  from 230p to 300p    Subjective: Was diagnosed with advanced glaucoma and advanced cateracts which she will be getting surgery for on Aug 17th  Was unable to come to pervious visits due to her dog needing emergency surgery and her vision making her feel unsafe to drive  Has been doing walking with a shopping cart in the store for exercise along with some pool exercises  Objective: See treatment diary below    Assessment: Tolerated session well  Was able to tolerate increase in step ups and hurdles well with fatigue reported  Was also progressed with more dynamic walking w/o AD today  She demonstrated some instability with fatigue towards end of visit and needed seated rest breaks through out  Complains of some pain in quadricepts on both sides today, but was able to tolerate  Would benefit from continued PT  Plan: Continue per plan of care  Continue original POC of 2x per week for 4 more weeks  Precautions: fluctuations in BP, type II DM, vision loss, emphysema       HEP: STS, heel raises, step ups, FT balance      Manuals                                                Neuro Re-Ed           Walking w/o AD 200ft x2 with CG 300ft x2 with CG In [place] with SPC 300ft     400ft x1 with CG    100ft x1  300ft x3 in SOLO    O2:96%  HR:86 [id number]    300ft x2 in SOLO     [id number]    [id number] with quick stop and backwards    20ftx4 side stepping     Hurdles  5 laps no UE support 4 laps fw and lat, no UE support 3 laps fw, no UE support 2 laps fw no UE support  4 laps fw in SOLO 3 laps fw in SOLO  6 laps fw in //bars    Static balance   FT 30"x2 FT 30"x2 FT 30"x3  FT foam 30"x3 FT foam 30"x3 FT foam 30"x3   Step ups 4in 2x5 no UE's 4in 2x5 no UE's 4in 3x5 no UE's 4in, LLE 3x5    RLE  4in, LLE 4 setsx5   Side stepping      3 laps in //bars no UE 4 laps in //bars no UE     Cone taps  2in cones 15x ea 2 in cones 10x ea 2 in cones 10x ea                      Ther Ex           Scifit  recumbent bike 3' L0 recumbent bike 3:45' L0 Held         Treadmill            STS 12x from hi/low table    O2-96%     12x from hi/low table   8x from hi/low table 2x5 from hi/low table   x5 from 21in surface (pain)    LAQ  2x10 5" on L  4x5 5" on L                                                    Ther Activity                                 Gait Training                                 Modalities

## 2021-07-08 ENCOUNTER — OFFICE VISIT (OUTPATIENT)
Dept: PHYSICAL THERAPY | Facility: REHABILITATION | Age: 76
End: 2021-07-08
Payer: MEDICARE

## 2021-07-08 DIAGNOSIS — G45.9 TIA (TRANSIENT ISCHEMIC ATTACK): Primary | ICD-10-CM

## 2021-07-08 PROCEDURE — 97112 NEUROMUSCULAR REEDUCATION: CPT

## 2021-07-08 PROCEDURE — 97110 THERAPEUTIC EXERCISES: CPT

## 2021-07-08 NOTE — PROGRESS NOTES
Daily Note     Today's date: 2021  Patient name: Celeste Zapien  : 1945  MRN: [id number]  Referring provider: Piedad Mckoy MD  Dx: No diagnosis found  Subjective: Patient reports she feels very sore after doing pool exercises in the pool and hot tub yesterday  She states her legs feel very tired and weak today  Objective: See treatment diary below  /74    Assessment: Tolerated treatment well  Patient demonstrated fatigue post treatment, exhibited good technique with therapeutic exercises and would benefit from continued PT  Patient experienced some dizziness/lightheadedness when performing side stepping activity, patient took seated rest break, BP was 132/74, HR 61  Patient had no reports of dizziness or lightheadedness during rest of session  Plan: Continue per plan of care                 Manuals                                                    Neuro Re-Ed            Walking w/o AD 200ft x2 with CG 300ft x2 with CG In [place] lot with SPC 300ft     400ft x1 with CG    100ft x1  300ft x3 in SOLO    O2:96%  HR:86 [id number]    300ft x2 in SOLO     [id number]    [id number] with quick stop and backwards    20ftx4 side stepping   400 feet x 1 without AD in solo step    400 feet quickly without AD in solo step    Backwards walking 40ft x 6 in solo step   Hurdles  5 laps no UE support 4 laps fw and lat, no UE support 3 laps fw, no UE support 2 laps fw no UE support  4 laps fw in SOLO 3 laps fw in SOLO  6 laps fw in //bars  3 laps forward in // bars with high and low hurdles, no UE support   Static balance   FT 30"x2 FT 30"x2 FT 30"x3  FT foam 30"x3 FT foam 30"x3 FT foam 30"x3 FT foam 30" x 3   Step ups     4in 2x5 no UE's 4in 2x5 no UE's 4in 3x5 no UE's 4in, LLE 3x5    RLE  4in, LLE 4 setsx5    Side stepping      3 laps in //bars no UE 4 laps in //bars no UE   In solo step with 180 degree turns x 2 laps 20 feet   Cone taps  2in cones 15x ea 2 in cones 10x ea 2 in cones 10x ea                     Solo step, patient performed semi-tandem walking 20 feet x 4  Frequent stepping strategy  Ther Ex            Scifit  recumbent bike 3' L0 recumbent bike 3:45' L0 Held          Treadmill             STS 12x from hi/low table    O2-96%     12x from hi/low table   8x from hi/low table 2x5 from hi/low table   x5 from 21in surface (pain)  2 reps from 18" chair     From 25" with foam: 5 x with orange TB pulling posteriorly for added resistance, x 3 reps without resistance   LAQ  2x10 5" on L  4x5 5" on L                                                         Ther Activity                                    Gait Training                                    Modalities

## 2021-07-14 ENCOUNTER — OFFICE VISIT (OUTPATIENT)
Dept: OCCUPATIONAL THERAPY | Facility: REHABILITATION | Age: 76
End: 2021-07-14
Payer: MEDICARE

## 2021-07-14 ENCOUNTER — EVALUATION (OUTPATIENT)
Dept: PHYSICAL THERAPY | Facility: REHABILITATION | Age: 76
End: 2021-07-14
Payer: MEDICARE

## 2021-07-14 DIAGNOSIS — G45.9 TIA (TRANSIENT ISCHEMIC ATTACK): Primary | ICD-10-CM

## 2021-07-14 PROCEDURE — 97110 THERAPEUTIC EXERCISES: CPT | Performed by: PHYSICAL THERAPIST

## 2021-07-14 PROCEDURE — 97112 NEUROMUSCULAR REEDUCATION: CPT | Performed by: OCCUPATIONAL THERAPIST

## 2021-07-14 PROCEDURE — 97112 NEUROMUSCULAR REEDUCATION: CPT | Performed by: PHYSICAL THERAPIST

## 2021-07-14 NOTE — PROGRESS NOTES
OCCUPATIONAL THERAPY RE- EVALUATION:    07/14/2021  Kiera Cooper  1945  [id number]  Aston Dye MD      Diagnosis ICD-10-CM Associated Orders   1  TIA (transient ischemic attack)  G45 9          Assessment  Assessment details: See skilled analysis for further details  Skilled Analysis:  Pt is a 77 y o  female referred to Occupational Therapy s/p TIA (transient ischemic attack) [G45 9]  Pt participated in skilled OT evaluation and following formalized testing as well as clinical observation, Pt presents with the following areas of deficit: decreased L proximal/distal/intrinsic strength, decreased L AROM with significant decreased muscle endurance, impaired proprioception of LUE affecting hand to target precision, learned non-use patterns of LUE with tasks performed unilaterally with RUE in home environment, decreased automaticity with L open/close grasp functions, impaired concentric/eccentric motor control, heightened fatigue levels with frequent naps taken during afternoon hours, decreased functional performance with typing demands 2* poor speed and accuracy, and decreased FMC/prehension with frequent droppge reported in home environment  Pt will benefit from skilled Occupational Therapy services 2x/week for 8-10 weeks with focus on thera ex, thera act, sensory re-education, neuro re-ed, self-care management, Pt education for increased activity/energy conservation techniques, and use of LUE for home and work functional tasks for eventual return to work role  Georgette Ormond Upon this date, Pt participated in re-evaluation to further assess fxnl progression towards Occupational Therapy goals  Pt demo with fair + functional progression towards goals in POC evident by improved distal strength/endurance, increased proximal AROM,and increased activity tolerance to both seated/in stance tasks    Following formalized testing and clinical observation, Pt continues to present with the following limitation: poor muscle endurance with high fatigue levels affecting functional performance in salient tasks, L decreased FMC/refined prehension/in-hand manipulation, impaired L proprioception, and tendencies of overcompensating with RUE for completion of functional tasks resulting in L inattention  OTR recommending Pt to continue skilled OT services 1x/week for 4-6 more weeks focusing on improving the above deficits, increase functional use of LUE, and enhance overall QOL           Short Term Goals:  - Pt will increase proprioception of LUE hand to target for improved functional reach vision occluded with ADL/IADL/grooming tasks  x 65% accuracy 4 weeks-- PARTIALLY MET  - Pt will demo with G carryover of Home Exercise Program to improve functional progression towards goals in Plan of care, increase proximal/distal strength, and for improved functional use of LUE  x 65% 4 weeks-- MET  - Pt will demo with G tolerance to supine, seated, and in stance exercise x 45 minutes with minimal rest breaks required for increased engagement in weekly exercise regimen and increased engagement in life roles 4 weeks-- PARTIALLY MET  · Pt will increase LUE prehension patterns for improved tripod with utensil management/item retrieval with <15% droppage in 4 weeks-- MET  · Pt will increase automaticity of LUE to 65% for improved grasp release of tabletop items for improved functional performance with salient tasks 4 weeks-- MET  · Pt will increase LUE rate of manipulation for all FM tests and improved speed of typing bilaterally for improved functional performance/independence with salient tasks/worker roles x 50% 4 weeks-- NOT MET  · Pt will increase LUE to refined assist with <5% cuing for tabletop tasks for improved functional performance of life roles and salient tasks 4 weeks-- MET  · Pt will increase L UE strength to 3+/5 and  strength to #33, through the use of strengthening exercises and home program for eventual return to worker role 4 weeks-- PARTIALLY MET       Long Term Goals:    - Pt will increase proprioception of LUE hand to target for improved functional reach vision occluded with ADL/IADL/grooming tasks  x 85% accuracy-- NOT MET  - Pt will demo with G carryover of Home Exercise Program to improve functional progression towards goals in Plan of care, increase proximal/distal strength, and for improved functional use of LUE  x 85%-- PARTIALLY MET  - Pt will demo with G tolerance to supine, seated, and in stance exercise x 60 minutes with minimal rest breaks required for increased engagement in weekly exercise regimen and increased engagement in life roles-- NOT MET  · Pt will increase LUE prehension patterns for improved tripod with utensil management/item retrieval with <2% droppage-- PARTIALLY MET  · Pt will increase automaticity of LUE to 85% for improved grasp release of tabletop items for improved functional performance with salient tasks-- PARTIALLY MET  · Pt will increase LUE rate of manipulation for all FM tests and improved speed of typing bilaterally for improved functional performance/independence with salient tasks/worker roles x 75%-- NOT MET  · Pt will increase LUE to Mod I  with 0% cuing for tabletop tasks for improved functional performance of life roles and salient tasks-- PARTIALLY MET   · Pt will increase L UE strength to 4/5 and  strength to #38, through the use of strengthening exercises and home program for eventual return to worker role-- NOT MET     Subjective Evaluation    Quality of life: good          SUBJECTIVE: "I am improving  My L arm is stronger and the use of my fingers has gotten better  I am trying to type and do finger exercises on the piano "    PATIENT GOAL: "I want to return to my full-time job in July "    Patient-Specific Functional Scale   Task is scored 0 (unable to perform activity) to 10 (ability to perform activity independently)    Activity Date: 05/12/2021 Date: 07/14/21   1   Typing for full-time work role 4 5-6   2  Applying makeup 7 10   3  Getting self dressed 8 10            HISTORY OF PRESENT ILLNESS:     Pt is a 77 y o  female who was referred to Occupational Therapy s/p  TIA (transient ischemic attack) [G45 9]  As per hospital reports, Pt with hx of DM2, CVA/TIA, GERD, glaucoma, hypothyroidism, HTN, presented to the ED for evaluation of numbness/weakness of left arm, headache and dizziness on 04/19/2021  Pt states she had CVA 3 years ago and 5 weeks ago she had 2 episodes of TIAs  Pt was seen by her PCP for these episodes with the following symptoms: numbness/weakness of left arm  Pt had outpatient MRI which was negative for acute disease and had outpatient carotid U/S which showed <50% stenosis bilaterally  Pt reports L sided weakness continued up until now for this last episode experienced  Pt self-reports having difficulties with all tasks requiring use of LUE including self-care and household tasks  Pt with self-report of frequent droppage when using L hand for tasks or for item retrieval  Pt with self-report of heighten fatigue levels with frequent napping during afternoon hours needed  Pt lives in one story apartment independently  Pt uses [employer] service with device worn for emergency call purposes  Pt performing ADLs at Mod I status using RUE to complete tasks and is currently receiving no assistance  Pt performing IADLs at Mod I status using RUE to complete tasks and currently receives assistance for home maintenance via  coming in as needed  Pt is not currently working 2* recovery from recent TIA and accompanying symptoms, but intends to return to full time work as an  for [employer] 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  Pt is currently on medical leave from full-time occupation through July 9  Pt currently drives self with no modifications or restrictions        PMH:   Past Medical History:   Diagnosis Date  Diabetes mellitus ([facility] )     Diabetes mellitus type 2, controlled ([facility] )     Disease of thyroid gland     GERD (gastroesophageal reflux disease)     Glaucoma     H/O tooth extraction     Had top row of teeth removed around 2016    Headache(784 0)     Hypertension     Pneumonia     Stroke ([facility] )     TIA (transient ischemic attack)     Visual impairment          Pain Levels:     Restin/10    With Activity:  7/10     Pt with self-report of pain in BLE and lumbar region; worsens after PT services  Objective     Functional Assessment        Comments  See impairment section for further details         Impairment Observations:        IE RUE IE LUE  Comments                 UPPER EXTREMITY FXN Intact Impaired  R Hand Dominant                           /Pinch Strength           Dynamometer       - Gross Grasp 60 lbs 40 lbs  Significant increased  strength in LUE   Pinch Meter        - PINCER 8 5 lbs 4 lbs  abnormal; slighted decrease in L pincer grasp    - TRIPOD 13 lbs 4 lbs  abnormal; maintained in L hand    - LATERAL 17 5 lbs  6 5 lbs  abnormal; slight increase in lateral pinch bilaterally               AROM (seated)        Elevation [facility]     Shoulder FF WFL  135*  Increased   Shoulder Ext WFL 65*  Increased   Shoulder Abd WFL  125 *  Increased   Shoulder Add       Horizontal Abd WFL WFL     Horizontal Add [facility] WFL     Elbow Flex WFL WFL     Elbow Ext WFL WFL     Pronation WFL WFL     Supination [facility] WFL     Wrist Flex WFL WFL     Wrist Ext [facility] WFL     Digit Flex [facility] WFL     Digit Ex [facility] WFL     Composite Grasp WFL WFL     Hook Grasp [facility] WFL     Opposition WFL WFL     Dysdiadochokinesia WFL WFL     Subluxation  None  None                    AROM (supine)           Shoulder FF       Shoulder Ext       Shoulder ABD       Shoulder ADD       Horizontal ABD       Horizontal ADD       Elbow Flex       Elbow Ext       Pronation       Supination       Wrist Flex       Wrist Ext       Digit Flex Digit  Ext                                     PROM ( position)           Shoulder FF       Shoulder Ext       Shoulder ABD       Shoulder ADD       Horizontal ABD       Horizontal ADD       Elbow Flex       Elbow Ext       Wrist Flex       Wrist Ext       Digit Flex       Digit Ext       Supination       Pronation                                        MMT           Shoulder FF 5/5 3+/5     Shoulder Ext 5/5 4/5     Shoulder Abd 5/5 3+/5     Shoulder ADd 5/5 3+/5     Elbow Flex 5/5 3/5     Elbow Ext 5/5 3/5     Wrist Flex 5/5 3-/5     Wrist Ext 5/5 3-/5     Gross Grasp       Isolated Movement       SENSATION       Myofilaments (3 61 Wnl)  3 61     Sharp Dull   Intact     Proprioception  Impaired     Hot/Cold Temp  Intact            COORDINATION       9 Hole Peg Test 25 82 seconds 33 02 seconds  Significant improved speed with manipulation of pegs; Pt with tendencies of sliding peg to edge of board   Fxnl Dexterity Test 28 05 seconds 48 26 seconds;  Pt required to utilize board to A with in-hand manipulation  abnormal   Brian Hand fxn Test          - Handwriting          - Card Turning          - Small Item p/u          - Simulated Feeding          - Stacking Checkers          - Moving Light Objects          - Moving Heavy Objects       Concentric Circles Test (tremors)       MODIFIED MIKEY SCALE (TONE)       No increase in muscle tone (0)       Slight Increase in muscle tone with catch and release or min resist at end range (1)       Slight Increase in muscle tone with catch and release, followed by min resistance through remainder of range (1+)       Increased muscle tone through full range, able to be moved easily (2)       Considerable increase in tone, difficult to move (3)       Rigid in Flexion/Extension (4)                                           OTHER PLANNED THERAPY INTERVENTIONS:   Supine, seated, and in stance neuro re-ed  IASTM  FMC/prehension  Timed Trials  Manual tx  Hand to target  Endurance training  Seated functional reach: crossing midline  Supine place and hold  WBearing strategies   Closed chain activities  Open chain activities      INTERVENTION COMMENTS:  Diagnosis: TIA (transient ischemic attack) [G45 9]  Precautions: DM, Glaucoma, HTN, possible seizure activity-- NO STIM until further notice  FOTO: not performed  Insurance: Payor: MEDICARE / Plan: MEDICARE A AND B / Product Type: Medicare A & B Fee for Service /   8 of 10 visits, PN due 08/14/2021

## 2021-07-14 NOTE — PROGRESS NOTES
Progress Update     Today's date: 2021  Patient name: Sai Garcia  : 1945  MRN: [id number]  Referring provider: Bandar Benavides MD  Dx:   Encounter Diagnosis     ICD-10-CM    1  TIA (transient ischemic attack)  G45 9                   Subjective: Reports feeling well  Has been doing exercising in her home 1x per day with about 15 minutes of walking, STS's, LAQ's  Feels she is making progress progress  Feels more stable with her gait, does not have to use her cane nearly as often now  Will have days where she feels weaker and will use SPC  Still has issues with her vision which she will be having surgery for   Reports continued difficulty with stepping down a curb which she thinks is partially vision related  Feels her fatigue limits her from returns to work along with her ability to type (being addressed in OT)  Would like to continue PT  Objective: See treatment diary below    /68       Balance Test Initial Eval       5x Sit to Stand: NO UE's 21in surface   37s  No UE's 21in surface  43s (1 fialed attempt   No UE's 21in surface  56s (1 fialed attempt     TUG: 15 9 no AD  15 7s no AD  11 15s no AD     Gait Speed:            2 Minute Walk Test:           6 Minute Walk Test: Stopped at 4:00, 800ft       HR-79  O2- 96%  Finished full 6min   980ft  1130ft      Liu Balance Scale:           FGA:       Modified Cache Scale Score:           Patient-Reported Outcome Measure  Stroke Impact Scale:                     Goals  STGs (4 weeks)  Pt will be independent with comprehensive HEP - progressing   Pt will demonstrate improved TUG by at least 3 sec - MET  Pt will demonstrate at least 5s improvement on 5xSTS - not MET    LTG's (to be achieved by d/c)  Pt will be able to self manage sx's independently - progressing   Pt will return to ambulation in community and home w/o use of AD - not met  Pt will demonstrate at least 4 point improvement on FGA - progressing  Pt will be able to walk her dog again for at least half a mile - not met  Pt will be able to return to normal work duties - not met        Assessment: Since last progress update, Kim Allen has demonstrated continued improvement  Demonstrates improved functional endurance base don 6MWT, but did show increasing L knee weakness and instability with this  Improved dynamic balance based on FGA, but does still present as fall risk  Is improving with mobility with reduced use of single point cane  She continues to struggle with 5xSTS test, but show good technique and ability to rise from chair  During this test she shows some hesitancy which hurts her time, reporting knee pain and weakness  Remaining deficits are resulting in ability to perform work duties and safely/effectively ambulate in the community  Would benefit from continued PT to further address these deficits and maximize function  Plan: Continue per plan of care  Continue for 4 more weeks at 2x per week                 Manuals 5/20 5/25 6/2 6/7 6/14 6/16 7/1 7/8                                                    Neuro Re-Ed         See testing   Walking w/o AD 300ft x2 with CG [place] with SPC 300ft     400ft x1 with CG    100ft x1  300ft x3 in SOLO    O2:96%  HR:86 [id number]    300ft x2 in SOLO     [id number]    [id number] with quick stop and backwards    20ftx4 side stepping   400 feet x 1 without AD in solo step    400 feet quickly without AD in solo step    Backwards walking 40ft x 6 in solo step    Hurdles  4 laps fw and lat, no UE support 3 laps fw, no UE support 2 laps fw no UE support  4 laps fw in SOLO 3 laps fw in SOLO  6 laps fw in //bars  3 laps forward in // bars with high and low hurdles, no UE support 3 laps fwed in // bars, high and low hurdles, no UE support   Static balance  FT 30"x2 FT 30"x2 FT 30"x3  FT foam 30"x3 FT foam 30"x3 FT foam 30"x3 FT foam 30" x 3    Step ups    4in 2x5 no UE's 4in 2x5 no UE's 4in 3x5 no UE's 4in, LLE 3x5    RLE  4in, LLE 4 setsx5     Side stepping     3 laps in //bars no UE 4 laps in //bars no UE   In solo step with 180 degree turns x 2 laps 20 feet    Cone taps 2in cones 15x ea 2 in cones 10x ea 2 in cones 10x ea                     Solo step, patient performed semi-tandem walking 20 feet x 4  Frequent stepping strategy  Ther Ex         See testing    Scifit  recumbent bike 3:45' L0 Held           Treadmill             STS 12x from hi/low table   8x from hi/low table 2x5 from hi/low table   x5 from 21in surface (pain)  2 reps from 18" chair     From 18" with foam: 5 x with orange TB pulling posteriorly for added resistance, x 3 reps without resistance    LAQ   4x5 5" on L                                                          Ther Activity                                    Gait Training                                    Modalities

## 2021-07-16 ENCOUNTER — APPOINTMENT (OUTPATIENT)
Dept: PHYSICAL THERAPY | Facility: REHABILITATION | Age: 76
End: 2021-07-16
Payer: MEDICARE

## 2021-07-19 ENCOUNTER — OFFICE VISIT (OUTPATIENT)
Dept: FAMILY MEDICINE CLINIC | Facility: CLINIC | Age: 76
End: 2021-07-19
Payer: MEDICARE

## 2021-07-19 VITALS
DIASTOLIC BLOOD PRESSURE: 70 MMHG | HEART RATE: 65 BPM | RESPIRATION RATE: 16 BRPM | BODY MASS INDEX: 30.05 KG/M2 | SYSTOLIC BLOOD PRESSURE: 118 MMHG | HEIGHT: 66 IN | OXYGEN SATURATION: 98 % | TEMPERATURE: 97.8 F | WEIGHT: 187 LBS

## 2021-07-19 DIAGNOSIS — I10 BENIGN ESSENTIAL HYPERTENSION: ICD-10-CM

## 2021-07-19 DIAGNOSIS — Z01.818 PRE-OPERATIVE EXAMINATION: Primary | ICD-10-CM

## 2021-07-19 DIAGNOSIS — H25.9 SENILE CATARACT OF LEFT EYE, UNSPECIFIED AGE-RELATED CATARACT TYPE: ICD-10-CM

## 2021-07-19 DIAGNOSIS — E03.9 HYPOTHYROIDISM, UNSPECIFIED TYPE: ICD-10-CM

## 2021-07-19 DIAGNOSIS — E11.65 UNCONTROLLED TYPE 2 DIABETES MELLITUS WITH HYPERGLYCEMIA (HCC): ICD-10-CM

## 2021-07-19 DIAGNOSIS — I73.9 SMALL VESSEL DISEASE (HCC): ICD-10-CM

## 2021-07-19 DIAGNOSIS — E78.2 MIXED HYPERLIPIDEMIA: ICD-10-CM

## 2021-07-19 DIAGNOSIS — E11.9 CONTROLLED TYPE 2 DIABETES MELLITUS WITHOUT COMPLICATION, WITHOUT LONG-TERM CURRENT USE OF INSULIN (HCC): ICD-10-CM

## 2021-07-19 PROCEDURE — 99214 OFFICE O/P EST MOD 30 MIN: CPT | Performed by: FAMILY MEDICINE

## 2021-07-19 NOTE — PROGRESS NOTES
Presurgical Evaluation    Subjective:      Patient ID: Be Javier is a 77 y o  female  Chief Complaint   Patient presents with    Pre-op Clearance     Cataract, BAG had an EKG in 4/2021       HPI- Patient is here at the request of Dr Judy Hicks for preoperative assessment for her upcoming cataract surgery  The following portions of the patient's history were reviewed and updated as appropriate: allergies, current medications, past family history, past medical history, past social history, past surgical history and problem list     Procedure date: August 17, 2021      Surgeon:  Dr Judy Hicks  Planned procedure:  Cataract, POAG   Diagnosis for procedure:  Cataract, glaucoma     Prior anesthesia: has had prior anesthesia  She notes that after dental surgery she had very high blood sugar  Otherwise no complications with anesthesia in the past  (of note, her diabetes was not well controlled at baseline at the time)    CAD History: None    Pulmonary History:  Emphysematous changes on imaging  Uses albuterol prn    Renal history: None  Lab Results   Component Value Date    CREATININE 0 75 04/19/2021       Diabetes History:  Type 2  Controlled     Lab Results   Component Value Date    HGBA1C 6 7 (H) 03/05/2021       Neurological History: small vessel disease, possible TIA/CVA? Doing PT for L sided weakness (upper and lower extremity), the PT is helping significantly  On Immunosuppressant meds/biologics: No      Review of Systems   Respiratory: Negative for shortness of breath  Occ morning cough     Cardiovascular: Negative for chest pain           Current Outpatient Medications   Medication Sig Dispense Refill    albuterol (PROVENTIL HFA,VENTOLIN HFA) 90 mcg/act inhaler Inhale 2 puffs every 4 (four) hours as needed for wheezing or shortness of breath 1 Inhaler 1    aspirin 81 MG tablet Take 81 mg by mouth daily      COMBIGAN 0 2-0 5 % Administer 1 drop to the right eye 2 (two) times a day   3    dorzolamide (TRUSOPT) 2 % ophthalmic solution Administer 1 drop to the right eye 3 (three) times a day   1    dorzolamide-timolol (COSOPT) 22 3-6 8 MG/ML ophthalmic solution       glucose blood (OneTouch Verio) test strip USE TO TEST BLOOD SUGAR UP TO FOUR TIMES DAILY 300 each 1    Insulin Pen Needle (Pen Needles) 32G X 4 MM MISC To use daily with victoza 100 each 3    levothyroxine 100 mcg tablet Take 1 tablet (100 mcg total) by mouth daily BRAND SYNTHROID PLEASE 90 tablet 1    liraglutide (VICTOZA) injection Inject 0 1 mL (0 6 mg total) under the skin daily 3 pen 3    losartan-hydrochlorothiazide (HYZAAR) 50-12 5 mg per tablet TAKE 1 TABLET BY MOUTH DAILY 90 tablet 0    LUMIGAN 0 01 % ophthalmic drops Administer 1 drop to both eyes daily at bedtime   3    metFORMIN (GLUCOPHAGE) 500 mg tablet Take 1 tablet (500 mg total) by mouth 2 (two) times a day with meals START WITH 1 TABLET BY MOUTH DAILY FOR 1 TABLET WEEK, THEN 1 TABLET BY MOUTH TWICE DAILY FOR 1 WEEK, THEN 2 TABLETS BY MOUTH TWICE DAILY 180 tablet 1    OneTouch Delica Lancets 28K MISC To test BS up to four times daily- E11 65 200 each 5    rosuvastatin (CRESTOR) 5 mg tablet Take 1 tablet (5 mg total) by mouth daily 90 tablet 0     No current facility-administered medications for this visit  Allergies on file:   Eggs or egg-derived products - food allergy; Albumen, egg - food allergy; Antihistamines, diphenhydramine-type; Epinephrine; Fluzone [flu virus vaccine]; Iv contrast [iodinated diagnostic agents];  Lidocaine; and Zinc    Patient Active Problem List   Diagnosis    Benign essential hypertension    Hemispheric carotid artery syndrome    Controlled type 2 diabetes mellitus without complication, without long-term current use of insulin (HCC)    Hypothyroidism    Vitamin D deficiency    Glaucoma of right eye    Mixed hyperlipidemia    Left sided numbness    Small vessel disease ([address] )    Left-sided weakness        Past Medical History:   Diagnosis Date    [facility]     Diabetes mellitus type 2, controlled ([facility] )     Disease of thyroid gland     GERD (gastroesophageal reflux disease)     Glaucoma     H/O tooth extraction     Had top row of teeth removed around 2016    Headache(784 0)     Hypertension     Pneumonia     Stroke [facility]     TIA (transient ischemic attack)     Visual impairment        Past Surgical History:   Procedure Laterality Date    BREAST BIOPSY Left     many years ago  papiloma    CATARACT EXTRACTION Right     5 years ago    CYST REMOVAL Left 04/15/2014    ear lobe      EAR SURGERY  1970    Left ear cyst removal     EYE SURGERY      OTHER SURGICAL HISTORY       right upper arm          Family History   Problem Relation Age of Onset    Rheum arthritis Mother     Cancer Brother     Heart attack Paternal Grandmother     No Known Problems Maternal Grandmother     No Known Problems Paternal Grandfather        Social History     Tobacco Use    Smoking status: Former Smoker     Packs/day: 3 00     Years: 50 00     Pack years: 150 00     Types: Cigarettes     Start date: 3/30/1960     Quit date: 3/15/2010     Years since quittin 3    Smokeless tobacco: Never Used   Vaping Use    Vaping Use: Never used   Substance Use Topics    Alcohol use: Not Currently     Alcohol/week: 1 0 standard drinks     Types: 1 Glasses of wine per week     Comment: very rare    Drug use: Never       Objective:    Vitals:    21 0907   BP: 118/70   Pulse: 65   Resp: 16   Temp: 97 8 °F (36 6 °C)   SpO2: 98%   Weight: 84 8 kg (187 lb)   Height: 5' 6" (1 676 m)        Physical Exam      Preop labs/testing available and reviewed: no- she will get these done tomorrow to update  Echo in -  IMPRESSIONS:  Technical quality: Fair but adequate  Cardiac rhythm: Sinus     1  Normal left ventricular size and systolic and diastolic function  EF around 63%    2  Normal right ventricular size and systolic function  3  Borderline left atrial cavity enlargement  4  Aortic valve sclerosis, no aortic valve stenosis or regurgitation  5  Mild mitral valve leaflet thickening and sclerosis, trace mitral valve regurgitation  6  Trace tricuspid valve regurgitation  7  No obvious pulmonary hypertension  8  No pericardial effusion  EKG in April- NSR, no st/t changes, no ectopy           Functional capacity: General house cleaning     3 Mets, limited secondary to orthopedic concerns and weakness  RCRI  High Risk surgery? NO       0 Point  CAD History:  NO       0 Point   MI; Positive Stress Test; CP due to Mi;  Nitrate Usage to control Angina; Pathologic Q wave on EKG  CHF Active:         0 Point   Pulm Edema; Paroxysmal Nocturnal Dyspnea;  Bibasilar Rales (crackles);S3; CHF on CXR  Cerebrovascular Disease (TIA or CVA):  YES   1 Point  DM on Insulin:  NO      0 Point  Serum Creat >2 0 mg/dl: NO      0 Point          Total Points: 1     Scorin: Class I, Very Low Risk (0 4%)     1: Class II, Low risk (0 9%)     2: Class III Moderate (6 6%)     3: Class IV High (>11%)      YOVANI Risk:  GFR:    79 on 2021 labs              Assessment/Plan:    Patient is medically optimized (cleared) for the planned procedure with Dr Ramin Baker  Further testing/evaluation is not required  She does have labs pending that she will have done 21  If there are any concerns on these, I will add an addendum to the preoperative note  Postop concerns: no     Other Visit Diagnoses     Pre-operative examination    -  Primary- optimized for surgery as noted below  Please do not hesitate to contact me with any questions/concerns  Thank you for your care of our mutual patient      Senile cataract of left eye, unspecified age-related cataract type         Problem List Items Addressed This Visit        Endocrine    Controlled type 2 diabetes mellitus without complication, without long-term current use of insulin ([address] )-   Lab Results Component Value Date    HGBA1C 6 7 (H) 03/05/2021     Well controlled  Doing well with victoza and metformin  Relevant Medications    liraglutide (VICTOZA) injection    metFORMIN (GLUCOPHAGE) 500 mg tablet    Insulin Pen Needle (Pen Needles) 32G X 4 MM MISC    Hypothyroidism-   Lab Results   Component Value Date    [id number] 0 227 (L) 04/19/2021     Dose adjusted- await repeat test     Not a contraindication to surgery    Relevant Medications    levothyroxine 100 mcg tablet       Cardiovascular and Mediastinum    Benign essential hypertension- controlled, continuing with medication as ntoed  Relevant Medications    losartan-hydrochlorothiazide (HYZAAR) 50-12 5 mg per tablet    Small vessel disease ([address] )- following with neurology  She will continue follow up  Is doing well with physical therapy for unilateral weakness  Not a contraindication to surgery  Other    Mixed hyperlipidemia- continue statin    Relevant Medications    rosuvastatin (CRESTOR) 5 mg tablet                      She will hold off on nsaids, aspirin, supplements one week prior to surgery

## 2021-07-20 ENCOUNTER — APPOINTMENT (OUTPATIENT)
Dept: LAB | Facility: CLINIC | Age: 76
End: 2021-07-20
Payer: MEDICARE

## 2021-07-20 ENCOUNTER — APPOINTMENT (OUTPATIENT)
Dept: PHYSICAL THERAPY | Facility: REHABILITATION | Age: 76
End: 2021-07-20
Payer: MEDICARE

## 2021-07-20 DIAGNOSIS — I10 BENIGN ESSENTIAL HYPERTENSION: ICD-10-CM

## 2021-07-20 DIAGNOSIS — E11.9 CONTROLLED TYPE 2 DIABETES MELLITUS WITHOUT COMPLICATION, WITHOUT LONG-TERM CURRENT USE OF INSULIN (HCC): ICD-10-CM

## 2021-07-20 DIAGNOSIS — E03.9 HYPOTHYROIDISM, UNSPECIFIED TYPE: ICD-10-CM

## 2021-07-20 DIAGNOSIS — E78.2 MIXED HYPERLIPIDEMIA: ICD-10-CM

## 2021-07-20 LAB
ALBUMIN SERPL BCP-MCNC: 3.6 G/DL (ref 3.5–5)
ALP SERPL-CCNC: 68 U/L (ref 46–116)
ALT SERPL W P-5'-P-CCNC: 25 U/L (ref 12–78)
ANION GAP SERPL CALCULATED.3IONS-SCNC: 6 MMOL/L (ref 4–13)
AST SERPL W P-5'-P-CCNC: 16 U/L (ref 5–45)
BASOPHILS # BLD AUTO: 0.1 THOUSANDS/ΜL (ref 0–0.1)
BASOPHILS NFR BLD AUTO: 1 % (ref 0–1)
BILIRUB SERPL-MCNC: 0.7 MG/DL (ref 0.2–1)
BUN SERPL-MCNC: 19 MG/DL (ref 5–25)
CALCIUM SERPL-MCNC: 9.5 MG/DL (ref 8.3–10.1)
CHLORIDE SERPL-SCNC: 105 MMOL/L (ref 100–108)
CHOLEST SERPL-MCNC: 115 MG/DL (ref 50–200)
CO2 SERPL-SCNC: 27 MMOL/L (ref 21–32)
CREAT SERPL-MCNC: 0.65 MG/DL (ref 0.6–1.3)
EOSINOPHIL # BLD AUTO: 0.15 THOUSAND/ΜL (ref 0–0.61)
EOSINOPHIL NFR BLD AUTO: 2 % (ref 0–6)
ERYTHROCYTE [DISTWIDTH] IN BLOOD BY AUTOMATED COUNT: 12.9 % (ref 11.6–15.1)
EST. AVERAGE GLUCOSE BLD GHB EST-MCNC: 143 MG/DL
GFR SERPL CREATININE-BSD FRML MDRD: 88 ML/MIN/1.73SQ M
GLUCOSE P FAST SERPL-MCNC: 148 MG/DL (ref 65–99)
HBA1C MFR BLD: 6.6 %
HCT VFR BLD AUTO: 41.7 % (ref 34.8–46.1)
HDLC SERPL-MCNC: 56 MG/DL
HGB BLD-MCNC: 13.8 G/DL (ref 11.5–15.4)
IMM GRANULOCYTES # BLD AUTO: 0.03 THOUSAND/UL (ref 0–0.2)
IMM GRANULOCYTES NFR BLD AUTO: 0 % (ref 0–2)
LDLC SERPL CALC-MCNC: 40 MG/DL (ref 0–100)
LYMPHOCYTES # BLD AUTO: 2 THOUSANDS/ΜL (ref 0.6–4.47)
LYMPHOCYTES NFR BLD AUTO: 20 % (ref 14–44)
MCH RBC QN AUTO: 33.3 PG (ref 26.8–34.3)
MCHC RBC AUTO-ENTMCNC: 33.1 G/DL (ref 31.4–37.4)
MCV RBC AUTO: 101 FL (ref 82–98)
MONOCYTES # BLD AUTO: 0.91 THOUSAND/ΜL (ref 0.17–1.22)
MONOCYTES NFR BLD AUTO: 9 % (ref 4–12)
NEUTROPHILS # BLD AUTO: 6.65 THOUSANDS/ΜL (ref 1.85–7.62)
NEUTS SEG NFR BLD AUTO: 68 % (ref 43–75)
NRBC BLD AUTO-RTO: 0 /100 WBCS
PLATELET # BLD AUTO: 360 THOUSANDS/UL (ref 149–390)
PMV BLD AUTO: 10.8 FL (ref 8.9–12.7)
POTASSIUM SERPL-SCNC: 4 MMOL/L (ref 3.5–5.3)
PROT SERPL-MCNC: 7.8 G/DL (ref 6.4–8.2)
RBC # BLD AUTO: 4.15 MILLION/UL (ref 3.81–5.12)
SODIUM SERPL-SCNC: 138 MMOL/L (ref 136–145)
TRIGL SERPL-MCNC: 93 MG/DL
TSH SERPL DL<=0.05 MIU/L-ACNC: 0.38 UIU/ML (ref 0.36–3.74)
WBC # BLD AUTO: 9.84 THOUSAND/UL (ref 4.31–10.16)

## 2021-07-20 PROCEDURE — 83036 HEMOGLOBIN GLYCOSYLATED A1C: CPT

## 2021-07-20 PROCEDURE — 84443 ASSAY THYROID STIM HORMONE: CPT

## 2021-07-20 PROCEDURE — 36415 COLL VENOUS BLD VENIPUNCTURE: CPT

## 2021-07-20 PROCEDURE — 80053 COMPREHEN METABOLIC PANEL: CPT

## 2021-07-20 PROCEDURE — 80061 LIPID PANEL: CPT

## 2021-07-20 PROCEDURE — 85025 COMPLETE CBC W/AUTO DIFF WBC: CPT

## 2021-07-20 RX ORDER — LEVOTHYROXINE SODIUM 0.1 MG/1
100 TABLET ORAL DAILY
Qty: 90 TABLET | Refills: 1 | Status: SHIPPED | OUTPATIENT
Start: 2021-07-20 | End: 2021-12-06 | Stop reason: SDUPTHER

## 2021-07-20 RX ORDER — ROSUVASTATIN CALCIUM 5 MG/1
5 TABLET, COATED ORAL DAILY
Qty: 90 TABLET | Refills: 1 | Status: SHIPPED | OUTPATIENT
Start: 2021-07-20 | End: 2021-12-06 | Stop reason: SDUPTHER

## 2021-07-20 RX ORDER — LOSARTAN POTASSIUM AND HYDROCHLOROTHIAZIDE 12.5; 5 MG/1; MG/1
1 TABLET ORAL DAILY
Qty: 90 TABLET | Refills: 1 | Status: SHIPPED | OUTPATIENT
Start: 2021-07-20 | End: 2021-12-06 | Stop reason: SDUPTHER

## 2021-07-20 RX ORDER — PEN NEEDLE, DIABETIC 30 GX3/16"
NEEDLE, DISPOSABLE MISCELLANEOUS
Qty: 100 EACH | Refills: 3 | Status: SHIPPED | OUTPATIENT
Start: 2021-07-20 | End: 2021-12-06 | Stop reason: SDUPTHER

## 2021-07-22 ENCOUNTER — OFFICE VISIT (OUTPATIENT)
Dept: PHYSICAL THERAPY | Facility: REHABILITATION | Age: 76
End: 2021-07-22
Payer: MEDICARE

## 2021-07-22 ENCOUNTER — OFFICE VISIT (OUTPATIENT)
Dept: OCCUPATIONAL THERAPY | Facility: REHABILITATION | Age: 76
End: 2021-07-22
Payer: MEDICARE

## 2021-07-22 DIAGNOSIS — G45.9 TIA (TRANSIENT ISCHEMIC ATTACK): Primary | ICD-10-CM

## 2021-07-22 PROCEDURE — 97112 NEUROMUSCULAR REEDUCATION: CPT

## 2021-07-22 PROCEDURE — 97110 THERAPEUTIC EXERCISES: CPT | Performed by: OCCUPATIONAL THERAPIST

## 2021-07-22 PROCEDURE — 97530 THERAPEUTIC ACTIVITIES: CPT | Performed by: OCCUPATIONAL THERAPIST

## 2021-07-22 PROCEDURE — 97110 THERAPEUTIC EXERCISES: CPT

## 2021-07-22 NOTE — PROGRESS NOTES
Daily Note     Today's date: 2021  Patient name: Hansel Haines  : 1945  MRN: [id number]  Referring provider: Henri Carrillo MD  Dx: No diagnosis found  Subjective: Patient reports she hurt her back to other day, she states today is a bad vision day  Objective: See treatment diary below      Assessment: Tolerated treatment well  Patient demonstrated fatigue post treatment, exhibited good technique with therapeutic exercises and would benefit from continued PT  Most difficulty with foam activities      Plan: Continue per plan of care                          Manuals                                                        Neuro Re-Ed         See testing    Walking w/o AD 300ft x2 with CG In [place] lot with SPC 300ft     400ft x1 with CG    100ft x1  300ft x3 in SOLO    O2:96%  HR:86 [id number]    300ft x2 in SOLO     [id number]    [id number] with quick stop and backwards    20ftx4 side stepping   400 feet x 1 without AD in solo step    400 feet quickly without AD in solo step    Backwards walking 40ft x 6 in solo step  400 without AD in solo step, walking quick, slow, stopping and turning 180 degrees    Walking backwards 40 feet x 4 with quick changes in direction    Walking in solo step 100 feet head turns, 100 feet head nods, 100 feet random directions called out by therapist    Hurdles  4 laps fw and lat, no UE support 3 laps fw, no UE support 2 laps fw no UE support  4 laps fw in SOLO 3 laps fw in SOLO  6 laps fw in //bars  3 laps forward in // bars with high and low hurdles, no UE support 3 laps fwed in // bars, high and low hurdles, no UE support 3 laps forward and sideways, high and low hurdles, no UE support   Static balance  FT 30"x2 FT 30"x2 FT 30"x3  FT foam 30"x3 FT foam 30"x3 FT foam 30"x3 FT foam 30" x 3  FT foam 30" x 3  Tandem stance 30" x 3   Step ups    4in 2x5 no UE's 4in 2x5 no UE's 4in 3x5 no UE's 4in, LLE 3x5    RLE  4in, LLE 4 setsx5      Side stepping     3 laps in //bars no UE 4 laps in //bars no UE   In solo step with 180 degree turns x 2 laps 20 feet     Cone taps 2in cones 15x ea 2 in cones 10x ea 2 in cones 10x ea                      Solo step, patient performed semi-tandem walking 20 feet x 4  Frequent stepping strategy  Ther Ex         See testing     Scifit  recumbent bike 3:45' L0 Held            Treadmill              STS 12x from hi/low table   8x from hi/low table 2x5 from hi/low table   x5 from 21in surface (pain)  2 reps from 18" chair  From 25" with foam: 5 x with orange TB pulling posteriorly for added resistance, x 3 reps without resistance  Sitting on foam from 18 inch chair  2 sets of 5     LAQ   4x5 5" on L                                                               Ther Activity                                       Gait Training                                       Modalities

## 2021-07-22 NOTE — PROGRESS NOTES
Daily Note     Today's date: 2021  Patient name: Liliana Srivastava  : 1945  MRN: [id number]  Referring provider: Krzysztof Jacobs MD  Dx:   Encounter Diagnosis     ICD-10-CM    1  TIA (transient ischemic attack)  G45 9                   Subjective: "I don't want to do much today because my back hurts a lot today  I think I overdid it in the pool "      Objective: See treatment description below     Pt reports pain 3/10 in B/L traps and 7-8/10 in lumbar region upon arrival      Pt began tx session with distal strengthening task of sustained grasp to green flex bar with wrist et/flexors (simulated towel ring outs) focusing on improved distal strength/endurance and B/L coordination  Pt performed 2 sets x 10 reps with green flex bar  OTR downgraded functional task to performing functional movement with towel 2* increased distal fatigue  Pt performed 1 set x 10 reps with towel  OTR educated Pt on functional coordination activities to increase attention and functional use of LUE in home environment  OTR provided Pt with handout with written cues and instructions to improve understanding and carryover  Pt and OTR practiced performing functional task in clinic to further understanding of exercises/functional tasks  Assessment: Tolerated treatment well  Patient would benefit from continued OT    Pt continues to be limited by ongoing pain in both back and B/L traps limiting engagement in tx session on this date  Pt demo with Max difficulties maintaining sustained grasp to green flex bar with requirement of readjusting grasp to increase performance  Pt with Max fatigue reported in L hand after completion of 2 sets x 10 reps of green flex bar with requirement for completion of last set with towel  Pt demo with G understanding of functional coordination activities and the importance of performing in home environment to aide in increased attention and functional use of LUE           Plan: Continue per plan of care        INTERVENTION COMMENTS:  Diagnosis: TIA (transient ischemic attack) [G45 9]  Precautions: DM, Glaucoma, HTN, possible seizure activity-- NO STIM until further notice  FOTO: not performed  Insurance: Payor: Melba Levine / Plan: MEDICARE A AND B / Product Type: Medicare A & B Fee for Service /   1 of 10 visits, PN due 08/14/2021

## 2021-07-27 ENCOUNTER — OFFICE VISIT (OUTPATIENT)
Dept: PHYSICAL THERAPY | Facility: REHABILITATION | Age: 76
End: 2021-07-27
Payer: MEDICARE

## 2021-07-27 ENCOUNTER — APPOINTMENT (OUTPATIENT)
Dept: OCCUPATIONAL THERAPY | Facility: REHABILITATION | Age: 76
End: 2021-07-27
Payer: MEDICARE

## 2021-07-27 DIAGNOSIS — G45.9 TIA (TRANSIENT ISCHEMIC ATTACK): Primary | ICD-10-CM

## 2021-07-27 PROCEDURE — 97112 NEUROMUSCULAR REEDUCATION: CPT | Performed by: PHYSICAL THERAPIST

## 2021-07-27 PROCEDURE — 97110 THERAPEUTIC EXERCISES: CPT | Performed by: PHYSICAL THERAPIST

## 2021-07-27 NOTE — PROGRESS NOTES
Daily Note / 30 DAY HOLD    Today's date: 2021  Patient name: Bud Burgos  : 1945  MRN: [id number]  Referring provider: Jourdan Saldana MD  Dx:   Encounter Diagnosis     ICD-10-CM    1  TIA (transient ischemic attack)  G45 9                   Subjective: Pt would like to hold therapy until she has her vision surgery  Her vision has regressed to the point where she does not feel safe driving anymore  She does not have anyone who can drive her to appointments  Has to use her cane most times due to vision issues  Objective: See treatment diary below      Assessment: Tolerated treatment well  Pt will be placed on medical hold for 30 days due to surgery for glaucoma and cataracts  Emphasized performing exercises that pt will be able to perform at home since she will be taking a leave from PT from surgery  Demonstrated fatigue and reported increasing weakness in LLE towards end of visit  Was challenged with SL weight bearing on L side during marching and hip abd  Cues needed to perform exercises properly  Was provided updated printed HEP  Will perform re-eval at time of return from surgery  Plan: Continue per plan of care                 HEP: STS, heel raises, toe raises step ups, FT balance EC, walking march, hip abd          Manuals  6 6                                                       Neuro Re-Ed        See testing     Walking w/o AD In [place] with SPC 300ft     400ft x1 with CG    100ft x1  300ft x3 in SOLO    O2:96%  HR:86 [id number]    300ft x2 in SOLO     [id number]    [id number] with quick stop and backwards    20ftx4 side stepping   400 feet x 1 without AD in solo step    400 feet quickly without AD in solo step    Backwards walking 40ft x 6 in solo step  400 without AD in solo step, walking quick, slow, stopping and turning 180 degrees    Walking backwards 40 feet x 4 with quick changes in direction    Walking in solo step 100 feet head turns, 100 feet head nods, 100 feet random directions called out by therapist  Walking with sudden stops and backwards  200ft   Hurdles  3 laps fw, no UE support 2 laps fw no UE support  4 laps fw in SOLO 3 laps fw in SOLO  6 laps fw in //bars  3 laps forward in // bars with high and low hurdles, no UE support 3 laps fwed in // bars, high and low hurdles, no UE support 3 laps forward and sideways, high and low hurdles, no UE support 3 laps forward and sideways, high and low hurdles, no UE support   Static balance  FT 30"x2 FT 30"x3  FT foam 30"x3 FT foam 30"x3 FT foam 30"x3 FT foam 30" x 3  FT foam 30" x 3  Tandem stance 30" x 3 FT floor EC 30"x3   Step ups   4in 2x5 no UE's 4in 2x5 no UE's 4in 3x5 no UE's 4in, LLE 3x5    RLE  4in, LLE 4 setsx5    x10 ea 4in   Side stepping    3 laps in //bars no UE 4 laps in //bars no UE   In solo step with 180 degree turns x 2 laps 20 feet      Cone taps 2 in cones 10x ea 2 in cones 10x ea                      Solo step, patient performed semi-tandem walking 20 feet x 4  Frequent stepping strategy  Ther Ex        See testing      Scifit  Held             Treadmill              STS 8x from hi/low table 2x5 from hi/low table   x5 from 21in surface (pain)  2 reps from 18" chair  From 25" with foam: 5 x with orange TB pulling posteriorly for added resistance, x 3 reps without resistance  Sitting on foam from 18 inch chair  2 sets of 5  Sitting on foam from 18 inch chair  1 sets of 5     LAQ  4x5 5" on L            Heel/toe raises          2x10   Hip abd          x10 ea                             Ther Activity                                       Gait Training                                       Modalities

## 2021-07-27 NOTE — PROGRESS NOTES
OTR will be placing Pt on 30-day medical hold 2* undergoing surgery for advanced cataracts in Glaucoma  Pt to call back when stable enough to return to skilled OT services

## 2021-07-28 ENCOUNTER — APPOINTMENT (OUTPATIENT)
Dept: OCCUPATIONAL THERAPY | Facility: REHABILITATION | Age: 76
End: 2021-07-28
Payer: MEDICARE

## 2021-07-29 ENCOUNTER — APPOINTMENT (OUTPATIENT)
Dept: OCCUPATIONAL THERAPY | Facility: REHABILITATION | Age: 76
End: 2021-07-29
Payer: MEDICARE

## 2021-07-29 ENCOUNTER — APPOINTMENT (OUTPATIENT)
Dept: PHYSICAL THERAPY | Facility: REHABILITATION | Age: 76
End: 2021-07-29
Payer: MEDICARE

## 2021-08-25 ENCOUNTER — OFFICE VISIT (OUTPATIENT)
Dept: NEUROLOGY | Facility: CLINIC | Age: 76
End: 2021-08-25
Payer: MEDICARE

## 2021-08-25 VITALS
WEIGHT: 183 LBS | HEIGHT: 66 IN | HEART RATE: 79 BPM | BODY MASS INDEX: 29.41 KG/M2 | DIASTOLIC BLOOD PRESSURE: 66 MMHG | SYSTOLIC BLOOD PRESSURE: 116 MMHG | TEMPERATURE: 97.7 F

## 2021-08-25 DIAGNOSIS — G44.89 OTHER HEADACHE SYNDROME: ICD-10-CM

## 2021-08-25 DIAGNOSIS — E78.2 MIXED HYPERLIPIDEMIA: ICD-10-CM

## 2021-08-25 DIAGNOSIS — G45.9 TIA (TRANSIENT ISCHEMIC ATTACK): ICD-10-CM

## 2021-08-25 DIAGNOSIS — R20.0 LEFT ARM NUMBNESS: Primary | ICD-10-CM

## 2021-08-25 PROCEDURE — 99215 OFFICE O/P EST HI 40 MIN: CPT | Performed by: PSYCHIATRY & NEUROLOGY

## 2021-08-25 RX ORDER — KETOROLAC TROMETHAMINE 5 MG/ML
SOLUTION OPHTHALMIC
COMMUNITY
Start: 2021-08-05 | End: 2021-11-22 | Stop reason: ALTCHOICE

## 2021-08-25 RX ORDER — PREDNISOLONE ACETATE 10 MG/ML
SUSPENSION/ DROPS OPHTHALMIC
COMMUNITY
Start: 2021-08-05 | End: 2021-11-22 | Stop reason: ALTCHOICE

## 2021-08-25 RX ORDER — GATIFLOXACIN 5 MG/ML
SOLUTION/ DROPS OPHTHALMIC
COMMUNITY
Start: 2021-08-05 | End: 2021-11-22 | Stop reason: ALTCHOICE

## 2021-08-25 NOTE — PROGRESS NOTES
Patient ID: Celeste Zapien is a 77 y o  female  Assessment/Plan: This is a 78 y/o Female who is here as a follow up for history TIA  She continues to have Left sided numbness and had an episode of left arm numbness few days ago  PLAN:      Diagnoses and all orders for this visit:    Left arm numbness  -will get MRI brain without contrast to rule out CVA  -MRI C-spine which I reviewed the images of which showed C6-C7 radiculopathy which is likely causing these symptoms which are stereotypical  -continue with PT, if no improvement with PT, then consider having her see neurosurgery    -     MRI brain without contrast; Future    TIA (transient ischemic attack)  -for stroke prevention, will have patient call ophthalmology and restart aspirin and crestor  -BP goal < 130/80, BP is at goal in the office  Counseling -   -does not smoke at this time   -denies any history of snoring    -I advised patient to avoid using NSAIDs for headaches or other pain and to stick to tylenol if needed  -Recommend mediterranean diet & regular exercise regimen atleast 4-5 times a week for 20-30 minutes  -I educated patient/family regarding medication compliance    -     Ambulatory referral to Neurology    Mixed hyperlipidemia    Other headache syndrome  -likely 2/2 dehydration and heat  -no additional recommendations at this time  Other orders  -     ketorolac (ACULAR) 0 5 % ophthalmic solution; INSTILL 1 DROP IN LEFT EYE FOUR TIMES DAILY AFTER SURGERY  -     prednisoLONE acetate (PRED FORTE) 1 % ophthalmic suspension; INSTILL 1 DROP TO LEFT EYE EVERY 1 TO 2 HOURS AFTER SURGERY  -     gatifloxacin (ZYMAXID) 0 5 %; INSTILL 1 DROP INTO LEFT EYE FOUR TIMES DAILY STARTING 2 DAYS PRIOR TO SURGERY       Follow up -  4 weeks  I would be happy to see the patient sooner if any new questions/concerns arise  Patient/Guardian was advised to the call the office if they have any questions and concerns in the meantime  Patient/Guardian does understand that if they have any new stroke like symptoms such as facial droop on one side, weakness/paralysis on either side, speech trouble, numbness on one side, balance issues, any vision changes, extreme dizziness or any new headache, to call 9-1-1 immediately or to proceed to the nearest ER immediately  Subjective:    HPI    This is a 76 y/o Female who is here as a follow up for history of TIA, and patient has left sided numbness  Patient is doing well and had an episode of left sided numbness and arm weakness, and says it may have lasted for 1-2 minutes and she did not have any other symptoms  She has chronic neck pain, and she is not PT as of a month ago due to vision issues and she lost vision and she had a recent cataract surgery and she can see so much better and she says she feels like a million bucks now  She is scheduled to go back to PT September 16th  No residual deficits  The following portions of the patient's history were reviewed and updated as appropriate:   She  has a past medical history of Diabetes mellitus ([address] ), Diabetes mellitus type 2, controlled ([address] ), Disease of thyroid gland, GERD (gastroesophageal reflux disease), Glaucoma, H/O tooth extraction, Headache(784 0), Hypertension, Pneumonia, Stroke ([address] ), TIA (transient ischemic attack), and Visual impairment    She   Patient Active Problem List    Diagnosis Date Noted    Other headache syndrome 08/25/2021    Left-sided weakness 05/17/2021    Left sided numbness 05/05/2021    Small vessel disease ([address] ) 05/05/2021    Mixed hyperlipidemia 10/29/2020    Glaucoma, bilateral 01/04/2019    Hemispheric carotid artery syndrome 10/10/2017    Vitamin D deficiency 10/21/2013    Benign essential hypertension 09/25/2013    Controlled type 2 diabetes mellitus without complication, without long-term current use of insulin ([address] ) 09/25/2013    Hypothyroidism 09/25/2013     She  has a past surgical history that includes Cataract extraction (Right); EAR SURGERY (1970); Other surgical history; Cyst Removal (Left, 04/15/2014); Eye surgery; and Breast biopsy (Left)  Her family history includes Cancer in her brother; Heart attack in her paternal grandmother; No Known Problems in her maternal grandmother and paternal grandfather; Rheum arthritis in her mother  She  reports that she quit smoking about 11 years ago  Her smoking use included cigarettes  She started smoking about 61 years ago  She has a 150 00 pack-year smoking history  She has never used smokeless tobacco  She reports previous alcohol use of about 1 0 standard drinks of alcohol per week  She reports that she does not use drugs    Current Outpatient Medications   Medication Sig Dispense Refill    albuterol (PROVENTIL HFA,VENTOLIN HFA) 90 mcg/act inhaler Inhale 2 puffs every 4 (four) hours as needed for wheezing or shortness of breath 1 Inhaler 1    aspirin 81 MG tablet Take 81 mg by mouth daily      COMBIGAN 0 2-0 5 % Administer 1 drop to the right eye 2 (two) times a day   3    dorzolamide-timolol (COSOPT) 22 3-6 8 MG/ML ophthalmic solution       glucose blood (OneTouch Verio) test strip USE TO TEST BLOOD SUGAR UP TO FOUR TIMES DAILY 300 each 1    Insulin Pen Needle (Pen Needles) 32G X 4 MM MISC To use daily with victoza 100 each 3    levothyroxine 100 mcg tablet Take 1 tablet (100 mcg total) by mouth daily BRAND SYNTHROID PLEASE 90 tablet 1    liraglutide (VICTOZA) injection Inject 0 3 mL (1 8 mg total) under the skin daily 9 mL 1    losartan-hydrochlorothiazide (HYZAAR) 50-12 5 mg per tablet Take 1 tablet by mouth daily 90 tablet 1    LUMIGAN 0 01 % ophthalmic drops Administer 1 drop to both eyes daily at bedtime   3    metFORMIN (GLUCOPHAGE) 500 mg tablet Take 1 tablet (500 mg total) by mouth 2 (two) times a day with meals START WITH 1 TABLET BY MOUTH DAILY FOR 1 TABLET WEEK, THEN 1 TABLET BY MOUTH TWICE DAILY FOR 1 WEEK, THEN 2 TABLETS BY MOUTH TWICE DAILY 180 tablet 1    OneTouch Delica Lancets 89P MISC To test BS up to four times daily- E11 65 200 each 5    rosuvastatin (CRESTOR) 5 mg tablet Take 1 tablet (5 mg total) by mouth daily 90 tablet 1    dorzolamide (TRUSOPT) 2 % ophthalmic solution Administer 1 drop to the right eye 3 (three) times a day  (Patient not taking: Reported on 8/25/2021)  1    gatifloxacin (ZYMAXID) 0 5 % INSTILL 1 DROP INTO LEFT EYE FOUR TIMES DAILY STARTING 2 DAYS PRIOR TO SURGERY      ketorolac (ACULAR) 0 5 % ophthalmic solution INSTILL 1 DROP IN LEFT EYE FOUR TIMES DAILY AFTER SURGERY      prednisoLONE acetate (PRED FORTE) 1 % ophthalmic suspension INSTILL 1 DROP TO LEFT EYE EVERY 1 TO 2 HOURS AFTER SURGERY       No current facility-administered medications for this visit       Current Outpatient Medications on File Prior to Visit   Medication Sig    albuterol (PROVENTIL HFA,VENTOLIN HFA) 90 mcg/act inhaler Inhale 2 puffs every 4 (four) hours as needed for wheezing or shortness of breath    aspirin 81 MG tablet Take 81 mg by mouth daily    COMBIGAN 0 2-0 5 % Administer 1 drop to the right eye 2 (two) times a day     dorzolamide-timolol (COSOPT) 22 3-6 8 MG/ML ophthalmic solution     glucose blood (OneTouch Verio) test strip USE TO TEST BLOOD SUGAR UP TO FOUR TIMES DAILY    Insulin Pen Needle (Pen Needles) 32G X 4 MM MISC To use daily with victoza    levothyroxine 100 mcg tablet Take 1 tablet (100 mcg total) by mouth daily BRAND SYNTHROID PLEASE    liraglutide (VICTOZA) injection Inject 0 3 mL (1 8 mg total) under the skin daily    losartan-hydrochlorothiazide (HYZAAR) 50-12 5 mg per tablet Take 1 tablet by mouth daily    LUMIGAN 0 01 % ophthalmic drops Administer 1 drop to both eyes daily at bedtime     metFORMIN (GLUCOPHAGE) 500 mg tablet Take 1 tablet (500 mg total) by mouth 2 (two) times a day with meals START WITH 1 TABLET BY MOUTH DAILY FOR 1 TABLET WEEK, THEN 1 TABLET BY MOUTH TWICE DAILY FOR 1 WEEK, THEN 2 TABLETS BY MOUTH TWICE DAILY    OneTouch Delica Lancets 81N MISC To test BS up to four times daily- E11 65    rosuvastatin (CRESTOR) 5 mg tablet Take 1 tablet (5 mg total) by mouth daily    dorzolamide (TRUSOPT) 2 % ophthalmic solution Administer 1 drop to the right eye 3 (three) times a day  (Patient not taking: Reported on 8/25/2021)    gatifloxacin (ZYMAXID) 0 5 % INSTILL 1 DROP INTO LEFT EYE FOUR TIMES DAILY STARTING 2 DAYS PRIOR TO SURGERY    ketorolac (ACULAR) 0 5 % ophthalmic solution INSTILL 1 DROP IN LEFT EYE FOUR TIMES DAILY AFTER SURGERY    prednisoLONE acetate (PRED FORTE) 1 % ophthalmic suspension INSTILL 1 DROP TO LEFT EYE EVERY 1 TO 2 HOURS AFTER SURGERY     No current facility-administered medications on file prior to visit  She is allergic to eggs or egg-derived products - food allergy; albumen, egg - food allergy; antihistamines, diphenhydramine-type; epinephrine; fluzone [flu virus vaccine]; iv contrast [iodinated diagnostic agents]; lidocaine; and zinc          Objective:    Blood pressure 116/66, pulse 79, temperature 97 7 °F (36 5 °C), height 5' 6" (1 676 m), weight 83 kg (183 lb)  Physical Exam    Neurological Exam      ROS:    Review of Systems   Constitutional: Positive for appetite change (loss of appetite)  Negative for fever  HENT: Negative  Negative for hearing loss, tinnitus, trouble swallowing and voice change  Eyes: Positive for photophobia (recent cataract surgery)  Negative for pain  Respiratory: Positive for shortness of breath  Cardiovascular: Negative  Negative for palpitations  Gastrointestinal: Negative  Negative for nausea and vomiting  Endocrine: Negative  Negative for cold intolerance  Genitourinary: Negative  Negative for dysuria, frequency and urgency  Musculoskeletal: Positive for neck pain and neck stiffness  Negative for myalgias  Skin: Negative  Negative for rash  Neurological: Positive for weakness and headaches  Negative for dizziness, tremors, seizures, syncope, facial asymmetry, speech difficulty, light-headedness and numbness  Hematological: Negative  Does not bruise/bleed easily  Psychiatric/Behavioral: Negative  Negative for confusion, hallucinations and sleep disturbance

## 2021-08-25 NOTE — PROGRESS NOTES
Nesha Morales is a 78 yo female seen in the neurology clinic today as a 3 month follow up for L sided weakness/numbness  States that her L arm feels weak and slightly numb while her L leg feels weak  She had cataract surgery done on 8/10/21, which she had to stop taking aspirin and has not taken aspirin since then  Reports that she had a TIA 4 days ago that lasted for 2 minutes  It had a sudden onset with no prodrome symptoms where her L arm became a "wet noodle" with numbness and weakness  Afterwards, she regained strength and sensation in her L arm  States it felt similar to the TIA in 202 but not as intense  PMH significant for Type 2 DM, hypertension, stroke, small vessel disease, and TIA  She had a stroke in 2017 and a TIA in   Reports that TIA in  lasted for 5-10 minutes and affected L arm and L leg  L arm felt like a "wet noodle" and she did not regain full strength of her L arm and leg afterwards  Former smoker of 3ppd for 50 years, quit in   Labs as of 21  HgA1C: 6 6  Glucose fastin    Imaging   21: MRI cervical spine showed scattered multilevel spondylosis most pronounced on the right at C6-C7  No cord compression or cord signal abnormality  21: CTA head and neck showed chronic lacunar infarct in the right centrum semiovale  No acute intracranial abnormality  Atherosclerotic calcification left carotid bifurcation without significant stenosis

## 2021-09-02 ENCOUNTER — APPOINTMENT (OUTPATIENT)
Dept: PHYSICAL THERAPY | Facility: REHABILITATION | Age: 76
End: 2021-09-02
Payer: MEDICARE

## 2021-09-02 ENCOUNTER — APPOINTMENT (OUTPATIENT)
Dept: OCCUPATIONAL THERAPY | Facility: REHABILITATION | Age: 76
End: 2021-09-02
Payer: MEDICARE

## 2021-09-07 ENCOUNTER — EVALUATION (OUTPATIENT)
Dept: PHYSICAL THERAPY | Facility: REHABILITATION | Age: 76
End: 2021-09-07
Payer: MEDICARE

## 2021-09-07 DIAGNOSIS — G45.9 TIA (TRANSIENT ISCHEMIC ATTACK): ICD-10-CM

## 2021-09-07 DIAGNOSIS — R53.1 LEFT-SIDED WEAKNESS: ICD-10-CM

## 2021-09-07 DIAGNOSIS — M54.2 CERVICAL PAIN: Primary | ICD-10-CM

## 2021-09-07 PROCEDURE — 97110 THERAPEUTIC EXERCISES: CPT | Performed by: PHYSICAL THERAPIST

## 2021-09-07 PROCEDURE — 97164 PT RE-EVAL EST PLAN CARE: CPT | Performed by: PHYSICAL THERAPIST

## 2021-09-07 NOTE — PROGRESS NOTES
Re-Evaluation     Today's date: 2021  Patient name: Cosmo Pham  : 1945  MRN: [id number]  Referring provider: Mathew Mock MD  Dx:   Encounter Diagnosis     ICD-10-CM    1  Cervical pain  M54 2    2  Left-sided weakness  R53 1    3  TIA (transient ischemic attack)  G45 9                   Subjective: Pt was on hiatus from PT due to surgery on her L eye  She had a complicated cataract surgery and also glaucoma stent placed  This has greatly improved her vision which was rapidly deteriorating prior to surgery  Her surgeon listed her precautions as no straining, bending over, or tilting her head down, no lifting  Will likely be cleared of these precautions upon follow up visit Monday she states  Reports having another TIA a few weeks ago which lasted only a few minutes with numbness and weakness of LUE  This occurred while she was making a sandwich at her kitchen counter  Saw neurology recently who feels that her symptoms are actually related to her cervical spine because all of her attacks have occurred while she was working at her kitchen counter top looking down  MRI of C spine showed multilevel spondylosis and foraminal narrowing, most prominent at C6-7 Will be getting another MRI of the brain on   Reports being sedentary from her recovery and feels weaker in general  Has not had to use her cane recently  LLE has not felt like it has wanted to buckle, however has not been as active to push he limits  States she sometimes gets unsteady because of her vision "my eyes get cock eyed"  No falls in last month  Reports her neck pain is worse when waking up in the morning  Has had slowly worsening pain for years  No hx of surgery or injury  Reports difficulty with tilting her head back due to feeling of pre-syncope and dizziness that progressively worsens the longer she is there  Has not found that her neck hurts more when LUE or LLE went numb   Reports pain is midline along with tenderness to b/l musculature  Pain improves with heat, pain creme, and rest      Objective: See treatment diary below    BP: 112/78       Changes in bowel/bladder function?: No    Ligament Laxity Testing  Alar Ligament: neg  Transverse Ligament: neg    VBI: dizziness report on both sides non fatiguing, no presyncope, diplopia, dysarthria, or nausea  Upon rising from test reports visual disturbance described as oscillopsia  Cervical Palpation: TTP at R upper trap with trigger point noted    Cervical Posture: forward head rounded shoulders    Upper Quarter Screen   Dermatomal: diminished C4, C5 (upper trap and lat shoulder)   Myotomal: diminished at C4, C5, C6, C7 (shoulder, elbow flex, elbow ext, wrist ext)   Reflexes: 1+ on R,    2+ on L    Cervical Range of Motion     Flexion Deferred     Extension 25 pain     Left Right   Lateral Flexion 15 15   Rotation 50 50 pain     Repeated Movements: deferred today     PAIVMs: localized pain and limited mobility through out C spine    Compression Test: neg    Spurling A: pos to L     Distraction Test: neg, does report dizziness    ULNTT: not performed      Balance Test Initial Eval  6/7 7/14  -   5x Sit to Stand: NO UE's 21in surface   37s  No UE's 21in surface  43s (1 fialed attempt   No UE's 21in surface  56s (1 fialed attempt  -   TUG: 15 9 no AD  15 7s no AD  11 15s no AD  -   Gait Speed:            2 Minute Walk Test:           6 Minute Walk Test: Stopped at 4:00, 800ft       HR-79  O2- 96%  Finished full 6min   980ft  1130ft   -   Liu Balance Scale:           FGA:  18/30 21/30  -   Modified [place] Scale Score:           Patient-Reported Outcome Measure  Stroke Impact Scale:                STGs (4 weeks)  Pt will be independent with comprehensive HEP   Pt will demonstrate impaired cervical AROM by at least 5 degrees  Pt will report reduced pain by at least 2 points at worst on NPRS    LTG's (to be achieved by d/c)  Pt will be able to self manage sx's independently   Pt will be able to perform community ambulation w/o use of AD  Pt will report at least 50% reduction in symptoms of weakness and paresthesia on L side         Assessment: Denies returns to PT following medical hiatus with continued issues of transient L sided weakness and numbness, gait dysfunction, along with chronic neck pain  Evaluation of cervical spine indicates impaired cervical AROM, impaired posture tenderness to musculature, impaired dermatomal/myotomal response (not consistent with a particular vertebral level), and positive Spurling A test  Was able to replicate some paresthesia in LUE with Spurling A test, but not with other testing today  These impairments are resulting in difficulty with sleep, lifting, carrying, and potentially contributing to L sided weakness  Her reports of dizziness and pre-syncope when her neck is in extended position is of concern  Vertebrobasilar artery test was performed today which replicated dizziness and visual disturbance (diagnostic accuracy of this test is not well studied)  Her presentation was not consistent with BPPV  Plan: Findings will be discussed with neurology  Will initiate treatment for cervical spine unless directed otherwise by neurology  Will further test balance and LE strength NV and treat as indicated  Skilled PT for 8 weeks at 2x per week  HEP: seated chin tucks (educated and performed in clinic today, also educated on initiating safe walking program and posture)           Manuals 5/25 6/2 6/7 6/14 6/16 7/1 7/8 7/22 7/27                                                       Neuro Re-Ed        See testing     Walking w/o AD [place] with SPC 300ft     400ft x1 with CG    100ft x1  300ft x3 in SOLO    O2:96%  HR:86 [id number]    300ft x2 in SOLO     [id number]    [id number] with quick stop and backwards    20ftx4 side stepping   400 feet x 1 without AD in solo step    400 feet quickly without AD in solo step    Backwards walking 40ft x 6 in solo step  400 without AD in solo step, walking quick, slow, stopping and turning 180 degrees    Walking backwards 40 feet x 4 with quick changes in direction    Walking in solo step 100 feet head turns, 100 feet head nods, 100 feet random directions called out by therapist  Walking with sudden stops and backwards  200ft   Hurdles  3 laps fw, no UE support 2 laps fw no UE support  4 laps fw in SOLO 3 laps fw in SOLO  6 laps fw in //bars  3 laps forward in // bars with high and low hurdles, no UE support 3 laps fwed in // bars, high and low hurdles, no UE support 3 laps forward and sideways, high and low hurdles, no UE support 3 laps forward and sideways, high and low hurdles, no UE support   Static balance  FT 30"x2 FT 30"x3  FT foam 30"x3 FT foam 30"x3 FT foam 30"x3 FT foam 30" x 3  FT foam 30" x 3  Tandem stance 30" x 3 FT floor EC 30"x3   Step ups   4in 2x5 no UE's 4in 2x5 no UE's 4in 3x5 no UE's 4in, LLE 3x5    RLE  4in, LLE 4 setsx5    x10 ea 4in   Side stepping    3 laps in //bars no UE 4 laps in //bars no UE   In solo step with 180 degree turns x 2 laps 20 feet      Cone taps 2 in cones 10x ea 2 in cones 10x ea                      Solo step, patient performed semi-tandem walking 20 feet x 4  Frequent stepping strategy  Ther Ex        See testing      Scifit  Held             Treadmill              STS 8x from hi/low table 2x5 from hi/low table   x5 from 21in surface (pain)  2 reps from 18" chair  From 25" with foam: 5 x with orange TB pulling posteriorly for added resistance, x 3 reps without resistance  Sitting on foam from 18 inch chair  2 sets of 5  Sitting on foam from 18 inch chair  1 sets of 5     LAQ  4x5 5" on L            Heel/toe raises          2x10   Hip abd          x10 ea                             Ther Activity                                       Gait Training                                       Modalities

## 2021-09-09 ENCOUNTER — OFFICE VISIT (OUTPATIENT)
Dept: PHYSICAL THERAPY | Facility: REHABILITATION | Age: 76
End: 2021-09-09
Payer: MEDICARE

## 2021-09-09 DIAGNOSIS — G45.9 TIA (TRANSIENT ISCHEMIC ATTACK): ICD-10-CM

## 2021-09-09 DIAGNOSIS — M54.2 CERVICAL PAIN: Primary | ICD-10-CM

## 2021-09-09 DIAGNOSIS — R53.1 LEFT-SIDED WEAKNESS: ICD-10-CM

## 2021-09-09 PROCEDURE — 97112 NEUROMUSCULAR REEDUCATION: CPT | Performed by: PHYSICAL THERAPIST

## 2021-09-09 PROCEDURE — 97140 MANUAL THERAPY 1/> REGIONS: CPT | Performed by: PHYSICAL THERAPIST

## 2021-09-09 NOTE — PROGRESS NOTES
Daily Note    Today's date: 2021  Patient name: Finesse Pace  : 1945  MRN: [id number]  Referring provider: Diana Cowan MD  Dx:   Encounter Diagnosis     ICD-10-CM    1  Cervical pain  M54 2    2  Left-sided weakness  R53 1    3  TIA (transient ischemic attack)  G45 9                   Subjective: Reports that after testing last visit she had significant neck pain during her drive home  Pain lasted through the next day and states she rested her neck all day and moved very little  Is still sore today, but much improved  Would like to continue addressing her balance as well as neck  Objective: See treatment diary below         Balance Test Initial Eval     5x Sit to Stand: NO UE's 21in surface   37s  No UE's 21in surface  43s (1 fialed attempt   No UE's 21in surface  56s (1 fialed attempt     TUG: 15 9 no AD  15 7s no AD  11 15s no AD     Gait Speed:            2 Minute Walk Test:           6 Minute Walk Test: Stopped at 4:00, 800ft       HR-79  O2- 96%  Finished full 6min  980ft  1130ft      Liu Balance Scale:           FGA:     Modified [place] Scale Score:           Patient-Reported Outcome Measure  Stroke Impact Scale:                     Assessment: Less than optimal response to treatment  Some discomfort reported with manual upper trap stretching  Reported dizziness upon rising from supine again today, despite head being elevated  Exercise intensity was kept very low today however still had very limited tolerance to soft tissue mobilization and gentle AROM exercises due to reports of increasing pain  She reported releif of pain with use of moist heat after treatment  Time spent educating pt on chronic pain and central sensitization  FGA performed today and demonstrated small regression from her last assessment of balance  Would benefit from continued PT       Plan: Continue per POC              HEP: seated chin tucks, cervical AROM rot and SB, scap squeezes          Manuals 9/9       Manual upper trap stretch  30"x2 ea       STM  B/l upper traps, pain on L                        Neuro Re-Ed FGA performed today       Chin tucks 5x 5" seated       Scap squeezes  Instructed for HEP       Hurdles                                 Education  On chronic pain, and central sensitization, HEP       Ther Ex        Cervical AROM gentle Instructed for HEP                                                               Ther Activity                        Gait Training                        Modalities        Heat  10' end of visit not billed for

## 2021-09-13 ENCOUNTER — TELEPHONE (OUTPATIENT)
Dept: NEUROLOGY | Facility: CLINIC | Age: 76
End: 2021-09-13

## 2021-09-13 NOTE — TELEPHONE ENCOUNTER
Spoke to pt to move   [facility]  9/22  [address] [facility]  9/14  [address] [facility]    Does not want to move appt due to getting PT and wanting the Dr to see the results

## 2021-09-14 ENCOUNTER — APPOINTMENT (OUTPATIENT)
Dept: PHYSICAL THERAPY | Facility: REHABILITATION | Age: 76
End: 2021-09-14
Payer: MEDICARE

## 2021-09-16 ENCOUNTER — OFFICE VISIT (OUTPATIENT)
Dept: PHYSICAL THERAPY | Facility: REHABILITATION | Age: 76
End: 2021-09-16
Payer: MEDICARE

## 2021-09-16 DIAGNOSIS — R53.1 LEFT-SIDED WEAKNESS: ICD-10-CM

## 2021-09-16 DIAGNOSIS — G45.9 TIA (TRANSIENT ISCHEMIC ATTACK): ICD-10-CM

## 2021-09-16 DIAGNOSIS — M54.2 CERVICAL PAIN: Primary | ICD-10-CM

## 2021-09-16 PROCEDURE — 97140 MANUAL THERAPY 1/> REGIONS: CPT | Performed by: PHYSICAL THERAPIST

## 2021-09-16 PROCEDURE — 97112 NEUROMUSCULAR REEDUCATION: CPT | Performed by: PHYSICAL THERAPIST

## 2021-09-16 NOTE — PROGRESS NOTES
Daily Note    Today's date: 2021  Patient name: Sri Cunningham  : 1945  MRN: [id number]  Referring provider: Sudheer Baum MD  Dx:   Encounter Diagnosis     ICD-10-CM    1  Cervical pain  M54 2    2  Left-sided weakness  R53 1    3  TIA (transient ischemic attack)  G45 9                   Subjective: Arrived late for visit today  Reports neck was sore after last visit, but not as bad as initial treatment  Was driving for an hours yesterday which was very painful for her neck  Overall driving always hurts her neck  Has been doing some neck exercises at home  Objective: See treatment diary below             Assessment: Tolerated treatment fair  Continued to keep exercise and manual therapy volume at a minimum  She demonstrates considerable hypersensitivity to cervical movement  Incorporated some balance training today, reported weakness in LLE with this causing instability  Would benefit from continued PT       Plan: Continue per POC              HEP: seated chin tucks, cervical AROM rot and SB, scap squeezes          Manuals       Manual upper trap stretch  30"x2 ea       STM  B/l upper traps, pain on L  suboccip release       Manual traction  10" holds 4 min              Neuro Re-Ed FGA performed today       Chin tucks 5x 5" seated 5x 5" seated and supine      Scap squeezes  Instructed for HEP 10x 5"      Hurdles         Supine T's Y's  nv              Hurdles   2 hurdles       Static balance   FA foam 30"x3      Education  On chronic pain, and central sensitization, HEP       Ther Ex        Cervical AROM gentle Instructed for HEP Supine rotation 5x ea                                                                  Ther Activity                        Gait Training                        Modalities        Heat  10' end of visit not billed for 10' end of visit not billed for

## 2021-09-21 ENCOUNTER — OFFICE VISIT (OUTPATIENT)
Dept: PHYSICAL THERAPY | Facility: REHABILITATION | Age: 76
End: 2021-09-21
Payer: MEDICARE

## 2021-09-21 ENCOUNTER — TELEPHONE (OUTPATIENT)
Dept: FAMILY MEDICINE CLINIC | Facility: CLINIC | Age: 76
End: 2021-09-21

## 2021-09-21 DIAGNOSIS — R53.1 LEFT-SIDED WEAKNESS: ICD-10-CM

## 2021-09-21 DIAGNOSIS — G45.9 TIA (TRANSIENT ISCHEMIC ATTACK): ICD-10-CM

## 2021-09-21 DIAGNOSIS — M54.2 CERVICAL PAIN: Primary | ICD-10-CM

## 2021-09-21 PROCEDURE — 97112 NEUROMUSCULAR REEDUCATION: CPT | Performed by: PHYSICAL THERAPIST

## 2021-09-21 PROCEDURE — 97110 THERAPEUTIC EXERCISES: CPT | Performed by: PHYSICAL THERAPIST

## 2021-09-21 PROCEDURE — 97140 MANUAL THERAPY 1/> REGIONS: CPT | Performed by: PHYSICAL THERAPIST

## 2021-09-21 NOTE — TELEPHONE ENCOUNTER
Rec'd FMLA paperwork  If this is in regard to her arm and leg weakness, it will need to be completed by her neurologist   Looks like she was to have an appointment but cancelled and it is postponed  Has an appointment for MRI  Would ask that neurology make any further recommendations      Does not need accomodations at this point for her eye as per previous note from ophthalmologist

## 2021-09-21 NOTE — PROGRESS NOTES
Daily Note    Today's date: 2021  Patient name: Nicole Shi  : 1945  MRN: [id number]  Referring provider: Noelle Mustafa MD  Dx:   Encounter Diagnosis     ICD-10-CM    1  Cervical pain  M54 2    2  Left-sided weakness  R53 1    3  TIA (transient ischemic attack)  G45 9                   Subjective: Feels she is improving because she is having few and less intense headache  Her neck is still painful and has difficulty tolerating movement of her neck  Objective: See treatment diary below             Assessment: Tolerated treatment fair  Continues to have difficulty tolerating movement of her cervical spine weather it be manual therapy or exercises  This requires low volume of exercises, but was able to toelrate addition of supine T exercise reporting considerable tightness through b/l pectoralis major  LE strengthening and balance in slowly being reincorporated  Recommended pt take her over the counter pain medications as directed by her doctor, but that she may benefit more if she takes them before coming to therapy vs after when she is experiencing pain  Still Would benefit from continued PT  Plan: Continue per POC              HEP: seated chin tucks, cervical AROM rot and SB, scap squeezes          Manuals      Manual upper trap stretch  30"x2 ea  30"x2 ea     STM  B/l upper traps, pain on L  suboccip release  suboccip release      Manual traction  10" holds 4 min 10" holds 4 min             Neuro Re-Ed FGA performed today       Chin tucks 5x 5" seated 5x 5" seated and supine 5x 5" seated and supine   Towel roll at low back while seated     Scap squeezes  Instructed for HEP 10x 5" 10x 5"     Supine T's Y's  nv T's  5x5"             Hurdles   2 hurdles   3 hurdles, 3 laps     Static balance   FA foam 30"x3 FA foam 30"x3     Education  On chronic pain, and central sensitization, HEP  On Ergon[employer] set up for desk and seated posture     Step ups   x10 4in, LLE     Ther Ex Cervical AROM gentle Instructed for HEP Supine rotation 5x ea     Supine rotation 5x ea     STS   From hi/low table x10                                                     Ther Activity                        Gait Training                        Modalities        Heat  10' end of visit not billed for 10' end of visit not billed for 12' end of visit not billed for

## 2021-09-23 ENCOUNTER — OFFICE VISIT (OUTPATIENT)
Dept: PHYSICAL THERAPY | Facility: REHABILITATION | Age: 76
End: 2021-09-23
Payer: MEDICARE

## 2021-09-23 DIAGNOSIS — G45.9 TIA (TRANSIENT ISCHEMIC ATTACK): ICD-10-CM

## 2021-09-23 DIAGNOSIS — M54.2 CERVICAL PAIN: Primary | ICD-10-CM

## 2021-09-23 DIAGNOSIS — R53.1 LEFT-SIDED WEAKNESS: ICD-10-CM

## 2021-09-23 PROCEDURE — 97140 MANUAL THERAPY 1/> REGIONS: CPT | Performed by: PHYSICAL THERAPIST

## 2021-09-23 PROCEDURE — 97112 NEUROMUSCULAR REEDUCATION: CPT | Performed by: PHYSICAL THERAPIST

## 2021-09-23 NOTE — PROGRESS NOTES
Daily Note    Today's date: 2021  Patient name: Clarence Ahumada  : 1945  MRN: [id number]  Referring provider: Cisco Weiss MD  Dx:   Encounter Diagnosis     ICD-10-CM    1  Cervical pain  M54 2    2  Left-sided weakness  R53 1    3  TIA (transient ischemic attack)  G45 9                   Subjective: Reports considerable pain the night and and day after therapy last visit  States she wasn't able to move much yesterday as a result  Is concerned that she has so much trouble tolerating therapy  Objective: See treatment diary below             Assessment: Tolerated treatment fair  Performed very light manual therapy today with no significant improvement in pain levels  Considerably reduced patients exercise volume today due to high pain levels from previous visit  Despite keeping exercise volume for c-spine low during visits, continues to struggle with tolerating visits  Demonstrates hypersensitivity to cervical movement likely secondary to central sensitization  Will discuss possible referral to spine or pain management with referring provider  Would benefit from continued PT  Plan: Continue per POC  Recommending referral to pain management or spine specialist               HEP: seated chin tucks, cervical AROM rot and SB, scap squeezes          Manuals     Manual upper trap stretch  30"x2 ea  30"x2 ea 30"x2 light to about 50% max extensibility    STM  B/l upper traps, pain on L  suboccip release  suboccip release  Light pressure 2'    Manual traction  10" holds 4 min 10" holds 4 min 10" holds 2 min            Neuro Re-Ed FGA performed today       Chin tucks 5x 5" seated 5x 5" seated and supine 5x 5" seated and supine   Towel roll at low back while seated 5x5" supine     Scap squeezes  Instructed for HEP 10x 5" 10x 5"     Supine T's Y's  nv T's  5x5"             Hurdles   2 hurdles   3 hurdles, 3 laps     Static balance   FA foam 30"x3 FA foam 30"x3     Education  On chronic pain, and central sensitization, HEP  On Ergonomic set up for desk and seated posture On posture, pain science, and POC     Step ups   x10 4in, LLE     Ther Ex        Cervical AROM gentle Instructed for HEP Supine rotation 5x ea     Supine rotation 5x ea Supine rotation 4x ea    STS   From hi/low table x10                                                     Ther Activity                        Gait Training                        Modalities        Heat  10' end of visit not billed for 10' end of visit not billed for 12' end of visit not billed for 15' end of visit not billed for

## 2021-09-24 ENCOUNTER — TELEPHONE (OUTPATIENT)
Dept: NEUROLOGY | Facility: CLINIC | Age: 76
End: 2021-09-24

## 2021-09-24 NOTE — TELEPHONE ENCOUNTER
[id number] [facility] received  Scanned into Media and forwarded to [facility]

## 2021-09-27 ENCOUNTER — TELEMEDICINE (OUTPATIENT)
Dept: FAMILY MEDICINE CLINIC | Facility: CLINIC | Age: 76
End: 2021-09-27
Payer: MEDICARE

## 2021-09-27 VITALS
HEART RATE: 62 BPM | TEMPERATURE: 97.8 F | SYSTOLIC BLOOD PRESSURE: 128 MMHG | HEIGHT: 66 IN | OXYGEN SATURATION: 95 % | DIASTOLIC BLOOD PRESSURE: 67 MMHG | WEIGHT: 181 LBS | BODY MASS INDEX: 29.09 KG/M2

## 2021-09-27 DIAGNOSIS — I10 BENIGN ESSENTIAL HYPERTENSION: ICD-10-CM

## 2021-09-27 DIAGNOSIS — I73.9 SMALL VESSEL DISEASE (HCC): ICD-10-CM

## 2021-09-27 DIAGNOSIS — E11.9 CONTROLLED TYPE 2 DIABETES MELLITUS WITHOUT COMPLICATION, WITHOUT LONG-TERM CURRENT USE OF INSULIN (HCC): ICD-10-CM

## 2021-09-27 DIAGNOSIS — E66.3 OVERWEIGHT: ICD-10-CM

## 2021-09-27 DIAGNOSIS — B34.9 VIRAL INFECTION, UNSPECIFIED: Primary | ICD-10-CM

## 2021-09-27 PROCEDURE — 99213 OFFICE O/P EST LOW 20 MIN: CPT | Performed by: FAMILY MEDICINE

## 2021-09-27 PROCEDURE — U0003 INFECTIOUS AGENT DETECTION BY NUCLEIC ACID (DNA OR RNA); SEVERE ACUTE RESPIRATORY SYNDROME CORONAVIRUS 2 (SARS-COV-2) (CORONAVIRUS DISEASE [COVID-19]), AMPLIFIED PROBE TECHNIQUE, MAKING USE OF HIGH THROUGHPUT TECHNOLOGIES AS DESCRIBED BY CMS-2020-01-R: HCPCS | Performed by: FAMILY MEDICINE

## 2021-09-27 PROCEDURE — U0005 INFEC AGEN DETEC AMPLI PROBE: HCPCS | Performed by: FAMILY MEDICINE

## 2021-09-27 NOTE — PATIENT INSTRUCTIONS
[address]    Your healthcare provider and/or public health staff have evaluated you and have determined that you do not need to remain in the hospital at this time  At this time you can be isolated at home where you will be monitored by staff from your local or state health department  You should carefully follow the prevention and isolation steps below until a healthcare provider or local or state health department says that you can return to your normal activities  Stay home except to get medical care    People who are mildly ill with COVID-19 are able to isolate at home during their illness  You should restrict activities outside your home, except for getting medical care  Do not go to work, school, or public areas  Avoid using public transportation, ride-sharing, or taxis  Separate yourself from other people and animals in your home    People: As much as possible, you should stay in a specific room and away from other people in your home  Also, you should use a separate bathroom, if available  Animals: You should restrict contact with pets and other animals while you are sick with COVID-19, just like you would around other people  Although there have not been reports of pets or other animals becoming sick with COVID-19, it is still recommended that people sick with COVID-19 limit contact with animals until more information is known about the virus  When possible, have another member of your household care for your animals while you are sick  If you are sick with COVID-19, avoid contact with your pet, including petting, snuggling, being kissed or licked, and sharing food  If you must care for your pet or be around animals while you are sick, wash your hands before and after you interact with pets and wear a facemask  See COVID-19 and Animals for more information      Call ahead before visiting your doctor    If you have a medical appointment, call the healthcare provider and tell them that you have or may have COVID-19  This will help the healthcare providers office take steps to keep other people from getting infected or exposed  Wear a facemask    You should wear a facemask when you are around other people (e g , sharing a room or vehicle) or pets and before you enter a healthcare providers office  If you are not able to wear a facemask (for example, because it causes trouble breathing), then people who live with you should not stay in the same room with you, or they should wear a facemask if they enter your room  Cover your coughs and sneezes    Cover your mouth and nose with a tissue when you cough or sneeze  Throw used tissues in a lined trash can  Immediately wash your hands with soap and water for at least 20 seconds or, if soap and water are not available, clean your hands with an alcohol-based hand  that contains at least 60% alcohol  Clean your hands often    Wash your hands often with soap and water for at least 20 seconds, especially after blowing your nose, coughing, or sneezing; going to the bathroom; and before eating or preparing food  If soap and water are not readily available, use an alcohol-based hand  with at least 60% alcohol, covering all surfaces of your hands and rubbing them together until they feel dry  Soap and water are the best option if hands are visibly dirty  Avoid touching your eyes, nose, and mouth with unwashed hands  Avoid sharing personal household items    You should not share dishes, drinking glasses, cups, eating utensils, towels, or bedding with other people or pets in your home  After using these items, they should be washed thoroughly with soap and water  Clean all high-touch surfaces everyday    High touch surfaces include counters, tabletops, doorknobs, bathroom fixtures, toilets, phones, keyboards, tablets, and bedside tables  Also, clean any surfaces that may have blood, stool, or body fluids on them   Use a household cleaning spray or wipe, according to the label instructions  Labels contain instructions for safe and effective use of the cleaning product including precautions you should take when applying the product, such as wearing gloves and making sure you have good ventilation during use of the product  Monitor your symptoms    Seek prompt medical attention if your illness is worsening (e g , difficulty breathing)  Before seeking care, call your healthcare provider and tell them that you have, or are being evaluated for, COVID-19  Put on a facemask before you enter the facility  These steps will help the healthcare providers office to keep other people in the office or waiting room from getting infected or exposed  Ask your healthcare provider to call the local or [facility] health department  Persons who are placed under active monitoring or facilitated self-monitoring should follow instructions provided by their local health department or occupational health professionals, as appropriate  If you have a medical emergency and need to call 911, notify the dispatch personnel that you have, or are being evaluated for COVID-19  If possible, put on a facemask before emergency medical services arrive      Discontinuing home isolation    Patients with confirmed COVID-19 should remain under home isolation precautions until the following conditions are met:   - They have had no fever for at least 24 hours (that is one full day of no fever without the use medicine that reduces fevers)  AND  - other symptoms have improved (for example, when their cough or shortness of breath have improved)  AND  - If had mild or moderate illness, at least 10 days have passed since their symptoms first appeared or if severe illness (needed oxygen) or immunosuppressed, at least 20 days have passed since symptoms first appeared  Patients with confirmed COVID-19 should also notify close contacts (including their workplace) and ask that they self-quarantine  Currently, close contact is defined as being within 6 feet for 15 minutes or more from the period 24 hours starting 48 hours before symptom onset to the time at which the patient went into isolation  Close contacts of patients diagnosed with COVID-19 should be instructed by the patient to self-quarantine for 14 days from the last time of their last contact with the patient  Source: [employer]    COVID TESTING TRIAGE:    Other Screening: (Travel, Work, School, etc  with no known direct exposure to [address]) - Refer to [employer] ([employer], [employer], [employer]) Call ahead to see if these locations provide these services  In an effort to best serve our community in this time of increased COVID activity, [facility] will only be providing COVID testing for those individuals who are symptomatic or have had a direct exposure to a COVID case  Unfortunately, due to resource constraints we are unable to provide testing for asymptomatic individuals who need a swab for clearance to travel, or return to work or school  Please note that [facility] Employee's in need of testing for return to work in a Network position can continue to obtain testing through this hotline  Thank you in advance for your understanding and cooperation during these challenging times  Symptomatic Patient or Exposed Patient(Close Contact with COVID + Person):  Testing Sites:   [facility]Address: [address] [place] [address]  oHours: Monday-Friday 8:00A - 5:00P  [address] ([facility])oAddress: [address] [place], [place] [address]MMTRVH: Monday-Friday 8:00A - 5:00P   [address] [facility])oAddress: [address] [facility] [place] [id number]: Monday-Friday 8:00A - 5:00P  [address] CenteroAddress: [address] [place], [place] [address]h Hours: Monday-Friday 8:00A - 5:00P   [facility]Address: [address], [place], [address] [address]ZZIZWD: Monday-Friday 8:00A - 5:00P & Saturday 8:00A - 1:00P   [facility] [employer] oAddress: [address]  [id number](located in the parking lot behind the building)oHours: Monday-Friday 8:00A - 1:00P   [address] [place] [address] oAddress: [address] [facility], [address] [place], [address]  [id number]: Monday-Friday 10:00A - 2:00P   Care Now Sites:  [address] Now [address] [facility]) oAddress: [address], [address]  [facility], [place], [address]: Monday-Friday 7:30A - 10:30P & Sat/Sun 8:00A - 8:00P   [facility] Now- [place] ([facility])oAddress: [address] [id number]: Monday-Friday 7:30A - 10:30P & Sat/Sun 8A - 8P  [address] Now- [place]Address: [address] [address]mFpcgi: Monday-Friday 8:00A - 8:00P & Sat/Sun 8:00A - 4:00P  [address] ([address] [facility],[address]Th [facility])oAddress: [address], [address]: Monday-Friday 8:00A - 8:00P & Sat/Sun 8:00A - 4:00P  [address] Now- [place] oAddress: [address] [facility] (2nd [facility]) Mony Patt: Monday-Friday 8:00A - 8:00P  [facility]otte [facility] Now-[place]ooAddress: [address] [place], [address] [address]EUYKUB: Monday-Friday 8:00A-8:00P & Sat/Sun 8:00A - 4:00P  [address] Now- [place]Address: [address] [facility] [id number]: Monday-Friday 8:00A - 8:00P & Sat/Sun 8:00A - 4:00P  [address], [place], [address] [address]CQSXHD: Monday-Friday 8:00A - 8:00P  [address] Now[facility]Address: [place], [address] [address]wYkazb: 7 days a week 8:00A - 8:00P    [address] [facility]Address: [address] [facility], [place], [address] [address]pFpvwy: Monday-Friday 8:00A - 8:00P  [address] Now [facility]Address: [address] [place], [place] [address]nWzroo: Monday-Friday 8:00A - 8:00P & Sat/Sun 8:00A - 4:00P   [facility])oAddress: [address] [place], [place] [address]RGNQDI: Monday-Friday 8:00A - 8:00P  [address] Now- [place]Address: [address] [place], [place] [address]ZLHHKQ: Monday-Friday 8:00A - 8:00P & Sat/Sun 8:00A - 4:00PSt  [facility] Now- [place]Address: [address] [place], [place] [address]GHUNTH: 7 days a week 8:00A - 8:00P    [facility] Now- [place]oAddress: [address], [place] [address]VIKFLU: 7 days a week 8:00A - 8:00P   [address] [employer] Now- [place]Address: [address], [place], [place] [address]qXhggy: Monday-Friday 7:30A - 10:30P & Sat/Sun 8:00A - 8:00P      Bamlanivimab and etesevimab are investigational medicines used to treat mild to moderate symptoms of COVID-19 in non-hospitalized adults and adolescents (15years of age and older who weigh at least 80 pounds (40 kg)), and who are at high risk for developing severe COVID-19 symptoms or the need for hospitalization  Bamlanivimab and etesevimab are investigational because they are still being studied  There is limited information known about the safety and effectiveness of using bamlanivimab and etesevimab to treat people with COVID-19  The FDA has authorized the emergency use of bamlanivimab and etesevimab for the treatment of COVID-19 under an Emergency Use Authorization (EUA)  How will I receive bamlanivimab and etesevimab?  Bamlanivimab and etesevimab are given at the same time through a vein (intravenous or IV)   You will receive one dose of bamlanivimab and etesevimab by IV infusion  The infusion will take 21-60 minutes or longer  Possible side effects of bamlanivimab and etesevimab: Allergic reactions can happen during and after infusion with bamlanivimab and etesevimab      Possible reactions include: fever, chills, nausea, headache, shortness of breath, low or high blood pressure, rapid or slow heart rate, chest discomfort or pain, weakness, confusion, feeling tired, wheezing, swelling of your lips, face, or throat, rash including hives, itching, muscle aches, dizziness, and sweating  These reactions may be severe or life threatening  Worsening symptoms after treatment: You may experience new or worsening symptoms after infusion, including fever, difficulty breathing, rapid or slow heart rate, tiredness, weakness or confusion  If these occur, contact your healthcare provider or seek immediate medical attention as some of these events have required hospitalization  It is unknown if these events are related to treatment or are due to the progression of COVID19  The side effects of getting any medicine by vein may include brief pain, bleeding, bruising of the skin, soreness, swelling, and possible infection at the infusion site  These are not all the possible side effects of bamlanivimab and etesevimab  Not a lot of people have been given bamlanivimab and etesevimab  Serious and unexpected side effects may happen  Bamlanivimab and etesevimab are still being studied so it is possible that all of the risks are not known at this time  It is possible that bamlanivimab and etesevimab could interfere with your body's own ability to fight off a future infection of SARS-CoV-2  Similarly, bamlanivimab and etesevimab may reduce your bodys immune response to a vaccine for SARS-CoV-2  Specific studies have not been conducted to address these possible risks  Talk to your healthcare provider if you have any questions  Emergency Use Authorization:    The [facility] FDA has made bamlanivimab and etesevimab available under an emergency access mechanism called an EUA   The EUA is supported by a  of Health and Human Service (HHS) declaration that circumstances exist to justify the emergency use of drugs and biological products during the COVID-19 pandemic  Bamlanivimab and etesevimab have not undergone the same type of review as an FDA-approved or cleared product  The FDA may issue an EUA when certain criteria are met, which includes that there are no adequate, approved, and available alternatives  In addition, the FDA decision is based on the totality of scientific evidence available showing that it is reasonable to believe that the product meets certain criteria for safety, performance, and labeling and may be effective in treatment of patients during the COVID-19 pandemic  All of these criteria must be met to allow for the product to be used in the treatment of patients during the COVID-19 pandemic  The EUA for bamlanivimab and etesevimab together is in effect for the duration of the COVID-19 declaration justifying emergency use of these products, unless terminated or revoked (after which the product may no longer be used)  What if I am pregnant or breastfeeding? There is limited experience treating pregnant women or breastfeeding mothers with bamlanivimab and etesevimab  For a mother and unborn baby, the benefit of receiving bamlanivimab and etesevimab may be greater than the risk from the treatment  If you are pregnant or breastfeeding, discuss your options and specific situation with your healthcare provider  Regarding COVID-19 Vaccination:    Currently there is no data or safety or efficacy of COVID-19 vaccination in persons who received monoclonal antibodies as part of COVID-19 treatment  Treatment should be deferred for at least 90 days to avoid interference of the treatment with vaccine-induced immune responses (this is based on estimated half-life of therapies and evidence suggesting reinfection is uncommon within 90 days of initial infection)      Full fact sheet document for patients can be found at: [employer] pt    The patient consents to proceed with bamlanivimab and etesevimab infusion  Possible side effects of bamlanivimab are: Allergic reactions   Allergic reactions can happen during and after infusion with bamlanivimab which include:    fever, chills, nausea, headache, shortness of breath, low blood pressure, wheezing, swelling of your lips, face, or  throat, rash including hives, itching, muscle aches, and dizziness  The side effects of getting any medicine by vein may include brief pain, bleeding, bruising of the skin, soreness,  swelling, and possible infection at the infusion site  These are not all the possible side effects of bamlanivimab  Not a lot of people have been given bamlanivimab  Serious and unexpected side effects may happen  Levora Fabry is still being studied so it is possible that all of  the risks are not known at this time  Please note that this drug was approved under the Emergency Use Authorization   of the FDA and has not gone through the full, formal FDA approval process    It is possible that bamlanivimab could interfere with your body's own ability to fight off a future infection of  SARS-CoV-2  Similarly, bamlanivimab may reduce your bodys immune response to a vaccine for SARS-CoV-2  Specific studies have not been conducted to address these possible risks  Currently there is no data or safety or efficacy of COVID-19 vaccination in persons who received monoclonal antibodies   as part of COVID-19 treatment  Treatment should be deferred for at least 90 days to avoid interference of the treatment   with vaccine-induced immune responses (this is based on estimated half-life of therapies and evidence suggesting reinfection   is uncommon within 90 days of initial infection)  The patient consents to proceed with bamlanivimab infusion      Table 2: Treatment-emergent Adverse Events Reported in at Least 1% of All  Subjects in BLAZE-1*         Bamlinivimab  Symptom Placebo 700 mg (N=101) 2000 mg (N=107) 7000 mg (N=101) Total (N=309)   Nausea 4% 3% 4% 5% 4%   Diarrhea 5% 1% 2% 7% 3%   Dizziness 2% 3% 3% 3% 3%   Headache 2% 3% 2% 0% 2%   Pruritis 1% 2% 3% 0% 2%   Vomiting 3% 1% 3% 1% 2%     *[email or web address]-[email or web address]  pdf    Instructions for Bamlanivimab infusion at [facility]    1  Once you arrive at [facility], please park in the small parking lot at the [address] entrance  Do not park in the main parking lot or in the parking garage  2  When you arrive, please call the infusion nurse at [phone] to be escorted into the clinic  3  It will be approximately a 4-hour visit  4  No visitors can accompany you into the clinic  If you need a , they will need to wait in the car until treatment is over or we can call them 30 minutes before you will be ready for   5  Please bring something to occupy your time for 4 hours, i e  book, i-Pad, phone, headphones, etc   6  Please make sure you bring your mask  You must wear a mask for the duration of your treatment  7  The clinic will have light refreshments and snacks available

## 2021-09-27 NOTE — PROGRESS NOTES
COVID-19 Outpatient Progress Note    Assessment/Plan:    Problem List Items Addressed This Visit        Endocrine    Controlled type 2 diabetes mellitus without complication, without long-term current use of insulin ([facility] [address] )       Cardiovascular and Mediastinum    Benign essential hypertension    Small vessel disease ([facility] [address] )      Other Visit Diagnoses     Viral infection, unspecified    -  Primary    Relevant Orders    Novel Coronavirus (Covid-19),PCR [facility] - Collected at [facility] or [facility]    Overweight             Disposition:     I referred patient to one of our centralized sites for a COVID-19 swab  Pending labs  Patient should remain in quarantine until further notice  If her COVID-19 test is negative, she can discontinue quarantine  If her COVID-19 test is positive, she should complete 10 days of isolation through Tuesday, 10/05/2021, at the earliest ending on Wednesday 10/06/2021    Monoclonal antibody infusion therapy was briefly discussed and information was placed in patient's Portal for review  If she is interested in this therapy, further counseling is needed    Use albuterol HFA p r n     I have spent 21 minutes directly with the patient  Greater than 50% of this time was spent in counseling/coordination of care regarding: diagnostic results, prognosis, risks and benefits of treatment options, instructions for management, patient and family education, importance of treatment compliance, risk factor reductions and impressions          Verification of patient location:    Patient is located in the following state in which I hold an active license [place]    Encounter provider Shani Maki DO    Provider located at [address]  [facility] [address] [place] [id number] [phone]    Recent Visits  Date Type Provider Dept   09/21/21 Telephone 1031 Mary Carmen Ramirez MD Pg Al Baird recent visits within past 7 days and meeting all other requirements  Today's Visits  Date Type Provider Dept   09/27/21 Telemedicine DO Chaim Rea   Showing today's visits and meeting all other requirements  Future Appointments  No visits were found meeting these conditions  Showing future appointments within next 150 days and meeting all other requirements     This virtual check-in was done via [employer] used as patient did not  [facility] and patient was informed that this is not a secure, HIPAA-compliant platform  She agrees to proceed  Patient agrees to participate in a virtual check in via telephone or video visit instead of presenting to the office to address urgent/immediate medical needs  Patient is aware this is a billable service  After connecting through [facility], the patient was identified by name and date of birth  Cornelius Mc was informed that this was a telemedicine visit and that the exam was being conducted confidentially over secure lines  My office door was closed  No one else was in the room  Cornelius Mc acknowledged consent and understanding of privacy and security of the telemedicine visit  I informed the patient that I have reviewed her record in Epic and presented the opportunity for her to ask any questions regarding the visit today  The patient agreed to participate  Subjective:   Cornelius Mc is a 77 y o  female who is concerned about COVID-19  Patient's symptoms include fatigue, rhinorrhea, sore throat, cough (Productive), shortness of breath (Slight), chest tightness (Slight), diarrhea (Resolved), myalgias and headache  Patient denies fever, chills, congestion, anosmia, loss of taste, abdominal pain, nausea and vomiting       Date of symptom onset: 9/25/2021  COVID-19 vaccination status: Fully vaccinated    Exposure:   Contact with a person who is under investigation (PUI) for or who is positive for COVID-19 within the last 14 days?: No    Hospitalized recently for fever and/or lower respiratory symptoms?: No      Currently a healthcare worker that is involved in direct patient care?: No      Works in a special setting where the risk of COVID-19 transmission may be high? (this may include long-term care, correctional and half-way facilities; homeless shelters; assisted-living facilities and group homes ): No      Resident in a special setting where the risk of COVID-19 transmission may be high? (this may include long-term care, correctional and half-way facilities; homeless shelters; assisted-living facilities and group homes ): No      She had a social gathering in her home on Friday, 9/24/21  Contact's children had COVID, but contact was not tested  Using Tylenol XS c benefit for myalgias and headaches  She is pushing fluids        Lab Results   Component Value Date    SARSCOV2 Negative 01/27/2021     Past Medical History:   Diagnosis Date    Diabetes mellitus [facility] [address] [facility]     Diabetes mellitus type 2, controlled ([facility] [address] )     Disease of thyroid gland     GERD (gastroesophageal reflux disease)     Glaucoma     H/O tooth extraction     Had top row of teeth removed around April 2016    Headache(784 0)     Hypertension     Pneumonia     Stroke ([facility] [address] )     TIA (transient ischemic attack)     Visual impairment      Past Surgical History:   Procedure Laterality Date    BREAST BIOPSY Left     many years ago  papiloma    CATARACT EXTRACTION Right     5 years ago    CYST REMOVAL Left 04/15/2014    ear lobe      EAR SURGERY  1970    Left ear cyst removal     EYE SURGERY      OTHER SURGICAL HISTORY       right upper arm        Current Outpatient Medications   Medication Sig Dispense Refill    albuterol (PROVENTIL HFA,VENTOLIN HFA) 90 mcg/act inhaler Inhale 2 puffs every 4 (four) hours as needed for wheezing or shortness of breath 1 Inhaler 1    aspirin 81 MG tablet Take 81 mg by mouth daily      COMBIGAN 0 2-0 5 % Administer 1 drop to the right eye 2 (two) times a day   3    dorzolamide-timolol (COSOPT) 22 3-6 8 MG/ML ophthalmic solution       gatifloxacin (ZYMAXID) 0 5 % INSTILL 1 DROP INTO LEFT EYE FOUR TIMES DAILY STARTING 2 DAYS PRIOR TO SURGERY      glucose blood (OneTouch Verio) test strip USE TO TEST BLOOD SUGAR UP TO FOUR TIMES DAILY 300 each 1    Insulin Pen Needle (Pen Needles) 32G X 4 MM MISC To use daily with victoza 100 each 3    ketorolac (ACULAR) 0 5 % ophthalmic solution INSTILL 1 DROP IN LEFT EYE FOUR TIMES DAILY AFTER SURGERY      levothyroxine 100 mcg tablet Take 1 tablet (100 mcg total) by mouth daily BRAND SYNTHROID PLEASE 90 tablet 1    liraglutide (VICTOZA) injection Inject 0 3 mL (1 8 mg total) under the skin daily 9 mL 1    losartan-hydrochlorothiazide (HYZAAR) 50-12 5 mg per tablet Take 1 tablet by mouth daily 90 tablet 1    LUMIGAN 0 01 % ophthalmic drops Administer 1 drop to both eyes daily at bedtime   3    metFORMIN (GLUCOPHAGE) 500 mg tablet Take 1 tablet (500 mg total) by mouth 2 (two) times a day with meals START WITH 1 TABLET BY MOUTH DAILY FOR 1 TABLET WEEK, THEN 1 TABLET BY MOUTH TWICE DAILY FOR 1 WEEK, THEN 2 TABLETS BY MOUTH TWICE DAILY 180 tablet 1    OneTouch Delica Lancets 72N MISC To test BS up to four times daily- E11 65 200 each 5    prednisoLONE acetate (PRED FORTE) 1 % ophthalmic suspension INSTILL 1 DROP TO LEFT EYE EVERY 1 TO 2 HOURS AFTER SURGERY      rosuvastatin (CRESTOR) 5 mg tablet Take 1 tablet (5 mg total) by mouth daily 90 tablet 1    dorzolamide (TRUSOPT) 2 % ophthalmic solution Administer 1 drop to the right eye 3 (three) times a day  (Patient not taking: Reported on 8/25/2021)  1     No current facility-administered medications for this visit       Allergies   Allergen Reactions    Eggs Or Egg-Derived Products - Food Allergy Anaphylaxis    Albumen, Egg - Food Allergy Hives    Antihistamines, Diphenhydramine-Type     Epinephrine Syncope     Root canal 25 years ago     Fluzone [Flu Virus Vaccine] Hives, Abdominal Pain and Facial Swelling    Iv Contrast [Iodinated Diagnostic Agents] Headache     Headache, swelling around eyes, eye burning- with CT contrast   No rxn to MRI contrast    Lidocaine      Syncope, tachycardia with local anesthetic with epinephrine    Zinc Rash       Review of Systems   Constitutional: Positive for fatigue  Negative for chills and fever  HENT: Positive for rhinorrhea and sore throat  Negative for congestion  Respiratory: Positive for cough (Productive), chest tightness (Slight) and shortness of breath (Slight)  Gastrointestinal: Positive for diarrhea (Resolved)  Negative for abdominal pain, nausea and vomiting  Musculoskeletal: Positive for myalgias  Neurological: Positive for headaches  Objective:    Vitals:    09/27/21 1029   BP: 128/67   Pulse: 62   Temp: 97 8 °F (36 6 °C)   SpO2: 95%   Weight: 82 1 kg (181 lb)   Height: 5' 6" (1 676 m)       Physical Exam  Vitals and nursing note reviewed  Constitutional:       General: She is not in acute distress  Appearance: Normal appearance  She is well-developed  HENT:      Head: Normocephalic and atraumatic  Right Ear: External ear normal       Left Ear: External ear normal       Nose: Nose normal       Mouth/Throat:      Mouth: Mucous membranes are moist       Pharynx: Oropharynx is clear  Eyes:      Extraocular Movements: Extraocular movements intact  Conjunctiva/sclera: Conjunctivae normal    Cardiovascular:      Rate and Rhythm: Normal rate  Pulmonary:      Effort: Pulmonary effort is normal  No respiratory distress  Abdominal:      Palpations: Abdomen is soft  Tenderness: There is no abdominal tenderness  There is no guarding or rebound  Musculoskeletal:         General: No swelling  Cervical back: Neck supple  Right lower leg: No edema  Left lower leg: No edema  Skin:     General: Skin is warm and dry  Neurological:      General: No focal deficit present        Mental Status: She is alert and oriented to person, place, and time  Psychiatric:         Mood and Affect: Mood normal          VIRTUAL VISIT DISCLAIMER    Cosmo Pham verbally agrees to participate in [employer]  Pt is aware that Virtual Care Services could be limited without vital signs or the ability to perform a full hands-on physical exam  Chanell Hal Minors understands she or the provider may request at any time to terminate the video visit and request the patient to seek care or treatment in person

## 2021-09-28 ENCOUNTER — HOSPITAL ENCOUNTER (OUTPATIENT)
Dept: MRI IMAGING | Facility: HOSPITAL | Age: 76
Discharge: HOME | End: 2021-09-28
Attending: PSYCHIATRY & NEUROLOGY
Payer: MEDICARE

## 2021-09-28 ENCOUNTER — APPOINTMENT (OUTPATIENT)
Dept: PHYSICAL THERAPY | Facility: REHABILITATION | Age: 76
End: 2021-09-28
Payer: MEDICARE

## 2021-09-28 DIAGNOSIS — R20.0 LEFT ARM NUMBNESS: ICD-10-CM

## 2021-09-28 PROCEDURE — G1004 CDSM NDSC: HCPCS

## 2021-09-28 PROCEDURE — 70551 MRI BRAIN STEM W/O DYE: CPT

## 2021-09-30 ENCOUNTER — APPOINTMENT (OUTPATIENT)
Dept: PHYSICAL THERAPY | Facility: REHABILITATION | Age: 76
End: 2021-09-30
Payer: MEDICARE

## 2021-10-06 ENCOUNTER — OFFICE VISIT (OUTPATIENT)
Dept: NEUROLOGY | Facility: CLINIC | Age: 76
End: 2021-10-06
Payer: MEDICARE

## 2021-10-06 VITALS
TEMPERATURE: 96.9 F | HEART RATE: 76 BPM | WEIGHT: 181 LBS | SYSTOLIC BLOOD PRESSURE: 120 MMHG | DIASTOLIC BLOOD PRESSURE: 70 MMHG | HEIGHT: 66 IN | BODY MASS INDEX: 29.09 KG/M2

## 2021-10-06 DIAGNOSIS — M54.2 NECK PAIN: ICD-10-CM

## 2021-10-06 DIAGNOSIS — G89.29 CHRONIC LEFT-SIDED BACK PAIN: Primary | ICD-10-CM

## 2021-10-06 DIAGNOSIS — M54.9 CHRONIC LEFT-SIDED BACK PAIN: Primary | ICD-10-CM

## 2021-10-06 PROCEDURE — 99215 OFFICE O/P EST HI 40 MIN: CPT | Performed by: PSYCHIATRY & NEUROLOGY

## 2021-10-07 ENCOUNTER — APPOINTMENT (OUTPATIENT)
Dept: PHYSICAL THERAPY | Facility: REHABILITATION | Age: 76
End: 2021-10-07
Payer: MEDICARE

## 2021-10-15 ENCOUNTER — CONSULT (OUTPATIENT)
Dept: PAIN MEDICINE | Facility: MEDICAL CENTER | Age: 76
End: 2021-10-15
Payer: MEDICARE

## 2021-10-15 ENCOUNTER — APPOINTMENT (OUTPATIENT)
Dept: PHYSICAL THERAPY | Facility: REHABILITATION | Age: 76
End: 2021-10-15
Payer: MEDICARE

## 2021-10-15 VITALS
SYSTOLIC BLOOD PRESSURE: 118 MMHG | HEIGHT: 65 IN | DIASTOLIC BLOOD PRESSURE: 58 MMHG | HEART RATE: 81 BPM | BODY MASS INDEX: 30.82 KG/M2 | TEMPERATURE: 97.8 F | WEIGHT: 185 LBS

## 2021-10-15 DIAGNOSIS — M47.812 CERVICAL SPONDYLOSIS: Primary | ICD-10-CM

## 2021-10-15 DIAGNOSIS — M54.2 NECK PAIN: ICD-10-CM

## 2021-10-15 PROCEDURE — 99204 OFFICE O/P NEW MOD 45 MIN: CPT | Performed by: PHYSICAL MEDICINE & REHABILITATION

## 2021-10-19 ENCOUNTER — DOCUMENTATION (OUTPATIENT)
Dept: NEUROLOGY | Facility: CLINIC | Age: 76
End: 2021-10-19

## 2021-10-20 ENCOUNTER — OFFICE VISIT (OUTPATIENT)
Dept: PHYSICAL THERAPY | Facility: REHABILITATION | Age: 76
End: 2021-10-20
Payer: MEDICARE

## 2021-10-20 DIAGNOSIS — G45.9 TIA (TRANSIENT ISCHEMIC ATTACK): ICD-10-CM

## 2021-10-20 DIAGNOSIS — R53.1 LEFT-SIDED WEAKNESS: Primary | ICD-10-CM

## 2021-10-20 PROCEDURE — 97112 NEUROMUSCULAR REEDUCATION: CPT | Performed by: PHYSICAL THERAPIST

## 2021-10-20 PROCEDURE — 97110 THERAPEUTIC EXERCISES: CPT | Performed by: PHYSICAL THERAPIST

## 2021-10-26 ENCOUNTER — APPOINTMENT (OUTPATIENT)
Dept: PHYSICAL THERAPY | Facility: REHABILITATION | Age: 76
End: 2021-10-26
Payer: MEDICARE

## 2021-10-29 ENCOUNTER — APPOINTMENT (OUTPATIENT)
Dept: PHYSICAL THERAPY | Facility: REHABILITATION | Age: 76
End: 2021-10-29
Payer: MEDICARE

## 2021-11-02 ENCOUNTER — OFFICE VISIT (OUTPATIENT)
Dept: PHYSICAL THERAPY | Facility: REHABILITATION | Age: 76
End: 2021-11-02
Payer: MEDICARE

## 2021-11-02 DIAGNOSIS — R53.1 LEFT-SIDED WEAKNESS: Primary | ICD-10-CM

## 2021-11-02 DIAGNOSIS — G45.9 TIA (TRANSIENT ISCHEMIC ATTACK): ICD-10-CM

## 2021-11-02 PROCEDURE — 97112 NEUROMUSCULAR REEDUCATION: CPT | Performed by: PHYSICAL THERAPIST

## 2021-11-03 ENCOUNTER — HOSPITAL ENCOUNTER (OUTPATIENT)
Dept: RADIOLOGY | Facility: MEDICAL CENTER | Age: 76
Discharge: HOME | End: 2021-11-03
Attending: PHYSICAL MEDICINE & REHABILITATION
Payer: MEDICARE

## 2021-11-03 VITALS
RESPIRATION RATE: 18 BRPM | DIASTOLIC BLOOD PRESSURE: 82 MMHG | SYSTOLIC BLOOD PRESSURE: 149 MMHG | TEMPERATURE: 97.8 F | HEART RATE: 85 BPM | OXYGEN SATURATION: 99 %

## 2021-11-03 DIAGNOSIS — M54.2 NECK PAIN: ICD-10-CM

## 2021-11-03 DIAGNOSIS — M47.812 CERVICAL SPONDYLOSIS: ICD-10-CM

## 2021-11-03 PROCEDURE — 64491 INJ PARAVERT F JNT C/T 2 LEV: CPT | Performed by: PHYSICAL MEDICINE & REHABILITATION

## 2021-11-03 PROCEDURE — 64490 INJ PARAVERT F JNT C/T 1 LEV: CPT | Performed by: PHYSICAL MEDICINE & REHABILITATION

## 2021-11-03 RX ADMIN — Medication 1.5 ML: at 14:45

## 2021-11-05 ENCOUNTER — APPOINTMENT (OUTPATIENT)
Dept: PHYSICAL THERAPY | Facility: REHABILITATION | Age: 76
End: 2021-11-05
Payer: MEDICARE

## 2021-11-05 ENCOUNTER — TELEPHONE (OUTPATIENT)
Dept: RADIOLOGY | Facility: MEDICAL CENTER | Age: 76
End: 2021-11-05

## 2021-11-09 ENCOUNTER — OFFICE VISIT (OUTPATIENT)
Dept: PHYSICAL THERAPY | Facility: REHABILITATION | Age: 76
End: 2021-11-09
Payer: MEDICARE

## 2021-11-09 DIAGNOSIS — G45.9 TIA (TRANSIENT ISCHEMIC ATTACK): ICD-10-CM

## 2021-11-09 DIAGNOSIS — R53.1 LEFT-SIDED WEAKNESS: Primary | ICD-10-CM

## 2021-11-09 PROCEDURE — 97112 NEUROMUSCULAR REEDUCATION: CPT | Performed by: PHYSICAL THERAPIST

## 2021-11-12 ENCOUNTER — APPOINTMENT (OUTPATIENT)
Dept: PHYSICAL THERAPY | Facility: REHABILITATION | Age: 76
End: 2021-11-12
Payer: MEDICARE

## 2021-11-17 ENCOUNTER — TELEPHONE (OUTPATIENT)
Dept: PAIN MEDICINE | Facility: MEDICAL CENTER | Age: 76
End: 2021-11-17

## 2021-11-22 ENCOUNTER — HOSPITAL ENCOUNTER (OUTPATIENT)
Dept: RADIOLOGY | Facility: MEDICAL CENTER | Age: 76
Discharge: HOME | End: 2021-11-22
Attending: PHYSICAL MEDICINE & REHABILITATION
Payer: MEDICARE

## 2021-11-22 VITALS
OXYGEN SATURATION: 97 % | RESPIRATION RATE: 18 BRPM | TEMPERATURE: 97.8 F | DIASTOLIC BLOOD PRESSURE: 75 MMHG | HEART RATE: 68 BPM | SYSTOLIC BLOOD PRESSURE: 143 MMHG

## 2021-11-22 DIAGNOSIS — M47.812 CERVICAL SPONDYLOSIS: ICD-10-CM

## 2021-11-22 DIAGNOSIS — M54.2 CERVICALGIA: ICD-10-CM

## 2021-11-22 PROCEDURE — 64490 INJ PARAVERT F JNT C/T 1 LEV: CPT | Performed by: PHYSICAL MEDICINE & REHABILITATION

## 2021-11-22 PROCEDURE — 64491 INJ PARAVERT F JNT C/T 2 LEV: CPT | Performed by: PHYSICAL MEDICINE & REHABILITATION

## 2021-11-22 RX ORDER — BUPIVACAINE HYDROCHLORIDE 5 MG/ML
1.5 INJECTION, SOLUTION EPIDURAL; INTRACAUDAL ONCE
Status: COMPLETED | OUTPATIENT
Start: 2021-11-22 | End: 2021-11-22

## 2021-11-22 RX ADMIN — BUPIVACAINE HYDROCHLORIDE 1.5 ML: 5 INJECTION, SOLUTION EPIDURAL; INTRACAUDAL at 15:13

## 2021-11-29 ENCOUNTER — TELEPHONE (OUTPATIENT)
Dept: FAMILY MEDICINE CLINIC | Facility: CLINIC | Age: 76
End: 2021-11-29

## 2021-11-29 DIAGNOSIS — E55.9 VITAMIN D DEFICIENCY: ICD-10-CM

## 2021-11-29 DIAGNOSIS — I10 BENIGN ESSENTIAL HYPERTENSION: ICD-10-CM

## 2021-11-29 DIAGNOSIS — E03.9 HYPOTHYROIDISM, UNSPECIFIED TYPE: ICD-10-CM

## 2021-11-29 DIAGNOSIS — E11.9 CONTROLLED TYPE 2 DIABETES MELLITUS WITHOUT COMPLICATION, WITHOUT LONG-TERM CURRENT USE OF INSULIN (HCC): Primary | ICD-10-CM

## 2021-12-02 ENCOUNTER — APPOINTMENT (OUTPATIENT)
Dept: LAB | Facility: CLINIC | Age: 76
End: 2021-12-02
Payer: MEDICARE

## 2021-12-02 DIAGNOSIS — I10 BENIGN ESSENTIAL HYPERTENSION: ICD-10-CM

## 2021-12-02 DIAGNOSIS — E11.9 CONTROLLED TYPE 2 DIABETES MELLITUS WITHOUT COMPLICATION, WITHOUT LONG-TERM CURRENT USE OF INSULIN (HCC): ICD-10-CM

## 2021-12-02 DIAGNOSIS — E03.9 HYPOTHYROIDISM, UNSPECIFIED TYPE: ICD-10-CM

## 2021-12-02 DIAGNOSIS — E55.9 VITAMIN D DEFICIENCY: ICD-10-CM

## 2021-12-02 LAB
25(OH)D3 SERPL-MCNC: 25.7 NG/ML (ref 30–100)
ALBUMIN SERPL BCP-MCNC: 3.8 G/DL (ref 3.5–5)
ALP SERPL-CCNC: 76 U/L (ref 46–116)
ALT SERPL W P-5'-P-CCNC: 29 U/L (ref 12–78)
ANION GAP SERPL CALCULATED.3IONS-SCNC: 5 MMOL/L (ref 4–13)
AST SERPL W P-5'-P-CCNC: 19 U/L (ref 5–45)
BASOPHILS # BLD AUTO: 0.11 THOUSANDS/ΜL (ref 0–0.1)
BASOPHILS NFR BLD AUTO: 1 % (ref 0–1)
BILIRUB SERPL-MCNC: 1.22 MG/DL (ref 0.2–1)
BUN SERPL-MCNC: 18 MG/DL (ref 5–25)
CALCIUM SERPL-MCNC: 9.3 MG/DL (ref 8.3–10.1)
CHLORIDE SERPL-SCNC: 103 MMOL/L (ref 100–108)
CHOLEST SERPL-MCNC: 115 MG/DL
CO2 SERPL-SCNC: 28 MMOL/L (ref 21–32)
CREAT SERPL-MCNC: 0.85 MG/DL (ref 0.6–1.3)
CREAT UR-MCNC: 122 MG/DL
EOSINOPHIL # BLD AUTO: 0.1 THOUSAND/ΜL (ref 0–0.61)
EOSINOPHIL NFR BLD AUTO: 1 % (ref 0–6)
ERYTHROCYTE [DISTWIDTH] IN BLOOD BY AUTOMATED COUNT: 12.6 % (ref 11.6–15.1)
EST. AVERAGE GLUCOSE BLD GHB EST-MCNC: 148 MG/DL
GFR SERPL CREATININE-BSD FRML MDRD: 68 ML/MIN/1.73SQ M
GLUCOSE P FAST SERPL-MCNC: 167 MG/DL (ref 65–99)
HBA1C MFR BLD: 6.8 %
HCT VFR BLD AUTO: 43 % (ref 34.8–46.1)
HDLC SERPL-MCNC: 57 MG/DL
HGB BLD-MCNC: 14 G/DL (ref 11.5–15.4)
IMM GRANULOCYTES # BLD AUTO: 0.03 THOUSAND/UL (ref 0–0.2)
IMM GRANULOCYTES NFR BLD AUTO: 0 % (ref 0–2)
LDLC SERPL CALC-MCNC: 39 MG/DL (ref 0–100)
LYMPHOCYTES # BLD AUTO: 1.87 THOUSANDS/ΜL (ref 0.6–4.47)
LYMPHOCYTES NFR BLD AUTO: 20 % (ref 14–44)
MCH RBC QN AUTO: 32.4 PG (ref 26.8–34.3)
MCHC RBC AUTO-ENTMCNC: 32.6 G/DL (ref 31.4–37.4)
MCV RBC AUTO: 100 FL (ref 82–98)
MICROALBUMIN UR-MCNC: 11.7 MG/L (ref 0–20)
MICROALBUMIN/CREAT 24H UR: 10 MG/G CREATININE (ref 0–30)
MONOCYTES # BLD AUTO: 0.78 THOUSAND/ΜL (ref 0.17–1.22)
MONOCYTES NFR BLD AUTO: 8 % (ref 4–12)
NEUTROPHILS # BLD AUTO: 6.51 THOUSANDS/ΜL (ref 1.85–7.62)
NEUTS SEG NFR BLD AUTO: 70 % (ref 43–75)
NRBC BLD AUTO-RTO: 0 /100 WBCS
PLATELET # BLD AUTO: 343 THOUSANDS/UL (ref 149–390)
PMV BLD AUTO: 10.7 FL (ref 8.9–12.7)
POTASSIUM SERPL-SCNC: 4.3 MMOL/L (ref 3.5–5.3)
PROT SERPL-MCNC: 7.7 G/DL (ref 6.4–8.2)
RBC # BLD AUTO: 4.32 MILLION/UL (ref 3.81–5.12)
SODIUM SERPL-SCNC: 136 MMOL/L (ref 136–145)
TRIGL SERPL-MCNC: 97 MG/DL
TSH SERPL DL<=0.05 MIU/L-ACNC: 0.38 UIU/ML (ref 0.36–3.74)
WBC # BLD AUTO: 9.4 THOUSAND/UL (ref 4.31–10.16)

## 2021-12-02 PROCEDURE — 84443 ASSAY THYROID STIM HORMONE: CPT

## 2021-12-02 PROCEDURE — 83036 HEMOGLOBIN GLYCOSYLATED A1C: CPT

## 2021-12-02 PROCEDURE — 36415 COLL VENOUS BLD VENIPUNCTURE: CPT

## 2021-12-02 PROCEDURE — 82306 VITAMIN D 25 HYDROXY: CPT

## 2021-12-02 PROCEDURE — 80061 LIPID PANEL: CPT

## 2021-12-02 PROCEDURE — 85025 COMPLETE CBC W/AUTO DIFF WBC: CPT

## 2021-12-02 PROCEDURE — 82043 UR ALBUMIN QUANTITATIVE: CPT

## 2021-12-02 PROCEDURE — 80053 COMPREHEN METABOLIC PANEL: CPT

## 2021-12-02 PROCEDURE — 82570 ASSAY OF URINE CREATININE: CPT

## 2021-12-06 ENCOUNTER — OFFICE VISIT (OUTPATIENT)
Dept: FAMILY MEDICINE CLINIC | Facility: CLINIC | Age: 76
End: 2021-12-06
Payer: MEDICARE

## 2021-12-06 ENCOUNTER — TELEPHONE (OUTPATIENT)
Dept: RADIOLOGY | Facility: MEDICAL CENTER | Age: 76
End: 2021-12-06

## 2021-12-06 VITALS
HEART RATE: 79 BPM | WEIGHT: 188 LBS | RESPIRATION RATE: 18 BRPM | SYSTOLIC BLOOD PRESSURE: 142 MMHG | DIASTOLIC BLOOD PRESSURE: 72 MMHG | OXYGEN SATURATION: 97 % | TEMPERATURE: 97.5 F | HEIGHT: 65 IN | BODY MASS INDEX: 31.32 KG/M2

## 2021-12-06 DIAGNOSIS — I10 BENIGN ESSENTIAL HYPERTENSION: ICD-10-CM

## 2021-12-06 DIAGNOSIS — J43.8 OTHER EMPHYSEMA (HCC): ICD-10-CM

## 2021-12-06 DIAGNOSIS — E11.9 CONTROLLED TYPE 2 DIABETES MELLITUS WITHOUT COMPLICATION, WITHOUT LONG-TERM CURRENT USE OF INSULIN (HCC): ICD-10-CM

## 2021-12-06 DIAGNOSIS — E55.9 VITAMIN D DEFICIENCY: Primary | ICD-10-CM

## 2021-12-06 DIAGNOSIS — E78.2 MIXED HYPERLIPIDEMIA: ICD-10-CM

## 2021-12-06 DIAGNOSIS — E03.9 HYPOTHYROIDISM, UNSPECIFIED TYPE: ICD-10-CM

## 2021-12-06 PROCEDURE — 99214 OFFICE O/P EST MOD 30 MIN: CPT | Performed by: FAMILY MEDICINE

## 2021-12-06 RX ORDER — LOSARTAN POTASSIUM AND HYDROCHLOROTHIAZIDE 12.5; 5 MG/1; MG/1
1 TABLET ORAL DAILY
Qty: 90 TABLET | Refills: 1 | Status: SHIPPED | OUTPATIENT
Start: 2021-12-06 | End: 2022-04-12 | Stop reason: SDUPTHER

## 2021-12-06 RX ORDER — ROSUVASTATIN CALCIUM 5 MG/1
5 TABLET, COATED ORAL DAILY
Qty: 90 TABLET | Refills: 1 | Status: SHIPPED | OUTPATIENT
Start: 2021-12-06 | End: 2022-04-08

## 2021-12-06 RX ORDER — ACETAMINOPHEN 160 MG
2000 TABLET,DISINTEGRATING ORAL DAILY
Start: 2021-12-06 | End: 2022-05-18 | Stop reason: DRUGHIGH

## 2021-12-06 RX ORDER — PEN NEEDLE, DIABETIC 30 GX3/16"
NEEDLE, DISPOSABLE MISCELLANEOUS
Qty: 100 EACH | Refills: 3 | Status: SHIPPED | OUTPATIENT
Start: 2021-12-06

## 2021-12-06 RX ORDER — LEVOTHYROXINE SODIUM 0.1 MG/1
100 TABLET ORAL DAILY
Qty: 90 TABLET | Refills: 1 | Status: SHIPPED | OUTPATIENT
Start: 2021-12-06 | End: 2022-04-21 | Stop reason: SDUPTHER

## 2022-01-04 ENCOUNTER — TELEPHONE (OUTPATIENT)
Dept: PAIN MEDICINE | Facility: MEDICAL CENTER | Age: 77
End: 2022-01-04

## 2022-01-04 ENCOUNTER — HOSPITAL ENCOUNTER (OUTPATIENT)
Dept: RADIOLOGY | Facility: MEDICAL CENTER | Age: 77
Discharge: HOME | End: 2022-01-04
Attending: PHYSICAL MEDICINE & REHABILITATION | Admitting: PHYSICAL MEDICINE & REHABILITATION
Payer: MEDICARE

## 2022-01-04 VITALS
HEART RATE: 78 BPM | OXYGEN SATURATION: 95 % | RESPIRATION RATE: 18 BRPM | TEMPERATURE: 97.4 F | SYSTOLIC BLOOD PRESSURE: 122 MMHG | DIASTOLIC BLOOD PRESSURE: 81 MMHG

## 2022-01-04 DIAGNOSIS — M47.812 CERVICAL SPONDYLOSIS: ICD-10-CM

## 2022-01-04 DIAGNOSIS — M54.2 CERVICALGIA: ICD-10-CM

## 2022-01-04 PROCEDURE — 64633 DESTROY CERV/THOR FACET JNT: CPT | Performed by: PHYSICAL MEDICINE & REHABILITATION

## 2022-01-04 PROCEDURE — 64634 DESTROY C/TH FACET JNT ADDL: CPT | Performed by: PHYSICAL MEDICINE & REHABILITATION

## 2022-01-04 RX ADMIN — Medication 5 ML: at 08:58

## 2022-01-04 NOTE — DISCHARGE INSTRUCTIONS

## 2022-01-05 NOTE — TELEPHONE ENCOUNTER
--AC--    RN s/w pt regarding previous  Pt was very sore last night, better today  Denied s/s of infection or sunburn like sensation at injection sites  Is taking extra strength tylenol for discomfort aware may use ise or heat today Ibuprofen or tylenol and that we do not expect any pain relief for 2 weeks and up to 4-6 weeks for full pain effect to be achieved  6 week f/u appt made for 2/14

## 2022-03-17 ENCOUNTER — TELEPHONE (OUTPATIENT)
Dept: NEUROLOGY | Facility: CLINIC | Age: 77
End: 2022-03-17

## 2022-03-17 NOTE — TELEPHONE ENCOUNTER
I spoke with patient advise her that Dr Brooks stated she she is having symptoms she needs to go to ER  Patient refused to go to ER and stated  that they don't do anything for her but put her on a monitor and send her home  Patient stated that she will wait until visit with Katy Herrera

## 2022-03-17 NOTE — TELEPHONE ENCOUNTER
Spoke with patient due to concern for having another TIA, Saturday, March 12, she states her usual TIA's last about 3-4 min but this one lasted a bit longer 7 min, her left arm and leg became "wet noodles" no residual effect, but patient wanted to schedule appointment to speak with a provider about it  I did not find any availability on Dr Mila Oquendo schedule so scheduled with Neida Aden  Patient is asking if she should have any testing prior to the appointment (Scheduled April 7)  Please advise and I will reach back out to the patient

## 2022-03-17 NOTE — TELEPHONE ENCOUNTER
Gianna Beck I called patient and may can get her in today at 3:30 to see Dr Minnie Jose   I will revisit the call in a hour thanks

## 2022-04-01 ENCOUNTER — TELEPHONE (OUTPATIENT)
Dept: OBGYN CLINIC | Facility: CLINIC | Age: 77
End: 2022-04-01

## 2022-04-01 NOTE — TELEPHONE ENCOUNTER
When reaching out to the patient about her appointment with Dr Zachery Pedraza on 4/11 that needed to be reschedule the patient got very nasty with me and demanding that she has to be seen within the next couple days  She said she is getting tired of this she received an injection that didn't work and she will only schedule with Dr Zachery Pedraza   She then said she will be calling administration and told me to call her when we have an appointment for her and hung up the phone

## 2022-04-04 ENCOUNTER — APPOINTMENT (OUTPATIENT)
Dept: RADIOLOGY | Facility: MEDICAL CENTER | Age: 77
End: 2022-04-04
Payer: MEDICARE

## 2022-04-04 ENCOUNTER — OFFICE VISIT (OUTPATIENT)
Dept: PAIN MEDICINE | Facility: MEDICAL CENTER | Age: 77
End: 2022-04-04
Payer: MEDICARE

## 2022-04-04 VITALS
SYSTOLIC BLOOD PRESSURE: 140 MMHG | BODY MASS INDEX: 30.13 KG/M2 | HEIGHT: 67 IN | WEIGHT: 192 LBS | HEART RATE: 88 BPM | DIASTOLIC BLOOD PRESSURE: 82 MMHG

## 2022-04-04 DIAGNOSIS — M79.18 MYOFASCIAL PAIN SYNDROME: ICD-10-CM

## 2022-04-04 DIAGNOSIS — M75.81 ROTATOR CUFF TENDINITIS, RIGHT: ICD-10-CM

## 2022-04-04 DIAGNOSIS — M25.511 CHRONIC RIGHT SHOULDER PAIN: Primary | ICD-10-CM

## 2022-04-04 DIAGNOSIS — M54.2 NECK PAIN: ICD-10-CM

## 2022-04-04 DIAGNOSIS — G89.29 CHRONIC RIGHT SHOULDER PAIN: ICD-10-CM

## 2022-04-04 DIAGNOSIS — M25.511 CHRONIC RIGHT SHOULDER PAIN: ICD-10-CM

## 2022-04-04 DIAGNOSIS — G89.29 CHRONIC RIGHT SHOULDER PAIN: Primary | ICD-10-CM

## 2022-04-04 PROCEDURE — 73030 X-RAY EXAM OF SHOULDER: CPT

## 2022-04-04 PROCEDURE — 99214 OFFICE O/P EST MOD 30 MIN: CPT | Performed by: PHYSICAL MEDICINE & REHABILITATION

## 2022-04-04 NOTE — PROGRESS NOTES
Assessment:  1  Chronic right shoulder pain    2  Rotator cuff tendinitis, right    3  Neck pain    4  Myofascial pain syndrome        Plan:  1  At this time we will obtain x-rays of the right shoulder  2  Schedule for right subacromial subdeltoid bursa injection under ultrasound guidance  3  Continue with current medication regimen including Aspercreme topically over the shoulder as well as Tylenol and advil combined  Orders Placed This Encounter   Procedures    XR shoulder 2+ vw right     Standing Status:   Future     Standing Expiration Date:   4/4/2026     Scheduling Instructions:      Bring along any outside films relating to this procedure  No orders of the defined types were placed in this encounter  My impressions and treatment recommendations were discussed in detail with the patient, who verbalized understanding and had no further questions  Discharge instructions were provided  I personally saw and examined the patient and I agree with the above discussed plan of care  History of Present Illness:    Christine Jones is a 77 y o  female who presents to [facility] for initial evaluation of the above stated pain complaints  She is returning in follow-up after completion of right C4-6 radiofrequency ablation  Unfortunately this failed to provide any significant relief and she actually has been having worsening pain out towards the right shoulder  She notices severe pain when moving the shoulder in different positions and a tearing type sensation at times  She also continues to have some focal soreness around the right cervical spine near the area that was addressed  She denies any sunburn type sensation in that area  She is currently rating the pain as a 7/10 which is constant characterized as burning and sharp  Medications only provide about 60% relief of symptoms and she is hopeful for further relief      The pain does lead to rather significant including moderate and severe functional deficits including decreased range of motion joint stiffness and pain as noted  Review of Systems:    Review of Systems   Constitutional: Negative for chills, fatigue and unexpected weight change  HENT: Negative for ear pain, mouth sores, nosebleeds, sinus pain and sore throat  Eyes: Negative for pain and visual disturbance  Respiratory: Negative for choking and wheezing  Cardiovascular: Negative for chest pain and palpitations  Gastrointestinal: Negative for abdominal pain and nausea  Endocrine: Negative for cold intolerance and heat intolerance  Genitourinary: Negative for decreased urine volume, enuresis and hematuria  Musculoskeletal: Positive for arthralgias, joint swelling and neck pain  Negative for myalgias  Skin: Negative for rash  Allergic/Immunologic: Negative for environmental allergies  Neurological: Negative for dizziness, weakness and numbness  Hematological: Does not bruise/bleed easily  Psychiatric/Behavioral: Negative for behavioral problems and self-injury  The patient is not hyperactive            Patient Active Problem List   Diagnosis    Benign essential hypertension    Hemispheric carotid artery syndrome    Controlled type 2 diabetes mellitus without complication, without long-term current use of insulin (HCC)    Hypothyroidism    Vitamin D deficiency    Glaucoma, bilateral    Mixed hyperlipidemia    Left sided numbness    Small vessel disease ([address]    Left-sided weakness    Other headache syndrome    Cervical spondylosis    Cervicalgia    Other emphysema ([address] )       Past Medical History:   Diagnosis Date    Diabetes mellitus ([address]     Diabetes mellitus type 2, controlled ([address]     Disease of thyroid gland     Emphysema of lung ([address]     GERD (gastroesophageal reflux disease)     Glaucoma     H/O tooth extraction     Had top row of teeth removed around April 2016    Headache(784 0)    Jacinda Credit Hyperlipidemia     Hypertension     Neck pain     Pneumonia     Stroke ([facility] [address] [facility]     TIA (transient ischemic attack)     Visual impairment        Past Surgical History:   Procedure Laterality Date    BREAST BIOPSY Left     many years ago  papiloma    CATARACT EXTRACTION Right     5 years ago    CATARACT EXTRACTION Left     CYST REMOVAL Left 04/15/2014    ear lobe      EAR SURGERY  1970    Left ear cyst removal     EYE SURGERY      OTHER SURGICAL HISTORY       right upper arm          Family History   Problem Relation Age of Onset    Rheum arthritis Mother     No Known Problems Father     Cancer Brother     Heart attack Paternal Grandmother     No Known Problems Maternal Grandmother     No Known Problems Paternal Grandfather        Social History     Occupational History    Not on file   Tobacco Use    Smoking status: Former Smoker     Packs/day: 3 00     Years: 50 00     Pack years: 150 00     Types: Cigarettes     Start date: 3/30/1960     Quit date: 3/15/2010     Years since quittin 0    Smokeless tobacco: Never Used   Vaping Use    Vaping Use: Never used   Substance and Sexual Activity    Alcohol use: Not Currently     Alcohol/week: 1 0 standard drink     Types: 1 Glasses of wine per week     Comment: very rare    Drug use: Never    Sexual activity: Never         Current Outpatient Medications:     albuterol (PROVENTIL HFA,VENTOLIN HFA) 90 mcg/act inhaler, Inhale 2 puffs every 4 (four) hours as needed for wheezing or shortness of breath, Disp: 1 Inhaler, Rfl: 1    Cholecalciferol (Vitamin D3) 50 MCG (2000 UT) capsule, Take 1 capsule (2,000 Units total) by mouth daily, Disp: , Rfl:     COMBIGAN 0 2-0 5 %, Administer 1 drop to the right eye 2 (two) times a day , Disp: , Rfl: 3    dorzolamide (TRUSOPT) 2 % ophthalmic solution, Administer 1 drop to the right eye 3 (three) times a day , Disp: , Rfl: 1    dorzolamide-timolol (COSOPT) 22 3-6 8 MG/ML ophthalmic solution,  , Disp: , Rfl:   fluticasone-salmeterol (Advair Diskus) 250-50 mcg/dose inhaler, Inhale 1 puff 2 (two) times a day Rinse mouth after use , Disp: 60 blister, Rfl: 2    levothyroxine 100 mcg tablet, Take 1 tablet (100 mcg total) by mouth daily BRAND SYNTHROID PLEASE, Disp: 90 tablet, Rfl: 1    liraglutide (VICTOZA) injection, Inject 0 3 mL (1 8 mg total) under the skin daily, Disp: 9 mL, Rfl: 1    losartan-hydrochlorothiazide (HYZAAR) 50-12 5 mg per tablet, Take 1 tablet by mouth daily, Disp: 90 tablet, Rfl: 1    LUMIGAN 0 01 % ophthalmic drops, Administer 1 drop to both eyes daily at bedtime , Disp: , Rfl: 3    metFORMIN (GLUCOPHAGE) 500 mg tablet, Take 1 tablet (500 mg total) by mouth 2 (two) times a day with meals START WITH 1 TABLET BY MOUTH DAILY FOR 1 TABLET WEEK, THEN 1 TABLET BY MOUTH TWICE DAILY FOR 1 WEEK, THEN 2 TABLETS BY MOUTH TWICE DAILY, Disp: 180 tablet, Rfl: 1    glucose blood (OneTouch Verio) test strip, USE TO TEST BLOOD SUGAR UP TO FOUR TIMES DAILY (Patient not taking: Reported on 4/4/2022 ), Disp: 300 each, Rfl: 1    Insulin Pen Needle (Pen Needles) 32G X 4 MM MISC, To use daily with victoza (Patient not taking: Reported on 4/4/2022 ), Disp: 100 each, Rfl: 3    OneTouch Delica Lancets 72R MISC, To test BS up to four times daily- E11 65 (Patient not taking: Reported on 4/4/2022 ), Disp: 200 each, Rfl: 5    rosuvastatin (CRESTOR) 5 mg tablet, Take 1 tablet (5 mg total) by mouth daily (Patient not taking: Reported on 4/4/2022 ), Disp: 90 tablet, Rfl: 1    Allergies   Allergen Reactions    Eggs Or Egg-Derived Products - Food Allergy Anaphylaxis    Albumen, Egg - Food Allergy Hives    Antihistamines, Diphenhydramine-Type     Epinephrine Syncope     Root canal 25 years ago     Fluzone [Influenza Virus Vaccine] Hives, Abdominal Pain and Facial Swelling    Iv Contrast [Iodinated Diagnostic Agents] Headache     Headache, swelling around eyes, eye burning- with CT contrast   No rxn to MRI contrast    Lidocaine      Syncope, tachycardia with local anesthetic with epinephrine    Zinc Rash       Physical Exam:    /82   Pulse 88   Ht 5' 7" (1 702 m) Comment: Verbal  Wt 87 1 kg (192 lb)   BMI 30 07 kg/m²     Constitutional: normal, well developed, well nourished, alert, in no distress and non-toxic and no overt pain behavior    Eyes: anicteric  HEENT: grossly intact  Neck: supple, symmetric, trachea midline and no masses   Pulmonary:even and unlabored  Cardiovascular:No edema or pitting edema present  Skin:Normal without rashes or lesions and well hydrated  Psychiatric:Mood and affect appropriate  Neurologic:Cranial Nerves II-XII grossly intact  Musculoskeletal:normal, except for significant tenderness to palpation over the right supraspinatus tendon, positive Neer and Wright on the right shoulder, positive tenderness over the right cervical paraspinal musculature as well    Imaging  XR shoulder 2+ vw right    (Results Pending)       Orders Placed This Encounter   Procedures    XR shoulder 2+ vw right

## 2022-04-07 ENCOUNTER — TELEMEDICINE (OUTPATIENT)
Dept: NEUROLOGY | Facility: CLINIC | Age: 77
End: 2022-04-07
Payer: MEDICARE

## 2022-04-07 VITALS — OXYGEN SATURATION: 95 % | HEART RATE: 81 BPM | SYSTOLIC BLOOD PRESSURE: 109 MMHG | DIASTOLIC BLOOD PRESSURE: 80 MMHG

## 2022-04-07 DIAGNOSIS — I10 BENIGN ESSENTIAL HYPERTENSION: ICD-10-CM

## 2022-04-07 DIAGNOSIS — R20.0 LEFT SIDED NUMBNESS: ICD-10-CM

## 2022-04-07 DIAGNOSIS — R53.1 LEFT-SIDED WEAKNESS: ICD-10-CM

## 2022-04-07 DIAGNOSIS — E11.9 CONTROLLED TYPE 2 DIABETES MELLITUS WITHOUT COMPLICATION, WITHOUT LONG-TERM CURRENT USE OF INSULIN (HCC): Primary | ICD-10-CM

## 2022-04-07 DIAGNOSIS — G44.89 OTHER HEADACHE SYNDROME: ICD-10-CM

## 2022-04-07 PROCEDURE — 99215 OFFICE O/P EST HI 40 MIN: CPT | Performed by: PHYSICIAN ASSISTANT

## 2022-04-07 NOTE — PATIENT INSTRUCTIONS
· Recommend tracking events and take note if there is a headache/migraine associated with event  · Consider 24-72 hour ambulatory EEG to try to capture seizure potential  · If you are in a safe place try to video tape the event  · Unable to fully rule out transient ischemic attacks (TIA), your vascular risk factors are well controlled   Consider resuming aspirin 81mg daily

## 2022-04-07 NOTE — PROGRESS NOTES
Virtual Regular Visit  Verification of patient location:  Patient is located in the following state in which I hold an active license [place]      Assessment/Plan:    Problem List Items Addressed This Visit        Endocrine    Controlled type 2 diabetes mellitus without complication, without long-term current use of insulin ([facility] [address] ) - Primary       Lab Results   Component Value Date    HGBA1C 6 8 (H) 12/02/2021               Cardiovascular and Mediastinum    Benign essential hypertension  · Monitors three times a week, averaging 120-126/70-72       Nervous and Auditory    Left-sided weakness  · She does have some left sided weakness, likely secondary to spinal stenosis but she admits to 4-5 episodes of transient complete loss of muscle tone in LUE and 50% loss of tone in LLE that generally lasts 3-4 minutes followed by fatigue  · Her most recent episode was Saturday 3/12/22 where it lasted approximately 7 minutes  · CTA H/N 4/19/21 - chronic lacunar infarct right centrum semiovale  No acute intracranial abnormality  No focal intracranial stenosis or aneurysm  · Previously was on aspirin but has been off for months  · Etiology for transient left sided numbness and weakness is unclear  Etiologies include seizure activity, TIA or complex migraine  · She has a long standing history of frequent headaches and migraines that resolved following her divorce  She cannot correlate if there is associated headache with events  · Explained that transient left sided weakness would not correlate with spinal stenosis  · She does report a long standing history of migraine  I've asked for her to track symptoms following left sided transient numbness/weakness to see if headache is associated with it     · Discussed sleep deprived EEG vs 24-72 ambulatory EEG vs EMU admission - she wishes to consider this  · Recommend if event occurs that if she's in a safe place to video tape the loss of muscle control  · Low likelihood for stereotypical events to be related to TIA  She does have a history of right lacunar infarct and has been off her aspirin for a few months  Her vascular risk factors are very well controlled and only minimal microangiopathic disease noted on MRI brain  To be safe discussed resuming aspirin 81mg daily  · Will defer repeat imaging at this time                 Reason for visit is   Chief Complaint   Patient presents with    Virtual Regular Visit        Encounter provider Dung Gasca PA-C    Provider located at [facility] [id number]      Recent Visits  No visits were found meeting these conditions  Showing recent visits within past 7 days and meeting all other requirements  Today's Visits  Date Type Provider Dept   [facility]   Showing today's visits and meeting all other requirements  Future Appointments  No visits were found meeting these conditions  Showing future appointments within next 150 days and meeting all other requirements       The patient was identified by name and date of birth  Odilon Lee was informed that this is a telemedicine visit and that the visit is being conducted through [address] and patient was informed this is a secure, HIPAA-complaint platform  She agrees to proceed     My office door was closed  No one else was in the room  She acknowledged consent and understanding of privacy and security of the video platform  The patient has agreed to participate and understands they can discontinue the visit at any time  Patient is aware this is a billable service  Subjective  Odilon Lee is a right handed 77 y o  female  With PMH of DM, hypothyroidism, HTN,   She is following up today via telehealth for left sided weakness  In 2017 she had a full blown "mini stroke"  At that time her neurologist at [facility] said she had a stroke   She was on the floor and had a crawl to the living room to get help  These episodes are much different  She was placed on aspirin and statin therapy  She does admit she is no longer on either due to holding her aspirin for surgical intervention in the past and the statin made her extremely fatigued  It is primarily the left upper > lower extremity weakness  She describes it as it becoming a wet noodle  Seconds before the event she feels like her extremities will go numb, then it does  Her left arm completely goes weak  Afterwards she describes having a weird sensation overlying the left side  She denies any headaches, confusion  She does admit to extreme fatigue, generalized weakness, unstable following the event  She denies any urinary or bowel incontinence, unexplained tongue bites  She admits with her most recent event she was very stressed, her dog underwent surgery with complications  She admits to having bad headaches  She previously suffered from migraines that were debilitating and resolved when she  her   She has a history of aura  She will use tylenol with advil  With her migraines she gets a warning sign of a sensation that it is coming and nausea  This sensation is much different than the left sided weakness  Former smoker - 3 ppd x 50 years  Quit 2010 cold turkey  She does monitor her blood pressure three times per week for which it has been well controlled, averaging 120-126/70-72  She does have left sided weakness at baseline  She uses a cane for ambulation  Semiology:  She admits to having 4-5 episodes of left sided numbness and weakness  Typical events  lasts 3-4 minutes  Her most recent event was Saturday 3/12/22 which lasted 7 minutes  She does admit to getting a weird sensation before hand that does not feel the same as her migraines  She will have complete loss of tone and sensation in LUE and estimates 50% loss of muscle tone in LLE   Following the event she admits to fatigue, unstable sensation and generalized weakness  She does not note if any headache is associated with it  She feels like it takes approximately 20-30 minutes to complete return back to her baseline  Triggers:  She has not found any associated triggers for event  Denies it occurring with stress, after sleep deprivation  Frequency  She states it varies  She believes she had two events this year  EEG:  This routine EEG recorded during wakefulness, drowsiness is normal      Seizure Risk Factors:  Born full term just over 7 lbs, No NICU stays, no febrile seizures, no history of meningitis, no known family history         HPI     Past Medical History:   Diagnosis Date    Diabetes mellitus ([facility] [address] )     [facility]     Disease of thyroid gland     [facility]     GERD (gastroesophageal reflux disease)     Glaucoma     H/O tooth extraction     Had top row of teeth removed around April 2016    Headache(784 0)     Hyperlipidemia     Hypertension     Neck pain     Pneumonia     Stroke ([facility] [address] )     TIA (transient ischemic attack)     Visual impairment        Past Surgical History:   Procedure Laterality Date    BREAST BIOPSY Left     many years ago  papiloma    CATARACT EXTRACTION Right     5 years ago    CATARACT EXTRACTION Left     CYST REMOVAL Left 04/15/2014    ear lobe      EAR SURGERY  1970    Left ear cyst removal     EYE SURGERY      OTHER SURGICAL HISTORY       right upper arm          Current Outpatient Medications   Medication Sig Dispense Refill    albuterol (PROVENTIL HFA,VENTOLIN HFA) 90 mcg/act inhaler Inhale 2 puffs every 4 (four) hours as needed for wheezing or shortness of breath 1 Inhaler 1    Cholecalciferol (Vitamin D3) 50 MCG (2000 UT) capsule Take 1 capsule (2,000 Units total) by mouth daily      COMBIGAN 0 2-0 5 % Administer 1 drop to the right eye 2 (two) times a day   3    dorzolamide (TRUSOPT) 2 % ophthalmic solution Administer 1 drop to the right eye 3 (three) times a day   1    dorzolamide-timolol (COSOPT) 22 3-6 8 MG/ML ophthalmic solution        fluticasone-salmeterol (Advair Diskus) 250-50 mcg/dose inhaler Inhale 1 puff 2 (two) times a day Rinse mouth after use  60 blister 2    levothyroxine 100 mcg tablet Take 1 tablet (100 mcg total) by mouth daily BRAND SYNTHROID PLEASE 90 tablet 1    liraglutide (VICTOZA) injection Inject 0 3 mL (1 8 mg total) under the skin daily 9 mL 1    losartan-hydrochlorothiazide (HYZAAR) 50-12 5 mg per tablet Take 1 tablet by mouth daily 90 tablet 1    LUMIGAN 0 01 % ophthalmic drops Administer 1 drop to both eyes daily at bedtime   3    metFORMIN (GLUCOPHAGE) 500 mg tablet Take 1 tablet (500 mg total) by mouth 2 (two) times a day with meals START WITH 1 TABLET BY MOUTH DAILY FOR 1 TABLET WEEK, THEN 1 TABLET BY MOUTH TWICE DAILY FOR 1 WEEK, THEN 2 TABLETS BY MOUTH TWICE DAILY 180 tablet 1    glucose blood (OneTouch Verio) test strip USE TO TEST BLOOD SUGAR UP TO FOUR TIMES DAILY (Patient not taking: Reported on 4/4/2022 ) 300 each 1    Insulin Pen Needle (Pen Needles) 32G X 4 MM MISC To use daily with victoza (Patient not taking: Reported on 4/4/2022 ) 100 each 3    OneTouch Delica Lancets 62V MISC To test BS up to four times daily- E11 65 (Patient not taking: Reported on 4/4/2022 ) 200 each 5    rosuvastatin (CRESTOR) 5 mg tablet Take 1 tablet (5 mg total) by mouth daily (Patient not taking: Reported on 4/4/2022 ) 90 tablet 1     No current facility-administered medications for this visit          Allergies   Allergen Reactions    Eggs Or Egg-Derived Products - Food Allergy Anaphylaxis    Albumen, Egg - Food Allergy Hives    Antihistamines, Diphenhydramine-Type     Epinephrine Syncope     Root canal 25 years ago     Fluzone [Influenza Virus Vaccine] Hives, Abdominal Pain and Facial Swelling    Iv Contrast [Iodinated Diagnostic Agents] Headache     Headache, swelling around eyes, eye burning- with CT contrast   No rxn to MRI contrast    Lidocaine      Syncope, tachycardia with local anesthetic with epinephrine    Zinc Rash       Review of Systems   Review of Systems   Constitutional: Negative  Negative for appetite change and fever  HENT: Negative  Negative for hearing loss, tinnitus, trouble swallowing and voice change  Eyes: Negative  Negative for photophobia and pain  Respiratory: Negative  Negative for shortness of breath  Cardiovascular: Negative  Negative for palpitations  Gastrointestinal: Negative  Negative for nausea and vomiting  Endocrine: Negative  Negative for cold intolerance  Genitourinary: Negative  Negative for dysuria, frequency and urgency  Musculoskeletal: Negative  Negative for myalgias and neck pain  Pain in right shoulder    Skin: Negative  Negative for rash  Neurological: Positive for weakness (gernalized weekenss in left arm lasted about 7 mins  )  Negative for dizziness, tremors, seizures, syncope, facial asymmetry, speech difficulty, light-headedness, numbness and headaches  Hematological: Negative  Does not bruise/bleed easily  Psychiatric/Behavioral: Negative  Negative for confusion, hallucinations and sleep disturbance  All other systems reviewed and are negative  ROS reviewed and edited as needed  Video Exam    Vitals:    04/07/22 1030   BP: 109/80   Pulse: 81   SpO2: 95%       Physical Exam   CONSTITUTIONAL:  Well developed, well nourished, well groomed  No dysmorphic features  MENTAL STATUS  Orientation: Alert and oriented  Fund of knowledge: Intact  Speech is fluent without dysarthria     PSYCHIATRIC:  Normal behavior and appropriate affect    CRANIAL NERVES  Conjugate gaze  Extraocular movements intact  No nystagmus    Facial sensation normal V1-V3  Facial movements normal and symmetric  Intact gross hearing bilaterally  Intact trapezius  Tongue protrudes to the midline      COORDINATION   No pronator drift appreciated  Finger to nose: normal bilaterally    MOTOR (Upper and lower extremities)   Bulk/tone/abnormal movement: Normal muscle bulk and tone  POWERS without gross weakness appreciated     I spent 45 minutes directly with the patient during this visit    VIRTUAL VISIT Dennis Payne verbally agrees to participate in [employer]  Pt is aware that Virtual Care Services could be limited without vital signs or the ability to perform a full hands-on physical exam  Chanell Silva understands she or the provider may request at any time to terminate the video visit and request the patient to seek care or treatment in person

## 2022-04-07 NOTE — PROGRESS NOTES
Patient ID: Ashok Quiroga is a 77 y o  female  Assessment/Plan:    No problem-specific Assessment & Plan notes found for this encounter  {Assess/PlanSmartLinks:58585}       Subjective:    HPI    {[facility] HPI texts:27507}    {Common ambulatory SmartLinks:39651}         Objective: There were no vitals taken for this visit  Physical Exam    Neurological Exam      ROS:    Review of Systems   Constitutional: Negative  Negative for appetite change and fever  HENT: Negative  Negative for hearing loss, tinnitus, trouble swallowing and voice change  Eyes: Negative  Negative for photophobia and pain  Respiratory: Negative  Negative for shortness of breath  Cardiovascular: Negative  Negative for palpitations  Gastrointestinal: Negative  Negative for nausea and vomiting  Endocrine: Negative  Negative for cold intolerance  Genitourinary: Negative  Negative for dysuria, frequency and urgency  Musculoskeletal: Negative  Negative for myalgias and neck pain  Pain in right shoulder    Skin: Negative  Negative for rash  Neurological: Positive for weakness (gernalized weekenss in left arm lasted about 7 mins  )  Negative for dizziness, tremors, seizures, syncope, facial asymmetry, speech difficulty, light-headedness, numbness and headaches  Hematological: Negative  Does not bruise/bleed easily  Psychiatric/Behavioral: Negative  Negative for confusion, hallucinations and sleep disturbance  All other systems reviewed and are negative

## 2022-04-08 ENCOUNTER — TELEPHONE (OUTPATIENT)
Dept: PAIN MEDICINE | Facility: MEDICAL CENTER | Age: 77
End: 2022-04-08

## 2022-04-08 NOTE — TELEPHONE ENCOUNTER
RN s/w pt regarding previous  Pt did call the reading room and xray was read  RN went through results of shoulder xray with pt and per pt she just wasnt sure of a few things  Should she still have the USGI that she is scheduled for with the results from this XRAY? What possible injury could she have had on her rib? She does not ever remember hurting her ribs, and is wondering what this might tell her as to what is the determining cause of why her shoulder and neck hurt  Could it be the degeneration? Pt aware that I will send this to [address] and c/b on Monday with his answers and recs for same    --please advise thank you--

## 2022-04-08 NOTE — TELEPHONE ENCOUNTER
Pt called waiting results of xray  Advised not avaliable  Pt because to claudio stating she is furious that its been 5 days  Advised pt to call location she had xray done at and pt stated they wont take a complaint because they told her it was this offices fault that results are not in   Pt states she calling the head of health services because she is furious

## 2022-04-12 ENCOUNTER — OFFICE VISIT (OUTPATIENT)
Dept: FAMILY MEDICINE CLINIC | Facility: CLINIC | Age: 77
End: 2022-04-12
Payer: MEDICARE

## 2022-04-12 VITALS
OXYGEN SATURATION: 99 % | HEIGHT: 67 IN | RESPIRATION RATE: 18 BRPM | DIASTOLIC BLOOD PRESSURE: 80 MMHG | BODY MASS INDEX: 30.29 KG/M2 | SYSTOLIC BLOOD PRESSURE: 142 MMHG | WEIGHT: 193 LBS | TEMPERATURE: 97.5 F | HEART RATE: 83 BPM

## 2022-04-12 DIAGNOSIS — R20.0 LEFT SIDED NUMBNESS: ICD-10-CM

## 2022-04-12 DIAGNOSIS — R53.1 LEFT-SIDED WEAKNESS: ICD-10-CM

## 2022-04-12 DIAGNOSIS — E78.2 MIXED HYPERLIPIDEMIA: ICD-10-CM

## 2022-04-12 DIAGNOSIS — E11.9 CONTROLLED TYPE 2 DIABETES MELLITUS WITHOUT COMPLICATION, WITHOUT LONG-TERM CURRENT USE OF INSULIN (HCC): Primary | ICD-10-CM

## 2022-04-12 DIAGNOSIS — I10 BENIGN ESSENTIAL HYPERTENSION: ICD-10-CM

## 2022-04-12 PROBLEM — Z53.20 REFUSAL OF STATIN MEDICATION BY PATIENT: Status: ACTIVE | Noted: 2022-04-12

## 2022-04-12 PROCEDURE — 99214 OFFICE O/P EST MOD 30 MIN: CPT | Performed by: FAMILY MEDICINE

## 2022-04-12 RX ORDER — LOSARTAN POTASSIUM AND HYDROCHLOROTHIAZIDE 12.5; 5 MG/1; MG/1
1 TABLET ORAL DAILY
Qty: 90 TABLET | Refills: 1 | Status: SHIPPED | OUTPATIENT
Start: 2022-04-12 | End: 2022-04-21 | Stop reason: SDUPTHER

## 2022-04-12 NOTE — PROGRESS NOTES
Assessment/Plan:       Problem List Items Addressed This Visit        Endocrine    Controlled type 2 diabetes mellitus without complication, without long-term current use of insulin ([facility] [address] ) - Primary     BS have been higher most recently  She will increase the victoza dose to 1 2 mg, then 1 8 mg as tolerated  Advised to call if her BS increases after steroid injection, but I suspect she will do well once the victoza is increased  Reviewed recent labs  Lab Results   Component Value Date    HGBA1C 6 8 (H) 12/02/2021            Relevant Medications    metFORMIN (GLUCOPHAGE) 500 mg tablet    liraglutide (VICTOZA) injection       Cardiovascular and Mediastinum    Benign essential hypertension     Home blood pressures are consistently at goal            Relevant Medications    losartan-hydrochlorothiazide (HYZAAR) 50-12 5 mg per tablet       Nervous and Auditory    Left-sided weakness       Other    Mixed hyperlipidemia     Pt declines statin  She was taking crestor, but read about it and feels more comfortable staying off of this  Left sided numbness    Relevant Orders    Ambulatory Referral to Neurology- she would like to see [facility] neuro due to difficulties with getting an appointment with neurologist at [place] [id number]  She will call their office to set up an appt             Subjective:     Nav Miller is a 77 y o  female here today with chief complaint below:  Chief Complaint   Patient presents with    Follow-up     PT states that she wants to talk about her TIA, also would like to talk about right should injustions on the 04/21/2022     Neck Pain     Patient states that she does not make in during the day because that pain is hazel bad     Shoulder Pain    Labs     Not due till 06/2022     - CC above per clinical staff and reviewed  HPI:  Pt here w concern of TIA about 3-4 weeks ago  She did a virtual visit with neurology around that time  She had transient L sided numbness/weakness    She discussed possible seizures at the visit but patient feels this is unlikely  She does not wish to go to the ER again when this happens b/c of a negative experience with waiting  She wonders about seeing a different neurologist b/c she has trouble scheduling appts with her neurologist who is part time  She has seen neuro at [facility] at the past and wishes to make an appointment there  She has concern for possible rotator cuff injury along with ongoing pain in the neck/RUE  Seeing Dr Shawn Bhatia- to have corticosteroid injection but she is concerned about possible BS increase with the injection  Injection is later this month  Current bs readings 160-200 fasting  Home blood pressure readings have been controlled  The following portions of the patient's history were reviewed and updated as appropriate: allergies, current medications, past family history, past medical history, past social history, past surgical history and problem list     ROS:  Review of Systems     Objective:      /80   Pulse 83   Temp 97 5 °F (36 4 °C)   Resp 18   Ht 5' 7" (1 702 m)   Wt 87 5 kg (193 lb)   SpO2 99%   BMI 30 23 kg/m²   BP Readings from Last 3 Encounters:   04/12/22 142/80   04/07/22 109/80   04/04/22 140/82     Wt Readings from Last 3 Encounters:   04/12/22 87 5 kg (193 lb)   04/04/22 87 1 kg (192 lb)   12/06/21 85 3 kg (188 lb)               Physical Exam:   Physical Exam  Vitals and nursing note reviewed  Constitutional:       Appearance: Normal appearance  She is well-developed  She is not ill-appearing  HENT:      Head: Normocephalic and atraumatic  Eyes:      Conjunctiva/sclera: Conjunctivae normal    Cardiovascular:      Rate and Rhythm: Normal rate and regular rhythm  Heart sounds: Normal heart sounds  No murmur heard  Pulmonary:      Effort: Pulmonary effort is normal  No respiratory distress  Breath sounds: Normal breath sounds  No wheezing  Abdominal:      Palpations: Abdomen is soft  Tenderness: There is no abdominal tenderness  There is no guarding or rebound  Musculoskeletal:      Cervical back: Neck supple  Right lower leg: No edema  Left lower leg: No edema  Lymphadenopathy:      Cervical: No cervical adenopathy  Skin:     General: Skin is warm and dry  Neurological:      Mental Status: She is alert and oriented to person, place, and time  Comments: Ambulating without assistive device  Able to get on/off exam table with support     Psychiatric:         Behavior: Behavior normal

## 2022-04-12 NOTE — ASSESSMENT & PLAN NOTE
Pt declines statin  She was taking crestor, but read about it and feels more comfortable staying off of this

## 2022-04-12 NOTE — PATIENT INSTRUCTIONS
Increase your victoza from 0 6 to 1 2  After 1-2 weeks if you are tolerating this, increase it again to 1 8    Don't increase further than 1 8mg

## 2022-04-17 NOTE — ASSESSMENT & PLAN NOTE
BS have been higher most recently  She will increase the victoza dose to 1 2 mg, then 1 8 mg as tolerated  Advised to call if her BS increases after steroid injection, but I suspect she will do well once the victoza is increased  Reviewed recent labs      Lab Results   Component Value Date    HGBA1C 6 8 (H) 12/02/2021

## 2022-04-21 ENCOUNTER — TELEPHONE (OUTPATIENT)
Dept: FAMILY MEDICINE CLINIC | Facility: CLINIC | Age: 77
End: 2022-04-21

## 2022-04-21 ENCOUNTER — PROCEDURE VISIT (OUTPATIENT)
Dept: PAIN MEDICINE | Facility: MEDICAL CENTER | Age: 77
End: 2022-04-21
Payer: MEDICARE

## 2022-04-21 DIAGNOSIS — M70.61 GREATER TROCHANTERIC BURSITIS OF RIGHT HIP: Primary | ICD-10-CM

## 2022-04-21 PROCEDURE — 20611 DRAIN/INJ JOINT/BURSA W/US: CPT | Performed by: PHYSICAL MEDICINE & REHABILITATION

## 2022-04-21 RX ORDER — METHYLPREDNISOLONE ACETATE 40 MG/ML
40 INJECTION, SUSPENSION INTRA-ARTICULAR; INTRALESIONAL; INTRAMUSCULAR; SOFT TISSUE ONCE
Status: COMPLETED | OUTPATIENT
Start: 2022-04-21 | End: 2022-04-21

## 2022-04-21 RX ORDER — BUPIVACAINE HYDROCHLORIDE 2.5 MG/ML
10 INJECTION, SOLUTION EPIDURAL; INFILTRATION; INTRACAUDAL ONCE
Status: COMPLETED | OUTPATIENT
Start: 2022-04-21 | End: 2022-04-21

## 2022-04-21 RX ADMIN — METHYLPREDNISOLONE ACETATE 40 MG: 40 INJECTION, SUSPENSION INTRA-ARTICULAR; INTRALESIONAL; INTRAMUSCULAR; SOFT TISSUE at 16:03

## 2022-04-21 RX ADMIN — BUPIVACAINE HYDROCHLORIDE 10 ML: 2.5 INJECTION, SOLUTION EPIDURAL; INFILTRATION; INTRACAUDAL at 16:02

## 2022-04-21 NOTE — TELEPHONE ENCOUNTER
Have her increase the victoza to 1 8 mg daily (she can continue on this long term for BS control at this dose as well)  I don't suspect she will need insulin, but if her BS is approaching 350-400, ok for a few days of lantus to help with BS control

## 2022-04-21 NOTE — TELEPHONE ENCOUNTER
Phone call placed to patient  Patient states that she started 1 8 mg 2 days ago   Patient is going to to let us know if anything changes

## 2022-04-21 NOTE — TELEPHONE ENCOUNTER
Pt received her cortisone injection a little while ago  Pts states Dr Ashlee Amaya & Dr Gregory Mcrae (pain management) told her that her sugar may go high after injection  Pt's sugar before injection was 134 & is now 145 post injection  Pt is asking if she should take her Victoza injection as usual this evening & what she should do if her sugar goes up to 300-400 like Dr Gregory Mcrae said it may go up to  Pt  States she doesn't have any insulin if she is to take that  Please advise

## 2022-04-21 NOTE — PROGRESS NOTES
Indication:  Shoulder pain  Preprocedure diagnosis:  Shoulder bursitis  Postprocedure diagnosis:  Shoulder bursitis    Procedure: Ultrasound-guided right subacromial/subdeltoid bursa injection    After discussing the risks, benefits, and alternatives to the procedure, the patient expressed understanding and wished to proceed  The patient was brought to the procedure suite and placed in the [sidelying] position  A procedural pause was conducted to verify:  correct patient identity, procedure to be performed and as applicable, correct side and site, correct patient position, and availability of implants, special equipment or special requirements  A simple surgical tray was used  Prior to the procedure, the shoulder was examined with a 12-MHz linear transducer to visualize the subacromial/subdeltoid bursa and determine the optimal needle path  Following this, the shoulder was prepared with a ChloraPrep scrub, then re-examined using the same transducer, a sterile ultrasound transducer cover, and sterile ultrasound transducer gel  Thereafter, using ultrasound guidance, a 2 5 inch 25 gague needle was advanced into the subacromial/subdeltoid bursa under continuous ultrasound guidance  After visualization of the tip in the target area and negative aspiration for blood, a mixture of [20 mg Depo-Medrol in 3 mL of 0 25% bupivacaine] was injected into the subacromial/subdeltoid bursa  Following the injection the needle was withdrawn  The patient tolerated the procedure well and there were no apparent complications  After an appropriate amount of observation, the patient was dismissed from the clinic in good condition under their own power      [ ]

## 2022-04-28 ENCOUNTER — TELEPHONE (OUTPATIENT)
Dept: PAIN MEDICINE | Facility: CLINIC | Age: 77
End: 2022-04-28

## 2022-04-29 NOTE — TELEPHONE ENCOUNTER
Patient said that she had pain in her arm for 3 days. The pain is gone from the shoulder and her neck. Pt wants the Dr to know there is no pain

## 2022-05-18 ENCOUNTER — OFFICE VISIT (OUTPATIENT)
Dept: PAIN MEDICINE | Facility: MEDICAL CENTER | Age: 77
End: 2022-05-18
Payer: MEDICARE

## 2022-05-18 VITALS
WEIGHT: 191 LBS | OXYGEN SATURATION: 96 % | HEIGHT: 67 IN | DIASTOLIC BLOOD PRESSURE: 64 MMHG | TEMPERATURE: 98.6 F | SYSTOLIC BLOOD PRESSURE: 150 MMHG | BODY MASS INDEX: 29.98 KG/M2 | HEART RATE: 84 BPM

## 2022-05-18 DIAGNOSIS — M54.2 CERVICALGIA: ICD-10-CM

## 2022-05-18 DIAGNOSIS — M25.511 CHRONIC RIGHT SHOULDER PAIN: ICD-10-CM

## 2022-05-18 DIAGNOSIS — G89.29 CHRONIC RIGHT SHOULDER PAIN: ICD-10-CM

## 2022-05-18 DIAGNOSIS — M47.812 CERVICAL SPONDYLOSIS: Primary | ICD-10-CM

## 2022-05-18 PROCEDURE — 99213 OFFICE O/P EST LOW 20 MIN: CPT | Performed by: NURSE PRACTITIONER

## 2022-05-18 RX ORDER — NETARSUDIL 0.2 MG/ML
SOLUTION/ DROPS OPHTHALMIC; TOPICAL
COMMUNITY
Start: 2022-05-09

## 2022-05-18 NOTE — PROGRESS NOTES
Assessment  1  Cervical spondylosis    2  Cervicalgia    3  Chronic right shoulder pain        Plan  I discussed with the patient that since there has been  significant improvement in the pain symptoms that we will hold off on any repeat injections at this point in time  However, I reviewed with the patient that if her symptoms should return, worsen, and/or experience new pain symptoms, she should call our office  to discuss repeating the injection  Follow-up as needed        My impressions and treatment recommendations were discussed in detail with the patient who verbalized understanding and had no further questions  Discharge instructions were provided  I personally saw and examined the patient and I agree with the above discussed plan of care  No orders of the defined types were placed in this encounter  New Medications Ordered This Visit   Medications    Rhopressa 0 02 % SOLN       History of Present Illness    Krishan Ervin is a 77 y o  female presents for follow-up related to her right-sided shoulder and neck pain symptoms  Today she reports she is doing better rates her pain 3/10  Patient does have occasional pain in the morning and at night described as sharp, and burning  She is status post an ultrasound-guided right SA SD bursa injection with Dr Rick Coronado on April 21, 2022 she is reporting significant relief of more than 50% from the injection  She tells me she is thrilled with the results as it not only help just her right shoulder but helped her neck pain on the right side as well  I have personally reviewed and/or updated the patient's past medical history, past surgical history, family history, social history, current medications, allergies, and vital signs today  Review of Systems   Respiratory: Negative for shortness of breath  Cardiovascular: Negative for chest pain  Gastrointestinal: Negative for constipation, diarrhea, nausea and vomiting     Musculoskeletal: Positive for myalgias, neck pain and neck stiffness  Negative for arthralgias, gait problem and joint swelling  Skin: Negative for rash  Neurological: Negative for dizziness, seizures and weakness  All other systems reviewed and are negative        Patient Active Problem List   Diagnosis    Benign essential hypertension    Hemispheric carotid artery syndrome    Controlled type 2 diabetes mellitus without complication, without long-term current use of insulin (HCC)    Hypothyroidism    Vitamin D deficiency    Glaucoma, bilateral    Mixed hyperlipidemia    Left sided numbness    Small vessel disease ([address] )    Left-sided weakness    Other headache syndrome    Cervical spondylosis    Cervicalgia    Other emphysema ([address] )    Refusal of statin medication by patient       Past Medical History:   Diagnosis Date    Diabetes mellitus ([address] )     Diabetes mellitus type 2, controlled ([address] )     Disease of thyroid gland     Emphysema of lung ([address] )     GERD (gastroesophageal reflux disease)     Glaucoma     H/O tooth extraction     Had top row of teeth removed around April 2016    Headache(784 0)     Hyperlipidemia     Hypertension     Neck pain     Pneumonia     Stroke ([address] )     TIA (transient ischemic attack)     TIA (transient ischemic attack)     Visual impairment        Past Surgical History:   Procedure Laterality Date    BREAST BIOPSY Left     many years ago  papiloma    CATARACT EXTRACTION Right     5 years ago    CATARACT EXTRACTION Left     CYST REMOVAL Left 04/15/2014    ear lobe      EAR SURGERY  1970    Left ear cyst removal     EYE SURGERY      OTHER SURGICAL HISTORY       right upper arm          Family History   Problem Relation Age of Onset    Rheum arthritis Mother     No Known Problems Father     Cancer Brother     Heart attack Paternal Grandmother     No Known Problems Maternal Grandmother     No Known Problems Paternal Grandfather        Social History     Occupational History    Not on file   Tobacco Use    Smoking status: Former Smoker     Packs/day: 3 00     Years: 50 00     Pack years: 150 00     Types: Cigarettes, Cigarettes     Start date: 3/30/1960     Quit date: 2010     Years since quittin 2    Smokeless tobacco: Never Used   Vaping Use    Vaping Use: Never used   Substance and Sexual Activity    Alcohol use: Not Currently     Alcohol/week: 1 0 standard drink     Types: 1 Glasses of wine per week     Comment: very rare    Drug use: Never    Sexual activity: Never       Current Outpatient Medications on File Prior to Visit   Medication Sig    albuterol (PROVENTIL HFA,VENTOLIN HFA) 90 mcg/act inhaler Inhale 2 puffs every 4 (four) hours as needed for wheezing or shortness of breath    COMBIGAN 0 2-0 5 % Administer 1 drop to the right eye 2 (two) times a day     dorzolamide (TRUSOPT) 2 % ophthalmic solution Administer 1 drop to the right eye 3 (three) times a day     fluticasone-salmeterol (Advair Diskus) 250-50 mcg/dose inhaler Inhale 1 puff 2 (two) times a day Rinse mouth after use   levothyroxine 100 mcg tablet Take 1 tablet (100 mcg total) by mouth daily BRAND SYNTHROID PLEASE    liraglutide (VICTOZA) injection Inject 0 2 mL (1 2 mg total) under the skin daily for 14 days, THEN 0 3 mL (1 8 mg total) daily      losartan-hydrochlorothiazide (HYZAAR) 50-12 5 mg per tablet Take 1 tablet by mouth daily    LUMIGAN 0 01 % ophthalmic drops Administer 1 drop to both eyes daily at bedtime     metFORMIN (GLUCOPHAGE) 500 mg tablet Take 1 tablet (500 mg total) by mouth 2 (two) times a day with meals    Rhopressa 0 02 % SOLN     dorzolamide-timolol (COSOPT) 22 3-6 8 MG/ML ophthalmic solution      glucose blood (OneTouch Verio) test strip USE TO TEST BLOOD SUGAR UP TO FOUR TIMES DAILY    Insulin Pen Needle (Pen Needles) 32G X 4 MM MISC To use daily with victoza    OneTouch Delica Lancets 58M MISC To test BS up to four times daily- E11 65   Harper Splinter [DISCONTINUED] Cholecalciferol (Vitamin D3) 50 MCG (2000 UT) capsule Take 1 capsule (2,000 Units total) by mouth daily     No current facility-administered medications on file prior to visit  Allergies   Allergen Reactions    Eggs Or Egg-Derived Products - Food Allergy Anaphylaxis    Albumen, Egg - Food Allergy Hives    Antihistamines, Diphenhydramine-Type     Epinephrine Syncope     Root canal 25 years ago     Fluzone [Influenza Virus Vaccine] Hives, Abdominal Pain and Facial Swelling    Iv Contrast [Iodinated Diagnostic Agents] Headache     Headache, swelling around eyes, eye burning- with CT contrast   No rxn to MRI contrast    Lidocaine      Syncope, tachycardia with local anesthetic with epinephrine    Zinc Rash       Physical Exam    /64   Pulse 84   Temp 98 6 °F (37 °C)   Ht 5' 7" (1 702 m)   Wt 86 6 kg (191 lb)   SpO2 96%   BMI 29 91 kg/m²     Constitutional: normal, well developed, well nourished, alert, in no distress and non-toxic and no overt pain behavior    Eyes: anicteric  HEENT: grossly intact  Neck: supple, symmetric, trachea midline and no masses   Pulmonary:even and unlabored  Cardiovascular:No edema or pitting edema present  Skin:Normal without rashes or lesions and well hydrated  Psychiatric:Mood and affect appropriate  Neurologic:Cranial Nerves II-XII grossly intact  Musculoskeletal:normal       Imaging

## 2022-05-19 ENCOUNTER — TELEPHONE (OUTPATIENT)
Dept: FAMILY MEDICINE CLINIC | Facility: CLINIC | Age: 77
End: 2022-05-19

## 2022-05-19 NOTE — TELEPHONE ENCOUNTER
Patient called since her steroid injection she is fighting to keep her glucose above 100, she is waking up at night to eat just to keep it up in the 90's during the night   Please advise on this

## 2022-05-19 NOTE — TELEPHONE ENCOUNTER
91 is ok- would avoid BS <70  Ok to stop the victoza for now and monitor BS    If fasting BS >140, would restart at 0 6mg

## 2022-05-19 NOTE — TELEPHONE ENCOUNTER
Per patient she had steroid inject four weeks ago, pt decreased the victoza to 0 6mg every other evening to days after getting steroid injection  Pt checks BS twice nightly scared its going to to low  BS was 91 today  No sxs of hypoglycemia but feels this is too low for her   Please advise

## 2022-06-06 ENCOUNTER — HOSPITAL ENCOUNTER (OUTPATIENT)
Dept: CT IMAGING | Facility: HOSPITAL | Age: 77
Discharge: HOME | End: 2022-06-06
Payer: MEDICARE

## 2022-06-06 DIAGNOSIS — Z87.891 HX OF TOBACCO USE, PRESENTING HAZARDS TO HEALTH: ICD-10-CM

## 2022-06-06 PROCEDURE — 71271 CT THORAX LUNG CANCER SCR C-: CPT

## 2022-06-07 ENCOUNTER — APPOINTMENT (OUTPATIENT)
Dept: LAB | Facility: CLINIC | Age: 77
End: 2022-06-07
Payer: MEDICARE

## 2022-06-07 ENCOUNTER — LAB (OUTPATIENT)
Dept: LAB | Facility: CLINIC | Age: 77
End: 2022-06-07
Payer: MEDICARE

## 2022-06-07 DIAGNOSIS — E55.9 VITAMIN D DEFICIENCY: ICD-10-CM

## 2022-06-07 DIAGNOSIS — E03.9 HYPOTHYROIDISM, UNSPECIFIED TYPE: ICD-10-CM

## 2022-06-07 DIAGNOSIS — I10 BENIGN ESSENTIAL HYPERTENSION: ICD-10-CM

## 2022-06-07 DIAGNOSIS — E11.9 CONTROLLED TYPE 2 DIABETES MELLITUS WITHOUT COMPLICATION, WITHOUT LONG-TERM CURRENT USE OF INSULIN (HCC): ICD-10-CM

## 2022-06-07 DIAGNOSIS — E78.2 MIXED HYPERLIPIDEMIA: ICD-10-CM

## 2022-06-07 LAB
25(OH)D3 SERPL-MCNC: 29 NG/ML (ref 30–100)
ALBUMIN SERPL BCP-MCNC: 3.8 G/DL (ref 3.5–5)
ALP SERPL-CCNC: 76 U/L (ref 46–116)
ALT SERPL W P-5'-P-CCNC: 25 U/L (ref 12–78)
ANION GAP SERPL CALCULATED.3IONS-SCNC: 7 MMOL/L (ref 4–13)
AST SERPL W P-5'-P-CCNC: 31 U/L (ref 5–45)
BASOPHILS # BLD AUTO: 0.09 THOUSANDS/ΜL (ref 0–0.1)
BASOPHILS NFR BLD AUTO: 1 % (ref 0–1)
BILIRUB SERPL-MCNC: 0.91 MG/DL (ref 0.2–1)
BUN SERPL-MCNC: 19 MG/DL (ref 5–25)
CALCIUM SERPL-MCNC: 9.6 MG/DL (ref 8.3–10.1)
CHLORIDE SERPL-SCNC: 103 MMOL/L (ref 100–108)
CHOLEST SERPL-MCNC: 168 MG/DL
CO2 SERPL-SCNC: 26 MMOL/L (ref 21–32)
CREAT SERPL-MCNC: 0.77 MG/DL (ref 0.6–1.3)
CREAT UR-MCNC: 130 MG/DL
EOSINOPHIL # BLD AUTO: 0.18 THOUSAND/ΜL (ref 0–0.61)
EOSINOPHIL NFR BLD AUTO: 2 % (ref 0–6)
ERYTHROCYTE [DISTWIDTH] IN BLOOD BY AUTOMATED COUNT: 13 % (ref 11.6–15.1)
EST. AVERAGE GLUCOSE BLD GHB EST-MCNC: 134 MG/DL
GFR SERPL CREATININE-BSD FRML MDRD: 75 ML/MIN/1.73SQ M
GLUCOSE P FAST SERPL-MCNC: 152 MG/DL (ref 65–99)
HBA1C MFR BLD: 6.3 %
HCT VFR BLD AUTO: 43.4 % (ref 34.8–46.1)
HDLC SERPL-MCNC: 46 MG/DL
HGB BLD-MCNC: 14.2 G/DL (ref 11.5–15.4)
IMM GRANULOCYTES # BLD AUTO: 0.03 THOUSAND/UL (ref 0–0.2)
IMM GRANULOCYTES NFR BLD AUTO: 0 % (ref 0–2)
LDLC SERPL CALC-MCNC: 86 MG/DL (ref 0–100)
LYMPHOCYTES # BLD AUTO: 1.62 THOUSANDS/ΜL (ref 0.6–4.47)
LYMPHOCYTES NFR BLD AUTO: 16 % (ref 14–44)
MCH RBC QN AUTO: 32.9 PG (ref 26.8–34.3)
MCHC RBC AUTO-ENTMCNC: 32.7 G/DL (ref 31.4–37.4)
MCV RBC AUTO: 101 FL (ref 82–98)
MICROALBUMIN UR-MCNC: 10 MG/L (ref 0–20)
MICROALBUMIN/CREAT 24H UR: 8 MG/G CREATININE (ref 0–30)
MONOCYTES # BLD AUTO: 0.97 THOUSAND/ΜL (ref 0.17–1.22)
MONOCYTES NFR BLD AUTO: 9 % (ref 4–12)
NEUTROPHILS # BLD AUTO: 7.54 THOUSANDS/ΜL (ref 1.85–7.62)
NEUTS SEG NFR BLD AUTO: 72 % (ref 43–75)
NRBC BLD AUTO-RTO: 0 /100 WBCS
PLATELET # BLD AUTO: 373 THOUSANDS/UL (ref 149–390)
PMV BLD AUTO: 11.1 FL (ref 8.9–12.7)
POTASSIUM SERPL-SCNC: 5.1 MMOL/L (ref 3.5–5.3)
PROT SERPL-MCNC: 7.8 G/DL (ref 6.4–8.2)
RBC # BLD AUTO: 4.32 MILLION/UL (ref 3.81–5.12)
SODIUM SERPL-SCNC: 136 MMOL/L (ref 136–145)
TRIGL SERPL-MCNC: 182 MG/DL
TSH SERPL DL<=0.05 MIU/L-ACNC: 1.42 UIU/ML (ref 0.45–4.5)
WBC # BLD AUTO: 10.43 THOUSAND/UL (ref 4.31–10.16)

## 2022-06-07 PROCEDURE — 36415 COLL VENOUS BLD VENIPUNCTURE: CPT

## 2022-06-07 PROCEDURE — 82306 VITAMIN D 25 HYDROXY: CPT

## 2022-06-07 PROCEDURE — 80053 COMPREHEN METABOLIC PANEL: CPT

## 2022-06-07 PROCEDURE — 82043 UR ALBUMIN QUANTITATIVE: CPT

## 2022-06-07 PROCEDURE — 82570 ASSAY OF URINE CREATININE: CPT

## 2022-06-07 PROCEDURE — 84443 ASSAY THYROID STIM HORMONE: CPT

## 2022-06-07 PROCEDURE — 80061 LIPID PANEL: CPT

## 2022-06-07 PROCEDURE — 85025 COMPLETE CBC W/AUTO DIFF WBC: CPT

## 2022-06-07 PROCEDURE — 83036 HEMOGLOBIN GLYCOSYLATED A1C: CPT

## 2022-06-07 NOTE — RESULT ENCOUNTER NOTE
Cullen Hughes,   Your lab work shows that your A1C is better than last time at 6 3- good job! Your triglycerides and LDL are higher than before  I suspect this is from stopping the crestor  The thyroid remains in the normal range along with your liver/kidney function tests  The vitamin D is borderline- increase your vitamin D dose by 1000 units daily  We can go through things in more detail at your visit next month  Kamila Casper MD

## 2022-06-12 DIAGNOSIS — Z87.891 HX OF TOBACCO USE, PRESENTING HAZARDS TO HEALTH: Primary | ICD-10-CM

## 2022-06-12 NOTE — RESULT ENCOUNTER NOTE
Hi Chanell,  Your CT scan of the chest does not show any findings concerning for cancer  I'll order you a repeat scan for next year  Darío Bach MD

## 2022-06-14 ENCOUNTER — TELEPHONE (OUTPATIENT)
Dept: OTHER | Facility: OTHER | Age: 77
End: 2022-06-14

## 2022-06-14 ENCOUNTER — TELEMEDICINE (OUTPATIENT)
Dept: FAMILY MEDICINE CLINIC | Facility: CLINIC | Age: 77
End: 2022-06-14
Payer: MEDICARE

## 2022-06-14 VITALS
TEMPERATURE: 98.2 F | SYSTOLIC BLOOD PRESSURE: 127 MMHG | HEART RATE: 80 BPM | OXYGEN SATURATION: 98 % | DIASTOLIC BLOOD PRESSURE: 84 MMHG | BODY MASS INDEX: 29.91 KG/M2 | HEIGHT: 67 IN

## 2022-06-14 DIAGNOSIS — J43.8 OTHER EMPHYSEMA (HCC): ICD-10-CM

## 2022-06-14 DIAGNOSIS — U07.1 COVID-19: Primary | ICD-10-CM

## 2022-06-14 DIAGNOSIS — E11.9 CONTROLLED TYPE 2 DIABETES MELLITUS WITHOUT COMPLICATION, WITHOUT LONG-TERM CURRENT USE OF INSULIN (HCC): ICD-10-CM

## 2022-06-14 DIAGNOSIS — I10 BENIGN ESSENTIAL HYPERTENSION: ICD-10-CM

## 2022-06-14 DIAGNOSIS — E78.2 MIXED HYPERLIPIDEMIA: ICD-10-CM

## 2022-06-14 PROCEDURE — 99214 OFFICE O/P EST MOD 30 MIN: CPT | Performed by: FAMILY MEDICINE

## 2022-06-14 NOTE — PATIENT INSTRUCTIONS
[address]    Your healthcare provider and/or public health staff have evaluated you and have determined that you do not need to remain in the hospital at this time  At this time you can be isolated at home where you will be monitored by staff from your local or state health department  You should carefully follow the prevention and isolation steps below until a healthcare provider or local or state health department says that you can return to your normal activities  Stay home except to get medical care    People who are mildly ill with COVID-19 are able to isolate at home during their illness  You should restrict activities outside your home, except for getting medical care  Do not go to work, school, or public areas  Avoid using public transportation, ride-sharing, or taxis  Separate yourself from other people and animals in your home    People: As much as possible, you should stay in a specific room and away from other people in your home  Also, you should use a separate bathroom, if available  Animals: You should restrict contact with pets and other animals while you are sick with COVID-19, just like you would around other people  Although there have not been reports of pets or other animals becoming sick with COVID-19, it is still recommended that people sick with COVID-19 limit contact with animals until more information is known about the virus  When possible, have another member of your household care for your animals while you are sick  If you are sick with COVID-19, avoid contact with your pet, including petting, snuggling, being kissed or licked, and sharing food  If you must care for your pet or be around animals while you are sick, wash your hands before and after you interact with pets and wear a facemask  See COVID-19 and Animals for more information      Call ahead before visiting your doctor    If you have a medical appointment, call the healthcare provider and tell them that you have or may have COVID-19  This will help the healthcare providers office take steps to keep other people from getting infected or exposed  Wear a facemask    You should wear a facemask when you are around other people (e g , sharing a room or vehicle) or pets and before you enter a healthcare providers office  If you are not able to wear a facemask (for example, because it causes trouble breathing), then people who live with you should not stay in the same room with you, or they should wear a facemask if they enter your room  Cover your coughs and sneezes    Cover your mouth and nose with a tissue when you cough or sneeze  Throw used tissues in a lined trash can  Immediately wash your hands with soap and water for at least 20 seconds or, if soap and water are not available, clean your hands with an alcohol-based hand  that contains at least 60% alcohol  Clean your hands often    Wash your hands often with soap and water for at least 20 seconds, especially after blowing your nose, coughing, or sneezing; going to the bathroom; and before eating or preparing food  If soap and water are not readily available, use an alcohol-based hand  with at least 60% alcohol, covering all surfaces of your hands and rubbing them together until they feel dry  Soap and water are the best option if hands are visibly dirty  Avoid touching your eyes, nose, and mouth with unwashed hands  Avoid sharing personal household items    You should not share dishes, drinking glasses, cups, eating utensils, towels, or bedding with other people or pets in your home  After using these items, they should be washed thoroughly with soap and water  Clean all high-touch surfaces everyday    High touch surfaces include counters, tabletops, doorknobs, bathroom fixtures, toilets, phones, keyboards, tablets, and bedside tables  Also, clean any surfaces that may have blood, stool, or body fluids on them   Use a household cleaning spray or wipe, according to the label instructions  Labels contain instructions for safe and effective use of the cleaning product including precautions you should take when applying the product, such as wearing gloves and making sure you have good ventilation during use of the product  Monitor your symptoms    Seek prompt medical attention if your illness is worsening (e g , difficulty breathing)  Before seeking care, call your healthcare provider and tell them that you have, or are being evaluated for, COVID-19  Put on a facemask before you enter the facility  These steps will help the healthcare providers office to keep other people in the office or waiting room from getting infected or exposed  Ask your healthcare provider to call the local or [facility] health department  Persons who are placed under active monitoring or facilitated self-monitoring should follow instructions provided by their local health department or occupational health professionals, as appropriate  If you have a medical emergency and need to call 911, notify the dispatch personnel that you have, or are being evaluated for COVID-19  If possible, put on a facemask before emergency medical services arrive      Discontinuing home isolation    Patients with confirmed COVID-19 should remain under home isolation precautions until the following conditions are met:   - They have had no fever for at least 24 hours (that is one full day of no fever without the use medicine that reduces fevers)  AND  - other symptoms have improved (for example, when their cough or shortness of breath have improved)  AND  - If had mild or moderate illness, at least 10 days have passed since their symptoms first appeared or if severe illness (needed oxygen) or immunosuppressed, at least 20 days have passed since symptoms first appeared  Patients with confirmed COVID-19 should also notify close contacts (including their workplace) and ask that they self-quarantine  Currently, close contact is defined as being within 6 feet for 15 minutes or more from the period 24 hours starting 48 hours before symptom onset to the time at which the patient went into isolation  Close contacts of patients diagnosed with COVID-19 should be instructed by the patient to self-quarantine for 14 days from the last time of their last contact with the patient  Source: [employer] dimple    Weight Management   AMBULATORY CARE:   Why it is important to manage your weight:  Being overweight increases your risk of health conditions such as heart disease, high blood pressure, type 2 diabetes, and certain types of cancer  It can also increase your risk for osteoarthritis, sleep apnea, and other respiratory problems  Aim for a slow, steady weight loss  Even a small amount of weight loss can lower your risk of health problems  Risks of being overweight:  Extra weight can cause many health problems, including the following:  · Diabetes (high blood sugar level)    · High blood pressure or high cholesterol    · Heart disease    · Stroke    · Gallbladder or liver disease    · Cancer of the colon, breast, prostate, liver, or kidney    · Sleep apnea    · Arthritis or gout    Screening  is done to check for health conditions before you have signs or symptoms  If you are 28to 79years old, your blood sugar level may be checked every 3 years for signs of prediabetes or diabetes  Your healthcare provider will check your blood pressure at each visit  High blood pressure can lead to a stroke or other problems  Your provider may check for signs of heart disease, cancer, or other health problems  How to lose weight safely:  A safe and healthy way to lose weight is to eat fewer calories and get regular exercise  · You can lose up about 1 pound a week by decreasing the number of calories you eat by 500 calories each day   You can decrease calories by eating smaller portion sizes or by cutting out high-calorie foods  Read labels to find out how many calories are in the foods you eat  · You can also burn calories with exercise such as walking, swimming, or biking  You will be more likely to keep weight off if you make these changes part of your lifestyle  Exercise at least 30 minutes per day on most days of the week  You can also fit in more physical activity by taking the stairs instead of the elevator or parking farther away from stores  Ask your healthcare provider about the best exercise plan for you  Healthy meal plan for weight management:  A healthy meal plan includes a variety of foods, contains fewer calories, and helps you stay healthy  A healthy meal plan includes the following:     · Eat whole-grain foods more often  A healthy meal plan should contain fiber  Fiber is the part of grains, fruits, and vegetables that is not broken down by your body  Whole-grain foods are healthy and provide extra fiber in your diet  Some examples of whole-grain foods are whole-wheat breads and pastas, oatmeal, brown rice, and bulgur  · Eat a variety of vegetables every day  Include dark, leafy greens such as spinach, kale, avery greens, and mustard greens  Eat yellow and orange vegetables such as carrots, sweet potatoes, and winter squash  · Eat a variety of fruits every day  Choose fresh or canned fruit (canned in its own juice or light syrup) instead of juice  Fruit juice has very little or no fiber  · Eat low-fat dairy foods  Drink fat-free (skim) milk or 1% milk  Eat fat-free yogurt and low-fat cottage cheese  Try low-fat cheeses such as mozzarella and other reduced-fat cheeses  · Choose meat and other protein foods that are low in fat  Choose beans or other legumes such as split peas or lentils  Choose fish, skinless poultry (chicken or turkey), or lean cuts of red meat (beef or pork)   Before you cook meat or poultry, cut off any visible fat  · Use less fat and oil  Try baking foods instead of frying them  Add less fat, such as margarine, sour cream, regular salad dressing and mayonnaise to foods  Eat fewer high-fat foods  Some examples of high-fat foods include french fries, doughnuts, ice cream, and cakes  · Eat fewer sweets  Limit foods and drinks that are high in sugar  This includes candy, cookies, regular soda, and sweetened drinks  Ways to decrease calories:   · Eat smaller portions  ? Use a small plate with smaller servings  ? Do not eat second helpings  ? When you eat at a restaurant, ask for a box and place half of your meal in the box before you eat  ? Share an entrée with someone else  · Replace high-calorie snacks with healthy, low-calorie snacks  ? Choose fresh fruit, vegetables, fat-free rice cakes, or air-popped popcorn instead of potato chips, nuts, or chocolate  ? Choose water or calorie-free drinks instead of soda or sweetened drinks  · Do not shop for groceries when you are hungry  You may be more likely to make unhealthy food choices  Take a grocery list of healthy foods and shop after you have eaten  · Eat regular meals  Do not skip meals  Skipping meals can lead to overeating later in the day  This can make it harder for you to lose weight  Eat a healthy snack in place of a meal if you do not have time to eat a regular meal  Talk with a dietitian to help you create a meal plan and schedule that is right for you  Other things to consider as you try to lose weight:   · Be aware of situations that may give you the urge to overeat, such as eating while watching television  Find ways to avoid these situations  For example, read a book, go for a walk, or do crafts  · Meet with a weight loss support group or friends who are also trying to lose weight  This may help you stay motivated to continue working on your weight loss goals      © Copyright [employer] 2022 Information is for End User's use only and may not be sold, redistributed or otherwise used for commercial purposes  All illustrations and images included in CareNotes® are the copyrighted property of [employer]  or Aquilino Singh  The above information is an  only  It is not intended as medical advice for individual conditions or treatments  Talk to your doctor, nurse or pharmacist before following any medical regimen to see if it is safe and effective for you

## 2022-06-14 NOTE — TELEPHONE ENCOUNTER
Pharmacy needs the script for Paxlovid to be corrected  The questionnaire was not completed  They are missing information on if the patient is COVID positive within the past 5 days, if there will be a drug interaction with the medication Ritonavir, and if the patients estimated gfr is greater than 60       On call provider was paged

## 2022-06-14 NOTE — PROGRESS NOTES
COVID-19 Outpatient Progress Note    Assessment/Plan:    Problem List Items Addressed This Visit        Endocrine    Controlled type 2 diabetes mellitus without complication, without long-term current use of insulin (HCC)       Respiratory    Other emphysema ([address] )       Cardiovascular and Mediastinum    Benign essential hypertension       Other    Mixed hyperlipidemia      Other Visit Diagnoses     COVID-19    -  Primary    Relevant Medications    nirmatrelvir & ritonavir (Paxlovid) tablet therapy pack    BMI 29 0-29 9,adult             Disposition:     Risks and benefits of COVID-19 vaccination was discussed with patient  Patient has COVID-19 infection  Based off CDC guidelines, they were recommended to isolate for 5 days from the date of the positive test  If they remain asymptomatic, isolation may be ended followed by 5 days of wearing a mask when around othes to minimize risk of infecting others  If they have a fever, continue to stay home until fever resolves for at least 24 hours  Discussed symptom directed medication options with patient  Patient should complete 5 days of isolation through Thursday, 06/16/2022, at the earliest ending on Friday, 06/17/2022 if she meets fever criteria  She would need to mask for an additional 5 days  Patient was counseled regarding Paxlovid and Rx was sent to the pharmacy  Use albuterol HFA p r n  [facility] Patient declines viscous lidocaine p r n  And Zofran p r n  As her symptoms are mild  Patient meets criteria for PAXLOVID and they have been counseled appropriately according to EUA documentation released by the FDA  After discussion, patient agrees to treatment      Thiago Levy is an investigational medicine used to treat mild-to-moderate COVID-19 in adults and children (15years of age and older weighing at least 80 pounds (40 kg)) with positive results of direct SARS-CoV-2 viral testing, and who are at high risk for progression to severe COVID-19, including hospitalization or death  PAXLOVID is investigational because it is still being studied  There is limited information about the safety and effectiveness of using PAXLOVID to treat people with mild-to-moderate COVID-19  The FDA has authorized the emergency use of PAXLOVID for the treatment of mild-tomoderate COVID-19 in adults and children (15years of age and older weighing at least 80 pounds (40 kg)) with a positive test for the virus that causes COVID-19, and who are at high risk for progression to severe COVID-19, including hospitalization or death, under an EUA  What should I tell my healthcare provider before I take PAXLOVID? Tell your healthcare provider if you:  - Have any allergies  - Have liver or kidney disease  - Are pregnant or plan to become pregnant  - Are breastfeeding a child  - Have any serious illnesses    Tell your healthcare provider about all the medicines you take, including prescription and over-the-counter medicines, vitamins, and herbal supplements  Some medicines may interact with PAXLOVID and may cause serious side effects  Keep a list of your medicines to show your healthcare provider and pharmacist when you get a new medicine  You can ask your healthcare provider or pharmacist for a list of medicines that interact with PAXLOVID  Do not start taking a new medicine without telling your healthcare provider  Your healthcare provider can tell you if it is safe to take PAXLOVID with other medicines  Tell your healthcare provider if you are taking combined hormonal contraceptive  PAXLOVID may affect how your birth control pills work  Females who are able to become pregnant should use another effective alternative form of contraception or an additional barrier method of contraception  Talk to your healthcare provider if you have any questions about contraceptive methods that might be right for you  How do I take PAXLOVID?     PAXLOVID consists of 2 medicines: nirmatrelvir and ritonavir  - Take 2 pink tablets of nirmatrelvir with 1 white tablet of ritonavir by mouth 2 times each day (in the morning and in the evening) for 5 days  For each dose, take all 3 tablets at the same time  - If you have kidney disease, talk to your healthcare provider  You may need a different dose  - Swallow the tablets whole  Do not chew, break, or crush the tablets  - Take PAXLOVID with or without food  - Do not stop taking PAXLOVID without talking to your healthcare provider, even if you feel better  - If you miss a dose of PAXLOVID within 8 hours of the time it is usually taken, take it as soon as you remember  If you miss a dose by more than 8 hours, skip the missed dose and take the next dose at your regular time  Do not take 2 doses of PAXLOVID at the same time  - If you take too much PAXLOVID, call your healthcare provider or go to the nearest hospital emergency room right away  - If you are taking a ritonavir- or cobicistat-containing medicine to treat hepatitis C or Human Immunodeficiency Virus (HIV), you should continue to take your medicine as prescribed by your healthcare provider   - Talk to your healthcare provider if you do not feel better or if you feel worse after 5 days  Who should generally not take PAXLOVID? Do not take PAXLOVID if:  You are allergic to nirmatrelvir, ritonavir, or any of the ingredients in PAXLOVID      You are taking any of the following medicines:  - Alfuzosin  - Pethidine, piroxicam, propoxyphene  - Ranolazine  - Amiodarone, dronedarone, flecainide, propafenone, quinidine  - Colchicine  - Lurasidone, pimozide, clozapine  - Dihydroergotamine, ergotamine, methylergonovine  - Lovastatin, simvastatin  - Sildenafil (Revatio®) for pulmonary arterial hypertension (PAH)  - Triazolam, oral midazolam  - Apalutamide  - Carbamazepine, phenobarbital, phenytoin  - Rifampin  - St  Marcels Wort (hypericum perforatum)    What are the important possible side effects of PAXLOVID? Possible side effects of PAXLOVID are:  - Liver Problems  Tell your healthcare provider right away if you have any of these signs and symptoms of liver problems: loss of appetite, yellowing of your skin and the whites of eyes (jaundice), dark-colored urine, pale colored stools and itchy skin, stomach area (abdominal) pain  - Resistance to HIV Medicines  If you have untreated HIV infection, PAXLOVID may lead to some HIV medicines not working as well in the future  - Other possible side effects include: altered sense of taste, diarrhea, high blood pressure, or muscle aches    These are not all the possible side effects of PAXLOVID  Not many people have taken PAXLOVID  Serious and unexpected side effects may happen  Radford Alba is still being studied, so it is possible that all of the risks are not known at this time  What other treatment choices are there? Like Jayla Singh may allow for the emergency use of other medicines to treat people with COVID-19  Go to https://OneBreath/ for information on the emergency use of other medicines that are authorized by FDA to treat people with COVID-19  Your healthcare provider may talk with you about clinical trials for which you may be eligible  It is your choice to be treated or not to be treated with PAXLOVID  Should you decide not to receive it or for your child not to receive it, it will not change your standard medical care  What if I am pregnant or breastfeeding? There is no experience treating pregnant women or breastfeeding mothers with PAXLOVID  For a mother and unborn baby, the benefit of taking PAXLOVID may be greater than the risk from the treatment  If you are pregnant, discuss your options and specific situation with your healthcare provider      It is recommended that you use effective barrier contraception or do not have sexual activity while taking PAXLOVID  If you are breastfeeding, discuss your options and specific situation with your healthcare provider  How do I report side effects with PAXLOVID? Contact your healthcare provider if you have any side effects that bother you or do not go away  Report side effects to FDA MedWatch at www fda gov/medwatch or call 6-404-BLT4686 or you can report side effects to [facility]  at the contact information provided below  Website Fax number Telephone number   Need Fixed [phone]     How should I store [employer]? Store PAXLOVID tablets at room temperature between 68°F to 77°F (20°C to 25°C)  Full fact sheet for patients, parents, and caregivers can be found at: [employer]    I have spent 20 minutes directly with the patient  Greater than 50% of this time was spent in counseling/coordination of care regarding: diagnostic results, prognosis, risks and benefits of treatment options, instructions for management, patient and family education, importance of treatment compliance, risk factor reductions and impressions  Encounter provider Betty Pugh DO    Provider located at [address] [place] [address] [address]  [facility] [address] [phone]    Recent Visits  No visits were found meeting these conditions  Showing recent visits within past 7 days and meeting all other requirements  Today's Visits  Date Type Provider Dept   06/14/22 Telemedicine Dian Beard DO Pg  [facility] today's visits and meeting all other requirements  Future Appointments  No visits were found meeting these conditions  Showing future appointments within next 150 days and meeting all other requirements     This virtual check-in was done via [employer] used as [employer] not working and patient was informed that this is not a secure, HIPAA-compliant platform   She agrees to proceed  Patient agrees to participate in a virtual check in via telephone or video visit instead of presenting to the office to address urgent/immediate medical needs  Patient is aware this is a billable service  After connecting through [facility], the patient was identified by name and date of birth  Odilon Lee was informed that this was a telemedicine visit and that the exam was being conducted confidentially over secure lines  My office door was closed  No one else was in the room  Odilon Lee acknowledged consent and understanding of privacy and security of the telemedicine visit  I informed the patient that I have reviewed her record in Epic and presented the opportunity for her to ask any questions regarding the visit today  The patient agreed to participate  Verification of patient location:  Patient is located in the following state in which I hold an active license: [place]    Subjective:   Odilon Lee is a 77 y o  female who has been screened for COVID-19  Symptom change since last report: worsening  Patient's symptoms include chills, fatigue, nasal congestion, rhinorrhea, sore throat, cough, chest tightness, nausea, myalgias (Chronic) and headache  Patient denies fever, anosmia, loss of taste, shortness of breath, abdominal pain, vomiting and diarrhea  - Date of symptom onset: 6/11/2022  - Date of positive COVID-19 test: 6/14/2022  Type of test: Home antigen  Home antigen result verified by provider  Will get picture of test uploaded/scanned into patient's chart  COVID-19 vaccination status: Fully vaccinated with booster    Danelle Salcedo has been staying home and has isolated themselves in her home  She is taking care to not share personal items and is cleaning all surfaces that are touched often, like counters, tabletops, and doorknobs using household cleaning sprays or wipes  She is wearing a mask when she leaves her room  Using Tylenol c benefit  Eating and drinking s difficulty  Not using Albuterol HFA PRN  Lab Results   Component Value Date    SARSCOV2 Negative 09/27/2021     Past Medical History:   Diagnosis Date    COVID-19 06/11/2022    Diabetes mellitus ([facility] )     Diabetes mellitus type 2, controlled ([facility] )     Disease of thyroid gland     [facility]     GERD (gastroesophageal reflux disease)     Glaucoma     H/O tooth extraction     Had top row of teeth removed around April 2016    Headache(784 0)     Hyperlipidemia     Hypertension     Neck pain     Pneumonia     Stroke ([facility] )     TIA (transient ischemic attack)     TIA (transient ischemic attack)     Visual impairment      Past Surgical History:   Procedure Laterality Date    BREAST BIOPSY Left     many years ago  papiloma    CATARACT EXTRACTION Right     5 years ago    CATARACT EXTRACTION Left     CYST REMOVAL Left 04/15/2014    ear lobe      EAR SURGERY  1970    Left ear cyst removal     EYE SURGERY      OTHER SURGICAL HISTORY       right upper arm        Current Outpatient Medications   Medication Sig Dispense Refill    albuterol (PROVENTIL HFA,VENTOLIN HFA) 90 mcg/act inhaler Inhale 2 puffs every 4 (four) hours as needed for wheezing or shortness of breath 1 Inhaler 1    COMBIGAN 0 2-0 5 % Administer 1 drop to the right eye 2 (two) times a day   3    dorzolamide (TRUSOPT) 2 % ophthalmic solution Administer 1 drop to the right eye 3 (three) times a day   1    dorzolamide-timolol (COSOPT) 22 3-6 8 MG/ML ophthalmic solution        fluticasone-salmeterol (Advair Diskus) 250-50 mcg/dose inhaler Inhale 1 puff 2 (two) times a day Rinse mouth after use   60 blister 2    glucose blood (OneTouch Verio) test strip USE TO TEST BLOOD SUGAR UP TO FOUR TIMES DAILY 300 each 1    Insulin Pen Needle (Pen Needles) 32G X 4 MM MISC To use daily with victoza 100 each 3    levothyroxine 100 mcg tablet Take 1 tablet (100 mcg total) by mouth daily BRAND SYNTHROID PLEASE 90 tablet 1    liraglutide (VICTOZA) injection Inject 0 2 mL (1 2 mg total) under the skin daily for 14 days, THEN 0 3 mL (1 8 mg total) daily  18 mL 2    losartan-hydrochlorothiazide (HYZAAR) 50-12 5 mg per tablet Take 1 tablet by mouth daily 90 tablet 1    LUMIGAN 0 01 % ophthalmic drops Administer 1 drop to both eyes daily at bedtime   3    metFORMIN (GLUCOPHAGE) 500 mg tablet Take 1 tablet (500 mg total) by mouth 2 (two) times a day with meals 180 tablet 1    nirmatrelvir & ritonavir (Paxlovid) tablet therapy pack Take 3 tablets by mouth 2 (two) times a day for 5 days Take 2 nirmatrelvir tablets + 1 ritonavir tablet together per dose 30 tablet 0    OneTouch Delica Lancets 67O MISC To test BS up to four times daily- E11 65 200 each 5    Rhopressa 0 02 % SOLN        No current facility-administered medications for this visit  Allergies   Allergen Reactions    Eggs Or Egg-Derived Products - Food Allergy Anaphylaxis    Albumen, Egg - Food Allergy Hives    Antihistamines, Diphenhydramine-Type     Epinephrine Syncope     Root canal 25 years ago     Fluzone [Influenza Virus Vaccine] Hives, Abdominal Pain and Facial Swelling    Iv Contrast [Iodinated Diagnostic Agents] Headache     Headache, swelling around eyes, eye burning- with CT contrast   No rxn to MRI contrast    Lidocaine      Syncope, tachycardia with local anesthetic with epinephrine    Zinc Rash       Review of Systems   Constitutional: Positive for chills and fatigue  Negative for fever  HENT: Positive for congestion, rhinorrhea and sore throat  Respiratory: Positive for cough and chest tightness  Negative for shortness of breath  Gastrointestinal: Positive for nausea  Negative for abdominal pain, diarrhea and vomiting  Musculoskeletal: Positive for myalgias (Chronic)  Neurological: Positive for headaches       Objective:    Vitals:    06/14/22 1320   BP: 127/84   Pulse: 80   Temp: 98 2 °F (36 8 °C)   SpO2: 98%   Height: 5' 7" (1 702 m)       Physical Exam  Vitals and nursing note reviewed  Constitutional:       General: She is not in acute distress  Appearance: Normal appearance  She is well-developed  HENT:      Head: Normocephalic and atraumatic  Left Ear: External ear normal       Nose: Nose normal       Mouth/Throat:      Mouth: Mucous membranes are moist       Pharynx: Oropharynx is clear  No oropharyngeal exudate or posterior oropharyngeal erythema  Eyes:      Extraocular Movements: Extraocular movements intact  Conjunctiva/sclera: Conjunctivae normal    Pulmonary:      Effort: Pulmonary effort is normal  No respiratory distress  Abdominal:      Palpations: Abdomen is soft  Tenderness: There is no abdominal tenderness  There is no guarding or rebound  Musculoskeletal:         General: No swelling  Cervical back: Normal range of motion  Right lower leg: No edema  Left lower leg: No edema  Skin:     General: Skin is warm and dry  Neurological:      General: No focal deficit present  Mental Status: She is alert and oriented to person, place, and time  VIRTUAL VISIT DISCLAIMER    Nasim Dailey verbally agrees to participate in [employer]  Pt is aware that Virtual Care Services could be limited without vital signs or the ability to perform a full hands-on physical exam  Chanell Velasquezbecky Erlin understands she or the provider may request at any time to terminate the video visit and request the patient to seek care or treatment in person  BMI Counseling: Body mass index is 29 91 kg/m²  The BMI is above normal  Nutrition recommendations include 3-5 servings of fruits/vegetables daily  Exercise recommendations include exercising 3-5 times per week

## 2022-06-14 NOTE — TELEPHONE ENCOUNTER
Message from Dr Wilson Whelan -- original message was sent to the prescribing physician, Dr Sita Villatoro

## 2022-07-07 ENCOUNTER — RA CDI HCC (OUTPATIENT)
Dept: OTHER | Facility: HOSPITAL | Age: 77
End: 2022-07-07

## 2022-07-07 NOTE — PROGRESS NOTES
[address]  coding opportunities       Chart reviewed, no opportunity found: CHART REVIEWED, NO OPPORTUNITY FOUND        Patients Insurance     Medicare Insurance: Medicare

## 2022-07-12 ENCOUNTER — PATIENT MESSAGE (OUTPATIENT)
Dept: FAMILY MEDICINE CLINIC | Facility: CLINIC | Age: 77
End: 2022-07-12

## 2022-07-12 ENCOUNTER — OFFICE VISIT (OUTPATIENT)
Dept: FAMILY MEDICINE CLINIC | Facility: CLINIC | Age: 77
End: 2022-07-12
Payer: MEDICARE

## 2022-07-12 VITALS
SYSTOLIC BLOOD PRESSURE: 132 MMHG | DIASTOLIC BLOOD PRESSURE: 84 MMHG | HEART RATE: 109 BPM | TEMPERATURE: 97.8 F | WEIGHT: 191.8 LBS | HEIGHT: 67 IN | BODY MASS INDEX: 30.1 KG/M2 | OXYGEN SATURATION: 98 % | RESPIRATION RATE: 18 BRPM

## 2022-07-12 DIAGNOSIS — R53.81 PHYSICAL DECONDITIONING: ICD-10-CM

## 2022-07-12 DIAGNOSIS — E03.9 HYPOTHYROIDISM, UNSPECIFIED TYPE: ICD-10-CM

## 2022-07-12 DIAGNOSIS — E28.310 SYMPTOMATIC PREMATURE MENOPAUSE: Primary | ICD-10-CM

## 2022-07-12 DIAGNOSIS — I10 BENIGN ESSENTIAL HYPERTENSION: ICD-10-CM

## 2022-07-12 DIAGNOSIS — Z12.31 VISIT FOR SCREENING MAMMOGRAM: ICD-10-CM

## 2022-07-12 DIAGNOSIS — E11.65 UNCONTROLLED TYPE 2 DIABETES MELLITUS WITH HYPERGLYCEMIA (HCC): ICD-10-CM

## 2022-07-12 DIAGNOSIS — E11.9 CONTROLLED TYPE 2 DIABETES MELLITUS WITHOUT COMPLICATION, WITHOUT LONG-TERM CURRENT USE OF INSULIN (HCC): ICD-10-CM

## 2022-07-12 DIAGNOSIS — Z23 ENCOUNTER FOR ADMINISTRATION OF VACCINE: ICD-10-CM

## 2022-07-12 DIAGNOSIS — R73.09 LABILE BLOOD GLUCOSE: ICD-10-CM

## 2022-07-12 DIAGNOSIS — R39.9 URINARY TRACT INFECTION SYMPTOMS: ICD-10-CM

## 2022-07-12 DIAGNOSIS — Z00.00 MEDICARE ANNUAL WELLNESS VISIT, SUBSEQUENT: ICD-10-CM

## 2022-07-12 PROCEDURE — G0439 PPPS, SUBSEQ VISIT: HCPCS | Performed by: FAMILY MEDICINE

## 2022-07-12 PROCEDURE — 90677 PCV20 VACCINE IM: CPT

## 2022-07-12 PROCEDURE — G0009 ADMIN PNEUMOCOCCAL VACCINE: HCPCS

## 2022-07-12 PROCEDURE — 1123F ACP DISCUSS/DSCN MKR DOCD: CPT

## 2022-07-12 PROCEDURE — 99214 OFFICE O/P EST MOD 30 MIN: CPT | Performed by: FAMILY MEDICINE

## 2022-07-12 RX ORDER — BLOOD SUGAR DIAGNOSTIC
STRIP MISCELLANEOUS
Qty: 300 EACH | Refills: 1 | Status: SHIPPED | OUTPATIENT
Start: 2022-07-12

## 2022-07-12 RX ORDER — LEVOTHYROXINE SODIUM 0.1 MG/1
100 TABLET ORAL DAILY
Qty: 90 TABLET | Refills: 1 | Status: SHIPPED | OUTPATIENT
Start: 2022-07-12

## 2022-07-12 RX ORDER — LANCETS 30 GAUGE
EACH MISCELLANEOUS
Qty: 200 EACH | Refills: 5 | Status: SHIPPED | OUTPATIENT
Start: 2022-07-12

## 2022-07-12 RX ORDER — LOSARTAN POTASSIUM AND HYDROCHLOROTHIAZIDE 12.5; 5 MG/1; MG/1
1 TABLET ORAL DAILY
Qty: 90 TABLET | Refills: 1 | Status: SHIPPED | OUTPATIENT
Start: 2022-07-12 | End: 2022-10-20

## 2022-07-12 RX ORDER — SULFAMETHOXAZOLE AND TRIMETHOPRIM 800; 160 MG/1; MG/1
1 TABLET ORAL 2 TIMES DAILY
Qty: 8 TABLET | Refills: 0 | Status: SHIPPED | OUTPATIENT
Start: 2022-07-12 | End: 2022-07-16

## 2022-07-12 NOTE — PROGRESS NOTES
Assessment and Plan:     Problem List Items Addressed This Visit        Endocrine    Controlled type 2 diabetes mellitus without complication, without long-term current use of insulin ([address] )     While her A1C is at goal, the patient is experiencing wide ranges of BS readings and is having episodes where she feels hypoglycemic (BS )  Due to this, she is monitoring BS >4x per day  She will continue with victoza 0 6 mg daily for now   (initial prescription given was a refill of the prior dosing- reprinted and patient notified to use 0 6 mg daily)  Lab Results   Component Value Date    HGBA1C 6 3 (H) 06/07/2022            Relevant Medications    metFORMIN (GLUCOPHAGE) 500 mg tablet    OneTouch Delica Lancets 53Z MISC    glucose blood (OneTouch Verio) test strip    liraglutide (VICTOZA) injection    Other Relevant Orders    Ambulatory referral to Endocrinology    Hypothyroidism     Prefers brand synthroid  Continue current dose  Lab Results   Component Value Date    [id number] 1 420 06/07/2022              Relevant Medications    levothyroxine 100 mcg tablet       Cardiovascular and Mediastinum    Benign essential hypertension     Controlled at present, no changes needed today  Reviewed labs  F/u in six months, sooner if needed           Relevant Medications    losartan-hydrochlorothiazide (HYZAAR) 50-12 5 mg per tablet      Other Visit Diagnoses     Symptomatic premature menopause    -  Primary    Relevant Orders    DXA bone density spine hip and pelvis    Medicare annual wellness visit, subsequent    - SEE BELOW    Uncontrolled type 2 diabetes mellitus with hyperglycemia (HCC)        Relevant Medications    metFORMIN (GLUCOPHAGE) 500 mg tablet    liraglutide (VICTOZA) injection    Visit for screening mammogram        Labile blood glucose        Relevant Medications    OneTouch Delica Lancets 40A MISC    glucose blood (OneTouch Verio) test strip    Other Relevant Orders    Ambulatory referral to Endocrinology    Physical deconditioning        Relevant Orders    Ambulatory Referral to Physical Therapy    Urinary tract infection symptoms        has already started bactrim which she had at home, so will hold on ua/culture  rx to complete total 5 days  repeat ua/culture if not improved  Relevant Medications    sulfamethoxazole-trimethoprim (BACTRIM DS) 800-160 mg per tablet    Encounter for administration of vaccine        Relevant Orders    Pneumococcal Conjugate Vaccine 20-valent (Pcv20) (Completed)           Preventive health issues were discussed with patient, and age appropriate screening tests were ordered as noted in patient's After Visit Summary  Personalized health advice and appropriate referrals for health education or preventive services given if needed, as noted in patient's After Visit Summary  History of Present Illness:     Patient presents for a Medicare Wellness Visit    Pt with concerns that her blood sugar went too low after her steroid injections  She feels hypoglycemic at [facility] of [facility]  Says it took a few weeks for her BS to normalize  Says yesterday "it started again"- today had a BS of 82  Took liquid glucose  Her BS is ranging from 200 to 82 in an hour  Last night had diarrhea  Did not take victoza last night b/c her BS was low  Currently has been taking victoza 0 6 mg and will take somewhere between 0 6 to 1 2 after a few times of running high  She tests many times per day  She is worried about her readings going too high and too low  Says she started on bactrim b/c she feels she has a UTI  Used it twice yesterday  It was "leftover" from her last UTI  Her urinary symptoms are improving  Says she has a hard time sleeping overnight, will sleep from 4:30-7 AM and then will sleep about two hours during the day  Takes dog for walks and has started doing this again  Belongs to [employer] and is able to do classes online    She says she doesn't last through the class, but she is trying  She says about 6-8 weeks ago she had a bad fall  Her patio is concrete and she was with her dog who stopped short and she fell forward onto her knees and outstretched hands  Did not seek medical attention at this time  She used aspercream, tylenol, advil  She is not willing to go for further mammograms          HPI   Patient Care Team:  Heri Lamb MD as PCP - General (Family Medicine)     Review of Systems:     Review of Systems     Problem List:     Patient Active Problem List   Diagnosis    Benign essential hypertension    Hemispheric carotid artery syndrome    Controlled type 2 diabetes mellitus without complication, without long-term current use of insulin (HCC)    Hypothyroidism    Vitamin D deficiency    Glaucoma, bilateral    Mixed hyperlipidemia    Left sided numbness    Small vessel disease ([address] )    Left-sided weakness    Other headache syndrome    Cervical spondylosis    Cervicalgia    Other emphysema ([address] )    Refusal of statin medication by patient      Past Medical and Surgical History:     Past Medical History:   Diagnosis Date    COVID-19 06/11/2022    Diabetes mellitus ([address] )     Diabetes mellitus type 2, controlled ([address] )     Disease of thyroid gland     Emphysema of lung ([address] )     GERD (gastroesophageal reflux disease)     Glaucoma     H/O tooth extraction     Had top row of teeth removed around April 2016    Headache(784 0)     Hyperlipidemia     Hypertension     Neck pain     Pneumonia     Stroke ([address] )     TIA (transient ischemic attack)     TIA (transient ischemic attack)     Visual impairment      Past Surgical History:   Procedure Laterality Date    BREAST BIOPSY Left     many years ago  papiloma    CATARACT EXTRACTION Right     5 years ago    CATARACT EXTRACTION Left     CYST REMOVAL Left 04/15/2014    ear lobe      EAR SURGERY  1970    Left ear cyst removal     EYE SURGERY      OTHER SURGICAL HISTORY right upper arm         Family History:     Family History   Problem Relation Age of Onset    Rheum arthritis Mother     No Known Problems Father     Cancer Brother     Heart attack Paternal Grandmother     No Known Problems Maternal Grandmother     No Known Problems Paternal Grandfather       Social History:     Social History     Socioeconomic History    Marital status:       Spouse name: None    Number of children: 0    Years of education: None    Highest education level: None   Occupational History    None   Tobacco Use    Smoking status: Former Smoker     Packs/day: 3 00     Years: 50 00     Pack years: 150 00     Types: Cigarettes, Cigarettes     Start date: 3/30/1960     Quit date: 2010     Years since quittin 4    Smokeless tobacco: Never Used   Vaping Use    Vaping Use: Never used   Substance and Sexual Activity    Alcohol use: Not Currently     Alcohol/week: 1 0 standard drink     Types: 1 Glasses of wine per week     Comment: very rare    Drug use: Never    Sexual activity: Never   Other Topics Concern    None   Social History Narrative    Former smoker: Quit 3/10/2011 - As per Care Everywhere      Social Determinants of Health     Financial Resource Strain: Not on file   Food Insecurity: Not on file   Transportation Needs: Not on file   Physical Activity: Not on file   Stress: Not on file   Social Connections: Not on file   Intimate Partner Violence: Not on file   Housing Stability: Not on file      Medications and Allergies:     Current Outpatient Medications   Medication Sig Dispense Refill    albuterol (PROVENTIL HFA,VENTOLIN HFA) 90 mcg/act inhaler Inhale 2 puffs every 4 (four) hours as needed for wheezing or shortness of breath 1 Inhaler 1    COMBIGAN 0 2-0 5 % Administer 1 drop to the right eye 2 (two) times a day   3    dorzolamide (TRUSOPT) 2 % ophthalmic solution Administer 1 drop to the right eye 3 (three) times a day   1    dorzolamide-timolol (COSOPT) 22 3-6 8 MG/ML ophthalmic solution        fluticasone-salmeterol (Advair Diskus) 250-50 mcg/dose inhaler Inhale 1 puff 2 (two) times a day Rinse mouth after use  60 blister 2    glucose blood (OneTouch Verio) test strip USE TO TEST BLOOD SUGAR UP TO FOUR TIMES DAILY 300 each 1    Insulin Pen Needle (Pen Needles) 32G X 4 MM MISC To use daily with victoza 100 each 3    levothyroxine 100 mcg tablet Take 1 tablet (100 mcg total) by mouth daily BRAND SYNTHROID PLEASE 90 tablet 1    liraglutide (VICTOZA) injection Inject 0 1 mL (0 6 mg total) under the skin daily 9 mL 1    losartan-hydrochlorothiazide (HYZAAR) 50-12 5 mg per tablet Take 1 tablet by mouth daily 90 tablet 1    LUMIGAN 0 01 % ophthalmic drops Administer 1 drop to both eyes daily at bedtime   3    metFORMIN (GLUCOPHAGE) 500 mg tablet Take 1 tablet (500 mg total) by mouth 2 (two) times a day with meals 180 tablet 1    OneTouch Delica Lancets 90F MISC To test BS up to four times daily- E11 65 200 each 5    Rhopressa 0 02 % SOLN       sulfamethoxazole-trimethoprim (BACTRIM DS) 800-160 mg per tablet Take 1 tablet by mouth 2 (two) times a day for 4 days 8 tablet 0     No current facility-administered medications for this visit       Allergies   Allergen Reactions    Eggs Or Egg-Derived Products - Food Allergy Anaphylaxis    Albumen, Egg - Food Allergy Hives    Antihistamines, Diphenhydramine-Type     Epinephrine Syncope     Root canal 25 years ago     Fluzone [Influenza Virus Vaccine] Hives, Abdominal Pain and Facial Swelling    Iv Contrast [Iodinated Diagnostic Agents] Headache     Headache, swelling around eyes, eye burning- with CT contrast   No rxn to MRI contrast    Lidocaine      Syncope, tachycardia with local anesthetic with epinephrine    Zinc Rash      Immunizations:     Immunization History   Administered Date(s) Administered    COVID-19 PFIZER VACCINE 0 3 ML IM 04/02/2021, 04/23/2021, 12/15/2021    Pneumococcal Conjugate 13-Valent 03/22/2017    Pneumococcal Conjugate Vaccine 20-valent (Pcv20), Polysace 07/12/2022    Pneumococcal Polysaccharide PPV23 01/01/2009, 07/19/2018    Tdap 03/27/2014      Health Maintenance:         Topic Date Due    DXA SCAN  03/30/2022    Breast Cancer Screening: Mammogram  05/26/2022    Lung Cancer Screening  06/06/2023    Colorectal Cancer Screening  06/15/2023    Hepatitis C Screening  Completed         Topic Date Due    COVID-19 Vaccine (4 - Booster for Encarnacion Peter series) 04/15/2022      Medicare Screening Tests and Risk Assessments:     Yashira Moore is here for her Subsequent Wellness visit  Health Risk Assessment:   Patient rates overall health as fair  Patient feels that their physical health rating is slightly worse  Patient is satisfied with their life  Eyesight was rated as same  Hearing was rated as same  Patient feels that their emotional and mental health rating is same  Patients states they are sometimes angry  Patient states they are never, rarely unusually tired/fatigued  Pain experienced in the last 7 days has been none  Patient states that she has experienced no weight loss or gain in last 6 months  Fall Risk Screening: In the past year, patient has experienced: history of falling in past year    Number of falls: 1  Injured during fall?: No    Feels unsteady when standing or walking?: No    Worried about falling?: No      Urinary Incontinence Screening:   Patient has leaked urine accidently in the last six months  Home Safety:  Patient has trouble with stairs inside or outside of their home  Patient has working smoke alarms and has working carbon monoxide detector  Home safety hazards include: none  Nutrition:   Current diet is Diabetic  Medications:   Patient is not currently taking any over-the-counter supplements  Patient is able to manage medications       Activities of Daily Living (ADLs)/Instrumental Activities of Daily Living (IADLs):   Walk and transfer into and out of bed and chair?: Yes  Dress and groom yourself?: Yes    Bathe or shower yourself?: Yes    Feed yourself? Yes  Do your laundry/housekeeping?: Yes  Manage your money, pay your bills and track your expenses?: Yes  Make your own meals?: Yes    Do your own shopping?: Yes    Previous Hospitalizations:   Any hospitalizations or ED visits within the last 12 months?: No      Advance Care Planning:   Living will: Yes    Advanced directive: Yes      PREVENTIVE SCREENINGS      Cardiovascular Screening:    General: Screening Not Indicated and History Lipid Disorder      Diabetes Screening:     General: Screening Not Indicated and History Diabetes      Colorectal Cancer Screening:     General: Screening Current      Breast Cancer Screening:     General: Screening Current    Due for: Mammogram        Cervical Cancer Screening:    General: Screening Not Indicated      Osteoporosis Screening:      Due for: DXA Axial and DXA Appendicular      Abdominal Aortic Aneurysm (AAA) Screening:        General: Screening Not Indicated      Lung Cancer Screening:     General: Screening Current      Hepatitis C Screening:    General: Screening Current    Screening, Brief Intervention, and Referral to Treatment (SBIRT)    Screening  Typical number of drinks in a day: 0  Typical number of drinks in a week: 0  Interpretation: Low risk drinking behavior  Single Item Drug Screening:  How often have you used an illegal drug (including marijuana) or a prescription medication for non-medical reasons in the past year? never    Single Item Drug Screen Score: 0  Interpretation: Negative screen for possible drug use disorder    No exam data present     Physical Exam:     /84   Pulse (!) 109   Temp 97 8 °F (36 6 °C)   Resp 18   Ht 5' 7" (1 702 m)   Wt 87 kg (191 lb 12 8 oz)   SpO2 98%   BMI 30 04 kg/m²     Physical Exam  Vitals and nursing note reviewed  Constitutional:       General: She is not in acute distress       Appearance: Normal appearance  She is well-developed  She is not ill-appearing  HENT:      Head: Normocephalic and atraumatic  Eyes:      Conjunctiva/sclera: Conjunctivae normal    Neck:      Vascular: No carotid bruit  Cardiovascular:      Rate and Rhythm: Normal rate and regular rhythm  Heart sounds: No murmur heard  Pulmonary:      Effort: Pulmonary effort is normal  No respiratory distress  Breath sounds: Normal breath sounds  Abdominal:      Palpations: Abdomen is soft  Tenderness: There is no abdominal tenderness  Musculoskeletal:      Cervical back: Neck supple  Right lower leg: No edema  Left lower leg: No edema  Skin:     General: Skin is warm and dry  Neurological:      Mental Status: She is alert and oriented to person, place, and time  Gait: Abnormal gait: ambulates w cane     Psychiatric:         Mood and Affect: Mood normal          Behavior: Behavior normal           Cece Paul MD

## 2022-07-12 NOTE — ASSESSMENT & PLAN NOTE
While her A1C is at goal, the patient is experiencing wide ranges of BS readings and is having episodes where she feels hypoglycemic (BS )  Due to this, she is monitoring BS >4x per day    She will continue with victoza 0 6 mg daily for now   (initial prescription given was a refill of the prior dosing- reprinted and patient notified to use 0 6 mg daily)  Lab Results   Component Value Date    HGBA1C 6 3 (H) 06/07/2022

## 2022-07-12 NOTE — PATIENT INSTRUCTIONS
Call to schedule physical therapy  Take four additional days of bactrim (sent to pharmacy) but if you have any further urinary symptoms, please call so the urine can be checked  Call to schedule an appointment with an endocrinologist         Medicare Preventive Visit Patient Instructions  Thank you for completing your Welcome to Medicare Visit or Medicare Annual Wellness Visit today  Your next wellness visit will be due in one year (7/13/2023)  The screening/preventive services that you may require over the next 5-10 years are detailed below  Some tests may not apply to you based off risk factors and/or age  Screening tests ordered at today's visit but not completed yet may show as past due  Also, please note that scanned in results may not display below  Preventive Screenings:  Service Recommendations Previous Testing/Comments   Colorectal Cancer Screening  * Colonoscopy    * Fecal Occult Blood Test (FOBT)/Fecal Immunochemical Test (FIT)  * Fecal DNA/Cologuard Test  * Flexible Sigmoidoscopy Age: 54-65 years old   Colonoscopy: every 10 years (may be performed more frequently if at higher risk)  OR  FOBT/FIT: every 1 year  OR  Cologuard: every 3 years  OR  Sigmoidoscopy: every 5 years  Screening may be recommended earlier than age 48 if at higher risk for colorectal cancer  Also, an individualized decision between you and your healthcare provider will decide whether screening between the ages of 74-80 would be appropriate  Colonoscopy: 12/11/2015  FOBT/FIT: Not on file  Cologuard: 06/15/2020  Sigmoidoscopy: Not on file          Breast Cancer Screening Age: 36 years old  Frequency: every 1-2 years  Not required if history of left and right mastectomy Mammogram: 05/26/2021    Screening Current   Cervical Cancer Screening Between the ages of 21-29, pap smear recommended once every 3 years  Between the ages of 33-67, can perform pap smear with HPV co-testing every 5 years     Recommendations may differ for women with a history of total hysterectomy, cervical cancer, or abnormal pap smears in past  Pap Smear: Not on file    Screening Not Indicated   Hepatitis C Screening Once for adults born between 1945 and 1965  More frequently in patients at high risk for Hepatitis C Hep C Antibody: 10/31/2017    Screening Current   Diabetes Screening 1-2 times per year if you're at risk for diabetes or have pre-diabetes Fasting glucose: 152 mg/dL   A1C: 6 3 %    Screening Not Indicated  History Diabetes   Cholesterol Screening Once every 5 years if you don't have a lipid disorder  May order more often based on risk factors  Lipid panel: 06/07/2022    Screening Not Indicated  History Lipid Disorder     Other Preventive Screenings Covered by Medicare:  Abdominal Aortic Aneurysm (AAA) Screening: covered once if your at risk  You're considered to be at risk if you have a family history of AAA  Lung Cancer Screening: covers low dose CT scan once per year if you meet all of the following conditions: (1) Age 50-69; (2) No signs or symptoms of lung cancer; (3) Current smoker or have quit smoking within the last 15 years; (4) You have a tobacco smoking history of at least 30 pack years (packs per day multiplied by number of years you smoked); (5) You get a written order from a healthcare provider  Glaucoma Screening: covered annually if you're considered high risk: (1) You have diabetes OR (2) Family history of glaucoma OR (3)  aged 48 and older OR (3)  American aged 72 and older  Osteoporosis Screening: covered every 2 years if you meet one of the following conditions: (1) You're estrogen deficient and at risk for osteoporosis based off medical history and other findings; (2) Have a vertebral abnormality; (3) On glucocorticoid therapy for more than 3 months; (4) Have primary hyperparathyroidism; (5) On osteoporosis medications and need to assess response to drug therapy     Last bone density test (DXA Scan): 03/30/2017  HIV Screening: covered annually if you're between the age of 12-76  Also covered annually if you are younger than 13 and older than 72 with risk factors for HIV infection  For pregnant patients, it is covered up to 3 times per pregnancy  Immunizations:  Immunization Recommendations   Influenza Vaccine Annual influenza vaccination during flu season is recommended for all persons aged >= 6 months who do not have contraindications   Pneumococcal Vaccine (Prevnar and Pneumovax)  * Prevnar = PCV13  * Pneumovax = PPSV23   Adults 25-60 years old: 1-3 doses may be recommended based on certain risk factors  Adults 72 years old: Prevnar (PCV13) vaccine recommended followed by Pneumovax (PPSV23) vaccine  If already received PPSV23 since turning 65, then PCV13 recommended at least one year after PPSV23 dose  Hepatitis B Vaccine 3 dose series if at intermediate or high risk (ex: diabetes, end stage renal disease, liver disease)   Tetanus (Td) Vaccine - COST NOT COVERED BY MEDICARE PART B Following completion of primary series, a booster dose should be given every 10 years to maintain immunity against tetanus  Td may also be given as tetanus wound prophylaxis  Tdap Vaccine - COST NOT COVERED BY MEDICARE PART B Recommended at least once for all adults  For pregnant patients, recommended with each pregnancy  Shingles Vaccine (Shingrix) - COST NOT COVERED BY MEDICARE PART B  2 shot series recommended in those aged 48 and above     Health Maintenance Due:      Topic Date Due    DXA SCAN  03/30/2022    Breast Cancer Screening: Mammogram  05/26/2022    Lung Cancer Screening  06/06/2023    Colorectal Cancer Screening  06/15/2023    Hepatitis C Screening  Completed     Immunizations Due:      Topic Date Due    COVID-19 Vaccine (4 - Booster for Encarnacion Peter series) 04/15/2022     Advance Directives   What are advance directives? Advance directives are legal documents that state your wishes and plans for medical care  These plans are made ahead of time in case you lose your ability to make decisions for yourself  Advance directives can apply to any medical decision, such as the treatments you want, and if you want to donate organs  What are the types of advance directives? There are many types of advance directives, and each state has rules about how to use them  You may choose a combination of any of the following:  Living will: This is a written record of the treatment you want  You can also choose which treatments you do not want, which to limit, and which to stop at a certain time  This includes surgery, medicine, IV fluid, and tube feedings  Durable power of  for [facility]): This is a written record that states who you want to make healthcare choices for you when you are unable to make them for yourself  This person, called a proxy, is usually a family member or a friend  You may choose more than 1 proxy  Do not resuscitate (DNR) order:  A DNR order is used in case your heart stops beating or you stop breathing  It is a request not to have certain forms of treatment, such as CPR  A DNR order may be included in other types of advance directives  Medical directive: This covers the care that you want if you are in a coma, near death, or unable to make decisions for yourself  You can list the treatments you want for each condition  Treatment may include pain medicine, surgery, blood transfusions, dialysis, IV or tube feedings, and a ventilator (breathing machine)  Values history: This document has questions about your views, beliefs, and how you feel and think about life  This information can help others choose the care that you would choose  Why are advance directives important? An advance directive helps you control your care  Although spoken wishes may be used, it is better to have your wishes written down  Spoken wishes can be misunderstood, or not followed   Treatments may be given even if you do not want them  An advance directive may make it easier for your family to make difficult choices about your care  Weight Management   Why it is important to manage your weight:  Being overweight increases your risk of health conditions such as heart disease, high blood pressure, type 2 diabetes, and certain types of cancer  It can also increase your risk for osteoarthritis, sleep apnea, and other respiratory problems  Aim for a slow, steady weight loss  Even a small amount of weight loss can lower your risk of health problems  How to lose weight safely:  A safe and healthy way to lose weight is to eat fewer calories and get regular exercise  You can lose up about 1 pound a week by decreasing the number of calories you eat by 500 calories each day  Healthy meal plan for weight management:  A healthy meal plan includes a variety of foods, contains fewer calories, and helps you stay healthy  A healthy meal plan includes the following:  Eat whole-grain foods more often  A healthy meal plan should contain fiber  Fiber is the part of grains, fruits, and vegetables that is not broken down by your body  Whole-grain foods are healthy and provide extra fiber in your diet  Some examples of whole-grain foods are whole-wheat breads and pastas, oatmeal, brown rice, and bulgur  Eat a variety of vegetables every day  Include dark, leafy greens such as spinach, kale, avery greens, and mustard greens  Eat yellow and orange vegetables such as carrots, sweet potatoes, and winter squash  Eat a variety of fruits every day  Choose fresh or canned fruit (canned in its own juice or light syrup) instead of juice  Fruit juice has very little or no fiber  Eat low-fat dairy foods  Drink fat-free (skim) milk or 1% milk  Eat fat-free yogurt and low-fat cottage cheese  Try low-fat cheeses such as mozzarella and other reduced-fat cheeses  Choose meat and other protein foods that are low in fat    Choose beans or other legumes such as split peas or lentils  Choose fish, skinless poultry (chicken or turkey), or lean cuts of red meat (beef or pork)  Before you cook meat or poultry, cut off any visible fat  Use less fat and oil  Try baking foods instead of frying them  Add less fat, such as margarine, sour cream, regular salad dressing and mayonnaise to foods  Eat fewer high-fat foods  Some examples of high-fat foods include french fries, doughnuts, ice cream, and cakes  Eat fewer sweets  Limit foods and drinks that are high in sugar  This includes candy, cookies, regular soda, and sweetened drinks  Exercise:  Exercise at least 30 minutes per day on most days of the week  Some examples of exercise include walking, biking, dancing, and swimming  You can also fit in more physical activity by taking the stairs instead of the elevator or parking farther away from stores  Ask your healthcare provider about the best exercise plan for you  © Copyright [employer] 2018 Information is for End User's use only and may not be sold, redistributed or otherwise used for commercial purposes   All illustrations and images included in CareNotes® are the copyrighted property of A D A M , Inc  or [address] [place] [address]

## 2022-07-15 NOTE — ASSESSMENT & PLAN NOTE
Prefers brand synthroid  Continue current dose    Lab Results   Component Value Date    [id number] 1 420 06/07/2022

## 2022-07-20 NOTE — TELEPHONE ENCOUNTER
From: Avis Okeefe MD  To: Bhavana Fournier  Sent: 7/12/2022 5:00 PM EDT  Subject: Victoza dose- stay at 0 6 mg daily    Hi Chanell,  I realized that your victoza prescription printed with the previous instructions of titrating the dose up to 1 2 and 1 8 mg  With your BS running on the low side at times, I'd like you to stay on the 0 6 mg dosing  I can send in the appropriate dose to your pharmacy or mail you a copy of the prescription- just let me know what is best!  Mimi Silva MD

## 2022-07-21 ENCOUNTER — EVALUATION (OUTPATIENT)
Dept: PHYSICAL THERAPY | Facility: CLINIC | Age: 77
End: 2022-07-21
Payer: MEDICARE

## 2022-07-21 DIAGNOSIS — R53.81 PHYSICAL DECONDITIONING: ICD-10-CM

## 2022-07-21 PROCEDURE — 97110 THERAPEUTIC EXERCISES: CPT | Performed by: PHYSICAL THERAPIST

## 2022-07-21 PROCEDURE — 97163 PT EVAL HIGH COMPLEX 45 MIN: CPT | Performed by: PHYSICAL THERAPIST

## 2022-07-21 NOTE — PROGRESS NOTES
PT Evaluation     Today's date: 2022  Patient name: Martínez Arredondo  : 1945  MRN: [id number]  Referring provider: Jamie Castillo MD  Dx:   Encounter Diagnosis     ICD-10-CM    1  Physical deconditioning  R53 81 Ambulatory Referral to Physical Therapy       Start Time: 1438  Stop Time: 1510  Total time in clinic (min): 32 minutes    Assessment  Assessment details: Pt is a 76 y/o female who's primary concern is her fatigue, strength, and balance difficulties  Pt has a PMH of multiple TIAs  Patient presents with decreased balance, power, and strength, as evident by objective/functional measures, which correlates with her need for therapy  Pt would benefit from skilled physical therapy to improve her strength, endurance, and balance  Impairments: activity intolerance, impaired balance, impaired physical strength, lacks appropriate home exercise program and poor body mechanics  Understanding of Dx/Px/POC: fair   Prognosis: fair    Goals  ST  Pt will be able to independently perform her HEP by the end of 2 weeks to increase her strength  2  Pt will be able to increase her L SLS to 7 seconds within 3 weeks in order to improve her balance to perform her everyday tasks  LT  Pt will decrease her STS time by 5 seconds within 6 weeks in order to increase her endurance/power  2  Pt will improve her LE strength to a 4/5 within 6 weeks in order to be able to walk her dog on trials       Plan  Patient would benefit from: skilled physical therapy  Planned modality interventions: cryotherapy  Planned therapy interventions: abdominal trunk stabilization, balance, balance/weight bearing training, home exercise program, flexibility, manual therapy, joint mobilization, massage, neuromuscular re-education, strengthening, stretching, therapeutic activities, therapeutic exercise and body mechanics training  Frequency: 2x week  Duration in weeks: 8  Treatment plan discussed with: patient        Subjective Evaluation    History of Present Illness  Mechanism of injury: Pt notes she has difficult w/ strength, balance, and endurance throughout her everyday life  Pt notes she was very active in the past, but in the past few years she has been sedentary due to her PMH  Pt notes she is unable to get through the day w/ out a nap and she is only able to run 1-2 errands w/ becoming fatigued  Pt also hopes to be able to walk her dog on paths and dance for fun  Pt has a PMH of 3-4 TIAs and each time she lost some of the strength of her L UE/LE  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, L rotator cuff issues, and an ablation of  Her C-spine  Pt reports she has been having issues w/ her glucose being unstable trista to getting injections  Pt has a deficit in depth perception and peripheral vision  Recurrent probem    Quality of life: fair    Pain  No pain reported  Progression: no change    Social Support  Lives with: alone      Diagnostic Tests  No diagnostic tests performed  Treatments  Previous treatment: physical therapy  Patient Goals  Patient goals for therapy: increased strength, independence with ADLs/IADLs and improved balance          Objective     Active Range of Motion     Lumbar   Normal active range of motion    Strength/Myotome Testing     Left Elbow   Extension: 3+    Right Elbow   Extension: 4+    Left Hip   Planes of Motion   Flexion: 3+  Abduction: 4-  Adduction: 3+    Right Hip   Planes of Motion   Flexion: 4  Abduction: 4+  Adduction: 4+    Left Knee   Flexion: 3+  Extension: 4-    Right Knee   Flexion: 4+  Extension: 4+    Left Ankle/Foot   Dorsiflexion: 3+  Plantar flexion: 4-  Inversion: 3+  Eversion: 3+    Right Ankle/Foot   Dorsiflexion: 4+  Plantar flexion: 4+  Inversion: 4+  Eversion: 4+    Ambulation     Observational Gait   Gait: within functional limits     Additional Observational Gait Details  Pt ambulates w/ a cane outside of her home       Functional Assessment        Comments  STS 33 31s w/ hands pushing off  SLS L 3 5s; R 8 3s  TUG 14 4s w/ hands to push off     Diminished Sensation: L C6, C7,T1, L L4             Precautions: DM, HTN, Depth Perception Issues      Manuals 7/21                                                                Neuro Re-Ed             [facility]                          Tandem stance  HEP            Sassy hip                                        Ther Ex                          LAQ HEP            SLR                          Leg Press                                       Ther Activity             STS HEP            Step ups             Gait Training                                       Modalities

## 2022-07-26 ENCOUNTER — OFFICE VISIT (OUTPATIENT)
Dept: PHYSICAL THERAPY | Facility: CLINIC | Age: 77
End: 2022-07-26
Payer: MEDICARE

## 2022-07-26 DIAGNOSIS — R53.81 PHYSICAL DECONDITIONING: Primary | ICD-10-CM

## 2022-07-26 PROCEDURE — 97112 NEUROMUSCULAR REEDUCATION: CPT | Performed by: PHYSICAL THERAPIST

## 2022-07-26 PROCEDURE — 97110 THERAPEUTIC EXERCISES: CPT | Performed by: PHYSICAL THERAPIST

## 2022-07-26 NOTE — PROGRESS NOTES
Daily Note     Today's date: 2022  Patient name: Anthony Adams  : 1945  MRN: [id number]  Referring provider: Madeline Patterson MD  Dx:   Encounter Diagnosis     ICD-10-CM    1  Physical deconditioning  R53 81        Start Time:   Stop Time: 113  Total time in clinic (min): 40 minutes    Subjective: Pt noted she was able to do her exercises 1 time since she has been here since her neck/ shoulder has not been feeling well  Objective: See treatment diary below      Assessment: Tolerated treatment well  Patient would benefit from continued PT  Pt was able to add additional exercises during today's physical therapy session  Pt notes her legs felt fatigued during the end of the session  Pt's neck was aggrevating her when performing SLR and sit to stands  Plan: Progress treatment as tolerated         Precautions: DM, HTN, Depth Perception Issues      Manuals                                                                Neuro Re-Ed             Seated Marches  3x10 B           Tandem stance  HEP 3x10s B           Sassy hip   10x3s B                                     Ther Ex                          LAQ HEP 2x10 B           SLR  2x5 B           Stationary Bike  5 min           Leg Press             DF strengthen   2x10                        Ther Activity             STS HEP 3x           Step ups/ downs  2x6 4in           Gait Training                                       Modalities

## 2022-07-29 ENCOUNTER — APPOINTMENT (OUTPATIENT)
Dept: PHYSICAL THERAPY | Facility: CLINIC | Age: 77
End: 2022-07-29
Payer: MEDICARE

## 2022-08-01 ENCOUNTER — OFFICE VISIT (OUTPATIENT)
Dept: PHYSICAL THERAPY | Facility: CLINIC | Age: 77
End: 2022-08-01
Payer: MEDICARE

## 2022-08-01 DIAGNOSIS — R53.81 PHYSICAL DECONDITIONING: Primary | ICD-10-CM

## 2022-08-01 PROCEDURE — 97112 NEUROMUSCULAR REEDUCATION: CPT | Performed by: PHYSICAL THERAPIST

## 2022-08-01 PROCEDURE — 97110 THERAPEUTIC EXERCISES: CPT | Performed by: PHYSICAL THERAPIST

## 2022-08-01 NOTE — PROGRESS NOTES
Daily Note     Today's date: 2022  Patient name: Maira Guido  : 1945  MRN: [id number]  Referring provider: Dede Carvalho MD  Dx:   Encounter Diagnosis     ICD-10-CM    1  Physical deconditioning  R53 81        Start Time: 1400  Stop Time: 4476  Total time in clinic (min): 37 minutes    Subjective: Pt noted that she can feel a difference from coming to therapy  Pt reports that she is having a 8/10 pain day today because she was unable to sleep much  Pt notes her L leg feels in more pain than her R leg  Pt notes her knees are bothering her more than usual today  Pt notes she was able to walk about 1/4 mile with her dog twice since she has started at physical therapy  Objective: See treatment diary below      Assessment: Tolerated treatment well  Patient would benefit from continued PT  Pt was unable to perform some of the exercises due to knee pain but the exercises she was able to do she was able to increase reps  Plan: Progress treatment as tolerated         Precautions: DM, HTN, Depth Perception Issues      Manuals                                                               Neuro Re-Ed             Seated Marches  3x10 B 3x10 B          Tandem stance  HEP 3x10s B 3x10s B          Sassy hip   10x3s B 15x 3s B                                    Ther Ex                          LAQ HEP 2x10 B 3x10 B          SLR  2x5 B           Stationary Bike  5 min 5 min          Leg Press             DF strengthen   2x10 3x10                       Ther Activity             STS HEP 3x           Step ups/ downs  2x6 4in 7x 2 in          Gait Training                                       Modalities

## 2022-08-04 ENCOUNTER — APPOINTMENT (OUTPATIENT)
Dept: PHYSICAL THERAPY | Facility: CLINIC | Age: 77
End: 2022-08-04
Payer: MEDICARE

## 2022-08-08 ENCOUNTER — OFFICE VISIT (OUTPATIENT)
Dept: PHYSICAL THERAPY | Facility: CLINIC | Age: 77
End: 2022-08-08
Payer: MEDICARE

## 2022-08-08 DIAGNOSIS — R53.81 PHYSICAL DECONDITIONING: Primary | ICD-10-CM

## 2022-08-08 PROCEDURE — 97110 THERAPEUTIC EXERCISES: CPT | Performed by: PHYSICAL THERAPIST

## 2022-08-08 PROCEDURE — 97112 NEUROMUSCULAR REEDUCATION: CPT | Performed by: PHYSICAL THERAPIST

## 2022-08-08 NOTE — PROGRESS NOTES
Daily Note     Today's date: 2022  Patient name: Parish Harper  : 1945  MRN: [id number]  Referring provider: Janelle Ramires MD  Dx:   Encounter Diagnosis     ICD-10-CM    1  Physical deconditioning  R53 81        Start Time: 1400  Stop Time: 1427  Total time in clinic (min): 27 minutes    Subjective: Pt notes she has been able to do her exercises more at home  Pt reports she feels stronger and is happy with the progress she has made  Pt notes she is able to walk with her dog for 15 min which she was unable to do when she started here  Objective: See treatment diary below      Assessment: Tolerated treatment well  Patient would benefit from continued PT  Pt was able to increase reps and appeared to be less visably tired during the beginning of the exercises but as she continued she began to fatigue  Pt noted she felt dizzy and after she say down for a few min she felt good  Pt was unable to finish her last exercise due to neck pain  Plan: Progress treatment as tolerated         Precautions: DM, HTN, Depth Perception Issues      Manuals                                                              Neuro Re-Ed             Seated Marches  3x10 B 3x10 B 3x12 B         Tandem stance  HEP 3x10s B 3x10s B          Sassy hip   10x3s B 15x 3s B 15x 3s R                                   Ther Ex                          LAQ HEP 2x10 B 3x10 B 3x12 B         SLR  2x5 B           Stationary Bike  5 min 5 min 5 min         Leg Press             DF strengthen   2x10 3x10 3x12                      Ther Activity             STS HEP 3x  5x         Step ups/ downs  2x6 4in 7x 2 in 10x4in         Gait Training                                       Modalities

## 2022-08-11 ENCOUNTER — OFFICE VISIT (OUTPATIENT)
Dept: PHYSICAL THERAPY | Facility: CLINIC | Age: 77
End: 2022-08-11
Payer: MEDICARE

## 2022-08-11 DIAGNOSIS — R53.81 PHYSICAL DECONDITIONING: Primary | ICD-10-CM

## 2022-08-11 PROCEDURE — 97112 NEUROMUSCULAR REEDUCATION: CPT | Performed by: PHYSICAL THERAPIST

## 2022-08-11 PROCEDURE — 97110 THERAPEUTIC EXERCISES: CPT | Performed by: PHYSICAL THERAPIST

## 2022-08-11 NOTE — PROGRESS NOTES
Daily Note     Today's date: 2022  Patient name: Cecilia Yuan  : 1945  MRN: [id number]  Referring provider: Sami Allison MD  Dx:   Encounter Diagnosis     ICD-10-CM    1  Physical deconditioning  R53 81        Start Time: 1400  Stop Time: 045  Total time in clinic (min): 37 minutes    Subjective: Pt notes after last physical therapy session she felt increased pain and the following day she was not able to walk much  Pt notes she is feeling a little bit better but her knees are still bothering her a lot  Objective: See treatment diary below      Assessment: Tolerated treatment well  Patient would benefit from continued PT  Pt did the treadmill and did well with it  Pt's was unable to perform some exercises today due to the knee pain  Pt notes she felt very fatigued at the end of today's physical therapy session  Plan: Progress treatment as tolerated         Precautions: DM, HTN, Depth Perception Issues      Manuals                                                             Neuro Re-Ed             Seated Marches  3x10 B 3x10 B 3x12 B 3x12 B        Tandem stance  HEP 3x10s B 3x10s B  3x10s B        Sassy hip   10x3s B 15x 3s B 15x 3s R 15x 3s R                                  Ther Ex                          LAQ HEP 2x10 B 3x10 B 3x12 B 3x12 B        SLR  2x5 B           Stationary Bike  5 min 5 min 5 min         Leg Press             DF strengthen   2x10 3x10 3x12 3x12        Treadmill     5 min        Ther Activity             STS HEP 3x  5x         Step ups/ downs  2x6 4in 7x 2 in 10x4in         Gait Training                                       Modalities

## 2022-08-15 ENCOUNTER — OFFICE VISIT (OUTPATIENT)
Dept: PHYSICAL THERAPY | Facility: CLINIC | Age: 77
End: 2022-08-15
Payer: MEDICARE

## 2022-08-15 DIAGNOSIS — R53.81 PHYSICAL DECONDITIONING: Primary | ICD-10-CM

## 2022-08-15 PROCEDURE — 97110 THERAPEUTIC EXERCISES: CPT

## 2022-08-15 PROCEDURE — 97112 NEUROMUSCULAR REEDUCATION: CPT

## 2022-08-15 NOTE — PROGRESS NOTES
Daily Note     Today's date: 8/15/2022  Patient name: Martínez Arredondo  : 1945  MRN: [id number]  Referring provider: Jamie Castillo MD  Dx:   Encounter Diagnosis     ICD-10-CM    1  Physical deconditioning  R53 81        Start Time: 1400  Stop Time: 1436  Total time in clinic (min): 36 minutes    Subjective: Pt notes she was sore after last physical therapy session but her neck felt better due to performing the treadmill instead of the bike  Pt reports she [place] and she was able to walk for about an hour  Pt notes she was sore the day but she feels okay at the start of today's physical therapy session  Pt notes her knees have been feeling better the past few days  Objective: See treatment diary below      Assessment: Tolerated treatment well  Patient would benefit from continued PT  Pt was able to complete all exercise today for the first time in the past few visits  Pt notes she felt fatigued in her LE towards the end of today's physical therapy session  Pt notes she feels dizzy when she turns from the step up/down so next visit she will be doing step up/down without turning around  Plan: Progress treatment as tolerated         Precautions: DM, HTN, Depth Perception Issues      Manuals 7/21 7/26 8/1 8/8 8/11 8/15                                                           Neuro Re-Ed             Seated Marches  3x10 B 3x10 B 3x12 B 3x12 B 3x12 B       Tandem stance  HEP 3x10s B 3x10s B  3x10s B 3x10s B       Sassy hip   10x3s B 15x 3s B 15x 3s R 15x 3s R 15x 3s R                                 Ther Ex                          LAQ HEP 2x10 B 3x10 B 3x12 B 3x12 B 3x12 B       SLR  2x5 B           Stationary Bike  5 min 5 min 5 min         Leg Press             DF strengthen   2x10 3x10 3x12 3x12 3x12       Treadmill     5 min 5 min       Ther Activity             STS HEP 3x  5x  5x       Step ups/ downs  2x6 4in 7x 2 in 10x4in  10x4in       Gait Training Modalities

## 2022-08-18 ENCOUNTER — APPOINTMENT (OUTPATIENT)
Dept: PHYSICAL THERAPY | Facility: CLINIC | Age: 77
End: 2022-08-18
Payer: MEDICARE

## 2022-08-22 ENCOUNTER — OFFICE VISIT (OUTPATIENT)
Dept: PHYSICAL THERAPY | Facility: CLINIC | Age: 77
End: 2022-08-22
Payer: MEDICARE

## 2022-08-22 DIAGNOSIS — R53.81 PHYSICAL DECONDITIONING: Primary | ICD-10-CM

## 2022-08-22 PROCEDURE — 97530 THERAPEUTIC ACTIVITIES: CPT | Performed by: PHYSICAL THERAPIST

## 2022-08-22 PROCEDURE — 97110 THERAPEUTIC EXERCISES: CPT | Performed by: PHYSICAL THERAPIST

## 2022-08-22 PROCEDURE — 97112 NEUROMUSCULAR REEDUCATION: CPT | Performed by: PHYSICAL THERAPIST

## 2022-08-22 NOTE — PROGRESS NOTES
Daily Note     Today's date: 2022  Patient name: Clemencia Loving  : 1945  MRN: [id number]  Referring provider: Ana Brown MD  Dx:   Encounter Diagnosis     ICD-10-CM    1  Physical deconditioning  R53 81                   Subjective: Pt reports increase in stamina and strength little by little  But notes can't do anything involving pushing up from her hands due to it flaring up her neck as after last time with STS patient had a lot of pain in her neck for a few days  Objective: See treatment diary below      Assessment: Tolerated treatment well  Patient would benefit from continued PT  Plan: Progress treatment as tolerated         Precautions: DM, HTN, Depth Perception Issues      Manuals 7/21 7/26 8/1 8/8 8/11 8/15 8/22                                                          Neuro Re-Ed             Seated Marches  3x10 B 3x10 B 3x12 B 3x12 B 3x12 B 3x12 B       Tandem stance  HEP 3x10s B 3x10s B  3x10s B 3x10s B 3x15" holds B       Sassy hip   10x3s B 15x 3s B 15x 3s R 15x 3s R 15x 3s R 10x 3" holds B                                 Ther Ex                          LAQ HEP 2x10 B 3x10 B 3x12 B 3x12 B 3x12 B 20x 5" holds       SLR  2x5 B           Stationary Bike  5 min 5 min 5 min   D/C      Leg Press             DF strengthen   2x10 3x10 3x12 3x12 3x12 20x 5" holds       Treadmill     5 min 5 min 5 min       Ther Activity             STS HEP 3x  5x  5x D/C      Step ups/ downs  2x6 4in 7x 2 in 10x4in  10x4in 10x 4" B       Gait Training                                       Modalities

## 2022-08-25 ENCOUNTER — OFFICE VISIT (OUTPATIENT)
Dept: PHYSICAL THERAPY | Facility: CLINIC | Age: 77
End: 2022-08-25
Payer: MEDICARE

## 2022-08-25 DIAGNOSIS — R53.81 PHYSICAL DECONDITIONING: Primary | ICD-10-CM

## 2022-08-25 PROCEDURE — 97112 NEUROMUSCULAR REEDUCATION: CPT

## 2022-08-25 PROCEDURE — 97110 THERAPEUTIC EXERCISES: CPT

## 2022-08-25 NOTE — PROGRESS NOTES
Daily Note     Today's date: 2022  Patient name: Kd Arevalo  : 1945  MRN: [id number]  Referring provider: Robbie Anand MD  Dx:   Encounter Diagnosis     ICD-10-CM    1  Physical deconditioning  R53 81                 Subjective: Pt reports increase in stamina and strength little by little  But notes can't do anything involving pushing up from her hands due to it flaring up her neck as after last time with STS patient had a lot of pain in her neck for a few days  Objective: See treatment diary below     Precautions: DM, HTN, Depth Perception Issues    Neuro Re-Ed 7/21 7/26 8/1 8/8 8/11 8/15 8/22 8/25     Seated Marches  3x10 B 3x10 B 3x12 B 3x12 B 3x12 B 3x12 B  3x12 B     Tandem stance  HEP 3x10s B 3x10s B  3x10s B 3x10s B 3x15" B  15"x3 ea     Sassy hip   10x3s B 15x 3s B 15x 3s R 15x 3s R 15x 3s R 10x 3" holds B  5"x10 ea on 6" step                  Ther Ex             LAQ HEP 2x10 B 3x10 B 3x12 B 3x12 B 3x12 B 20x 5" 5"x20     SLR  2x5 B           Stationary Bike  5 min 5 min 5 min   D/C      Leg Press             DF strengthen   2x10 3x10 3x12 3x12 3x12 20x 5" holds  PF 20x     Treadmill     5 min 5 min 5 min  5'     Ther Activity             STS HEP 3x  5x  5x D/C      Step ups/ downs  2x6 4in 7x 2 in 10x4in  10x4in 10x 4" B  4" step 10x ea                                                             Assessment: Tolerated treatment well  Patient demonstrated fatigue post treatment, exhibited good technique with therapeutic exercises and would benefit from continued PT  Plan: Progress treatment as tolerated      Young Saldana, PTA

## 2022-08-29 ENCOUNTER — APPOINTMENT (OUTPATIENT)
Dept: PHYSICAL THERAPY | Facility: CLINIC | Age: 77
End: 2022-08-29
Payer: MEDICARE

## 2022-09-01 ENCOUNTER — OFFICE VISIT (OUTPATIENT)
Dept: PHYSICAL THERAPY | Facility: CLINIC | Age: 77
End: 2022-09-01
Payer: MEDICARE

## 2022-09-01 DIAGNOSIS — R53.81 PHYSICAL DECONDITIONING: Primary | ICD-10-CM

## 2022-09-01 PROCEDURE — 97110 THERAPEUTIC EXERCISES: CPT | Performed by: PHYSICAL THERAPIST

## 2022-09-01 PROCEDURE — 97530 THERAPEUTIC ACTIVITIES: CPT | Performed by: PHYSICAL THERAPIST

## 2022-09-01 PROCEDURE — 97112 NEUROMUSCULAR REEDUCATION: CPT | Performed by: PHYSICAL THERAPIST

## 2022-09-01 NOTE — PROGRESS NOTES
Daily Note     Today's date: 2022  Patient name: David Ghosh  : 1945  MRN: [id number]  Referring provider: Ethel Mckeon MD  Dx:   Encounter Diagnosis     ICD-10-CM    1  Physical deconditioning  R53 81                 Subjective: Pt reports picked up a lot of things and now her groin and her R neck/shoulder are bothering her  Objective: See treatment diary below     Precautions: DM, HTN, Depth Perception Issues    Neuro Re-Ed 8/8 8/11 8/15 8/22 8/25 9/1    Seated Marches 3x12 B 3x12 B 3x12 B 3x12 B  3x12 B 3x12 B     Tandem stance   3x10s B 3x10s B 3x15" B  15"x3 ea 3x 15" holds     Sassy hip  15x 3s R 15x 3s R 15x 3s R 10x 3" holds B  5"x10 ea on 6" step -              Ther Ex          LAQ 3x12 B 3x12 B 3x12 B 20x 5" 5"x20 25x 5" holds     SLR          Stationary Bike 5 min   D/C      Leg Press          DF strengthen  3x12 3x12 3x12 20x 5" holds  PF 20x Seated DF/PF 20x    Treadmill  5 min 5 min 5 min  5' 5 min     Ther Activity          STS 5x  5x D/C      Step ups/ downs 10x4in  10x4in 10x 4" B  4" step 10x ea 10x 4"                                                 Assessment: Tolerated treatment well  Patient demonstrated fatigue post treatment, exhibited good technique with therapeutic exercises and would benefit from continued PT  Plan: Progress treatment as tolerated      Julio Cesar Cunningham, PT

## 2022-09-06 ENCOUNTER — OFFICE VISIT (OUTPATIENT)
Dept: PHYSICAL THERAPY | Facility: CLINIC | Age: 77
End: 2022-09-06
Payer: MEDICARE

## 2022-09-06 DIAGNOSIS — R53.81 PHYSICAL DECONDITIONING: Primary | ICD-10-CM

## 2022-09-06 PROCEDURE — 97112 NEUROMUSCULAR REEDUCATION: CPT | Performed by: PHYSICAL THERAPIST

## 2022-09-06 PROCEDURE — 97110 THERAPEUTIC EXERCISES: CPT | Performed by: PHYSICAL THERAPIST

## 2022-09-06 PROCEDURE — 97530 THERAPEUTIC ACTIVITIES: CPT | Performed by: PHYSICAL THERAPIST

## 2022-09-06 NOTE — PROGRESS NOTES
Daily Note     Today's date: 2022  Patient name: Parish Harper  : 1945  MRN: [id number]  Referring provider: Janelle Ramires MD  Dx:   Encounter Diagnosis     ICD-10-CM    1  Physical deconditioning  R53 81                 Subjective: Pt reports able to do STS on higher chair at home  Notes groin still hurts from when did a lot of lifting 2 weekends ago  Objective: See treatment diary below     Precautions: DM, HTN, Depth Perception Issues    Neuro Re-Ed 8/11 8/15 8/22 8/25 9/1 9/6   Seated Marches 3x12 B 3x12 B 3x12 B  3x12 B 3x12 B  3x15 B    Tandem stance  3x10s B 3x10s B 3x15" B  15"x3 ea 3x 15" holds  3x 20" holds B   Sassy hip  15x 3s R 15x 3s R 10x 3" holds B  5"x10 ea on 6" step - -   Tandem walking      2 laps    Ther Ex         LAQ 3x12 B 3x12 B 20x 5" 5"x20 25x 5" holds  25x 5" holds    SLR         Stationary Bike   D/C      Leg Press         DF strengthen  3x12 3x12 20x 5" holds  PF 20x Seated DF/PF 20x Seated DF/PF 22x    Treadmill 5 min 5 min 5 min  5' 5 min  5 min    Lateral walks      2 laps    Ther Activity         STS  5x D/C   5x w/ 1 blue sq foam on chair   Step ups/ downs  10x4in 10x 4" B  4" step 10x ea 10x 4"  10x 4" step                                            Assessment: Tolerated treatment well  Patient demonstrated fatigue post treatment, exhibited good technique with therapeutic exercises and would benefit from continued PT  Plan: Progress treatment as tolerated      Elle Cuevas, PT

## 2022-09-08 ENCOUNTER — OFFICE VISIT (OUTPATIENT)
Dept: PHYSICAL THERAPY | Facility: CLINIC | Age: 77
End: 2022-09-08
Payer: MEDICARE

## 2022-09-08 DIAGNOSIS — R53.81 PHYSICAL DECONDITIONING: Primary | ICD-10-CM

## 2022-09-08 PROCEDURE — 97110 THERAPEUTIC EXERCISES: CPT

## 2022-09-08 NOTE — PROGRESS NOTES
Daily Note     Today's date: 2022  Patient name: Claudia Saunders  : 1945  MRN: [id number]  Referring provider: Yuliya Barry MD  Dx:   Encounter Diagnosis     ICD-10-CM    1  Physical deconditioning  R53 81                   Subjective: Pt reports she was very sore following LV and "way overdid things"  Objective: See treatment diary below     Precautions: DM, HTN, Depth Perception Issues    Neuro Re-Ed 8/11 8/15 8/22 8/25 9/1 9/6 9/8     Seated Marches 3x12 B 3x12 B 3x12 B  3x12 B 3x12 B  3x15 B  3x15 B     Tandem stance  3x10s B 3x10s B 3x15" B  15"x3 ea 3x 15" holds  3x 20" holds B 20"x3 ea     Sassy hip  15x 3s R 15x 3s R 10x 3" holds B  5"x10 ea on 6" step - -      Tandem walking      2 laps  -     Ther Ex            LAQ 3x12 B 3x12 B 20x 5" 5"x20 25x 5" holds  25x 5" holds  5"x25     SLR            Stationary Bike   D/C         Leg Press            DF strengthen  3x12 3x12 20x 5" holds  PF 20x Seated DF/PF 20x Seated DF/PF 22x  Seated DF/PF 22x      Treadmill 5 min 5 min 5 min  5' 5 min  5 min  5'     Lateral walks      2 laps  4 laps     Ther Activity            STS  5x D/C   5x w/ 1 blue sq foam on chair      Step ups/ downs  10x4in 10x 4" B  4" step 10x ea 10x 4"  10x 4" step                                                           Assessment: Tolerated treatment fair  Patient demonstrated fatigue post treatment, exhibited good technique with therapeutic exercises and would benefit from continued PT    Plan: Continue per plan of care  Progress treatment as tolerated  Resume full program as able      Ernestine Rick, PTA

## 2022-09-09 DIAGNOSIS — I10 BENIGN ESSENTIAL HYPERTENSION: ICD-10-CM

## 2022-09-09 DIAGNOSIS — E03.9 HYPOTHYROIDISM, UNSPECIFIED TYPE: ICD-10-CM

## 2022-09-09 RX ORDER — LOSARTAN POTASSIUM AND HYDROCHLOROTHIAZIDE 12.5; 5 MG/1; MG/1
TABLET ORAL
Qty: 90 TABLET | Refills: 1 | OUTPATIENT
Start: 2022-09-09

## 2022-09-09 RX ORDER — LEVOTHYROXINE SODIUM 100 MCG
TABLET ORAL
Qty: 90 TABLET | Refills: 1 | OUTPATIENT
Start: 2022-09-09

## 2022-09-12 ENCOUNTER — OFFICE VISIT (OUTPATIENT)
Dept: PHYSICAL THERAPY | Facility: CLINIC | Age: 77
End: 2022-09-12
Payer: MEDICARE

## 2022-09-12 DIAGNOSIS — R53.81 PHYSICAL DECONDITIONING: Primary | ICD-10-CM

## 2022-09-12 PROCEDURE — 97530 THERAPEUTIC ACTIVITIES: CPT | Performed by: PHYSICAL THERAPIST

## 2022-09-12 PROCEDURE — 97112 NEUROMUSCULAR REEDUCATION: CPT | Performed by: PHYSICAL THERAPIST

## 2022-09-12 PROCEDURE — 97110 THERAPEUTIC EXERCISES: CPT | Performed by: PHYSICAL THERAPIST

## 2022-09-12 NOTE — PROGRESS NOTES
Daily Note     Today's date: 2022  Patient name: Roberto Masterson  : 1945  MRN: [id number]  Referring provider: Rubén Hodge MD  Dx:   Encounter Diagnosis     ICD-10-CM    1  Physical deconditioning  R53 81                   Subjective: Pt reports wants to continue with program but reduce reps until builds up strength to be able to tolerate it more  Objective: See treatment diary below     Precautions: DM, HTN, Depth Perception Issues    Neuro Re-Ed     Seated Marches 3x12 B  3x12 B 3x12 B  3x15 B   3x12 B     Tandem stance  3x15" B  15"x3 ea 3x 15" holds  3x 20" holds B 20"x3 ea 3x20" holds    Sassy hip  10x 3" holds B  5"x10 ea on 6" step - -      Tandem walking    2 laps  -     Ther Ex          LAQ 20x 5" 5"x20 25x 5" holds  25x 5" holds  5"x25 25x 5" holds     SLR          Stationary Bike D/C         Leg Press          DF strengthen  20x 5" holds  PF 20x Seated DF/PF 20x Seated DF/PF 22x  Seated DF/PF 22x  Seated DF/PF 20x     Treadmill 5 min  5' 5 min  5 min  5' 5 min 0 8 mph     Lateral walks    2 laps  4 laps 3 laps     Ther Activity          STS D/C   5x w/ 1 blue sq foam on chair  4x w/ 1 blue sq foam on chair     Step ups/ downs 10x 4" B  4" step 10x ea 10x 4"  10x 4" step   8x 4" step                                                 Assessment: Tolerated treatment fair  Patient demonstrated fatigue post treatment, exhibited good technique with therapeutic exercises and would benefit from continued PT    Plan: Continue per plan of care  Progress treatment as tolerated        Nini Martin, PTA

## 2022-09-15 ENCOUNTER — OFFICE VISIT (OUTPATIENT)
Dept: PHYSICAL THERAPY | Facility: CLINIC | Age: 77
End: 2022-09-15
Payer: MEDICARE

## 2022-09-15 DIAGNOSIS — R53.81 PHYSICAL DECONDITIONING: Primary | ICD-10-CM

## 2022-09-15 PROCEDURE — 97112 NEUROMUSCULAR REEDUCATION: CPT | Performed by: PHYSICAL THERAPIST

## 2022-09-15 PROCEDURE — 97530 THERAPEUTIC ACTIVITIES: CPT | Performed by: PHYSICAL THERAPIST

## 2022-09-15 PROCEDURE — 97110 THERAPEUTIC EXERCISES: CPT | Performed by: PHYSICAL THERAPIST

## 2022-09-15 NOTE — PROGRESS NOTES
Daily Note     Today's date: 9/15/2022  Patient name: David Ghosh  : 1945  MRN: [id number]  Referring provider: Ethel Mckeon MD  Dx:   Encounter Diagnosis     ICD-10-CM    1  Physical deconditioning  R53 81                   Subjective: Pt reports felt ok after last visit but then did a lot in her house the day after so her legs are sore  Objective: See treatment diary below     Precautions: DM, HTN, Depth Perception Issues    Neuro Re-Ed 8/25 9/1 9/6 9/8 9/12 9/15   Seated Marches 3x12 B 3x12 B  3x15 B   3x12 B  3x12 B   Tandem stance  15"x3 ea 3x 15" holds  3x 20" holds B 20"x3 ea 3x20" holds 3x20" holds do first next time after treadmill     Sassy hip  5"x10 ea on 6" step - -      Tandem walking   2 laps  -     Ther Ex         LAQ 5"x20 25x 5" holds  25x 5" holds  5"x25 25x 5" holds  25x 5" holds    SLR         Stationary Bike         Leg Press         DF strengthen  PF 20x Seated DF/PF 20x Seated DF/PF 22x  Seated DF/PF 22x  Seated DF/PF 20x  Seated DF/PF 25x    Treadmill 5' 5 min  5 min  5' 5 min 0 8 mph  5 min 0 8 mph    Lateral walks   2 laps  4 laps 3 laps  4 laps    Ther Activity         STS   5x w/ 1 blue sq foam on chair  4x w/ 1 blue sq foam on chair  5x w/ 1 blue sq    Step ups/ downs 4" step 10x ea 10x 4"  10x 4" step   8x 4" step  10x 4" step                                            Assessment: Tolerated treatment fair  Patient demonstrated fatigue post treatment, exhibited good technique with therapeutic exercises and would benefit from continued PT    Plan: Continue per plan of care  Progress treatment as tolerated        Carrol Griffiths, PTA

## 2022-09-19 ENCOUNTER — APPOINTMENT (OUTPATIENT)
Dept: PHYSICAL THERAPY | Facility: CLINIC | Age: 77
End: 2022-09-19
Payer: MEDICARE

## 2022-09-22 ENCOUNTER — APPOINTMENT (OUTPATIENT)
Dept: PHYSICAL THERAPY | Facility: CLINIC | Age: 77
End: 2022-09-22
Payer: MEDICARE

## 2022-09-26 ENCOUNTER — APPOINTMENT (OUTPATIENT)
Dept: PHYSICAL THERAPY | Facility: CLINIC | Age: 77
End: 2022-09-26
Payer: MEDICARE

## 2022-09-29 ENCOUNTER — APPOINTMENT (OUTPATIENT)
Dept: PHYSICAL THERAPY | Facility: CLINIC | Age: 77
End: 2022-09-29
Payer: MEDICARE

## 2022-10-03 ENCOUNTER — OFFICE VISIT (OUTPATIENT)
Dept: PHYSICAL THERAPY | Facility: CLINIC | Age: 77
End: 2022-10-03
Payer: MEDICARE

## 2022-10-03 DIAGNOSIS — R53.81 PHYSICAL DECONDITIONING: Primary | ICD-10-CM

## 2022-10-03 PROCEDURE — 97110 THERAPEUTIC EXERCISES: CPT | Performed by: PHYSICAL THERAPIST

## 2022-10-03 PROCEDURE — 97112 NEUROMUSCULAR REEDUCATION: CPT | Performed by: PHYSICAL THERAPIST

## 2022-10-03 NOTE — PROGRESS NOTES
Daily Note     Today's date: 10/3/2022  Patient name: Shelley Fowler  : 1945  MRN: [id number]  Referring provider: Marivel Mcmullen MD  Dx:   Encounter Diagnosis     ICD-10-CM    1  Physical deconditioning  R53 81                   Subjective: Pt reports R shoulder and R neck are really bothering her and she has R sciatica that sometimes goes into her L LE  Objective: See treatment diary below     Precautions: DM, HTN, Depth Perception Issues    Neuro Re-Ed 9/1 9/6 9/8 9/12 9/15 10/3   Seated Marches 3x12 B  3x15 B   3x12 B  3x12 B    Tandem stance  3x 15" holds  3x 20" holds B 20"x3 ea 3x20" holds 3x20" holds do first next time after treadmill      Sassy hip  - -       Tandem walking  2 laps  -      Ther Ex         LAQ 25x 5" holds  25x 5" holds  5"x25 25x 5" holds  25x 5" holds     SLR         Stationary Bike         Leg Press         DF strengthen  Seated DF/PF 20x Seated DF/PF 22x  Seated DF/PF 22x  Seated DF/PF 20x  Seated DF/PF 25x     Treadmill 5 min  5 min  5' 5 min 0 8 mph  5 min 0 8 mph     Lateral walks  2 laps  4 laps 3 laps  4 laps     Prone lying      completed   Sitting with good posture w/ lumbar support      MK            Ther Activity         STS  5x w/ 1 blue sq foam on chair  4x w/ 1 blue sq foam on chair  5x w/ 1 blue sq     Step ups/ downs 10x 4"  10x 4" step   8x 4" step  10x 4" step                                             Assessment: Tolerated treatment fair  Patient demonstrated fatigue post treatment, exhibited good technique with therapeutic exercises and would benefit from continued PT  Patient had improvement in low back symptoms symptoms post sitting with good posture and lying prone  Plan: Continue per plan of care  Progress treatment as tolerated        Peyton Patel PTA

## 2022-10-10 ENCOUNTER — APPOINTMENT (OUTPATIENT)
Dept: PHYSICAL THERAPY | Facility: CLINIC | Age: 77
End: 2022-10-10

## 2022-10-11 ENCOUNTER — OFFICE VISIT (OUTPATIENT)
Dept: PHYSICAL THERAPY | Facility: CLINIC | Age: 77
End: 2022-10-11
Payer: MEDICARE

## 2022-10-11 DIAGNOSIS — R53.81 PHYSICAL DECONDITIONING: Primary | ICD-10-CM

## 2022-10-11 PROCEDURE — 97110 THERAPEUTIC EXERCISES: CPT | Performed by: PHYSICAL THERAPIST

## 2022-10-11 PROCEDURE — 97112 NEUROMUSCULAR REEDUCATION: CPT | Performed by: PHYSICAL THERAPIST

## 2022-10-11 NOTE — PROGRESS NOTES
Daily Note     Today's date: 10/11/2022  Patient name: Talat Morales  : 1945  MRN: [id number]  Referring provider: Sheryl Jim MD  Dx:   Encounter Diagnosis     ICD-10-CM    1  Physical deconditioning  R53 81                   Subjective: Pt reports R shoulder and R neck are really bothering her and she has R sciatica that sometimes goes into her L LE  Notes it is also starting to affect her L UE  She notes the prone lying did help after last session but now she tried it at home and it isn't helping  She is seeing  Her MD soon to get imaging  Objective: See treatment diary below     Precautions: DM, HTN, Depth Perception Issues    Neuro Re-Ed 9/6 9/8 9/12 9/15 10/3 10/11   Seated Marches 3x15 B   3x12 B  3x12 B     Tandem stance  3x 20" holds B 20"x3 ea 3x20" holds 3x20" holds do first next time after treadmill       Sassy hip  -        Tandem walking 2 laps  -       Ther Ex         LAQ 25x 5" holds  5"x25 25x 5" holds  25x 5" holds   20x 5" holds    SLR         Stationary Bike         Leg Press         DF strengthen  Seated DF/PF 22x  Seated DF/PF 22x  Seated DF/PF 20x  Seated DF/PF 25x   Seated DF/PF 25x    Treadmill 5 min  5' 5 min 0 8 mph  5 min 0 8 mph      Lateral walks 2 laps  4 laps 3 laps  4 laps      Prone lying     completed completed   Sitting with good posture w/ lumbar support     [facility]            Ther Activity         STS 5x w/ 1 blue sq foam on chair  4x w/ 1 blue sq foam on chair  5x w/ 1 blue sq      Step ups/ downs 10x 4" step   8x 4" step  10x 4" step                                              Assessment: Tolerated treatment fair  Patient demonstrated fatigue post treatment, exhibited good technique with therapeutic exercises and would benefit from continued PT  Patient had improvement in low back symptoms symptoms post sitting with good posture and lying prone  Plan: Continue per plan of care  Progress treatment as tolerated        Zabrina Silver, YAMILET

## 2022-10-13 ENCOUNTER — OFFICE VISIT (OUTPATIENT)
Dept: FAMILY MEDICINE CLINIC | Facility: CLINIC | Age: 77
End: 2022-10-13
Payer: MEDICARE

## 2022-10-13 ENCOUNTER — APPOINTMENT (OUTPATIENT)
Dept: PHYSICAL THERAPY | Facility: CLINIC | Age: 77
End: 2022-10-13

## 2022-10-13 VITALS
WEIGHT: 187 LBS | SYSTOLIC BLOOD PRESSURE: 162 MMHG | HEART RATE: 82 BPM | OXYGEN SATURATION: 96 % | HEIGHT: 67 IN | TEMPERATURE: 97.3 F | BODY MASS INDEX: 29.35 KG/M2 | DIASTOLIC BLOOD PRESSURE: 82 MMHG

## 2022-10-13 DIAGNOSIS — M25.511 RIGHT SHOULDER PAIN, UNSPECIFIED CHRONICITY: ICD-10-CM

## 2022-10-13 DIAGNOSIS — M25.50 MULTIPLE JOINT PAIN: ICD-10-CM

## 2022-10-13 DIAGNOSIS — M54.16 RIGHT LUMBAR RADICULOPATHY: Primary | ICD-10-CM

## 2022-10-13 PROCEDURE — 99214 OFFICE O/P EST MOD 30 MIN: CPT | Performed by: FAMILY MEDICINE

## 2022-10-13 NOTE — PROGRESS NOTES
Assessment/Plan:       Problem List Items Addressed This Visit    None     Visit Diagnoses     Right lumbar radiculopathy    -  Primary- follow up with pain mgmt  She has appointment but will try to move this up sooner  Check mri given back pain and radicular symptoms are not improving with physical therapy and b/c she has weakness in the RLE    Relevant Orders    MRI lumbar spine wo contrast    C-reactive protein    Multiple joint pain    - check labs as noted  Relevant Orders    Lyme Antibody Profile with reflex to WB    CBC and differential    C-reactive protein    Right shoulder pain, unspecified chronicity   - recheck shoulder xray  Has f/u with pain mgmt  She will call to see if she can get in sooner  Relevant Orders    XR shoulder 2+ vw right           Declines gabapentin for now  Will continue with tylenol and ibuprofen which is helping along with heat/aspercream     Will forward note to Dr Bud Riley- pt has appointment 12/8/22, is hoping to be seen sooner  Check MRI of the lumbar spine as I am concerned about lumbar radiculopathy R>L along with RLE weakness/limited strength due to pain  Continue using cane or walker for support  Check labs as noted  Subjective:     Renetta Russo is a 77 y o  female here today with chief complaint below:  Chief Complaint   Patient presents with   • Joint Pain     - CC above per clinical staff and reviewed  HPI:  Pt here w concern that she is having significant pain in her R posterior neck, R shoulder, R upper arm  Limited ROM secondary to pain  Can't use her arm to put on makeup, needs to use the L arm to lift the R one  Then 5-6 weeks ago, she started with pain in the groin on the R  She is also getting pain in the R buttocks, posterior R leg to to the posterior R knee  About 10 days ago she started with milder posterior leg pain on the L     Can't turn over in bed due to pain  When she first gets up, it is hard to walk   Using a cane     Uses aspercream and hot shower  Using advil with tylenol  This dulls the pain enough that she can get to the store  Laying on her stomach seems to help a bit  Her knees hurt  She is concerned that her pain is affecting more areas  Hand joints are bothersome  No redness to any joints, no warmth  Has followed with Dr Fiordaliza Javier from pain mgmt  No trauma      The following portions of the patient's history were reviewed and updated as appropriate: allergies, current medications, past family history, past medical history, past social history, past surgical history and problem list     ROS:  Review of Systems     Objective:      /82 (BP Location: Left arm, Patient Position: Standing, Cuff Size: Standard)   Pulse 82   Temp (!) 97 3 °F (36 3 °C) (Temporal)   Ht 5' 7" (1 702 m)   Wt 84 8 kg (187 lb)   SpO2 96%   BMI 29 29 kg/m²   BP Readings from Last 3 Encounters:   10/13/22 162/82   07/12/22 132/84   06/14/22 127/84     Wt Readings from Last 3 Encounters:   10/13/22 84 8 kg (187 lb)   07/12/22 87 kg (191 lb 12 8 oz)   05/18/22 86 6 kg (191 lb)               Physical Exam:   Physical Exam  Vitals and nursing note reviewed  Constitutional:       Appearance: Normal appearance  Comments: Appears uncomfortable   Cardiovascular:      Rate and Rhythm: Normal rate and regular rhythm  Heart sounds: No murmur heard  Pulmonary:      Effort: Pulmonary effort is normal       Breath sounds: No wheezing or rhonchi  Musculoskeletal:      Right lower leg: No edema  Left lower leg: No edema  Comments: Tender around the R shoulder and upper trap to light palpation  ROM very limited secondary to discomfort  3/5 strength seated hip flexion on R, 4+/5 on L   4/5 distal LE strength R, 5/5/ on L  Tenderness along the posterior R thigh  Antalgic gait  Difficulty getting up from chair  Limited exam due to pain  Ambulates with single pronged cane     Neurological:      Mental Status: She is alert

## 2022-10-17 ENCOUNTER — APPOINTMENT (OUTPATIENT)
Dept: PHYSICAL THERAPY | Facility: CLINIC | Age: 77
End: 2022-10-17

## 2022-10-18 ENCOUNTER — CONSULT (OUTPATIENT)
Dept: ENDOCRINOLOGY | Facility: HOSPITAL | Age: 77
End: 2022-10-18
Payer: MEDICARE

## 2022-10-18 VITALS
SYSTOLIC BLOOD PRESSURE: 154 MMHG | DIASTOLIC BLOOD PRESSURE: 82 MMHG | WEIGHT: 191.8 LBS | BODY MASS INDEX: 30.1 KG/M2 | HEART RATE: 71 BPM | HEIGHT: 67 IN

## 2022-10-18 DIAGNOSIS — R73.09 LABILE BLOOD GLUCOSE: ICD-10-CM

## 2022-10-18 DIAGNOSIS — E03.9 ACQUIRED HYPOTHYROIDISM: ICD-10-CM

## 2022-10-18 DIAGNOSIS — E11.9 CONTROLLED TYPE 2 DIABETES MELLITUS WITHOUT COMPLICATION, WITHOUT LONG-TERM CURRENT USE OF INSULIN (HCC): Primary | ICD-10-CM

## 2022-10-18 DIAGNOSIS — I10 BENIGN ESSENTIAL HYPERTENSION: ICD-10-CM

## 2022-10-18 PROCEDURE — 99204 OFFICE O/P NEW MOD 45 MIN: CPT | Performed by: INTERNAL MEDICINE

## 2022-10-18 RX ORDER — PEN NEEDLE, DIABETIC 30 GX3/16"
NEEDLE, DISPOSABLE MISCELLANEOUS
Qty: 100 EACH | Refills: 3 | Status: SHIPPED | OUTPATIENT
Start: 2022-10-18

## 2022-10-18 RX ORDER — FLASH GLUCOSE SENSOR
KIT MISCELLANEOUS
Qty: 2 EACH | Refills: 6 | Status: SHIPPED | OUTPATIENT
Start: 2022-10-18

## 2022-10-18 RX ORDER — METFORMIN HYDROCHLORIDE 500 MG/1
500 TABLET, EXTENDED RELEASE ORAL 2 TIMES DAILY WITH MEALS
Qty: 180 TABLET | Refills: 3 | Status: SHIPPED | OUTPATIENT
Start: 2022-10-18

## 2022-10-18 NOTE — PATIENT INSTRUCTIONS
Let's switch the victoza to the morning and increase the dose to 1 2 mg consistently  We'll try to change the metformin to the XR version 500 mg twice a day to see if you can tolerate it  Work on [employer]  Consider the freestyle deepti 2 to use for alarming for low blood sugars  You need 2 sensors for 1 month, they get replaced every 14  days, you can us an stephane on your phone to test the sugars  Make a freestyle deepti account and then tell us so we can send you an email to link the account to us and we can download and remind us to download in 2 weeks after staring using it  Consider changing the blood pressure medicine to the morning to see if you get up to urinate less at night  Follow up in 3 months with blood work

## 2022-10-18 NOTE — PROGRESS NOTES
10/18/2022    Assessment/Plan      Diagnoses and all orders for this visit:    Controlled type 2 diabetes mellitus without complication, without long-term current use of insulin ([address] )  -     Ambulatory referral to Endocrinology  -     metFORMIN (GLUCOPHAGE-XR) 500 mg 24 hr tablet; Take 1 tablet (500 mg total) by mouth 2 (two) times a day with meals  -     liraglutide (VICTOZA) injection; Inject 0 2 mL (1 2 mg total) under the skin daily  -     Insulin Pen Needle (Pen Needles) 32G X 4 MM MISC; To use daily with victoza  -     Continuous Blood Gluc Sensor (FreeStyle Tarik 2 Sensor) MISC; Use to test sugars 7-10 times a day  Apply every 14 days  ,    Labile blood glucose  -     Ambulatory referral to Endocrinology  -     Continuous Blood Gluc Sensor (FreeStyle Tarik 2 Sensor) MISC; Use to test sugars 7-10 times a day  Apply every 14 days  ,    Acquired hypothyroidism    Benign essential hypertension        Assessment/Plan:  1  Type 2 diabetes  Last hemoglobin A1c was quite good at 6 3% but that was in June 2022  I have asked her to switch her metformin from immediate release to extended release to see if that improves her GI side effects and diarrhea  If it does not, then she is going to contact us  I have asked her to switch her Victoza to the morning to prevent hypoglycemia overnight and do increase the dose to 1 2 mg consistently and to not adjust the dosage on her own  She is to work on dietary changes  She is worried about low blood sugars overnight so I have recommended that she consider obtaining a freestyle Tarik 2 which can alarm if she has a low blood sugar overnight and she would need to sensors a month  She is aware that insurance will likely not pay for this as she is not on insulin but she is considering paying for the sensors out of her own pocket since they get replaced every 14 days and she needs to per month and then to use the stephane on her phone to test her sugars frequently throughout the day  If she does get the freestyle Garces, she is to make an account and we sent will send her an e-mail to link her count us and she can get a downloaded 2 weeks after starting the freestyle Tarik 2       2  Hypothyroidism  She will continue the same levothyroxine 100 mcg daily as her last TSH was normal signifying biochemical euthyroidism  3  Hypertension  Blood pressure was mildly elevated in the office today as she is in more pain  She is planning on increasing her blood pressure medicine to a whole tablet daily  I have asked her to consider changing her blood pressure medicine to the morning to see if that would decrease the amount of urination at night as half of her blood pressure medicine is a diuretic  I have asked her to follow up in 3 months with preceding blood work  She will be getting blood work ordered by her primary care physician and I have just asked to make sure that I get a copy  CC: Diabetes Consult    History of Present Illness     HPI: Juan Ramon Abbott is a 77y o  year old female with type 2 diabetes for 17 years, hypertension, hypothyroidism for evaluation/consult  She was diagnosed in 2005 when she had fatigue after eating with type 2 diabetes  Blood sugar was improved with dietary changes  She started metformin in 2019 after she saw previous endocrinologist   She was utilizing Procur[employer] for several years prior to that and these previous endocrinologist wanted her to switch from [address] [place] [address] to [facility]) but it was too costly and would not listen to the patient who could not afford it  She stopped going to that endocrinologist   She has noticed that blood sugars have been fluctuating up and down more recently  Sugars could be as high as 200s after eating and some into the 300s  She is been giving herself more Victoza when sugars are higher during the day  She is on oral agents at home and takes Victoza 0 6 or 1 2 mg daily in the evening and metformin 500 mg twice a day   She denies any polyuria, polydipsia, and blurry vision  She is polyphagia and 4-5 times per night nocturia  She denies numbness or tingling of the feet  She denies chest pain or shortness of breath  She denies nephropathy, retinopathy, heart attack and claudication but does admit to neuropathy and stroke  She reports that she has significant diarrhea and spends a lot of the days in the bathroom and sometimes it can be sudden onset  She reports that she had a steroid injection in April 2022 and blood sugars drop to the 70s to 80s and she had start decreasing her medications and it was difficult to keep the blood sugar above 100 so she discontinue the metformin temporarily  It took about 5 weeks for the sugars to normalize  Hypoglycemic episodes: Yes rare  Was having more after the shoulder steroid injection  H/o of hypoglycemia causing hospitalization or intervention such as glucagon injection  or ambulance call  No   Hypoglycemia symptoms: dizziness and and weakness when sugars around 100  Treatment of hypoglycemia: will eat something or use instant glucose  Glucagon:No   Medic alert tag: recommended,Yes  The patient's last eye exam was in September 2022 with no retinopathy  The patient's last foot exam was not recently  Last A1C was   Lab Results   Component Value Date    HGBA1C 6 3 (H) 06/07/2022     Blood Sugar/Glucometer/Pump/CGM review:  She is checking her blood sugars 7-10 times a day  She will check them every hour when her blood sugars are high to see when it will go down  She is been eating off her diet secondary to pain and sugars up to the 200s after eating and then will drop within 1-1-1/2 hour but at other times he could take 3-4 hours to drop  She has had some blood sugars into the 300s within the last month  She has hypothyroidism and takes levothyroxine 100 mcg daily  She denies heat or cold intolerance, palpitation, tremors, or weight changes  She is fatigued    She has insomnia due to being up to urinate frequently at night  She has chronic diarrhea but sometimes there can be days where she does not have as much diarrhea although it can be sudden onset  She denies constipation, anxiety or depression  She has dry skin which is worse in colder months along with hair loss and thinning hair along with brittle nails  She denies diplopia  She denies compressive thyroid symptoms or difficulties with swallowing  She has hypertension and takes losartan/HCTZ 50/12 5 mg 3/4 of a tablet at 7:00 p m  at night  She reports blood pressure has been a bit higher due to pain is normally her blood pressure is 120-122/70s  She has chronic headaches  She has no stroke-like symptoms but 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  Review of Systems   Constitutional: Positive for fatigue  Negative for unexpected weight change  HENT: Negative for hearing loss, tinnitus and trouble swallowing  Reportedly had some sort of nodule in her neck years ago evaluated by Jessica Elizabeth and was found to be a fatty lipoma  Ultrasounds of the thyroid have been normal in the past   No history of head or neck irradiation in the past    Eyes: Positive for visual disturbance  Decrease in vision and right eyesight due to glaucoma   Respiratory: Negative for chest tightness and shortness of breath  Cardiovascular: Negative for chest pain and palpitations  Gastrointestinal: Positive for diarrhea  Negative for abdominal pain, constipation and nausea  Diarrhea all day sometimes sudden  Endocrine: Positive for polyphagia  Negative for cold intolerance, heat intolerance, polydipsia and polyuria  Nocturia 4-5 times a night  Hungry a lot  Musculoskeletal: Positive for arthralgias and back pain  Uses a cane  Has a lot of back pain and bony issues  Has needed steroid injections in the shoulders with the last 1 in April 2022    Has significant right shoulder pain and right leg and hip pain with radiation down her leg  Skin: Negative for rash and wound  Has dry skin worse in the colder months  Has brittle nails unchanged  Has hair loss/thinning  Neurological: Positive for weakness and headaches  Negative for dizziness, tremors, light-headedness and numbness  Has chronic headaches  Left arm and leg weak since TIA, does PT, most is back  Psychiatric/Behavioral: Positive for sleep disturbance  Negative for dysphoric mood  The patient is not nervous/anxious  Up to urinate a lot at night          Historical Information   Past Medical History:   Diagnosis Date   • COVID-19 06/11/2022   • Diabetes mellitus ([facility] )    • Diabetes mellitus type 2, controlled ([facility] )    • Disease of thyroid gland    • Emphysema of lung ([facility] )    • GERD (gastroesophageal reflux disease)    • Glaucoma    • H/O tooth extraction     Had top row of teeth removed around April 2016   • Headache(784 0)    • Hyperlipidemia    • Hypertension    • Neck pain    • Pneumonia    • Stroke [facility])    • TIA (transient ischemic attack)    • TIA (transient ischemic attack)    • Visual impairment      Past Surgical History:   Procedure Laterality Date   • BREAST BIOPSY Left     many years ago  papiloma   • CATARACT EXTRACTION Right     5 years ago   • CATARACT EXTRACTION Left    • CYST REMOVAL Left 04/15/2014    ear lobe     • EAR SURGERY  1970    Left ear cyst removal    • EYE SURGERY     • OTHER SURGICAL HISTORY       right upper arm        Social History   Social History     Substance and Sexual Activity   Alcohol Use Not Currently   • Alcohol/week: 1 0 standard drink   • Types: 1 Glasses of wine per week    Comment: very rare     Social History     Substance and Sexual Activity   Drug Use Never     Social History     Tobacco Use   Smoking Status Former Smoker   • Packs/day: 3 00   • Years: 50 00   • Pack years: 150 00   • Types: Cigarettes, Cigarettes   • Start date: 3/30/1960   • Quit date: 2010   • Years since quittin 6   Smokeless Tobacco Never Used     Family History:   Family History   Problem Relation Age of Onset   • Rheum arthritis Mother    • No Known Problems Father    • Cancer Brother    • Heart attack Paternal Grandmother    • No Known Problems Maternal Grandmother    • No Known Problems Paternal Grandfather        Meds/Allergies   Current Outpatient Medications   Medication Sig Dispense Refill   • albuterol (PROVENTIL HFA,VENTOLIN HFA) 90 mcg/act inhaler Inhale 2 puffs every 4 (four) hours as needed for wheezing or shortness of breath 1 Inhaler 1   • COMBIGAN 0 2-0 5 % Administer 1 drop to the right eye 2 (two) times a day   3   • Continuous Blood Gluc Sensor (FreeStyle Tarik 2 Sensor) MISC Use to test sugars 7-10 times a day  Apply every 14 days  , 2 each 6   • dorzolamide (TRUSOPT) 2 % ophthalmic solution Administer 1 drop to the right eye 3 (three) times a day   1   • fluticasone-salmeterol (Advair Diskus) 250-50 mcg/dose inhaler Inhale 1 puff 2 (two) times a day Rinse mouth after use   60 blister 2   • glucose blood (OneTouch Verio) test strip USE TO TEST BLOOD SUGAR UP TO FOUR TIMES DAILY 300 each 1   • Insulin Pen Needle (Pen Needles) 32G X 4 MM MISC To use daily with victoza 100 each 3   • levothyroxine 100 mcg tablet Take 1 tablet (100 mcg total) by mouth daily BRAND SYNTHROID PLEASE 90 tablet 1   • liraglutide (VICTOZA) injection Inject 0 2 mL (1 2 mg total) under the skin daily 9 mL 1   • losartan-hydrochlorothiazide (HYZAAR) 50-12 5 mg per tablet Take 1 tablet by mouth daily (Patient taking differently: Take 3/4 of a tablet ) 90 tablet 1   • LUMIGAN 0 01 % ophthalmic drops Administer 1 drop to both eyes daily at bedtime   3   • metFORMIN (GLUCOPHAGE-XR) 500 mg 24 hr tablet Take 1 tablet (500 mg total) by mouth 2 (two) times a day with meals 180 tablet 3   • OneTouch Delica Lancets 56O MISC To test BS up to four times daily- E11 65 200 each 5   • Rhopressa 0 02 % SOLN No current facility-administered medications for this visit  Allergies   Allergen Reactions   • Eggs Or Egg-Derived Products - Food Allergy Anaphylaxis   • Albumen, Egg - Food Allergy Hives   • Antihistamines, Diphenhydramine-Type    • Epinephrine Syncope     Root canal 25 years ago    • Fluzone [Influenza Virus Vaccine] Hives, Abdominal Pain and Facial Swelling   • Iv Contrast [Iodinated Diagnostic Agents] Headache     Headache, swelling around eyes, eye burning- with CT contrast   No rxn to MRI contrast   • Lidocaine      Syncope, tachycardia with local anesthetic with epinephrine   • Zinc Rash       Objective   Vitals: Blood pressure 154/82, pulse 71, height 5' 7" (1 702 m), weight 87 kg (191 lb 12 8 oz)  Invasive Devices  Report    None                 Physical Exam  Vitals reviewed  Constitutional:       Appearance: Normal appearance  She is well-developed  HENT:      Head: Normocephalic and atraumatic  Eyes:      Extraocular Movements: Extraocular movements intact  Conjunctiva/sclera: Conjunctivae normal       Comments: No lid lag, stare, proptosis, or periorbital edema   Neck:      Thyroid: No thyromegaly  Vascular: No carotid bruit  Comments: Thyroid normal in size  No palpable thyroid nodules  Cardiovascular:      Rate and Rhythm: Normal rate and regular rhythm  Pulses: Pulses are weak  Dorsalis pedis pulses are 1+ on the right side and 1+ on the left side  Posterior tibial pulses are 1+ on the right side and 1+ on the left side  Heart sounds: Normal heart sounds  No murmur heard  Comments: 1+ dorsalis pedis and posterior tibialis pulses bilaterally  Pulmonary:      Effort: Pulmonary effort is normal       Breath sounds: Normal breath sounds  No wheezing  Abdominal:      General: Bowel sounds are normal       Palpations: Abdomen is soft  Tenderness: There is no abdominal tenderness  Musculoskeletal:         General: No deformity  Cervical back: Normal range of motion and neck supple  Right lower leg: No edema  Left lower leg: No edema  Comments: No tremor of the outstretched hands  No spinous process tenderness  No CVA tenderness  No ulcerations of the feet  Feet:      Right foot:      Skin integrity: No ulcer, skin breakdown, erythema, warmth, callus or dry skin  Left foot:      Skin integrity: No ulcer, skin breakdown, erythema, warmth, callus or dry skin  Lymphadenopathy:      Cervical: No cervical adenopathy  Skin:     General: Skin is warm and dry  Findings: No rash  Neurological:      Mental Status: She is alert and oriented to person, place, and time  Deep Tendon Reflexes: Reflexes are normal and symmetric  Comments: Vibration sensation absent to the 1st toe DIP joint bilaterally and to the left 1st metatarsophalangeal joint  Vibration sensation present though markedly diminished to the right 1st metatarsophalangeal joint and left medial malleolus  Microfilament sensation intact bilaterally  Achilles tendon reflexes diminished bilaterally     Patient's shoes and socks removed  Right Foot/Ankle   Right Foot Inspection  Skin Exam: skin normal and skin intact  No dry skin, no warmth, no callus, no erythema, no maceration, no abnormal color, no pre-ulcer, no ulcer and no callus  Toe Exam: No swelling and  no right toe deformity    Sensory   Vibration: diminished  Monofilament testing: intact    Vascular  Capillary refills: < 3 seconds  The right DP pulse is 1+  The right PT pulse is 1+  Left Foot/Ankle  Left Foot Inspection  Skin Exam: skin normal and skin intact  No dry skin, no warmth, no erythema, no maceration, normal color, no pre-ulcer, no ulcer and no callus  Toe Exam: No swelling and no left toe deformity  Sensory   Vibration: diminished  Monofilament testing: intact    Vascular  Capillary refills: < 3 seconds  The left DP pulse is 1+  The left PT pulse is 1+  Assign Risk Category  No deformity present  Loss of protective sensation  Weak pulses  Risk: 2        The history was obtained from the review of the chart and from the patient  Lab Results:    Most recent Alc is  Lab Results   Component Value Date    HGBA1C 6 3 (H) 06/07/2022           Blood work done on 06/07/2022 showed a glucose fasting of 152 but was otherwise normal    Lab Results   Component Value Date    CREATININE 0 77 06/07/2022    CREATININE 0 85 12/02/2021    CREATININE 0 65 07/20/2021    BUN 19 06/07/2022    K 5 1 06/07/2022     06/07/2022    CO2 26 06/07/2022     eGFR   Date Value Ref Range Status   06/07/2022 75 ml/min/1 73sq m Final     Total cholesterol 168, LDL cholesterol 86  Lab Results   Component Value Date    HDL 46 (L) 06/07/2022    TRIG 182 (H) 06/07/2022       Lab Results   Component Value Date    ALT 25 06/07/2022    AST 31 06/07/2022    ALKPHOS 76 06/07/2022     TSH is 1 42  Lab Results   Component Value Date    FREET4 1 36 03/05/2021     Urine microalbumin to creatinine ratio is normal at 8      CBC is normal     Future Appointments   Date Time Provider Thi Gracie   10/21/2022 11:30 AM SH MRI NOR 1 SH NOR MRI [facility]   12/8/2022 11:15 AM Marco Cranker, DO [facility]   1/12/2023  1:00 PM Sara Mancuso MD  [facility]   1/20/2023 11:20 AM Duran Gorman MD ENDO  [facility]

## 2022-10-19 ENCOUNTER — PATIENT MESSAGE (OUTPATIENT)
Dept: FAMILY MEDICINE CLINIC | Facility: CLINIC | Age: 77
End: 2022-10-19

## 2022-10-19 DIAGNOSIS — I10 BENIGN ESSENTIAL HYPERTENSION: Primary | ICD-10-CM

## 2022-10-20 ENCOUNTER — APPOINTMENT (OUTPATIENT)
Dept: PHYSICAL THERAPY | Facility: CLINIC | Age: 77
End: 2022-10-20

## 2022-10-20 RX ORDER — HYDROCHLOROTHIAZIDE 12.5 MG/1
12.5 TABLET ORAL DAILY
Qty: 90 TABLET | Refills: 1 | Status: SHIPPED | OUTPATIENT
Start: 2022-10-20

## 2022-10-20 RX ORDER — LOSARTAN POTASSIUM 50 MG/1
50 TABLET ORAL
Qty: 90 TABLET | Refills: 1 | Status: SHIPPED | OUTPATIENT
Start: 2022-10-20

## 2022-10-20 NOTE — TELEPHONE ENCOUNTER
From: Tangela Coleman  To: Ashish Le  Sent: 10/19/2022 2:11 PM EDT  Subject: Endocronologist Visit    I met with Daun Spatz yesterday and I couldn't be happier (she's just like you)  Discussed everything in regards to diabetes issues  Changes:  1) Going on FreeStyle [place] 2 & will monitor many times per day  Dr Jeannine Griffith can also upload  It will have an alert if my blood sugar goes too low,  2) Victoza - 1 2 per day - in the morning  3) Metformin changing to Extended Release to see if this stops digestive issues  If not, will remove me from Metformin    She also asked me to request of you to have the Losartin split into 2 pills, 1 for blood pressure and 1 for the diaretic part  I would take the diaretic in the am and the blood pressure one at night  She feels this will stop me from going to urinate 4-5 a night  If this can be done, please send new scripts to [facility]  I'm having the MRI on Friday 10/21/22  Thank you for fitting me in as I was late    Lauren Mills

## 2022-10-21 ENCOUNTER — APPOINTMENT (OUTPATIENT)
Dept: LAB | Facility: IMAGING CENTER | Age: 77
End: 2022-10-21
Payer: MEDICARE

## 2022-10-21 ENCOUNTER — HOSPITAL ENCOUNTER (OUTPATIENT)
Dept: RADIOLOGY | Facility: IMAGING CENTER | Age: 77
End: 2022-10-21
Payer: MEDICARE

## 2022-10-21 DIAGNOSIS — M25.50 MULTIPLE JOINT PAIN: ICD-10-CM

## 2022-10-21 DIAGNOSIS — M54.16 RIGHT LUMBAR RADICULOPATHY: ICD-10-CM

## 2022-10-21 DIAGNOSIS — M25.511 RIGHT SHOULDER PAIN, UNSPECIFIED CHRONICITY: ICD-10-CM

## 2022-10-21 LAB
BASOPHILS # BLD AUTO: 0.09 THOUSANDS/ÂΜL (ref 0–0.1)
BASOPHILS NFR BLD AUTO: 1 % (ref 0–1)
CRP SERPL QL: 13.7 MG/L
EOSINOPHIL # BLD AUTO: 0.12 THOUSAND/ÂΜL (ref 0–0.61)
EOSINOPHIL NFR BLD AUTO: 1 % (ref 0–6)
ERYTHROCYTE [DISTWIDTH] IN BLOOD BY AUTOMATED COUNT: 12.6 % (ref 11.6–15.1)
HCT VFR BLD AUTO: 39.5 % (ref 34.8–46.1)
HGB BLD-MCNC: 12.6 G/DL (ref 11.5–15.4)
IMM GRANULOCYTES # BLD AUTO: 0.06 THOUSAND/UL (ref 0–0.2)
IMM GRANULOCYTES NFR BLD AUTO: 1 % (ref 0–2)
LYMPHOCYTES # BLD AUTO: 1.84 THOUSANDS/ÂΜL (ref 0.6–4.47)
LYMPHOCYTES NFR BLD AUTO: 16 % (ref 14–44)
MCH RBC QN AUTO: 32.4 PG (ref 26.8–34.3)
MCHC RBC AUTO-ENTMCNC: 31.9 G/DL (ref 31.4–37.4)
MCV RBC AUTO: 102 FL (ref 82–98)
MONOCYTES # BLD AUTO: 1.02 THOUSAND/ÂΜL (ref 0.17–1.22)
MONOCYTES NFR BLD AUTO: 9 % (ref 4–12)
NEUTROPHILS # BLD AUTO: 8.42 THOUSANDS/ÂΜL (ref 1.85–7.62)
NEUTS SEG NFR BLD AUTO: 72 % (ref 43–75)
NRBC BLD AUTO-RTO: 0 /100 WBCS
PLATELET # BLD AUTO: 408 THOUSANDS/UL (ref 149–390)
PMV BLD AUTO: 10.5 FL (ref 8.9–12.7)
RBC # BLD AUTO: 3.89 MILLION/UL (ref 3.81–5.12)
WBC # BLD AUTO: 11.55 THOUSAND/UL (ref 4.31–10.16)

## 2022-10-21 PROCEDURE — 73030 X-RAY EXAM OF SHOULDER: CPT

## 2022-10-21 PROCEDURE — 36415 COLL VENOUS BLD VENIPUNCTURE: CPT

## 2022-10-21 PROCEDURE — 86618 LYME DISEASE ANTIBODY: CPT

## 2022-10-21 PROCEDURE — 85025 COMPLETE CBC W/AUTO DIFF WBC: CPT

## 2022-10-21 PROCEDURE — 86140 C-REACTIVE PROTEIN: CPT

## 2022-10-21 PROCEDURE — 72148 MRI LUMBAR SPINE W/O DYE: CPT

## 2022-10-22 LAB — B BURGDOR IGG+IGM SER-ACNC: <0.2 AI

## 2022-10-23 DIAGNOSIS — D75.89 MACROCYTOSIS WITHOUT ANEMIA: Primary | ICD-10-CM

## 2022-10-23 DIAGNOSIS — R71.0 DECREASED HEMOGLOBIN: ICD-10-CM

## 2022-10-23 DIAGNOSIS — D53.1 OTHER MEGALOBLASTIC ANEMIAS, NOT ELSEWHERE CLASSIFIED: ICD-10-CM

## 2022-10-24 ENCOUNTER — APPOINTMENT (OUTPATIENT)
Dept: PHYSICAL THERAPY | Facility: CLINIC | Age: 77
End: 2022-10-24

## 2022-10-26 ENCOUNTER — TELEPHONE (OUTPATIENT)
Dept: FAMILY MEDICINE CLINIC | Facility: CLINIC | Age: 77
End: 2022-10-26

## 2022-10-27 ENCOUNTER — APPOINTMENT (OUTPATIENT)
Dept: PHYSICAL THERAPY | Facility: CLINIC | Age: 77
End: 2022-10-27

## 2022-10-27 ENCOUNTER — TELEPHONE (OUTPATIENT)
Dept: FAMILY MEDICINE CLINIC | Facility: CLINIC | Age: 77
End: 2022-10-27

## 2022-10-27 ENCOUNTER — TELEMEDICINE (OUTPATIENT)
Dept: FAMILY MEDICINE CLINIC | Facility: CLINIC | Age: 77
End: 2022-10-27
Payer: MEDICARE

## 2022-10-27 DIAGNOSIS — M47.816 LUMBAR SPONDYLOSIS: ICD-10-CM

## 2022-10-27 DIAGNOSIS — N28.1 RENAL CYST: Primary | ICD-10-CM

## 2022-10-27 PROCEDURE — 99213 OFFICE O/P EST LOW 20 MIN: CPT | Performed by: FAMILY MEDICINE

## 2022-10-27 NOTE — TELEPHONE ENCOUNTER
Please call radiology- xray of the shoulder still not read from 10/21/22- can they read this today please?

## 2022-10-27 NOTE — PROGRESS NOTES
Virtual Regular Visit    Verification of patient location:    Patient is located in the following state in which I hold an active license [place]      Assessment/Plan:    Problem List Items Addressed This Visit    None     Visit Diagnoses     Renal cyst    -  Primary    Relevant Orders    US kidney and bladder    Lumbar spondylosis          reviewed MRI report with patient  Multilevel spondylosis  She has an appointment w/ pain mgmt (she has seen Dr Crispin Villa previously) and she will review w/ him at her visit  Reviewed incidentally noted renal cyst- u/s ordered  Reviewed that this is not typically a worrisome finding, but can be more fully characterized by u/s         Reason for visit is   Chief Complaint   Patient presents with   • Follow-up   • Virtual Regular Visit        Encounter provider Morenita Carr MD    Provider located at [facility]  [address] Srini Krista [facility] 1159 [phone]      Recent Visits  Date Type Provider Dept   10/26/22 Telephone Morenita Carr MD [address] [place] [address] recent visits within past 7 days and meeting all other requirements  Today's Visits  Date Type Provider Dept   10/27/22 Telephone Morenita Carr MD [facility]   10/27/22 Telemedicine Morenita Carr MD [facility]  [facility] today's visits and meeting all other requirements  Future Appointments  No visits were found meeting these conditions  Showing future appointments within next 150 days and meeting all other requirements       The patient was identified by name and date of birth  Laura Tam was informed that this is a telemedicine visit and that the visit is being conducted through the [address] [place] [address] Road Now platform  She agrees to proceed     My office door was closed  No one else was in the room  She acknowledged consent and understanding of privacy and security of the video platform   The patient has agreed to participate and understands they can discontinue the visit at any time  Patient is aware this is a billable service  Subjective  Lafonda Boas is a 77 y o  female who scheduled virtual visit to review her MRI lumbar spine findings  She was concerned b/c she saw that radiology called the office to report significant findings  Renal cyst noted on MRI, recommended to get U/S  She is not having any urinary concerns  She has low back pain with radiculopathy  (see prior note)  Also continues with shoulder pain, worse on the R  Xray was still pending at the time of our virtual visit  HPI     Past Medical History:   Diagnosis Date   • COVID-19 06/11/2022   • Diabetes mellitus ([address] )    • Diabetes mellitus type 2, controlled ([facility] )    • Disease of thyroid gland    • Emphysema of lung ([facility] )    • GERD (gastroesophageal reflux disease)    • Glaucoma    • H/O tooth extraction     Had top row of teeth removed around April 2016   • Headache([address])    • Hyperlipidemia    • Hypertension    • Neck pain    • Pneumonia    • Stroke [facility])    • TIA (transient ischemic attack)    • TIA (transient ischemic attack)    • Visual impairment        Past Surgical History:   Procedure Laterality Date   • BREAST BIOPSY Left     many years ago  papiloma   • CATARACT EXTRACTION Right     5 years ago   • CATARACT EXTRACTION Left    • CYST REMOVAL Left 04/15/2014    ear lobe     • EAR SURGERY  1970    Left ear cyst removal    • EYE SURGERY     • OTHER SURGICAL HISTORY       right upper arm          Current Outpatient Medications   Medication Sig Dispense Refill   • albuterol (PROVENTIL HFA,VENTOLIN HFA) 90 mcg/act inhaler Inhale 2 puffs every 4 (four) hours as needed for wheezing or shortness of breath 1 Inhaler 1   • COMBIGAN 0 2-0 5 % Administer 1 drop to the right eye 2 (two) times a day   3   • Continuous Blood Gluc Sensor (FreeStyle Tarik 2 Sensor) MISC Use to test sugars 7-10 times a day  Apply every 14 days  , 2 each 6   • dorzolamide (TRUSOPT) 2 % ophthalmic solution Administer 1 drop to the right eye 3 (three) times a day   1   • fluticasone-salmeterol (Advair Diskus) 250-50 mcg/dose inhaler Inhale 1 puff 2 (two) times a day Rinse mouth after use  60 blister 2   • glucose blood (OneTouch Verio) test strip USE TO TEST BLOOD SUGAR UP TO FOUR TIMES DAILY 300 each 1   • hydrochlorothiazide (HYDRODIURIL) 12 5 mg tablet Take 1 tablet (12 5 mg total) by mouth daily In the AM 90 tablet 1   • Insulin Pen Needle (Pen Needles) 32G X 4 MM MISC To use daily with victoza 100 each 3   • levothyroxine 100 mcg tablet Take 1 tablet (100 mcg total) by mouth daily BRAND SYNTHROID PLEASE 90 tablet 1   • liraglutide (VICTOZA) injection Inject 0 2 mL (1 2 mg total) under the skin daily 9 mL 1   • losartan (COZAAR) 50 mg tablet Take 1 tablet (50 mg total) by mouth daily at bedtime 90 tablet 1   • LUMIGAN 0 01 % ophthalmic drops Administer 1 drop to both eyes daily at bedtime   3   • metFORMIN (GLUCOPHAGE-XR) 500 mg 24 hr tablet Take 1 tablet (500 mg total) by mouth 2 (two) times a day with meals 180 tablet 3   • OneTouch Delica Lancets 03D MISC To test BS up to four times daily- E11 65 200 each 5   • Rhopressa 0 02 % SOLN        No current facility-administered medications for this visit  Allergies   Allergen Reactions   • Eggs Or Egg-Derived Products - Food Allergy Anaphylaxis   • Albumen, Egg - Food Allergy Hives   • Antihistamines, Diphenhydramine-Type    • Epinephrine Syncope     Root canal 25 years ago    • Fluzone [Influenza Virus Vaccine] Hives, Abdominal Pain and Facial Swelling   • Iv Contrast [Iodinated Diagnostic Agents] Headache     Headache, swelling around eyes, eye burning- with CT contrast   No rxn to MRI contrast   • Lidocaine      Syncope, tachycardia with local anesthetic with epinephrine   • Zinc Rash       Review of Systems    Video Exam    There were no vitals filed for this visit  Physical Exam  Vitals and nursing note reviewed     Constitutional: General: She is not in acute distress  Appearance: Normal appearance  Pulmonary:      Effort: No respiratory distress  Neurological:      Mental Status: She is alert and oriented to person, place, and time            I spent 20 minutes directly with the patient during this visit

## 2022-10-31 ENCOUNTER — TELEPHONE (OUTPATIENT)
Dept: PAIN MEDICINE | Facility: MEDICAL CENTER | Age: 77
End: 2022-10-31

## 2022-10-31 NOTE — TELEPHONE ENCOUNTER
Caller: Cheryle Anderson     Doctor: Galina Paz     Reason for call: patient states she has been in pain for months and was cancelled on one too many times she called to cancel our appt and to make you aware she is going with different facility       Call back#: [phone]

## 2022-11-17 ENCOUNTER — OFFICE VISIT (OUTPATIENT)
Dept: PHYSICAL THERAPY | Facility: CLINIC | Age: 77
End: 2022-11-17

## 2022-11-17 DIAGNOSIS — M54.50 CHRONIC BILATERAL LOW BACK PAIN, UNSPECIFIED WHETHER SCIATICA PRESENT: Primary | ICD-10-CM

## 2022-11-17 DIAGNOSIS — G89.29 CHRONIC BILATERAL LOW BACK PAIN, UNSPECIFIED WHETHER SCIATICA PRESENT: Primary | ICD-10-CM

## 2022-11-17 DIAGNOSIS — M54.2 NECK PAIN: ICD-10-CM

## 2022-11-17 NOTE — PROGRESS NOTES
PT Evaluation     Today's date: 2022  Patient name: Claudia Saunders  : 1945  MRN: [id number]  Referring provider: Pat Fulton PT  Dx:   Encounter Diagnosis     ICD-10-CM    1  Chronic bilateral low back pain, unspecified whether sciatica present  M54 50     G89 29       2  Neck pain  M54 2                      Assessment  Assessment details: 77year old female patient reports to PT with chronic neck, back, and B UE and B LE pain  No red flags noted and patient denies numbness/tingling  UQS and LQS unremarkable  Patient B shoulder PROM highly irritable and significantly limited with flexion/abduction  Patient also has R hip tightness and B knee irritability with overpressure extension  Patient has possible directional preference for lumbar flexion based on subjective/objective findings  Patient will benefit from skilled PT services to address current impairments and functional limitations to help patient return to her PLOF  Impairments: abnormal gait, abnormal muscle firing, abnormal muscle tone, abnormal or restricted ROM, abnormal movement, activity intolerance, impaired balance, impaired physical strength, lacks appropriate home exercise program, pain with function and weight-bearing intolerance    Symptom irritability: highUnderstanding of Dx/Px/POC: good   Prognosis: good    Goals  STG  1  Patient will be independent with completion of HEP throughout therapy  2  Patient will have at worst 5/10 pain so patient can sleep with less discomfort in 3 weeks  LTG  1  Patient will have B shoulder AROM of at least 90 deg flexion/abduction in at least 4 weeks  2  Patient will sit for prolonged periods without increase in symptoms in 6 weeks       Plan  Patient would benefit from: skilled physical therapy  Planned therapy interventions: abdominal trunk stabilization, activity modification, joint mobilization, manual therapy, motor coordination training, neuromuscular re-education, patient education, strengthening, stretching, therapeutic activities, therapeutic exercise, home exercise program, functional ROM exercises and flexibility  Frequency: 2x week  Duration in weeks: 6  Treatment plan discussed with: patient        Subjective Evaluation    History of Present Illness  Mechanism of injury: Patient reports with chronic neck and low back pain  Patient has pain in her B neck and shoulders as well as pain in her low back and her LEs  Patient denies numbness/tingling, but notes radiating R LE pain as well as posterior B knee pain  Patient notes her pain is at it's worst when she is lying down in bed at night  Patient sometimes initially uses a walker when she first gets out of bed and then once she is moving around all of her pain improves  Patient notes when she is sitting all of her pain gets worse as well  Patient takes advil and tylenol to help with pain pretty frequently  Patient is waiting to get an injection from an MD    Pain  Current pain ratin  At best pain ratin  At worst pain rating: 10  Quality: dull ache and radiating  Relieving factors: change in position and medications  Aggravating factors: sitting    Treatments  Previous treatment: physical therapy and injection treatment  Current treatment: physical therapy  Patient Goals  Patient goals for therapy: decreased pain, increased motion, increased strength and return to sport/leisure activities          Objective     Neurological Testing     Sensation   Cervical/Thoracic   Left   Intact: light touch    Right   Intact: light touch    Lumbar   Left   Intact: light touch    Right   Intact: light touch    Active Range of Motion   Left Shoulder   Flexion: 60 degrees with pain  Abduction: 45 degrees with pain    Right Shoulder   Flexion: 60 degrees with pain  Abduction: 45 degrees with pain    Lumbar   Flexion:  Restriction level: minimal  Extension: Active lumbar extension: pain    Restriction level: maximal    Passive Range of Motion   Left Shoulder   Flexion: 90 degrees with pain  Abduction: 60 degrees with pain  External rotation 45°: 90 degrees   Internal rotation 45°: 90 degrees     Right Shoulder   Flexion: 90 degrees with pain  Abduction: 60 degrees with pain  External rotation 45°: 60 degrees with pain  Internal rotation 45°: 90 degrees     Strength/Myotome Testing     Left Hip   Planes of Motion   Flexion: 4-  Extension: 3+  Abduction: 2+  Adduction: 3+    Right Hip   Planes of Motion   Flexion: 4-  Extension: 3+  Abduction: 2+  Adduction: 3+    Left Knee   Flexion: 4-  Extension: 4-    Right Knee   Flexion: 4-  Extension: 4-    Left Ankle/Foot   Dorsiflexion: 4  Great toe extension: 4    Right Ankle/Foot   Dorsiflexion: 4  Great toe extension: 4    General Comments:      Lumbar Comments  R hip flexion painful in groin  R hip IR at 90 deg flexion increased pain    B knee extension w/ overpressure increased knee pain  -flexion full and painless  -B patellar mobility normal     Shoulder Comments   Strength testing not tested             Precautions: DM, HTN, Depth Perception Issues      Manuals 11/17            B shoulder PROM gentle                                                     Neuro Re-Ed             scap sets                          Chin tucks When able            scap 4                                        Supine ab marches r            bridges When able            SLR When able                         Ther Ex             B table slides flexion/scaption r            Cervical flexion             Standing shoulder ER/IR                          Heel slides r            SKTC r            Seated lumbar flexion r                         LAQ              treadmill                                       Ther Activity                                       Gait Training                                       Modalities

## 2022-11-29 ENCOUNTER — APPOINTMENT (OUTPATIENT)
Dept: PHYSICAL THERAPY | Facility: CLINIC | Age: 77
End: 2022-11-29

## 2022-12-01 ENCOUNTER — OFFICE VISIT (OUTPATIENT)
Dept: PHYSICAL THERAPY | Facility: CLINIC | Age: 77
End: 2022-12-01

## 2022-12-01 DIAGNOSIS — M54.2 NECK PAIN: ICD-10-CM

## 2022-12-01 DIAGNOSIS — G89.29 CHRONIC BILATERAL LOW BACK PAIN, UNSPECIFIED WHETHER SCIATICA PRESENT: Primary | ICD-10-CM

## 2022-12-01 DIAGNOSIS — M54.50 CHRONIC BILATERAL LOW BACK PAIN, UNSPECIFIED WHETHER SCIATICA PRESENT: Primary | ICD-10-CM

## 2022-12-01 NOTE — PROGRESS NOTES
Daily Note     Today's date: 2022  Patient name: Austin Fishman  : 1945  MRN: [id number]  Referring provider: Malorie Grant PT  Dx:   Encounter Diagnosis     ICD-10-CM    1  Chronic bilateral low back pain, unspecified whether sciatica present  M54 50     G89 29       2  Neck pain  M54 2                      Subjective: Patient notes was in severe pain over last few days especially in R shoulder  Objective: See treatment diary below      Assessment: Tolerated treatment well  Patient would benefit from continued PT  Treatment plan initiated  Exercises stopped when patient started to feel increase in symptoms  No increase in pain post       Plan: Progress treatment as tolerated         Precautions: DM, HTN, Depth Perception Issues      Manuals            B shoulder PROM gentle              Cervical STM  seated gentle [address]                                      Neuro Re-Ed             scap sets                          Chin tucks When able            scap 4   Low rows 2x5 red                                     Supine ab marches r            bridges When able            SLR When able                         Ther Ex             B table slides flexion/scaption r 5x 5" holds B            Cervical flexion             Standing shoulder ER/IR  2x5 B blue each                         Heel slides r            SKTC r            Seated lumbar flexion r                         LAQ              treadmill  5 min                                      Ther Activity                                       Gait Training                                       Modalities

## 2022-12-06 ENCOUNTER — APPOINTMENT (OUTPATIENT)
Dept: PHYSICAL THERAPY | Facility: CLINIC | Age: 77
End: 2022-12-06

## 2022-12-08 ENCOUNTER — OFFICE VISIT (OUTPATIENT)
Dept: PHYSICAL THERAPY | Facility: CLINIC | Age: 77
End: 2022-12-08

## 2022-12-08 DIAGNOSIS — M54.50 CHRONIC BILATERAL LOW BACK PAIN, UNSPECIFIED WHETHER SCIATICA PRESENT: Primary | ICD-10-CM

## 2022-12-08 DIAGNOSIS — M54.2 NECK PAIN: ICD-10-CM

## 2022-12-08 DIAGNOSIS — G89.29 CHRONIC BILATERAL LOW BACK PAIN, UNSPECIFIED WHETHER SCIATICA PRESENT: Primary | ICD-10-CM

## 2022-12-08 NOTE — PROGRESS NOTES
Daily Note     Today's date: 2022  Patient name: Clemencia Loving  : 1945  MRN: [id number]  Referring provider: Nara Cook, PT  Dx:   Encounter Diagnosis     ICD-10-CM    1  Chronic bilateral low back pain, unspecified whether sciatica present  M54 50     G89 29       2  Neck pain  M54 2                      Subjective: Patient notes was in severe pain over last few days especially in R shoulder  Objective: See treatment diary below      Assessment: Tolerated treatment well  Patient would benefit from continued PT  Exercises stopped when patient started to feel increase in symptoms  No increase in pain post       Plan: Progress treatment as tolerated         Precautions: DM, HTN, Depth Perception Issues      Manuals           B shoulder PROM gentle              Cervical STM  seated gentle MK                                      Neuro Re-Ed             scap sets                          Chin tucks When able            scap 4   Low rows 2x5 red                                     Supine ab marches r            bridges When able            SLR When able                         Ther Ex             B table slides flexion/scaption r 5x 5" holds B  5x 5" holds B           Cervical flexion             Standing shoulder ER/IR  2x5 B blue each            No moneys   trialed and pain          Heel slides r            SKTC r            Seated lumbar flexion r  5x w/ tball                       LAQ    10x B          treadmill  5 min  5 min           Seated marches   pain                       Ther Activity                                       Gait Training                                       Modalities

## 2022-12-13 ENCOUNTER — OFFICE VISIT (OUTPATIENT)
Dept: PHYSICAL THERAPY | Facility: CLINIC | Age: 77
End: 2022-12-13

## 2022-12-13 DIAGNOSIS — M54.50 CHRONIC BILATERAL LOW BACK PAIN, UNSPECIFIED WHETHER SCIATICA PRESENT: Primary | ICD-10-CM

## 2022-12-13 DIAGNOSIS — G89.29 CHRONIC BILATERAL LOW BACK PAIN, UNSPECIFIED WHETHER SCIATICA PRESENT: Primary | ICD-10-CM

## 2022-12-13 DIAGNOSIS — M54.2 NECK PAIN: ICD-10-CM

## 2022-12-13 NOTE — PROGRESS NOTES
Daily Note     Today's date: 2022  Patient name: Bhavana Fournier  : 1945  MRN: [id number]  Referring provider: Bharat Arrington PT  Dx:   Encounter Diagnosis     ICD-10-CM    1  Chronic bilateral low back pain, unspecified whether sciatica present  M54 50     G89 29       2  Neck pain  M54 2                      Subjective: Patient notes has bad headache today and shoulders are bothering her  Notes saw new pain management MD and got xrays on her hips and getting MRIs on her shoulders  Happy with new MD        Objective: See treatment diary below      Assessment: Tolerated treatment well  Patient would benefit from continued PT  Exercises stopped when patient started to feel increase in symptoms  No increase in pain post       Plan: Progress treatment as tolerated         Precautions: DM, HTN, Depth Perception Issues      Manuals          B shoulder PROM gentle              Cervical STM  seated gentle MK                                      Neuro Re-Ed             scap sets                          Chin tucks When able            scap 4   Low rows 2x5 red                                     Supine ab marches r            bridges When able            SLR When able                         Ther Ex             B table slides flexion/scaption r 5x 5" holds B  5x 5" holds B  8x 5" holds B          Cervical flexion             Standing shoulder ER/IR  2x5 B blue each            No moneys   trialed and pain          Heel slides r            SKTC r            Seated lumbar flexion r  5x w/ tball 6x w/ tbal                       LAQ    10x B 12x B         treadmill  5 min  5 min  5 min         Seated marches   pain pain         Seated ankle DF    20x 5" holds          Ther Activity             STS    From chair w/ 2 foams 5x                       Gait Training                                       Modalities

## 2022-12-15 ENCOUNTER — APPOINTMENT (OUTPATIENT)
Dept: PHYSICAL THERAPY | Facility: CLINIC | Age: 77
End: 2022-12-15

## 2022-12-16 ENCOUNTER — TELEMEDICINE (OUTPATIENT)
Dept: FAMILY MEDICINE CLINIC | Facility: CLINIC | Age: 77
End: 2022-12-16

## 2022-12-16 ENCOUNTER — NURSE TRIAGE (OUTPATIENT)
Dept: OTHER | Facility: OTHER | Age: 77
End: 2022-12-16

## 2022-12-16 ENCOUNTER — PATIENT MESSAGE (OUTPATIENT)
Dept: FAMILY MEDICINE CLINIC | Facility: CLINIC | Age: 77
End: 2022-12-16

## 2022-12-16 VITALS — BODY MASS INDEX: 29.16 KG/M2 | TEMPERATURE: 97.7 F | WEIGHT: 186.2 LBS

## 2022-12-16 DIAGNOSIS — U07.1 COVID-19: ICD-10-CM

## 2022-12-16 RX ORDER — BENZONATATE 200 MG/1
200 CAPSULE ORAL 3 TIMES DAILY PRN
Qty: 20 CAPSULE | Refills: 0 | Status: SHIPPED | OUTPATIENT
Start: 2022-12-16

## 2022-12-16 RX ORDER — ALBUTEROL SULFATE 90 UG/1
2 AEROSOL, METERED RESPIRATORY (INHALATION) EVERY 4 HOURS PRN
Qty: 18 G | Refills: 1 | Status: SHIPPED | OUTPATIENT
Start: 2022-12-16

## 2022-12-16 RX ORDER — NIRMATRELVIR AND RITONAVIR 300-100 MG
3 KIT ORAL 2 TIMES DAILY
Qty: 30 TABLET | Refills: 0 | Status: SHIPPED | OUTPATIENT
Start: 2022-12-16 | End: 2022-12-21

## 2022-12-16 NOTE — TELEPHONE ENCOUNTER
Reason for Disposition  • MILD difficulty breathing (e g , minimal/no SOB at rest, SOB with walking, pulse <100)    Answer Assessment - Initial Assessment Questions  1  COVID-19 DIAGNOSIS: "Who made your COVID-19 diagnosis?" "Was it confirmed by a positive lab test or self-test?" If not diagnosed by a doctor (or NP/PA), ask "Are there lots of cases (community spread) where you live?" Note: See [facility] health department website, if unsure  COVID positive with rapid at home test today  2  COVID-19 EXPOSURE: "Was there any known exposure to COVID before the symptoms began?" CDC Definition of close contact: within 6 feet (2 meters) for a total of 15 minutes or more over a 24-hour period  N/a    3  ONSET: "When did the COVID-19 symptoms start?"       Started 3 days ago  4  WORST SYMPTOM: "What is your worst symptom?" (e g , cough, fever, shortness of breath, muscle aches)      SOB with and without exertion- pt with emphysema hx- using Advair discus  States that her SOB is mild  Denies chest pain  5  COUGH: "Do you have a cough?" If Yes, ask: "How bad is the cough?"        Yes- trying to bring up mucous but unable to do so  6  FEVER: "Do you have a fever?" If Yes, ask: "What is your temperature, how was it measured, and when did it start?"      Denies fever  7  RESPIRATORY STATUS: "Describe your breathing?" (e g , shortness of breath, wheezing, unable to speak)       Pt states she is SOB  Says it is with and without exertion but denies that SOB is severe  8  BETTER-SAME-WORSE: "Are you getting better, staying the same or getting worse compared to yesterday?"  If getting worse, ask, "In what way?"      Same  9  HIGH RISK DISEASE: "Do you have any chronic medical problems?" (e g , asthma, heart or lung disease, weak immune system, obesity, etc )      Emphysema, type 2 DM    Protocols used: CORONAVIRUS (COVID-19) DIAGNOSED OR SUSPECTED-ADULT-OH      Recommended ER eval to pt   States her SOB is mild and that she is not going to the ED  Is asking for a televisit and states she would like paxlovid ordered- since she has had it in the past and has done well  Explained to pt if she is saying her SOB is mild then will send a message to PCP to see if PCP is agreeable with having a virtual appt scheduled  Please reach out to pt to set up virtual visit if possible

## 2022-12-16 NOTE — TELEPHONE ENCOUNTER
Regarding: Covid positive with trouble breathing and catching her breath  ----- Message from Sarah Santacruz sent at 12/16/2022  9:28 AM EST -----  I tested positive for COVID this morning  I've been very sick for 3 days  On hold trying to get a video visit with you for today so I can get medicine  I am very weak, thoat is like razors, very bad respiratory issues, constantly coughing but can't bring up phlegm  I am very congested and my ears hurt  I am using my nebulizer and its not helping at all   I am having trouble breathing and catching my breath "

## 2022-12-16 NOTE — PROGRESS NOTES
COVID-19 Outpatient Progress Note    Assessment/Plan:    Problem List Items Addressed This Visit    None  Visit Diagnoses     COVID-19        Relevant Medications    albuterol (PROVENTIL HFA,VENTOLIN HFA) 90 mcg/act inhaler    benzonatate (TESSALON) 200 MG capsule    nirmatrelvir & ritonavir (Paxlovid, 300/100,) tablet therapy pack         Disposition:     Will treat with paxlovid x 5 days  Rest, fluids  Albuterol refilled, though pt denies shortness of breath at this time  Tessalon perles prn   Call if symptoms worsen  Isolate through Sunday and if by Monday she is fever-free and cough is improving, may d/c isolation and follow strict masking     Patient meets criteria for PAXLOVID and they have been counseled appropriately according to EUA documentation released by the FDA  After discussion, patient agrees to treatment  Corry Guzman is an investigational medicine used to treat mild-to-moderate COVID-19 in adults and children (15years of age and older weighing at least 80 pounds (40 kg)) with positive results of direct SARS-CoV-2 viral testing, and who are at high risk for progression to severe COVID-19, including hospitalization or death  PAXLOVID is investigational because it is still being studied  There is limited information about the safety and effectiveness of using PAXLOVID to treat people with mild-to-moderate COVID-19  The FDA has authorized the emergency use of PAXLOVID for the treatment of mild-tomoderate COVID-19 in adults and children (15years of age and older weighing at least 80 pounds (40 kg)) with a positive test for the virus that causes COVID-19, and who are at high risk for progression to severe COVID-19, including hospitalization or death, under an EUA  What should I tell my healthcare provider before I take PAXLOVID?     Tell your healthcare provider if you:  - Have any allergies  - Have liver or kidney disease  - Are pregnant or plan to become pregnant  - Are breastfeeding a child  - Have any serious illnesses    Tell your healthcare provider about all the medicines you take, including prescription and over-the-counter medicines, vitamins, and herbal supplements  Some medicines may interact with PAXLOVID and may cause serious side effects  Keep a list of your medicines to show your healthcare provider and pharmacist when you get a new medicine  You can ask your healthcare provider or pharmacist for a list of medicines that interact with PAXLOVID  Do not start taking a new medicine without telling your healthcare provider  Your healthcare provider can tell you if it is safe to take PAXLOVID with other medicines  Tell your healthcare provider if you are taking combined hormonal contraceptive  PAXLOVID may affect how your birth control pills work  Females who are able to become pregnant should use another effective alternative form of contraception or an additional barrier method of contraception  Talk to your healthcare provider if you have any questions about contraceptive methods that might be right for you  How do I take PAXLOVID? PAXLOVID consists of 2 medicines: nirmatrelvir and ritonavir  - Take 2 pink tablets of nirmatrelvir with 1 white tablet of ritonavir by mouth 2 times each day (in the morning and in the evening) for 5 days  For each dose, take all 3 tablets at the same time  - If you have kidney disease, talk to your healthcare provider  You may need a different dose  - Swallow the tablets whole  Do not chew, break, or crush the tablets  - Take PAXLOVID with or without food  - Do not stop taking PAXLOVID without talking to your healthcare provider, even if you feel better  - If you miss a dose of PAXLOVID within 8 hours of the time it is usually taken, take it as soon as you remember  If you miss a dose by more than 8 hours, skip the missed dose and take the next dose at your regular time  Do not take 2 doses of PAXLOVID at the same time    - If you take too much PAXLOVID, call your healthcare provider or go to the nearest hospital emergency room right away  - If you are taking a ritonavir- or cobicistat-containing medicine to treat hepatitis C or Human Immunodeficiency Virus (HIV), you should continue to take your medicine as prescribed by your healthcare provider   - Talk to your healthcare provider if you do not feel better or if you feel worse after 5 days  Who should generally not take PAXLOVID? Do not take PAXLOVID if:  You are allergic to nirmatrelvir, ritonavir, or any of the ingredients in PAXLOVID  You are taking any of the following medicines:  - Alfuzosin  - Pethidine, piroxicam, propoxyphene  - Ranolazine  - Amiodarone, dronedarone, flecainide, propafenone, quinidine  - Colchicine  - Lurasidone, pimozide, clozapine  - Dihydroergotamine, ergotamine, methylergonovine  - Lovastatin, simvastatin  - Sildenafil (Revatio®) for pulmonary arterial hypertension (PAH)  - Triazolam, oral midazolam  - Apalutamide  - Carbamazepine, phenobarbital, phenytoin  - Rifampin  - St  Marcel’s Wort (hypericum perforatum)    What are the important possible side effects of PAXLOVID? Possible side effects of PAXLOVID are:  - Liver Problems  Tell your healthcare provider right away if you have any of these signs and symptoms of liver problems: loss of appetite, yellowing of your skin and the whites of eyes (jaundice), dark-colored urine, pale colored stools and itchy skin, stomach area (abdominal) pain  - Resistance to HIV Medicines  If you have untreated HIV infection, PAXLOVID may lead to some HIV medicines not working as well in the future  - Other possible side effects include: altered sense of taste, diarrhea, high blood pressure, or muscle aches    These are not all the possible side effects of PAXLOVID  Not many people have taken PAXLOVID  Serious and unexpected side effects may happen   Humberto Barrett is still being studied, so it is possible that all of the risks are not known at this time  What other treatment choices are there? Like Camille Many may allow for the emergency use of other medicines to treat people with COVID-19  Go to https://FIMBex/ for information on the emergency use of other medicines that are authorized by FDA to treat people with COVID-19  Your healthcare provider may talk with you about clinical trials for which you may be eligible  It is your choice to be treated or not to be treated with PAXLOVID  Should you decide not to receive it or for your child not to receive it, it will not change your standard medical care  What if I am pregnant or breastfeeding? There is no experience treating pregnant women or breastfeeding mothers with PAXLOVID  For a mother and unborn baby, the benefit of taking PAXLOVID may be greater than the risk from the treatment  If you are pregnant, discuss your options and specific situation with your healthcare provider  It is recommended that you use effective barrier contraception or do not have sexual activity while taking PAXLOVID  If you are breastfeeding, discuss your options and specific situation with your healthcare provider  How do I report side effects with PAXLOVID? Contact your healthcare provider if you have any side effects that bother you or do not go away  Report side effects to FDA MedWatch at www fda gov/medwatch or call 8-070-ILG7382 or you can report side effects to [facility]  at the contact information provided below  Website Fax number Telephone number   eÃ‡ift [phone]     How should I store Quoc Ortiz? Store PAXLOVID tablets at room temperature between 68°F to 77°F (20°C to 25°C)      Full fact sheet for patients, parents, and caregivers can be found at: [facility]    I have spent 5 minutes directly with the patient  Greater than 50% of this time was spent in counseling/coordination of care regarding: risks and benefits of treatment options and instructions for management  Encounter provider: Ben Coy DO     Provider located at: [address] [place] [id number] [phone]     Recent Visits  No visits were found meeting these conditions  Showing recent visits within past 7 days and meeting all other requirements  Today's Visits  Date Type Provider Dept   12/16/22 Telemedicine DO Chaim Maciel   Showing today's visits and meeting all other requirements  Future Appointments  No visits were found meeting these conditions  Showing future appointments within next 150 days and meeting all other requirements     This virtual check-in was done via [facility] and patient was informed that this is a secure, HIPAA-compliant platform  She agrees to proceed  Patient agrees to participate in a virtual check in via telephone or video visit instead of presenting to the office to address urgent/immediate medical needs  Patient is aware this is a billable service  She acknowledged consent and understanding of privacy and security of the video platform  The patient has agreed to participate and understands they can discontinue the visit at any time  After connecting through [facility], the patient was identified by name and date of birth  Nelson Greene was informed that this was a telemedicine visit and that the exam was being conducted confidentially over secure lines  My office door was closed  No one else was in the room  Nelson Greene acknowledged consent and understanding of privacy and security of the telemedicine visit  I informed the patient that I have reviewed her record in Epic and presented the opportunity for her to ask any questions regarding the visit today  The patient agreed to participate      Verification of patient location:  Patient is located in the following state in which I hold an active license: [place]    Subjective:   Ketan Boucher is a 69 y o  female who is concerned about COVID-19  Patient's symptoms include chills, fatigue, malaise, nasal congestion, rhinorrhea, sore throat, cough, nausea, myalgias and headache  Patient denies fever, anosmia, loss of taste, shortness of breath, chest tightness, abdominal pain, vomiting and diarrhea  - Date of symptom onset: 12/13/2022      COVID-19 vaccination status: Fully vaccinated with booster    Exposure:   Contact with a person who is under investigation (PUI) for or who is positive for COVID-19 within the last 14 days?: No    Hospitalized recently for fever and/or lower respiratory symptoms?: No      Currently a healthcare worker that is involved in direct patient care?: No      Works in a special setting where the risk of COVID-19 transmission may be high? (this may include long-term care, correctional and shelter facilities; homeless shelters; assisted-living facilities and group homes ): No      Resident in a special setting where the risk of COVID-19 transmission may be high? (this may include long-term care, correctional and shelter facilities; homeless shelters; assisted-living facilities and group homes ): No      Above symptoms started Tuesday  +test today  Has had covid twice and used paxlovid before    Lab Results   Component Value Date    SARSCOV2 Negative 09/27/2021       Review of Systems   Constitutional: Positive for chills and fatigue  Negative for fever  HENT: Positive for congestion, rhinorrhea and sore throat  Respiratory: Positive for cough  Negative for chest tightness and shortness of breath  Gastrointestinal: Positive for nausea  Negative for abdominal pain, diarrhea and vomiting  Musculoskeletal: Positive for myalgias  Neurological: Positive for headaches       Current Outpatient Medications on File Prior to Visit   Medication Sig   • COMBIGAN 0 2-0 5 % Administer 1 drop to the right eye 2 (two) times a day    • dorzolamide (TRUSOPT) 2 % ophthalmic solution Administer 1 drop to the right eye 3 (three) times a day    • fluticasone-salmeterol (Advair Diskus) 250-50 mcg/dose inhaler Inhale 1 puff 2 (two) times a day Rinse mouth after use  • glucose blood (OneTouch Verio) test strip USE TO TEST BLOOD SUGAR UP TO FOUR TIMES DAILY   • hydrochlorothiazide (HYDRODIURIL) 12 5 mg tablet Take 1 tablet (12 5 mg total) by mouth daily In the AM   • Insulin Pen Needle (Pen Needles) 32G X 4 MM MISC To use daily with victoza   • levothyroxine 100 mcg tablet Take 1 tablet (100 mcg total) by mouth daily BRAND SYNTHROID PLEASE   • liraglutide (VICTOZA) injection Inject 0 2 mL (1 2 mg total) under the skin daily   • losartan (COZAAR) 50 mg tablet Take 1 tablet (50 mg total) by mouth daily at bedtime   • LUMIGAN 0 01 % ophthalmic drops Administer 1 drop to both eyes daily at bedtime    • metFORMIN (GLUCOPHAGE-XR) 500 mg 24 hr tablet Take 1 tablet (500 mg total) by mouth 2 (two) times a day with meals   • OneTouch Delica Lancets 40Y MISC To test BS up to four times daily- E11 65   • Rhopressa 0 02 % SOLN    • [DISCONTINUED] albuterol (PROVENTIL HFA,VENTOLIN HFA) 90 mcg/act inhaler Inhale 2 puffs every 4 (four) hours as needed for wheezing or shortness of breath   • Continuous Blood Gluc Sensor (FreeStyle Tarik 2 Sensor) MISC Use to test sugars 7-10 times a day  Apply every 14 days , (Patient not taking: Reported on 12/16/2022)       Objective:    Temp 97 7 °F (36 5 °C)   Wt 84 5 kg (186 lb 3 2 oz)   BMI 29 16 kg/m²      Physical Exam  Constitutional:       Appearance: Normal appearance  HENT:      Head: Normocephalic and atraumatic  Eyes:      Extraocular Movements: Extraocular movements intact  Conjunctiva/sclera: Conjunctivae normal    Pulmonary:      Effort: Pulmonary effort is normal  No respiratory distress     Neurological:      Mental Status: She is alert and oriented to person, place, and time  Cranial Nerves: No cranial nerve deficit         Aiden Stiles,

## 2022-12-19 ENCOUNTER — APPOINTMENT (OUTPATIENT)
Dept: PHYSICAL THERAPY | Facility: CLINIC | Age: 77
End: 2022-12-19

## 2022-12-20 ENCOUNTER — APPOINTMENT (OUTPATIENT)
Dept: PHYSICAL THERAPY | Facility: CLINIC | Age: 77
End: 2022-12-20

## 2022-12-22 ENCOUNTER — APPOINTMENT (OUTPATIENT)
Dept: PHYSICAL THERAPY | Facility: CLINIC | Age: 77
End: 2022-12-22

## 2023-01-13 ENCOUNTER — HOSPITAL ENCOUNTER (OUTPATIENT)
Facility: MEDICAL CENTER | Age: 78
Discharge: HOME | End: 2023-01-13

## 2023-01-13 DIAGNOSIS — M19.011 ARTHRITIS OF RIGHT SHOULDER REGION: ICD-10-CM

## 2023-01-13 DIAGNOSIS — M19.012 PRIMARY OSTEOARTHRITIS OF LEFT SHOULDER: ICD-10-CM

## 2023-01-27 ENCOUNTER — HOSPITAL ENCOUNTER (OUTPATIENT)
Facility: MEDICAL CENTER | Age: 78
Discharge: HOME | End: 2023-01-27

## 2023-01-27 DIAGNOSIS — M19.011 ARTHRITIS OF RIGHT SHOULDER REGION: ICD-10-CM

## 2023-02-13 ENCOUNTER — APPOINTMENT (OUTPATIENT)
Dept: PHYSICAL THERAPY | Facility: CLINIC | Age: 78
End: 2023-02-13

## 2023-02-20 ENCOUNTER — EVALUATION (OUTPATIENT)
Dept: PHYSICAL THERAPY | Facility: CLINIC | Age: 78
End: 2023-02-20

## 2023-02-20 DIAGNOSIS — G89.29 CHRONIC PAIN OF BOTH SHOULDERS: Primary | ICD-10-CM

## 2023-02-20 DIAGNOSIS — M25.511 CHRONIC PAIN OF BOTH SHOULDERS: Primary | ICD-10-CM

## 2023-02-20 DIAGNOSIS — M25.512 CHRONIC PAIN OF BOTH SHOULDERS: Primary | ICD-10-CM

## 2023-02-20 NOTE — LETTER
2023    Karan Fabry, MD [place]    Patient: Anthony Adams   YOB: 1945   Date of Visit: 2023     Encounter Diagnosis     ICD-10-CM    1  Chronic pain of both shoulders  M25 511     G89 29     M25 512           Dear Dr Candice Romero: Thank you for your recent referral of Anthony Adams  Please review the attached evaluation summary from Chanell's recent visit  Please verify that you agree with the plan of care by signing the attached order  If you have any questions or concerns, please do not hesitate to call  I sincerely appreciate the opportunity to share in the care of one of your patients and hope to have another opportunity to work with you in the near future  Sincerely,    Isai Ardon, PT      Referring Provider:      I certify that I have read the below Plan of Care and certify the need for these services furnished under this plan of treatment while under my care  Karan Fabry, MD [place]  Via Fax: [phone]          PT Evaluation     Today's date: 2023  Patient name: Anthony Adams  : 1945  MRN: [id number]  Referring provider: Jag Abel*  Dx:   Encounter Diagnosis     ICD-10-CM    1  Chronic pain of both shoulders  M25 511     G89 29     M25 512           Start Time: 1405  Stop Time: 1445  Total time in clinic (min): 40 minutes    Assessment  Assessment details: 69year old female patient reports to PT with chronic B shoulder pain  No red flags noted  UQS unremarkable  Patient presents with significant B shoulder PROM limitations (L>R), primarily into flexion and abduction  Patient also has decreased motor coordination/strength of rotator cuff and scapular musculature  Patient will benefit from skilled PT services to address current impairments and functional limitations to help patient return to her PLOF       Impairments: abnormal muscle firing, abnormal muscle tone, abnormal or restricted ROM, abnormal movement, activity intolerance, impaired physical strength and pain with function    Symptom irritability: moderateUnderstanding of Dx/Px/POC: good   Prognosis: good    Goals  STG  1  Patient will be independent with completion of HEP throughout therapy  2  Patient will have at worst 5/10 pain so patient can sleep with less discomfort in 3 weeks  LTG  1  Patient will put a jacket on without increased difficulty in 6 weeks  2  Patient will increase B shoulder overhead motion by at least 50% so patient can complete more functional tasks with less difficulty in 6 weeks  Plan  Patient would benefit from: skilled physical therapy  Planned therapy interventions: activity modification, joint mobilization, manual therapy, motor coordination training, neuromuscular re-education, patient education, strengthening, stretching, therapeutic activities, therapeutic exercise, home exercise program, functional ROM exercises and flexibility  Frequency: 2x week  Duration in weeks: 6  Treatment plan discussed with: patient        Subjective Evaluation    History of Present Illness  Mechanism of injury: Patient reports with B chronic shoulder pain  Patient notes lifted more things recently which has made her symptoms worse on her R shoulder  Patient notes her L shoulder is painful all the time  Patient has difficulty sleeping due to her symptoms and using her UE's (L>R)  Patient notes had numbness/tingling in R UE but has been much better since the injection in her shoulder and pain in neck is better  Patient also has issues dressing herself due to her symptoms  Patient had injection in R shoulder which helped a lot  Patient hasn't had one in the left yet but is seeing MD next week to probably get an injection in her L shoulder     Pain  Current pain ratin  At best pain ratin  At worst pain ratin  Quality: dull ache, throbbing and sharp          Objective     Concurrent Complaints  Positive for disturbed sleep   Negative for night pain, dizziness, faints, headaches and nausea/motion sickness    Neurological Testing     Sensation   Cervical/Thoracic   Left   Intact: light touch    Right   Intact: light touch    Active Range of Motion   Left Shoulder   Flexion: 60 degrees with pain  Abduction: 60 degrees with pain    Right Shoulder   Flexion: 90 degrees with pain  Abduction: 90 degrees with pain    Passive Range of Motion   Left Shoulder   Flexion: 60 degrees with pain  Abduction: 60 degrees with pain  External rotation 90°: 80 degrees with pain  Internal rotation 90°: 25 degrees with pain    Right Shoulder   Flexion: 90 degrees with pain  Abduction: 90 degrees with pain  External rotation 90°: 80 degrees with pain  Internal rotation 90°: 60 degrees with pain    Strength/Myotome Testing   Cervical Spine     Left   Interossei strength (t1): 4    Right   Interossei strength (t1): 4    Left Shoulder     Planes of Motion   Flexion: 3+   Abduction: 3   External rotation at 0°: 3+   Internal rotation at 0°: 4-     Right Shoulder     Planes of Motion   Flexion: 3+   Abduction: 3   External rotation at 0°: 3+   Internal rotation at 0°: 4-     Left Elbow   Flexion: 4  Extension: 4    Right Elbow   Flexion: 4  Extension: 4    Left Wrist/Hand   Wrist extension: 4  Wrist flexion: 4  Thumb extension: 4    Right Wrist/Hand   Wrist extension: 4  Wrist flexion: 4  Thumb extension: 4  Neuro Exam:     Headaches   Patient reports headaches: No        Flowsheet Rows    Flowsheet Row Most Recent Value   PT/OT G-Codes    Current Score 46   Projected Score 60            Precautions: DM, HTN, Depth Perception Issues, NEEDS TO LIE SEMI RECUMBENT       Manuals 2/20            B shoulder PROM             Shoulder mobs                                        Neuro Re-Ed             Supine serratus punches                          scap 4 Ther Ex             Table slides 5x 5" holds flexion                         isometrics flexion and abduction 5x 5" holds gentle             Supine B shoulder flexion AAROM w/ cane  unable due to pain               Standing ER/IR w/ bands                                                     Ther Activity                                       Gait Training                                       Modalities

## 2023-02-20 NOTE — PROGRESS NOTES
PT Evaluation     Today's date: 2023  Patient name: Redd Johnson  : 1945  MRN: [id number]  Referring provider: Roberta Morales*  Dx:   Encounter Diagnosis     ICD-10-CM    1  Chronic pain of both shoulders  M25 511     G89 29     M25 512           Start Time: 1405  Stop Time: 1445  Total time in clinic (min): 40 minutes    Assessment  Assessment details: 69year old female patient reports to PT with chronic B shoulder pain  No red flags noted  UQS unremarkable  Patient presents with significant B shoulder PROM limitations (L>R), primarily into flexion and abduction  Patient also has decreased motor coordination/strength of rotator cuff and scapular musculature  Patient will benefit from skilled PT services to address current impairments and functional limitations to help patient return to her PLOF  Impairments: abnormal muscle firing, abnormal muscle tone, abnormal or restricted ROM, abnormal movement, activity intolerance, impaired physical strength and pain with function    Symptom irritability: moderateUnderstanding of Dx/Px/POC: good   Prognosis: good    Goals  STG  1  Patient will be independent with completion of HEP throughout therapy  2  Patient will have at worst 5/10 pain so patient can sleep with less discomfort in 3 weeks  LTG  1  Patient will put a jacket on without increased difficulty in 6 weeks  2  Patient will increase B shoulder overhead motion by at least 50% so patient can complete more functional tasks with less difficulty in 6 weeks       Plan  Patient would benefit from: skilled physical therapy  Planned therapy interventions: activity modification, joint mobilization, manual therapy, motor coordination training, neuromuscular re-education, patient education, strengthening, stretching, therapeutic activities, therapeutic exercise, home exercise program, functional ROM exercises and flexibility  Frequency: 2x week  Duration in weeks: 6  Treatment plan discussed with: patient        Subjective Evaluation    History of Present Illness  Mechanism of injury: Patient reports with B chronic shoulder pain  Patient notes lifted more things recently which has made her symptoms worse on her R shoulder  Patient notes her L shoulder is painful all the time  Patient has difficulty sleeping due to her symptoms and using her UE's (L>R)  Patient notes had numbness/tingling in R UE but has been much better since the injection in her shoulder and pain in neck is better  Patient also has issues dressing herself due to her symptoms  Patient had injection in R shoulder which helped a lot  Patient hasn't had one in the left yet but is seeing MD next week to probably get an injection in her L shoulder  Pain  Current pain ratin  At best pain ratin  At worst pain ratin  Quality: dull ache, throbbing and sharp          Objective     Concurrent Complaints  Positive for disturbed sleep   Negative for night pain, dizziness, faints, headaches and nausea/motion sickness    Neurological Testing     Sensation   Cervical/Thoracic   Left   Intact: light touch    Right   Intact: light touch    Active Range of Motion   Left Shoulder   Flexion: 60 degrees with pain  Abduction: 60 degrees with pain    Right Shoulder   Flexion: 90 degrees with pain  Abduction: 90 degrees with pain    Passive Range of Motion   Left Shoulder   Flexion: 60 degrees with pain  Abduction: 60 degrees with pain  External rotation 90°: 80 degrees with pain  Internal rotation 90°: 25 degrees with pain    Right Shoulder   Flexion: 90 degrees with pain  Abduction: 90 degrees with pain  External rotation 90°: 80 degrees with pain  Internal rotation 90°: 60 degrees with pain    Strength/Myotome Testing   Cervical Spine     Left   Interossei strength (t1): 4    Right   Interossei strength (t1): 4    Left Shoulder     Planes of Motion   Flexion: 3+   Abduction: 3   External rotation at 0°: 3+   Internal rotation at 0°: 4-     Right Shoulder     Planes of Motion   Flexion: 3+   Abduction: 3   External rotation at 0°: 3+   Internal rotation at 0°: 4-     Left Elbow   Flexion: 4  Extension: 4    Right Elbow   Flexion: 4  Extension: 4    Left Wrist/Hand   Wrist extension: 4  Wrist flexion: 4  Thumb extension: 4    Right Wrist/Hand   Wrist extension: 4  Wrist flexion: 4  Thumb extension: 4  Neuro Exam:     Headaches   Patient reports headaches: No        Flowsheet Rows    Flowsheet Row Most Recent Value   PT/OT G-Codes    Current Score 46   Projected Score 60             Precautions: DM, HTN, Depth Perception Issues, NEEDS TO LIE SEMI RECUMBENT       Manuals 2/20            B shoulder PROM             Shoulder mobs                                        Neuro Re-Ed             Supine serratus punches                          scap 4                                                                  Ther Ex             Table slides 5x 5" holds flexion                         isometrics flexion and abduction 5x 5" holds gentle             Supine B shoulder flexion AAROM w/ cane  unable due to pain               Standing ER/IR w/ bands                                                     Ther Activity                                       Gait Training                                       Modalities

## 2023-02-23 ENCOUNTER — APPOINTMENT (OUTPATIENT)
Dept: PHYSICAL THERAPY | Facility: CLINIC | Age: 78
End: 2023-02-23

## 2023-02-27 ENCOUNTER — APPOINTMENT (OUTPATIENT)
Dept: PHYSICAL THERAPY | Facility: CLINIC | Age: 78
End: 2023-02-27

## 2023-03-02 ENCOUNTER — APPOINTMENT (OUTPATIENT)
Dept: PHYSICAL THERAPY | Facility: CLINIC | Age: 78
End: 2023-03-02

## 2023-03-06 ENCOUNTER — OFFICE VISIT (OUTPATIENT)
Dept: PHYSICAL THERAPY | Facility: CLINIC | Age: 78
End: 2023-03-06

## 2023-03-06 DIAGNOSIS — R73.09 LABILE BLOOD GLUCOSE: ICD-10-CM

## 2023-03-06 DIAGNOSIS — M25.512 CHRONIC PAIN OF BOTH SHOULDERS: Primary | ICD-10-CM

## 2023-03-06 DIAGNOSIS — E03.9 HYPOTHYROIDISM, UNSPECIFIED TYPE: ICD-10-CM

## 2023-03-06 DIAGNOSIS — G89.29 CHRONIC PAIN OF BOTH SHOULDERS: Primary | ICD-10-CM

## 2023-03-06 DIAGNOSIS — M25.511 CHRONIC PAIN OF BOTH SHOULDERS: Primary | ICD-10-CM

## 2023-03-06 DIAGNOSIS — I10 BENIGN ESSENTIAL HYPERTENSION: ICD-10-CM

## 2023-03-06 DIAGNOSIS — E11.9 CONTROLLED TYPE 2 DIABETES MELLITUS WITHOUT COMPLICATION, WITHOUT LONG-TERM CURRENT USE OF INSULIN (HCC): ICD-10-CM

## 2023-03-06 RX ORDER — BLOOD SUGAR DIAGNOSTIC
STRIP MISCELLANEOUS
Qty: 300 EACH | Refills: 0 | Status: SHIPPED | OUTPATIENT
Start: 2023-03-06

## 2023-03-06 RX ORDER — LOSARTAN POTASSIUM 50 MG/1
50 TABLET ORAL
Qty: 90 TABLET | Refills: 0 | Status: SHIPPED | OUTPATIENT
Start: 2023-03-06

## 2023-03-06 RX ORDER — HYDROCHLOROTHIAZIDE 12.5 MG/1
12.5 TABLET ORAL DAILY
Qty: 90 TABLET | Refills: 0 | Status: SHIPPED | OUTPATIENT
Start: 2023-03-06

## 2023-03-06 RX ORDER — LEVOTHYROXINE SODIUM 0.1 MG/1
100 TABLET ORAL DAILY
Qty: 90 TABLET | Refills: 0 | Status: SHIPPED | OUTPATIENT
Start: 2023-03-06

## 2023-03-06 NOTE — PROGRESS NOTES
Daily Note     Today's date: 3/6/2023  Patient name: Staci Thomas  : 1945  MRN: [id number]  Referring provider: Ricki Payment*  Dx:   Encounter Diagnosis     ICD-10-CM    1  Chronic pain of both shoulders  M25 511     G89 29     M25 512                      Subjective: Patient reports had severe pain in L shoulder after evaluation  Notes got injection in shoulder last week  Feels better but tweaked it this morning  Objective: See treatment diary below      Assessment: Tolerated treatment well  Patient would benefit from continued PT  Treatment plan reduced in intensity from already limited treatment  Exercises stopped once patient felt even the slightest increase in pain  Plan: Progress treatment as tolerated         Precautions: DM, HTN, Depth Perception Issues, NEEDS TO LIE SEMI RECUMBENT       Manuals 2/20 3/6           B shoulder PROM             Shoulder mobs                                        Neuro Re-Ed             Supine serratus punches             scap sets  10x 5" holds           scap 4                                                                  Ther Ex             Table slides 5x 5" holds flexion 10x 5" holds                        isometrics flexion and abduction 5x 5" holds gentle             Supine B shoulder flexion AAROM w/ cane  unable due to pain               Standing ER/IR w/ bands              treadmill  8 min to get more blood flow through arms                                      Ther Activity                                       Gait Training                                       Modalities

## 2023-03-09 ENCOUNTER — OFFICE VISIT (OUTPATIENT)
Dept: PHYSICAL THERAPY | Facility: CLINIC | Age: 78
End: 2023-03-09

## 2023-03-09 DIAGNOSIS — M25.512 CHRONIC PAIN OF BOTH SHOULDERS: Primary | ICD-10-CM

## 2023-03-09 DIAGNOSIS — G89.29 CHRONIC PAIN OF BOTH SHOULDERS: Primary | ICD-10-CM

## 2023-03-09 DIAGNOSIS — M25.511 CHRONIC PAIN OF BOTH SHOULDERS: Primary | ICD-10-CM

## 2023-03-09 NOTE — PROGRESS NOTES
Daily Note     Today's date: 3/9/2023  Patient name: Jose Daniel Lamb  : 1945  MRN: [id number]  Referring provider: Dayton Alvarado*  Dx:   Encounter Diagnosis     ICD-10-CM    1  Chronic pain of both shoulders  M25 511     G89 29     M25 512                      Subjective: Patient reports was sore after last visit for a day but wasn't as severe as last time  Objective: See treatment diary below      Assessment: Tolerated treatment well  Patient would benefit from continued PT  Treatment plan remained at same intensity    Plan: Progress treatment as tolerated         Precautions: DM, HTN, Depth Perception Issues, NEEDS TO LIE SEMI RECUMBENT       Manuals 2/20 3/6 3/9          B shoulder PROM             Shoulder mobs                                        Neuro Re-Ed             Supine serratus punches             scap sets  10x 5" holds 10x 5" holds           scap 4                                                                  Ther Ex             Table slides 5x 5" holds flexion 10x 5" holds 10x 5" holds                        isometrics flexion and abduction 5x 5" holds gentle             Supine B shoulder flexion AAROM w/ cane  unable due to pain               Standing ER/IR w/ bands              treadmill  8 min to get more blood flow through arms  8 min                                     Ther Activity                                       Gait Training                                       Modalities

## 2023-03-13 ENCOUNTER — APPOINTMENT (OUTPATIENT)
Dept: PHYSICAL THERAPY | Facility: CLINIC | Age: 78
End: 2023-03-13

## 2023-03-16 ENCOUNTER — OFFICE VISIT (OUTPATIENT)
Dept: PHYSICAL THERAPY | Facility: CLINIC | Age: 78
End: 2023-03-16

## 2023-03-16 DIAGNOSIS — M25.511 CHRONIC PAIN OF BOTH SHOULDERS: Primary | ICD-10-CM

## 2023-03-16 DIAGNOSIS — G89.29 CHRONIC PAIN OF BOTH SHOULDERS: Primary | ICD-10-CM

## 2023-03-16 DIAGNOSIS — M25.512 CHRONIC PAIN OF BOTH SHOULDERS: Primary | ICD-10-CM

## 2023-03-16 NOTE — PROGRESS NOTES
Daily Note     Today's date: 3/16/2023  Patient name: Javier Francisco  : 1945  MRN: [id number]  Referring provider: Alber Mustafa*  Dx:   Encounter Diagnosis     ICD-10-CM    1  Chronic pain of both shoulders  M25 511     G89 29     M25 512                      Subjective: Patient reports wasn't too sore after last visit but is sore all over today  Objective: See treatment diary below      Assessment: Tolerated treatment well  Patient would benefit from continued PT  Treatment plan remained at same intensity    Plan: Progress treatment as tolerated         Precautions: DM, HTN, Depth Perception Issues, NEEDS TO LIE SEMI RECUMBENT       Manuals 2/20 3/6 3/9 3/16         B shoulder PROM             Shoulder mobs                                        Neuro Re-Ed             Supine serratus punches             scap sets  10x 5" holds 10x 5" holds  10x 5" holds          scap 4                                                                  Ther Ex             Table slides 5x 5" holds flexion 10x 5" holds 10x 5" holds  10x 5" holds                       isometrics flexion and abduction 5x 5" holds gentle             Supine B shoulder flexion AAROM w/ cane  unable due to pain               Standing ER/IR w/ bands              treadmill  8 min to get more blood flow through arms  8 min  8 min                                    Ther Activity                                       Gait Training                                       Modalities

## 2023-03-20 ENCOUNTER — OFFICE VISIT (OUTPATIENT)
Dept: PHYSICAL THERAPY | Facility: CLINIC | Age: 78
End: 2023-03-20

## 2023-03-20 DIAGNOSIS — M25.511 CHRONIC PAIN OF BOTH SHOULDERS: Primary | ICD-10-CM

## 2023-03-20 DIAGNOSIS — G89.29 CHRONIC PAIN OF BOTH SHOULDERS: Primary | ICD-10-CM

## 2023-03-20 DIAGNOSIS — M25.512 CHRONIC PAIN OF BOTH SHOULDERS: Primary | ICD-10-CM

## 2023-03-20 NOTE — PROGRESS NOTES
Daily Note     Today's date: 3/20/2023  Patient name: Martínez Arredondo  : 1945  MRN: [id number]  Referring provider: Darinel Riddle  Dx:   Encounter Diagnosis     ICD-10-CM    1  Chronic pain of both shoulders  M25 511     G89 29     M25 512                      Subjective: Patient reports not feeling well today but felt some relief after last visit from exercises  Notes moved furniture yesterday and that hurt her shoulder  Objective: See treatment diary below      Assessment: Tolerated treatment well  Patient would benefit from continued PT  Treatment plan remained at same intensity    Plan: Progress treatment as tolerated         Precautions: DM, HTN, Depth Perception Issues, NEEDS TO LIE SEMI RECUMBENT       Manuals 2/20 3/6 3/9 3/16 3/20        B shoulder PROM             Shoulder mobs                                        Neuro Re-Ed             Supine serratus punches             scap sets  10x 5" holds 10x 5" holds  10x 5" holds  12x 5" holds        scap 4                                                                  Ther Ex             Table slides 5x 5" holds flexion 10x 5" holds 10x 5" holds  10x 5" holds  12x 5" holds                      isometrics flexion and abduction 5x 5" holds gentle             Supine B shoulder flexion AAROM w/ cane  unable due to pain               Standing ER/IR w/ bands              treadmill  8 min to get more blood flow through arms  8 min  8 min  8 min                                   Ther Activity                                       Gait Training                                       Modalities

## 2023-03-21 ENCOUNTER — TELEPHONE (OUTPATIENT)
Dept: FAMILY MEDICINE CLINIC | Facility: CLINIC | Age: 78
End: 2023-03-21

## 2023-03-23 ENCOUNTER — APPOINTMENT (OUTPATIENT)
Dept: PHYSICAL THERAPY | Facility: CLINIC | Age: 78
End: 2023-03-23

## 2023-03-27 ENCOUNTER — OFFICE VISIT (OUTPATIENT)
Dept: PHYSICAL THERAPY | Facility: CLINIC | Age: 78
End: 2023-03-27

## 2023-03-27 DIAGNOSIS — G89.29 CHRONIC PAIN OF BOTH SHOULDERS: Primary | ICD-10-CM

## 2023-03-27 DIAGNOSIS — M25.512 CHRONIC PAIN OF BOTH SHOULDERS: Primary | ICD-10-CM

## 2023-03-27 DIAGNOSIS — M25.511 CHRONIC PAIN OF BOTH SHOULDERS: Primary | ICD-10-CM

## 2023-03-27 NOTE — PROGRESS NOTES
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  Pt also notes back, hip & neck pain today  Objective: See treatment diary below    Assessment: Tolerated treatment fair  Decrease reps performed compared to previous visits, as pt is noting increased pain with each exercise  TM duration limited by hip pain and pt reported dizziness  Pt notes dizziness after the TM is typical for her, and she just needs to rest  Patient demonstrated fatigue post treatment and would benefit from continued PT    Plan: Continue per plan of care  Progress treatment as tolerated         Precautions: DM, HTN, Depth Perception Issues, NEEDS TO LIE SEMI RECUMBENT       Manuals  3/6 3/9 3/16 3/20 3/27       B shoulder PROM             Shoulder mobs                                        Neuro Re-Ed             Supine serratus punches             scap sets  10x 5\" holds 10x 5\" holds  10x 5\" holds  12x 5\" holds 10x 5\" holds       scap 4                                                                  Ther Ex             Table slides 5x 5\" holds flexion 10x 5\" holds 10x 5\" holds  10x 5\" holds  12x 5\" holds  5x 5\"                     isometrics flexion and abduction 5x 5\" holds gentle             Supine B shoulder flexion AAROM w/ cane  unable due to pain               Standing ER/IR w/ bands              treadmill  8 min to get more blood flow through arms  8 min  8 min  8 min  7 5 min                      " Ther Activity                                       Gait Training                                       Modalities

## 2023-03-30 ENCOUNTER — OFFICE VISIT (OUTPATIENT)
Dept: PHYSICAL THERAPY | Facility: CLINIC | Age: 78
End: 2023-03-30

## 2023-03-30 DIAGNOSIS — M25.511 CHRONIC PAIN OF BOTH SHOULDERS: Primary | ICD-10-CM

## 2023-03-30 DIAGNOSIS — G89.29 CHRONIC PAIN OF BOTH SHOULDERS: Primary | ICD-10-CM

## 2023-03-30 DIAGNOSIS — M25.512 CHRONIC PAIN OF BOTH SHOULDERS: Primary | ICD-10-CM

## 2023-03-30 NOTE — PROGRESS NOTES
"Daily Note     Today's date: 3/30/2023  Patient name: Delmer Ortega  : 1945  MRN: [id number]  Referring provider: Toshia Nelson*  Dx:   Encounter Diagnosis     ICD-10-CM    1  Chronic pain of both shoulders  M25 511     G89 29     M25 512                      Subjective: Patient states had injection in L shoulder from MD and is feeling much better but MD recommends holding off on L shoulder activity until next week  Then to start doing more  Objective: See treatment diary below    Assessment: Tolerated treatment fair  Patient would benefit from continued PT  Patient only completed R shoulder exercises this visit due to having recent injection in L shoulder  After scap sets and table slides had some back discomfort so patient requested to stop  Plan: Continue per plan of care  Progress treatment as tolerated         Precautions: DM, HTN, Depth Perception Issues, NEEDS TO LIE SEMI RECUMBENT       Manuals 2/20 3/6 3/9 3/16 3/20 3/27 3/30      B shoulder PROM             Shoulder mobs                                        Neuro Re-Ed             Supine serratus punches             scap sets  10x 5\" holds 10x 5\" holds  10x 5\" holds  12x 5\" holds 10x 5\" holds 15x 5\"  holds R      scap 4                                                                  Ther Ex             Table slides 5x 5\" holds flexion 10x 5\" holds 10x 5\" holds  10x 5\" holds  12x 5\" holds  5x 5\"  7x 5\" holds R                   isometrics flexion and abduction 5x 5\" holds gentle             Supine B shoulder flexion AAROM w/ cane  unable due to pain               Standing ER/IR w/ bands              treadmill  8 min to get more blood flow through arms  8 min  8 min  8 min  7 5 min 8 min                                 Ther Activity                                       Gait Training                                       Modalities                                            "

## 2023-04-03 ENCOUNTER — APPOINTMENT (OUTPATIENT)
Dept: PHYSICAL THERAPY | Facility: CLINIC | Age: 78
End: 2023-04-03

## 2023-04-06 ENCOUNTER — OFFICE VISIT (OUTPATIENT)
Dept: PHYSICAL THERAPY | Facility: CLINIC | Age: 78
End: 2023-04-06

## 2023-04-06 DIAGNOSIS — G89.29 CHRONIC PAIN OF BOTH SHOULDERS: Primary | ICD-10-CM

## 2023-04-06 DIAGNOSIS — M25.511 CHRONIC PAIN OF BOTH SHOULDERS: Primary | ICD-10-CM

## 2023-04-06 DIAGNOSIS — M25.512 CHRONIC PAIN OF BOTH SHOULDERS: Primary | ICD-10-CM

## 2023-04-06 NOTE — PROGRESS NOTES
"Daily Note     Today's date: 2023  Patient name: Delmer Ortega  : 1945  MRN: [id number]  Referring provider: Toshia Nelson*  Dx:   Encounter Diagnosis     ICD-10-CM    1  Chronic pain of both shoulders  M25 511     G89 29     M25 512           Start Time: 1445  Stop Time: 1515  Total time in clinic (min): 30 minutes    Subjective: Pt states her shoulder has been feeling really good since injection, but this morning she woke up with a headache and neck pain today  Pt notes some increase in L shoulder pain secondary to neck pain  Objective: See treatment diary below    Assessment: Tolerated treatment well  Initiated finger ladder for assisted flexion today  Pt denies any pain with initial trails on each side, but does note pain in the lowering phase of the motion  Attempted 2nd rep on each side, still able to reach >90 degrees but decreased motion noted, limited by pain in shoulders and neck  Will continue to attempt to progress in future visits, per pt tolerance  Patient demonstrated fatigue post treatment and would benefit from continued PT    Plan: Continue per plan of care  Progress treatment as tolerated         Precautions: DM, HTN, Depth Perception Issues, NEEDS TO LIE SEMI RECUMBENT       Manuals  3/6 3/9 3/16 3/20 3/27 3/30 4/6     B shoulder PROM             Shoulder mobs                                        Neuro Re-Ed             Supine serratus punches             scap sets  10x 5\" holds 10x 5\" holds  10x 5\" holds  12x 5\" holds 10x 5\" holds 15x 5\"  holds R 13x 5\" holds     scap 4                                                                  Ther Ex             Table slides 5x 5\" holds flexion 10x 5\" holds 10x 5\" holds  10x 5\" holds  12x 5\" holds  5x 5\"  7x 5\" holds R B 5\" x10                  isometrics flexion and abduction 5x 5\" holds gentle             Supine B shoulder flexion AAROM w/ cane  unable due to pain               Standing ER/IR w/ bands            " treadmill  8 min to get more blood flow through arms  8 min  8 min  8 min  7 5 min 8 min  8 min     Wall Walk/Finger Ladder        flexion x2 B                  Ther Activity                                       Gait Training                                       Modalities

## 2023-04-10 ENCOUNTER — APPOINTMENT (OUTPATIENT)
Dept: PHYSICAL THERAPY | Facility: CLINIC | Age: 78
End: 2023-04-10

## 2023-04-17 ENCOUNTER — APPOINTMENT (OUTPATIENT)
Dept: PHYSICAL THERAPY | Facility: CLINIC | Age: 78
End: 2023-04-17

## 2023-04-20 ENCOUNTER — APPOINTMENT (OUTPATIENT)
Dept: PHYSICAL THERAPY | Facility: CLINIC | Age: 78
End: 2023-04-20

## 2023-04-24 ENCOUNTER — APPOINTMENT (OUTPATIENT)
Dept: PHYSICAL THERAPY | Facility: CLINIC | Age: 78
End: 2023-04-24

## 2023-04-25 ENCOUNTER — APPOINTMENT (OUTPATIENT)
Dept: PHYSICAL THERAPY | Facility: CLINIC | Age: 78
End: 2023-04-25

## 2023-04-27 ENCOUNTER — APPOINTMENT (OUTPATIENT)
Dept: PHYSICAL THERAPY | Facility: CLINIC | Age: 78
End: 2023-04-27

## 2023-05-02 ENCOUNTER — APPOINTMENT (OUTPATIENT)
Dept: PHYSICAL THERAPY | Facility: CLINIC | Age: 78
End: 2023-05-02
Payer: MEDICARE

## 2023-05-04 ENCOUNTER — APPOINTMENT (OUTPATIENT)
Dept: PHYSICAL THERAPY | Facility: CLINIC | Age: 78
End: 2023-05-04
Payer: MEDICARE

## 2023-05-09 ENCOUNTER — APPOINTMENT (OUTPATIENT)
Dept: PHYSICAL THERAPY | Facility: CLINIC | Age: 78
End: 2023-05-09
Payer: MEDICARE

## 2023-05-16 ENCOUNTER — OFFICE VISIT (OUTPATIENT)
Dept: PHYSICAL THERAPY | Facility: CLINIC | Age: 78
End: 2023-05-16

## 2023-05-16 DIAGNOSIS — G89.29 CHRONIC PAIN OF BOTH SHOULDERS: Primary | ICD-10-CM

## 2023-05-16 DIAGNOSIS — M25.511 CHRONIC PAIN OF BOTH SHOULDERS: Primary | ICD-10-CM

## 2023-05-16 DIAGNOSIS — M25.512 CHRONIC PAIN OF BOTH SHOULDERS: Primary | ICD-10-CM

## 2023-05-16 NOTE — PROGRESS NOTES
"Daily Note     Today's date: 2023  Patient name: Chuyita Ramos  : 1945  MRN: [id number]  Referring provider: Blaine Maravilla*  Dx:   Encounter Diagnosis     ICD-10-CM    1  Chronic pain of both shoulders  M25 511     G89 29     M25 512                      Subjective: Patient reports hasn't followed up with ortho as had to cancel appointment a couple weeks ago  Notes whole body is aching today and feels her balance is worse which is why she is ambulating with her cane again  Wants to take it easy on shoulders today  Objective: See treatment diary below    Assessment: Tolerated treatment well  Patient demonstrated fatigue post treatment and would benefit from continued PT  No shoulder pain throughout treatment session or post     Plan: Continue per plan of care  Progress treatment as tolerated         Precautions: DM, HTN, Depth Perception Issues, NEEDS TO LIE SEMI RECUMBENT       Manuals 3/20 3/27 3/30 4/6 4/13 4/18 5/16   B shoulder PROM          Shoulder mobs                               Neuro Re-Ed          Supine serratus punches          scap sets 12x 5\" holds 10x 5\" holds 15x 5\"  holds R 13x 5\" holds 10x 5\" holds 10x 5\" holds 10x 5\" holds    scap 4        dynamic balance 3 laps each                                           Ther Ex          Table slides 12x 5\" holds  5x 5\"  7x 5\" holds R B 5\" x10 B 5\" x14 B 5\" x15 15x 5\" holds B             isometrics          Supine B shoulder flexion AAROM w/ cane           Standing ER/IR w/ bands           treadmill 8 min  7 5 min 8 min  8 min 8 min 8 min 8 min    Wall Walk/Finger Ladder    flexion x2 B flexion x2 B flexion x2 B -   Cane Lift Off     5\" x5 5\" x5    Ther Activity                              Gait Training                              Modalities                                   "

## 2023-05-18 ENCOUNTER — OFFICE VISIT (OUTPATIENT)
Dept: PHYSICAL THERAPY | Facility: CLINIC | Age: 78
End: 2023-05-18

## 2023-05-18 DIAGNOSIS — G89.29 CHRONIC PAIN OF BOTH SHOULDERS: Primary | ICD-10-CM

## 2023-05-18 DIAGNOSIS — M25.511 CHRONIC PAIN OF BOTH SHOULDERS: Primary | ICD-10-CM

## 2023-05-18 DIAGNOSIS — M25.512 CHRONIC PAIN OF BOTH SHOULDERS: Primary | ICD-10-CM

## 2023-05-18 NOTE — PROGRESS NOTES
"Daily Note     Today's date: 2023  Patient name: Gabriel Hamm  : 1945  MRN: [id number]  Referring provider: Tom Lowe*  Dx:   Encounter Diagnosis     ICD-10-CM    1  Chronic pain of both shoulders  M25 511     G89 29     M25 512                      Subjective: Patient reports is sore today but did a lot of sitting to drive her dog to an appointment  Notes does feel good to move  Notes does feel off balance from having to drive  Objective: See treatment diary below    Assessment: Tolerated treatment well  Patient demonstrated fatigue post treatment and would benefit from continued PT  No shoulder pain throughout treatment session or post     Plan: Continue per plan of care  Progress treatment as tolerated         Precautions: DM, HTN, Depth Perception Issues, NEEDS TO LIE SEMI RECUMBENT       Manuals 3/27 3/30 4/6 4/13 4/18 5/16 5/18   B shoulder PROM          Shoulder mobs                               Neuro Re-Ed          Supine serratus punches          scap sets 10x 5\" holds 15x 5\"  holds R 13x 5\" holds 10x 5\" holds 10x 5\" holds 10x 5\" holds  10x 5\" holds   scap 4       dynamic balance 3 laps each dynamic balance 3 laps each                                            Ther Ex          Table slides 5x 5\"  7x 5\" holds R B 5\" x10 B 5\" x14 B 5\" x15 15x 5\" holds B 15x 5\" holds B             isometrics          Supine B shoulder flexion AAROM w/ cane           Standing ER/IR w/ bands           treadmill 7 5 min 8 min  8 min 8 min 8 min 8 min  8 min    Wall Walk/Finger Ladder   flexion x2 B flexion x2 B flexion x2 B -    Cane Lift Off    5\" x5 5\" x5     Ther Activity                              Gait Training                              Modalities                                   "

## 2023-05-19 ENCOUNTER — OFFICE VISIT (OUTPATIENT)
Dept: FAMILY MEDICINE CLINIC | Facility: CLINIC | Age: 78
End: 2023-05-19

## 2023-05-19 VITALS
SYSTOLIC BLOOD PRESSURE: 130 MMHG | DIASTOLIC BLOOD PRESSURE: 66 MMHG | TEMPERATURE: 97.6 F | RESPIRATION RATE: 18 BRPM | WEIGHT: 182.8 LBS | HEIGHT: 67 IN | HEART RATE: 94 BPM | BODY MASS INDEX: 28.69 KG/M2 | OXYGEN SATURATION: 97 %

## 2023-05-19 DIAGNOSIS — E55.9 VITAMIN D DEFICIENCY: ICD-10-CM

## 2023-05-19 DIAGNOSIS — E03.9 ACQUIRED HYPOTHYROIDISM: ICD-10-CM

## 2023-05-19 DIAGNOSIS — Z12.11 COLON CANCER SCREENING: ICD-10-CM

## 2023-05-19 DIAGNOSIS — Z12.31 BREAST CANCER SCREENING BY MAMMOGRAM: ICD-10-CM

## 2023-05-19 DIAGNOSIS — E11.9 CONTROLLED TYPE 2 DIABETES MELLITUS WITHOUT COMPLICATION, WITHOUT LONG-TERM CURRENT USE OF INSULIN (HCC): Primary | ICD-10-CM

## 2023-05-19 LAB — SL AMB POCT HEMOGLOBIN AIC: 7 (ref ?–6.5)

## 2023-05-19 NOTE — PROGRESS NOTES
Name: Francheska Wang      : 1945      MRN: [id number]  Encounter Provider: Jose Thomas DO  Encounter Date: 2023   Encounter department: [address] [place] [address]  Controlled type 2 diabetes mellitus without complication, without long-term current use of insulin (HCC)  Comments:  a1c at 7 0 and this is partially due to steroids, so will continue current meds and recheck 4 mo  labs as ordered    Orders:  -     POCT hemoglobin A1c  -     Comprehensive metabolic panel; Future; Expected date: 2023  -     Lipid Panel with Direct LDL reflex; Future; Expected date: 2023  -     Albumin / creatinine urine ratio; Future; Expected date: 2023  -     liraglutide (VICTOZA) injection; Inject 0 2 mL (1 2 mg total) under the skin daily    2  Acquired hypothyroidism  Comments:  check tsh    Orders:  -     TSH, 3rd generation with Free T4 reflex; Future; Expected date: 2023    3  Vitamin D deficiency  -     Vitamin D 25 hydroxy; Future    4  Breast cancer screening by mammogram  -     Mammo screening bilateral w 3d & cad; Future; Expected date: 2023    5  Colon cancer screening  -     Cologuard      BMI Counseling: Body mass index is 28 63 kg/m²  The BMI is above normal  Nutrition recommendations include moderation in carbohydrate intake  Exercise recommendations include exercising 3-5 times per week  Rationale for BMI follow-up plan is due to patient being overweight or obese  Depression Screening and Follow-up Plan: Patient was screened for depression during today's encounter  They screened negative with a PHQ-2 score of 0  Subjective      HPI   Pt presents for chronic f/u  Pt of dr Debbie Key  Due for DM labs  Tolerating meds  Due for cologuard and mammogram   Due for dexa  Fingerstick a1c today 7 0 which pt is happy with as she notes her sugars have been higher lately due to steroid injections in her shoulders    She has self-increased her victoza as a result to 1 2 mg  Seeing eye doc soon  Tolerating synthroid at 100mcg daily  No excessive fatigue    Review of Systems   Constitutional: Negative for chills, fatigue, fever and unexpected weight change  HENT: Negative for congestion, ear pain, hearing loss, postnasal drip, rhinorrhea, sinus pressure, sinus pain, sore throat, trouble swallowing and voice change  Eyes: Negative for pain, redness and visual disturbance  Respiratory: Negative for cough and shortness of breath  Cardiovascular: Negative for chest pain, palpitations and leg swelling  Gastrointestinal: Negative for abdominal pain, constipation, diarrhea and nausea  Endocrine: Negative for cold intolerance, heat intolerance, polydipsia and polyuria  Genitourinary: Negative for dysuria, frequency and urgency  Musculoskeletal: Positive for arthralgias  Negative for joint swelling and myalgias  Skin: Negative for rash  No suspicious lesions   Neurological: Negative for weakness, numbness and headaches  Hematological: Negative for adenopathy  Current Outpatient Medications on File Prior to Visit   Medication Sig   • albuterol (PROVENTIL HFA,VENTOLIN HFA) 90 mcg/act inhaler Inhale 2 puffs every 4 (four) hours as needed for wheezing or shortness of breath   • COMBIGAN 0 2-0 5 % Administer 1 drop to the right eye 2 (two) times a day    • dorzolamide (TRUSOPT) 2 % ophthalmic solution Administer 1 drop to the right eye 3 (three) times a day    • fluticasone-salmeterol (Advair Diskus) 250-50 mcg/dose inhaler Inhale 1 puff 2 (two) times a day Rinse mouth after use     • LUMIGAN 0 01 % ophthalmic drops Administer 1 drop to both eyes daily at bedtime    • OneTouch Delica Lancets 02F MISC To test BS up to four times daily- E11 65   • Rhopressa 0 02 % SOLN    • benzonatate (TESSALON) 200 MG capsule Take 1 capsule (200 mg total) by mouth 3 (three) times a day as needed for cough (Patient not taking: Reported on "5/19/2023)   • Continuous Blood Gluc Sensor (FreeStyle Tarik 2 Sensor) MISC Use to test sugars 7-10 times a day  Apply every 14 days , (Patient not taking: Reported on 5/19/2023)       Objective     /66   Pulse 94   Temp 97 6 °F (36 4 °C)   Resp 18   Ht 5' 7\" (1 702 m)   Wt 82 9 kg (182 lb 12 8 oz)   SpO2 97%   BMI 28 63 kg/m²     Physical Exam  Constitutional:       General: She is not in acute distress  Appearance: She is well-developed  HENT:      Head: Normocephalic and atraumatic  Right Ear: Tympanic membrane, ear canal and external ear normal       Left Ear: Tympanic membrane, ear canal and external ear normal       Nose: Nose normal       Mouth/Throat:      Pharynx: No oropharyngeal exudate  Eyes:      Conjunctiva/sclera: Conjunctivae normal       Pupils: Pupils are equal, round, and reactive to light  Neck:      Thyroid: No thyromegaly  Vascular: No carotid bruit or JVD  Cardiovascular:      Rate and Rhythm: Regular rhythm  Heart sounds: S1 normal and S2 normal  No murmur heard  No friction rub  No gallop  No S3 or S4 sounds  Pulmonary:      Effort: Pulmonary effort is normal       Breath sounds: Normal breath sounds  No wheezing, rhonchi or rales  Abdominal:      General: Bowel sounds are normal  There is no distension  Palpations: Abdomen is soft  Tenderness: There is no abdominal tenderness  Lymphadenopathy:      Cervical: No cervical adenopathy  Neurological:      Mental Status: She is alert and oriented to person, place, and time  Cranial Nerves: No cranial nerve deficit  Sensory: No sensory deficit  Deep Tendon Reflexes: Reflexes are normal and symmetric         Dewey German DO  "

## 2023-05-19 NOTE — PATIENT INSTRUCTIONS
Due for CT lung cancer screening and dexa scan--call central schedule  Obtain cologuard  Obtain mammogram  Obtain labs

## 2023-05-22 ENCOUNTER — TELEPHONE (OUTPATIENT)
Dept: FAMILY MEDICINE CLINIC | Facility: CLINIC | Age: 78
End: 2023-05-22

## 2023-05-22 NOTE — TELEPHONE ENCOUNTER
Provider- Dr Laura Diaz    Name of form - [place] [employer]    Form to be given to patient at the end of the visit on 05/19/2023  Patient called stating she received voicemail from clerical staff stating form was ready for   Patient states she was already given this form on the day of her appointment  Patient was advised to disregard voicemail she received

## 2023-05-23 ENCOUNTER — OFFICE VISIT (OUTPATIENT)
Dept: PHYSICAL THERAPY | Facility: CLINIC | Age: 78
End: 2023-05-23

## 2023-05-23 DIAGNOSIS — M25.511 CHRONIC PAIN OF BOTH SHOULDERS: Primary | ICD-10-CM

## 2023-05-23 DIAGNOSIS — G89.29 CHRONIC PAIN OF BOTH SHOULDERS: Primary | ICD-10-CM

## 2023-05-23 DIAGNOSIS — R73.09 LABILE BLOOD GLUCOSE: ICD-10-CM

## 2023-05-23 DIAGNOSIS — M25.512 CHRONIC PAIN OF BOTH SHOULDERS: Primary | ICD-10-CM

## 2023-05-23 DIAGNOSIS — E11.9 CONTROLLED TYPE 2 DIABETES MELLITUS WITHOUT COMPLICATION, WITHOUT LONG-TERM CURRENT USE OF INSULIN (HCC): ICD-10-CM

## 2023-05-23 DIAGNOSIS — E03.9 HYPOTHYROIDISM, UNSPECIFIED TYPE: ICD-10-CM

## 2023-05-23 DIAGNOSIS — I10 BENIGN ESSENTIAL HYPERTENSION: ICD-10-CM

## 2023-05-23 NOTE — PROGRESS NOTES
"Daily Note     Today's date: 2023  Patient name: Terra Farfan  : 1945  MRN: [id number]  Referring provider: Wei *  Dx:   Encounter Diagnosis     ICD-10-CM    1  Chronic pain of both shoulders  M25 511     G89 29     M25 512                      Subjective: Patient reports the long drives is bothering her hip but shoulders are feeling better  Objective: See treatment diary below    Assessment: Tolerated treatment well  Patient demonstrated fatigue post treatment and would benefit from continued PT  No shoulder pain throughout treatment session or post     Plan: Continue per plan of care  Progress treatment as tolerated         Precautions: DM, HTN, Depth Perception Issues, NEEDS TO LIE SEMI RECUMBENT       Manuals 3/30 4/6 4/13 4/18 5/16 5/18 5/23   B shoulder PROM          Shoulder mobs                               Neuro Re-Ed          Supine serratus punches          scap sets 15x 5\"  holds R 13x 5\" holds 10x 5\" holds 10x 5\" holds 10x 5\" holds  10x 5\" holds 10x 5\" holds   scap 4      dynamic balance 3 laps each dynamic balance 3 laps each  dynamic balance 3 laps each                                           Ther Ex          Table slides 7x 5\" holds R B 5\" x10 B 5\" x14 B 5\" x15 15x 5\" holds B 15x 5\" holds B 18x 5\" holds B             isometrics          Supine B shoulder flexion AAROM w/ cane           Standing ER/IR w/ bands           treadmill 8 min  8 min 8 min 8 min 8 min  8 min  8 min    Wall Walk/Finger Ladder  flexion x2 B flexion x2 B flexion x2 B -     Cane Lift Off   5\" x5 5\" x5      Ther Activity                              Gait Training                              Modalities                                   "

## 2023-05-24 RX ORDER — BLOOD SUGAR DIAGNOSTIC
STRIP MISCELLANEOUS
Qty: 300 EACH | Refills: 1 | Status: SHIPPED | OUTPATIENT
Start: 2023-05-24

## 2023-05-24 RX ORDER — LOSARTAN POTASSIUM 50 MG/1
50 TABLET ORAL
Qty: 90 TABLET | Refills: 1 | Status: SHIPPED | OUTPATIENT
Start: 2023-05-24

## 2023-05-24 RX ORDER — LEVOTHYROXINE SODIUM 0.1 MG/1
100 TABLET ORAL DAILY
Qty: 90 TABLET | Refills: 1 | Status: SHIPPED | OUTPATIENT
Start: 2023-05-24

## 2023-05-24 RX ORDER — PEN NEEDLE, DIABETIC 30 GX3/16"
NEEDLE, DISPOSABLE MISCELLANEOUS
Qty: 100 EACH | Refills: 3 | Status: SHIPPED | OUTPATIENT
Start: 2023-05-24

## 2023-05-24 RX ORDER — HYDROCHLOROTHIAZIDE 12.5 MG/1
12.5 TABLET ORAL DAILY
Qty: 90 TABLET | Refills: 1 | Status: SHIPPED | OUTPATIENT
Start: 2023-05-24

## 2023-05-24 RX ORDER — METFORMIN HYDROCHLORIDE 500 MG/1
500 TABLET, EXTENDED RELEASE ORAL 2 TIMES DAILY WITH MEALS
Qty: 180 TABLET | Refills: 3 | Status: SHIPPED | OUTPATIENT
Start: 2023-05-24

## 2023-05-25 ENCOUNTER — APPOINTMENT (OUTPATIENT)
Dept: PHYSICAL THERAPY | Facility: CLINIC | Age: 78
End: 2023-05-25
Payer: MEDICARE

## 2023-05-25 DIAGNOSIS — E11.9 CONTROLLED TYPE 2 DIABETES MELLITUS WITHOUT COMPLICATION, WITHOUT LONG-TERM CURRENT USE OF INSULIN (HCC): ICD-10-CM

## 2023-05-25 RX ORDER — LIRAGLUTIDE 6 MG/ML
INJECTION SUBCUTANEOUS
Qty: 9 ML | Refills: 1 | Status: SHIPPED | OUTPATIENT
Start: 2023-05-25

## 2023-06-01 ENCOUNTER — APPOINTMENT (OUTPATIENT)
Dept: PHYSICAL THERAPY | Facility: CLINIC | Age: 78
End: 2023-06-01
Payer: MEDICARE

## 2023-06-01 ENCOUNTER — APPOINTMENT (OUTPATIENT)
Dept: LAB | Facility: CLINIC | Age: 78
End: 2023-06-01
Payer: MEDICARE

## 2023-06-01 DIAGNOSIS — E03.9 ACQUIRED HYPOTHYROIDISM: ICD-10-CM

## 2023-06-01 DIAGNOSIS — E55.9 VITAMIN D DEFICIENCY: ICD-10-CM

## 2023-06-01 DIAGNOSIS — E11.9 CONTROLLED TYPE 2 DIABETES MELLITUS WITHOUT COMPLICATION, WITHOUT LONG-TERM CURRENT USE OF INSULIN (HCC): ICD-10-CM

## 2023-06-01 LAB
25(OH)D3 SERPL-MCNC: 23 NG/ML (ref 30–100)
ALBUMIN SERPL BCP-MCNC: 3.6 G/DL (ref 3.5–5)
ALP SERPL-CCNC: 73 U/L (ref 46–116)
ALT SERPL W P-5'-P-CCNC: 21 U/L (ref 12–78)
ANION GAP SERPL CALCULATED.3IONS-SCNC: 2 MMOL/L (ref 4–13)
AST SERPL W P-5'-P-CCNC: 10 U/L (ref 5–45)
BILIRUB SERPL-MCNC: 0.9 MG/DL (ref 0.2–1)
BUN SERPL-MCNC: 25 MG/DL (ref 5–25)
CALCIUM SERPL-MCNC: 9 MG/DL (ref 8.3–10.1)
CHLORIDE SERPL-SCNC: 106 MMOL/L (ref 96–108)
CHOLEST SERPL-MCNC: 164 MG/DL
CO2 SERPL-SCNC: 27 MMOL/L (ref 21–32)
CREAT SERPL-MCNC: 0.74 MG/DL (ref 0.6–1.3)
CREAT UR-MCNC: 84.8 MG/DL
GFR SERPL CREATININE-BSD FRML MDRD: 78 ML/MIN/1.73SQ M
GLUCOSE P FAST SERPL-MCNC: 152 MG/DL (ref 65–99)
HDLC SERPL-MCNC: 48 MG/DL
LDLC SERPL CALC-MCNC: 95 MG/DL (ref 0–100)
MICROALBUMIN UR-MCNC: 10.5 MG/L (ref 0–20)
MICROALBUMIN/CREAT 24H UR: 12 MG/G CREATININE (ref 0–30)
POTASSIUM SERPL-SCNC: 3.7 MMOL/L (ref 3.5–5.3)
PROT SERPL-MCNC: 7.2 G/DL (ref 6.4–8.4)
SODIUM SERPL-SCNC: 135 MMOL/L (ref 135–147)
TRIGL SERPL-MCNC: 105 MG/DL
TSH SERPL DL<=0.05 MIU/L-ACNC: 0.74 UIU/ML (ref 0.45–4.5)

## 2023-06-01 PROCEDURE — 84443 ASSAY THYROID STIM HORMONE: CPT

## 2023-06-01 PROCEDURE — 82043 UR ALBUMIN QUANTITATIVE: CPT

## 2023-06-01 PROCEDURE — 80053 COMPREHEN METABOLIC PANEL: CPT

## 2023-06-01 PROCEDURE — 82306 VITAMIN D 25 HYDROXY: CPT

## 2023-06-01 PROCEDURE — 36415 COLL VENOUS BLD VENIPUNCTURE: CPT

## 2023-06-01 PROCEDURE — 80061 LIPID PANEL: CPT

## 2023-06-01 PROCEDURE — 82570 ASSAY OF URINE CREATININE: CPT

## 2023-06-05 ENCOUNTER — APPOINTMENT (OUTPATIENT)
Dept: PHYSICAL THERAPY | Facility: CLINIC | Age: 78
End: 2023-06-05
Payer: MEDICARE

## 2023-06-06 DIAGNOSIS — E11.9 CONTROLLED TYPE 2 DIABETES MELLITUS WITHOUT COMPLICATION, WITHOUT LONG-TERM CURRENT USE OF INSULIN (HCC): Primary | ICD-10-CM

## 2023-06-13 ENCOUNTER — APPOINTMENT (OUTPATIENT)
Dept: PHYSICAL THERAPY | Facility: CLINIC | Age: 78
End: 2023-06-13
Payer: MEDICARE

## 2023-06-14 ENCOUNTER — OFFICE VISIT (OUTPATIENT)
Dept: URGENT CARE | Facility: MEDICAL CENTER | Age: 78
End: 2023-06-14
Payer: MEDICARE

## 2023-06-14 VITALS
OXYGEN SATURATION: 98 % | DIASTOLIC BLOOD PRESSURE: 62 MMHG | TEMPERATURE: 98.1 F | RESPIRATION RATE: 18 BRPM | SYSTOLIC BLOOD PRESSURE: 126 MMHG | HEART RATE: 74 BPM

## 2023-06-14 DIAGNOSIS — N30.01 ACUTE CYSTITIS WITH HEMATURIA: Primary | ICD-10-CM

## 2023-06-14 DIAGNOSIS — R39.9 UTI SYMPTOMS: ICD-10-CM

## 2023-06-14 LAB
SL AMB  POCT GLUCOSE, UA: ABNORMAL
SL AMB LEUKOCYTE ESTERASE,UA: ABNORMAL
SL AMB POCT BILIRUBIN,UA: ABNORMAL
SL AMB POCT BLOOD,UA: ABNORMAL
SL AMB POCT CLARITY,UA: ABNORMAL
SL AMB POCT COLOR,UA: YELLOW
SL AMB POCT KETONES,UA: ABNORMAL
SL AMB POCT NITRITE,UA: ABNORMAL
SL AMB POCT PH,UA: 5
SL AMB POCT SPECIFIC GRAVITY,UA: 1.02
SL AMB POCT URINE PROTEIN: ABNORMAL
SL AMB POCT UROBILINOGEN: 0.2

## 2023-06-14 PROCEDURE — 99213 OFFICE O/P EST LOW 20 MIN: CPT

## 2023-06-14 PROCEDURE — 81002 URINALYSIS NONAUTO W/O SCOPE: CPT

## 2023-06-14 PROCEDURE — 87077 CULTURE AEROBIC IDENTIFY: CPT

## 2023-06-14 PROCEDURE — G0463 HOSPITAL OUTPT CLINIC VISIT: HCPCS

## 2023-06-14 PROCEDURE — 87086 URINE CULTURE/COLONY COUNT: CPT

## 2023-06-14 PROCEDURE — 87186 SC STD MICRODIL/AGAR DIL: CPT

## 2023-06-14 RX ORDER — SULFAMETHOXAZOLE AND TRIMETHOPRIM 800; 160 MG/1; MG/1
1 TABLET ORAL EVERY 12 HOURS SCHEDULED
Qty: 14 TABLET | Refills: 0 | Status: SHIPPED | OUTPATIENT
Start: 2023-06-14 | End: 2023-06-21

## 2023-06-14 NOTE — PATIENT INSTRUCTIONS
Urine dip showed signs of UTI  Prescribed antibiotics, use as directed  Will send culture out to determine bacteria causing infection and susceptibility to antibiotics  May try OTC Azo to decrease burning with urination  Drink plenty of fluids and follow proper urinary hygiene  Follow up with PCP in 3-5 days  Proceed to  ER if symptoms worsen  Urinary Tract Infection in Older Adults   AMBULATORY CARE:   A urinary tract infection (UTI)  is caused by bacteria that get inside your urinary tract  Your urinary tract includes your kidneys, ureters, bladder, and urethra  A UTI is more common in your lower urinary tract, which includes your bladder and urethra  Common signs and symptoms include the following:   Fever and chills    Pain or burning when you urinate    Urine that smells bad or looks cloudy, or blood in your urine    Urinating more often or waking from sleep to urinate    Sudden, strong need to urinate    Pain or pressure in your lower abdomen     Leaking urine    Confusion or agitation    Fatigue, shakiness, and weakness    Seek care immediately if:   You become confused or agitated  You fall down  You are urinating very little or not at all  You are vomiting  You have a high fever with shaking chills  You have side or back pain that gets worse  Call your doctor if:   You have a fever  You are a woman and you have increased white or yellow discharge from your vagina  You do not feel better after 2 days of taking antibiotics  You have questions or concerns about your condition or care  Treatment:  Medicines treat the bacterial infection or decrease pain and burning when you urinate  You may also need medicines to decrease the urge to urinate often  If you have UTIs often (called recurrent UTIs), you may be given antibiotics to take regularly  You will be given directions for when and how to use antibiotics   The goal is to prevent UTIs but not cause antibiotic resistance by using antibiotics too often  Self-care:   Drink liquids as directed  Liquids can help flush bacteria from your urinary tract  Ask how much liquid to drink each day and which liquids are best for you  You may need to drink more liquids than usual to help flush out the bacteria  Do not drink alcohol, caffeine, and citrus juices  These can irritate your bladder and increase your symptoms  Apply heat  on your abdomen for 20 to 30 minutes every 2 hours for as many days as directed  Heat helps decrease discomfort and pressure in your bladder  Prevent a UTI:   Urinate when you feel the urge  Do not hold your urine  Bacteria can grow if urine stays in the bladder too long  It may be helpful to urinate at least every 3 to 4 hours  Urinate after you have sex  This will help flush away bacteria that can enter your urinary tract during sex  Wear cotton underwear and clothes that are loose  Tight pants and nylon underwear can trap moisture and cause bacteria to grow  Drink cranberry juice or take cranberry supplements  These may help prevent UTIs  Your healthcare provider can recommend the right juice or supplement for you  Women should wipe front to back after urinating or having a bowel movement  This may prevent germs from getting into the urinary tract  Do not douche or use feminine deodorants  These can change the chemical balance in your vagina  You may also be given vaginal estrogen medicine  This medicine helps prevent recurrent UTIs in women who have gone through menopause or are in bao-menopause  Follow up with your doctor as directed:  Write down your questions so you remember to ask them during your visits  © Copyright Ric[employer] 2022 Information is for End User's use only and may not be sold, redistributed or otherwise used for commercial purposes  The above information is an  only  It is not intended as medical advice for individual conditions or treatments  Talk to your doctor, nurse or pharmacist before following any medical regimen to see if it is safe and effective for you

## 2023-06-14 NOTE — PROGRESS NOTES
[facility]        NAME: Tiffanie Orourke is a 69 y o  female  : 1945    MRN: [id number]  DATE: 2023  TIME: 3:48 PM    Assessment and Plan   Acute cystitis with hematuria [N30 01]  1  Acute cystitis with hematuria  sulfamethoxazole-trimethoprim (BACTRIM DS) 800-160 mg per tablet      2  UTI symptoms  POCT urine dip    Urine culture        POCT Urine Dip: Moderate leukocytes, moderate blood, cloudy  Urine culture sent out  Urine dip with signs of UTI - prescribed antibiotic   prescribed Bactrim with relief of symptoms, urine culture showed bacterial susceptibility  Discussed follow up, symptomatic treatment  Patient Instructions     Urine dip showed signs of UTI  Prescribed antibiotics, use as directed  Will send culture out to determine bacteria causing infection and susceptibility to antibiotics  May try OTC Azo to decrease burning with urination  Drink plenty of fluids and follow proper urinary hygiene  Follow up with PCP in 3-5 days  Proceed to  ER if symptoms worsen  Chief Complaint     Chief Complaint   Patient presents with   • Possible UTI     Started on Monday with burning on urination, frequency and urgency  History of Present Illness       Difficulty Urinating   This is a new problem  Episode onset: 2 days ago  The problem occurs every urination  The problem has been gradually worsening  The quality of the pain is described as burning (with urination)  There has been no fever  She is not sexually active  There is no history of pyelonephritis  Associated symptoms include frequency and urgency  Pertinent negatives include no chills, flank pain, nausea or vomiting  Treatments tried: cranberry juice  The treatment provided no relief  Review of Systems   Review of Systems   Constitutional: Negative for appetite change, chills and fever  Gastrointestinal: Negative for abdominal pain, nausea and vomiting     Genitourinary: Positive for dysuria, frequency and urgency  Negative for flank pain and pelvic pain  Musculoskeletal: Negative for myalgias           Current Medications       Current Outpatient Medications:   •  albuterol (PROVENTIL HFA,VENTOLIN HFA) 90 mcg/act inhaler, Inhale 2 puffs every 4 (four) hours as needed for wheezing or shortness of breath, Disp: 18 g, Rfl: 1  •  COMBIGAN 0 2-0 5 %, Administer 1 drop to the right eye 2 (two) times a day , Disp: , Rfl: 3  •  dorzolamide (TRUSOPT) 2 % ophthalmic solution, Administer 1 drop to the right eye 3 (three) times a day , Disp: , Rfl: 1  •  glucose blood (OneTouch Verio) test strip, USE TO TEST BLOOD SUGAR UP TO FOUR TIMES DAILY, Disp: 300 each, Rfl: 1  •  hydrochlorothiazide (HYDRODIURIL) 12 5 mg tablet, Take 1 tablet (12 5 mg total) by mouth daily In the AM, Disp: 90 tablet, Rfl: 1  •  Insulin Pen Needle (Pen Needles) 32G X 4 MM MISC, To use daily with victoza, Disp: 100 each, Rfl: 3  •  levothyroxine 100 mcg tablet, Take 1 tablet (100 mcg total) by mouth daily BRAND SYNTHROID PLEASE, Disp: 90 tablet, Rfl: 1  •  losartan (COZAAR) 50 mg tablet, Take 1 tablet (50 mg total) by mouth daily at bedtime, Disp: 90 tablet, Rfl: 1  •  LUMIGAN 0 01 % ophthalmic drops, Administer 1 drop to both eyes daily at bedtime , Disp: , Rfl: 3  •  metFORMIN (GLUCOPHAGE-XR) 500 mg 24 hr tablet, Take 1 tablet (500 mg total) by mouth 2 (two) times a day with meals, Disp: 180 tablet, Rfl: 3  •  OneTouch Delica Lancets 47R MISC, To test BS up to four times daily- E11 65, Disp: 200 each, Rfl: 5  •  Rhopressa 0 02 % SOLN, , Disp: , Rfl:   •  sulfamethoxazole-trimethoprim (BACTRIM DS) 800-160 mg per tablet, Take 1 tablet by mouth every 12 (twelve) hours for 7 days, Disp: 14 tablet, Rfl: 0  •  Victoza injection, INJECT 0 6MG SUBCUTANEOUSLY (UNDER THE SKIN) EVERY DAY, Disp: 9 mL, Rfl: 1  •  benzonatate (TESSALON) 200 MG capsule, Take 1 capsule (200 mg total) by mouth 3 (three) times a day as needed for cough (Patient not taking: Reported on 5/19/2023), Disp: 20 capsule, Rfl: 0  •  Continuous Blood Gluc Sensor (FreeStyle Tarik 2 Sensor) MISC, Use to test sugars 7-10 times a day  Apply every 14 days , (Patient not taking: Reported on 5/19/2023), Disp: 2 each, Rfl: 6  •  fluticasone-salmeterol (Advair Diskus) 250-50 mcg/dose inhaler, Inhale 1 puff 2 (two) times a day Rinse mouth after use   (Patient not taking: Reported on 6/14/2023), Disp: 60 blister, Rfl: 2    Current Allergies     Allergies as of 06/14/2023 - Reviewed 06/14/2023   Allergen Reaction Noted   • Eggs or egg-derived products - food allergy Anaphylaxis 01/07/2021   • Albumen, egg - food allergy Hives 03/10/2015   • Antihistamines, diphenhydramine-type  06/27/2016   • Epinephrine Syncope 03/10/2015   • Fluzone [influenza virus vaccine] Hives, Abdominal Pain, and Facial Swelling 01/07/2021   • Iv contrast [iodinated contrast media] Headache 04/28/2021   • Lidocaine  04/26/2017   • Zinc Rash 03/22/2017            The following portions of the patient's history were reviewed and updated as appropriate: allergies, current medications, past family history, past medical history, past social history, past surgical history and problem list      Past Medical History:   Diagnosis Date   • Allergic    • COVID-19 06/11/2022   • Diabetes mellitus ([address] )    • Diabetes mellitus type 2, controlled ([address] )    • Disease of thyroid gland    • Emphysema of lung ([address] )    • GERD (gastroesophageal reflux disease)    • Glaucoma    • H/O tooth extraction     Had top row of teeth removed around April 2016   • Headache(784 0)    • Hyperlipidemia    • Hypertension    • Neck pain    • Pneumonia    • Spondylolysis    • Stroke [facility])    • TIA (transient ischemic attack)    • TIA (transient ischemic attack)    • Visual impairment        Past Surgical History:   Procedure Laterality Date   • BREAST BIOPSY Left     many years ago  papiloma   • CATARACT EXTRACTION Right     5 years ago   • CATARACT EXTRACTION Left    • CYST REMOVAL Left 04/15/2014    ear lobe     • EAR SURGERY  1970    Left ear cyst removal    • EYE SURGERY     • OTHER SURGICAL HISTORY       right upper arm          Family History   Problem Relation Age of Onset   • Rheum arthritis Mother    • No Known Problems Father    • Cancer Brother    • Heart attack Paternal Grandmother    • No Known Problems Maternal Grandmother    • No Known Problems Paternal Grandfather          Medications have been verified  Objective   /62 (BP Location: Right arm, Patient Position: Sitting)   Pulse 74   Temp 98 1 °F (36 7 °C)   Resp 18   SpO2 98%        Physical Exam     Physical Exam  Vitals and nursing note reviewed  Constitutional:       General: She is not in acute distress  Appearance: Normal appearance  She is not ill-appearing  Cardiovascular:      Rate and Rhythm: Normal rate and regular rhythm  Pulses: Normal pulses  Heart sounds: Normal heart sounds  Pulmonary:      Effort: Pulmonary effort is normal       Breath sounds: Normal breath sounds  Abdominal:      General: Abdomen is flat  Bowel sounds are normal  There is no distension  Palpations: Abdomen is soft  Tenderness: There is no abdominal tenderness  There is no right CVA tenderness, left CVA tenderness or guarding  Neurological:      Mental Status: She is alert

## 2023-06-15 ENCOUNTER — APPOINTMENT (OUTPATIENT)
Dept: PHYSICAL THERAPY | Facility: CLINIC | Age: 78
End: 2023-06-15
Payer: MEDICARE

## 2023-06-16 LAB — BACTERIA UR CULT: ABNORMAL

## 2023-06-20 ENCOUNTER — OFFICE VISIT (OUTPATIENT)
Dept: PHYSICAL THERAPY | Facility: CLINIC | Age: 78
End: 2023-06-20
Payer: MEDICARE

## 2023-06-20 DIAGNOSIS — M25.511 CHRONIC PAIN OF BOTH SHOULDERS: Primary | ICD-10-CM

## 2023-06-20 DIAGNOSIS — M25.512 CHRONIC PAIN OF BOTH SHOULDERS: Primary | ICD-10-CM

## 2023-06-20 DIAGNOSIS — G89.29 CHRONIC PAIN OF BOTH SHOULDERS: Primary | ICD-10-CM

## 2023-06-20 PROCEDURE — 97112 NEUROMUSCULAR REEDUCATION: CPT | Performed by: PHYSICAL THERAPIST

## 2023-06-20 PROCEDURE — 97110 THERAPEUTIC EXERCISES: CPT | Performed by: PHYSICAL THERAPIST

## 2023-06-20 NOTE — PROGRESS NOTES
"Daily Note     Today's date: 2023  Patient name: Drew Blanco  : 1945  MRN: [id number]  Referring provider: Delia Staff*  Dx:   Encounter Diagnosis     ICD-10-CM    1  Chronic pain of both shoulders  M25 511     G89 29     M25 512                      Subjective: Patient reports the long drives are still bothering her and shoulder  Notes tired today and has been more sore lately  Objective: See treatment diary below    Assessment: Tolerated treatment well  Patient demonstrated fatigue post treatment and would benefit from continued PT  No shoulder pain throughout treatment session or post     Plan: Continue per plan of care  Progress treatment as tolerated         Precautions: DM, HTN, Depth Perception Issues, NEEDS TO LIE SEMI RECUMBENT       Manuals    B shoulder PROM         Shoulder mobs                            Neuro Re-Ed         Supine serratus punches         scap sets 10x 5\" holds 10x 5\" holds 10x 5\" holds  10x 5\" holds 10x 5\" holds 10x 5\" holds   scap 4    dynamic balance 3 laps each dynamic balance 3 laps each  dynamic balance 3 laps each dynamic balance 3 laps each                                        Ther Ex         Table slides B 5\" x14 B 5\" x15 15x 5\" holds B 15x 5\" holds B 18x 5\" holds B 18x 5\" holds             isometrics         Supine B shoulder flexion AAROM w/ cane          Standing ER/IR w/ bands          treadmill 8 min 8 min 8 min  8 min  8 min  8 min    Wall Walk/Finger Ladder flexion x2 B flexion x2 B -      Cane Lift Off 5\" x5 5\" x5       Ther Activity                           Gait Training                           Modalities                                "

## 2023-06-22 ENCOUNTER — APPOINTMENT (OUTPATIENT)
Dept: PHYSICAL THERAPY | Facility: CLINIC | Age: 78
End: 2023-06-22
Payer: MEDICARE

## 2023-06-27 ENCOUNTER — OFFICE VISIT (OUTPATIENT)
Dept: PHYSICAL THERAPY | Facility: CLINIC | Age: 78
End: 2023-06-27
Payer: MEDICARE

## 2023-06-27 DIAGNOSIS — M25.512 CHRONIC PAIN OF BOTH SHOULDERS: Primary | ICD-10-CM

## 2023-06-27 DIAGNOSIS — M25.511 CHRONIC PAIN OF BOTH SHOULDERS: Primary | ICD-10-CM

## 2023-06-27 DIAGNOSIS — G89.29 CHRONIC PAIN OF BOTH SHOULDERS: Primary | ICD-10-CM

## 2023-06-27 PROCEDURE — 97112 NEUROMUSCULAR REEDUCATION: CPT | Performed by: PHYSICAL THERAPIST

## 2023-06-27 PROCEDURE — 97110 THERAPEUTIC EXERCISES: CPT | Performed by: PHYSICAL THERAPIST

## 2023-06-27 NOTE — PROGRESS NOTES
"Daily Note     Today's date: 2023  Patient name: Lizzy Garcia  : 1945  MRN: [id number]  Referring provider: Ghulam Phan*  Dx:   Encounter Diagnosis     ICD-10-CM    1  Chronic pain of both shoulders  M25 511     G89 29     M25 512                      Subjective: Patient reports in more pain today  Objective: See treatment diary below    Assessment: Tolerated treatment well  Patient demonstrated fatigue post treatment and would benefit from continued PT  No shoulder pain throughout treatment session or post     Plan: Continue per plan of care  Progress treatment as tolerated         Precautions: DM, HTN, Depth Perception Issues, NEEDS TO LIE SEMI RECUMBENT       Manuals    B shoulder PROM         Shoulder mobs                            Neuro Re-Ed         Supine serratus punches         scap sets 10x 5\" holds 10x 5\" holds  10x 5\" holds 10x 5\" holds 10x 5\" holds    scap 4   dynamic balance 3 laps each dynamic balance 3 laps each  dynamic balance 3 laps each dynamic balance 3 laps each                                         Ther Ex         Table slides B 5\" x15 15x 5\" holds B 15x 5\" holds B 18x 5\" holds B 18x 5\" holds              isometrics         Supine B shoulder flexion AAROM w/ cane          Standing ER/IR w/ bands          treadmill 8 min 8 min  8 min  8 min  8 min     Wall Walk/Finger Ladder flexion x2 B -       Cane Lift Off 5\" x5        Ther Activity                           Gait Training                           Modalities                                "

## 2023-06-29 ENCOUNTER — APPOINTMENT (OUTPATIENT)
Dept: PHYSICAL THERAPY | Facility: CLINIC | Age: 78
End: 2023-06-29
Payer: MEDICARE

## 2023-06-29 ENCOUNTER — OFFICE VISIT (OUTPATIENT)
Dept: URGENT CARE | Facility: MEDICAL CENTER | Age: 78
End: 2023-06-29
Payer: MEDICARE

## 2023-06-29 VITALS
SYSTOLIC BLOOD PRESSURE: 162 MMHG | DIASTOLIC BLOOD PRESSURE: 71 MMHG | HEART RATE: 75 BPM | TEMPERATURE: 98.7 F | OXYGEN SATURATION: 96 % | RESPIRATION RATE: 18 BRPM

## 2023-06-29 DIAGNOSIS — K11.20 SIALADENITIS: Primary | ICD-10-CM

## 2023-06-29 PROCEDURE — G0463 HOSPITAL OUTPT CLINIC VISIT: HCPCS

## 2023-06-29 PROCEDURE — 99213 OFFICE O/P EST LOW 20 MIN: CPT

## 2023-06-29 RX ORDER — CEPHALEXIN 500 MG/1
500 CAPSULE ORAL EVERY 6 HOURS SCHEDULED
Qty: 28 CAPSULE | Refills: 0 | Status: SHIPPED | OUTPATIENT
Start: 2023-06-29 | End: 2023-07-06

## 2023-06-29 NOTE — PATIENT INSTRUCTIONS
Your symptoms are likely due to inflammation of your salivary gland  You were prescribed a course of antibiotics to treat any underlying infection  See your handout below for other symptomatic recommendations  Follow up with PCP in 3-5 days  Proceed to  ER if symptoms worsen  Sialoadenitis   AMBULATORY CARE:   Sialoadenitis  is an inflammation or infection of one or more of your salivary glands  A small stone can block the salivary gland and cause inflammation  Infection may be caused by a virus or bacteria  You can develop sialoadenitis on one or both sides of your face  Common symptoms include the following:   Pain and swelling of a salivary gland, especially during or right after eating    Bad breath or tooth pain    Pus in your mouth    Fever    Seek care immediately if:   You have trouble breathing or swallowing because of swelling  Call your doctor if:   You have trouble opening your mouth because of swelling  Your salivary gland gets more red and hot or drains more pus  The pain and swelling do not go away within 2 days, or they get worse  Your mouth is very dry  You lose movement on one side of your face  You have questions about your condition or care  Treatment  may include any of the following:  Medicines:      Antibiotics  may be given to treat a bacterial infection  Acetaminophen  decreases pain and fever  It is available without a doctor's order  Ask how much to give your child and how often to give it  Follow directions  Read the labels of all other medicines your child uses to see if they also contain acetaminophen, or ask your child's doctor or pharmacist  Acetaminophen can cause liver damage if not taken correctly  NSAIDs , such as ibuprofen, help decrease swelling, pain, and fever  This medicine is available with or without a doctor's order  NSAIDs can cause stomach bleeding or kidney problems in certain people   If you take blood thinner medicine, always ask your healthcare provider if NSAIDs are safe for you  Always read the medicine label and follow directions  Take your medicine as directed  Contact your healthcare provider if you think your medicine is not helping or if you have side effects  Tell your provider if you are allergic to any medicine  Keep a list of the medicines, vitamins, and herbs you take  Include the amounts, and when and why you take them  Bring the list or the pill bottles to follow-up visits  Carry your medicine list with you in case of an emergency  Procedures:      Removal of one or more stones  may be needed if other treatments do not work  An incision and drainage  may be needed if there is an abscess (pocket of pus) that does not respond to other treatments  Manage or prevent sialoadenitis:   Drink liquids as directed  You may need to drink more liquids than usual  Ask how much liquid to drink each day and which liquids are best for you  Good choices of liquids for most people include water, tea, soup, juice, or milk  Practice good oral care  Brush your teeth 2 times a day, 1 time in the morning and 1 time in the evening  Use a fluoride toothpaste  Floss your teeth 1 time each day, usually in the evening  Use mouthwash after you floss  Swish it around in your mouth for 30 seconds and spit it out  Keep your mouth moist   Suck on hard candy or chew sugarless gum to get your saliva flowing  Sour and tart flavors such as lemon and orange will help get saliva to flow  This will help keep your mouth moist and help push out a stone blocking your salivary duct  Apply a warm, wet cloth and massage  the swollen area as directed  This may help relieve swelling and pain by pushing the pus out of the gland  Follow up with your doctor or dentist as directed:  Write down your questions so you remember to ask them during your visits     © Copyright Mickey Padilla 2022 Information is for End User's use only and may not be sold, redistributed or otherwise used for commercial purposes  The above information is an  only  It is not intended as medical advice for individual conditions or treatments  Talk to your doctor, nurse or pharmacist before following any medical regimen to see if it is safe and effective for you

## 2023-06-29 NOTE — PROGRESS NOTES
[facility]        NAME: Lashell Conner is a 69 y o  female  : 1945    MRN: [id number]  DATE: 2023  TIME: 2:14 PM    Assessment and Plan   Sialadenitis [K11 20]  1  Sialadenitis  cephalexin (KEFLEX) 500 mg capsule        Localized swelling and tenderness to area of left parotid gland  Prescribed course of Keflex, advised symptomatic treatment and stricti ER precautions  Patient Instructions     Your symptoms are likely due to inflammation of your salivary gland  You were prescribed a course of antibiotics to treat any underlying infection  See your handout below for other symptomatic recommendations  Follow up with PCP in 3-5 days  Proceed to  ER if symptoms worsen  Chief Complaint     Chief Complaint   Patient presents with   • Oral Pain     Patient c/o Left side mouth pain with swelling x 3 days          History of Present Illness       Oral Pain   This is a new problem  Episode onset: 2 days ago  Episode frequency: pain not present at rest, swelling present at rest; pain only present with putting food in mouth  The problem has been unchanged  The pain is at a severity of 10/10  Associated symptoms include facial pain  Pertinent negatives include no difficulty swallowing, fever or oral bleeding  Treatments tried: heating pad, ice pack, Advil  The treatment provided no relief  Review of Systems   Review of Systems   Constitutional: Negative for appetite change (still feels hungry, but painful to eat), chills and fever  HENT: Positive for facial swelling (left jaw)  Negative for mouth sores, trouble swallowing and voice change  Respiratory: Negative for chest tightness, shortness of breath and stridor            Current Medications       Current Outpatient Medications:   •  cephalexin (KEFLEX) 500 mg capsule, Take 1 capsule (500 mg total) by mouth every 6 (six) hours for 7 days, Disp: 28 capsule, Rfl: 0  •  albuterol (PROVENTIL HFA,VENTOLIN HFA) 90 mcg/act inhaler, Inhale 2 puffs every 4 (four) hours as needed for wheezing or shortness of breath, Disp: 18 g, Rfl: 1  •  benzonatate (TESSALON) 200 MG capsule, Take 1 capsule (200 mg total) by mouth 3 (three) times a day as needed for cough (Patient not taking: Reported on 5/19/2023), Disp: 20 capsule, Rfl: 0  •  COMBIGAN 0 2-0 5 %, Administer 1 drop to the right eye 2 (two) times a day , Disp: , Rfl: 3  •  Continuous Blood Gluc Sensor (FreeStyle Tarik 2 Sensor) MISC, Use to test sugars 7-10 times a day  Apply every 14 days , (Patient not taking: Reported on 5/19/2023), Disp: 2 each, Rfl: 6  •  dorzolamide (TRUSOPT) 2 % ophthalmic solution, Administer 1 drop to the right eye 3 (three) times a day , Disp: , Rfl: 1  •  fluticasone-salmeterol (Advair Diskus) 250-50 mcg/dose inhaler, Inhale 1 puff 2 (two) times a day Rinse mouth after use   (Patient not taking: Reported on 6/14/2023), Disp: 60 blister, Rfl: 2  •  glucose blood (OneTouch Verio) test strip, USE TO TEST BLOOD SUGAR UP TO FOUR TIMES DAILY, Disp: 300 each, Rfl: 1  •  hydrochlorothiazide (HYDRODIURIL) 12 5 mg tablet, Take 1 tablet (12 5 mg total) by mouth daily In the AM, Disp: 90 tablet, Rfl: 1  •  Insulin Pen Needle (Pen Needles) 32G X 4 MM MISC, To use daily with victoza, Disp: 100 each, Rfl: 3  •  levothyroxine 100 mcg tablet, Take 1 tablet (100 mcg total) by mouth daily BRAND SYNTHROID PLEASE, Disp: 90 tablet, Rfl: 1  •  losartan (COZAAR) 50 mg tablet, Take 1 tablet (50 mg total) by mouth daily at bedtime, Disp: 90 tablet, Rfl: 1  •  LUMIGAN 0 01 % ophthalmic drops, Administer 1 drop to both eyes daily at bedtime , Disp: , Rfl: 3  •  metFORMIN (GLUCOPHAGE-XR) 500 mg 24 hr tablet, Take 1 tablet (500 mg total) by mouth 2 (two) times a day with meals, Disp: 180 tablet, Rfl: 3  •  OneTouch Delica Lancets 61J MISC, To test BS up to four times daily- E11 65, Disp: 200 each, Rfl: 5  •  Rhopressa 0 02 % SOLN, , Disp: , Rfl:   •  Victoza injection, INJECT 0 6MG SUBCUTANEOUSLY (UNDER THE SKIN) EVERY DAY, Disp: 9 mL, Rfl: 1    Current Allergies     Allergies as of 06/29/2023 - Reviewed 06/29/2023   Allergen Reaction Noted   • Eggs or egg-derived products - food allergy Anaphylaxis 01/07/2021   • Albumen, egg - food allergy Hives 03/10/2015   • Antihistamines, diphenhydramine-type  06/27/2016   • Epinephrine Syncope 03/10/2015   • Fluzone [influenza virus vaccine] Hives, Abdominal Pain, and Facial Swelling 01/07/2021   • Iv contrast [iodinated contrast media] Headache 04/28/2021   • Lidocaine  04/26/2017   • Zinc Rash 03/22/2017            The following portions of the patient's history were reviewed and updated as appropriate: allergies, current medications, past family history, past medical history, past social history, past surgical history and problem list      Past Medical History:   Diagnosis Date   • Allergic    • COVID-19 06/11/2022   • Diabetes mellitus ([facility] [address] )    • Diabetes mellitus type 2, controlled ([facility] [address] )    • Disease of thyroid gland    • Emphysema of lung ([facility] [address] )    • GERD (gastroesophageal reflux disease)    • Glaucoma    • H/O tooth extraction     Had top row of teeth removed around April 2016   • Headache(784 0)    • Hyperlipidemia    • Hypertension    • Neck pain    • Pneumonia    • Spondylolysis    • Stroke [facility])    • TIA (transient ischemic attack)    • TIA (transient ischemic attack)    • Visual impairment        Past Surgical History:   Procedure Laterality Date   • BREAST BIOPSY Left     many years ago  papiloma   • CATARACT EXTRACTION Right     5 years ago   • CATARACT EXTRACTION Left    • CYST REMOVAL Left 04/15/2014    ear lobe     • EAR SURGERY  1970    Left ear cyst removal    • EYE SURGERY     • OTHER SURGICAL HISTORY       right upper arm          Family History   Problem Relation Age of Onset   • Rheum arthritis Mother    • No Known Problems Father    • Cancer Brother    • Heart attack Paternal Grandmother    • No Known Problems Maternal Grandmother    • No Known Problems Paternal Grandfather          Medications have been verified  Objective   /71   Pulse 75   Temp 98 7 °F (37 1 °C)   Resp 18   SpO2 96%        Physical Exam     Physical Exam  Vitals and nursing note reviewed  Constitutional:       General: She is not in acute distress  Appearance: Normal appearance  She is not ill-appearing  HENT:      Head:      Jaw: No trismus or pain on movement  Salivary Glands: Left salivary gland is diffusely enlarged and tender  Comments: Left parotid gland  Mouth/Throat:      Mouth: Mucous membranes are moist  No oral lesions  Dentition: Has dentures  No gingival swelling or dental abscesses  Tongue: No lesions  Tongue does not deviate from midline  Palate: No lesions  Pharynx: Uvula midline  No pharyngeal swelling, oropharyngeal exudate, posterior oropharyngeal erythema or uvula swelling  Tonsils: No tonsillar exudate or tonsillar abscesses  0 on the right  0 on the left  Comments: Handling secretions well  Cardiovascular:      Rate and Rhythm: Normal rate and regular rhythm  Pulses: Normal pulses  Heart sounds: Normal heart sounds  Pulmonary:      Effort: Pulmonary effort is normal       Breath sounds: Normal breath sounds  Neurological:      Mental Status: She is alert

## 2023-07-05 LAB — COLOGUARD RESULT REPORTABLE: NEGATIVE

## 2023-07-11 ENCOUNTER — OFFICE VISIT (OUTPATIENT)
Dept: PHYSICAL THERAPY | Facility: CLINIC | Age: 78
End: 2023-07-11
Payer: MEDICARE

## 2023-07-11 DIAGNOSIS — M25.511 CHRONIC PAIN OF BOTH SHOULDERS: Primary | ICD-10-CM

## 2023-07-11 DIAGNOSIS — M25.512 CHRONIC PAIN OF BOTH SHOULDERS: Primary | ICD-10-CM

## 2023-07-11 DIAGNOSIS — G89.29 CHRONIC PAIN OF BOTH SHOULDERS: Primary | ICD-10-CM

## 2023-07-11 PROCEDURE — 97110 THERAPEUTIC EXERCISES: CPT | Performed by: PHYSICAL THERAPIST

## 2023-07-11 PROCEDURE — 97112 NEUROMUSCULAR REEDUCATION: CPT | Performed by: PHYSICAL THERAPIST

## 2023-07-11 NOTE — PROGRESS NOTES
Daily Note     Today's date: 2023  Patient name: Vanessa Brumfield  : 1945  MRN: [id number]  Referring provider: Maricarmen Marrero*  Dx:   Encounter Diagnosis     ICD-10-CM    1. Chronic pain of both shoulders  M25.511     G89.29     M25.512                      Subjective: Patient reports in more pain today in her hip and knees. Objective: See treatment diary below    Assessment: Tolerated treatment well. Patient demonstrated fatigue post treatment and would benefit from continued PT. No shoulder pain throughout treatment session or post.    Plan: Continue per plan of care. Progress treatment as tolerated.        Precautions: DM, HTN, Depth Perception Issues, NEEDS TO LIE SEMI RECUMBENT       Manuals    B shoulder PROM          Shoulder mobs                               Neuro Re-Ed          Supine serratus punches          scap sets 10x 5" holds 10x 5" holds  10x 5" holds 10x 5" holds 10x 5" holds     scap 4   dynamic balance 3 laps each dynamic balance 3 laps each  dynamic balance 3 laps each dynamic balance 3 laps each                                              Ther Ex          Table slides B 5" x15 15x 5" holds B 15x 5" holds B 18x 5" holds B 18x 5" holds                isometrics          Supine B shoulder flexion AAROM w/ cane           Standing ER/IR w/ bands           treadmill 8 min 8 min  8 min  8 min  8 min      Wall Walk/Finger Ladder flexion x2 B -        Cane Lift Off 5" x5         Ther Activity                              Gait Training                              Modalities

## 2023-07-13 ENCOUNTER — APPOINTMENT (OUTPATIENT)
Dept: PHYSICAL THERAPY | Facility: CLINIC | Age: 78
End: 2023-07-13
Payer: MEDICARE

## 2023-07-18 ENCOUNTER — APPOINTMENT (OUTPATIENT)
Dept: PHYSICAL THERAPY | Facility: CLINIC | Age: 78
End: 2023-07-18
Payer: MEDICARE

## 2023-07-24 ENCOUNTER — OFFICE VISIT (OUTPATIENT)
Dept: PHYSICAL THERAPY | Facility: CLINIC | Age: 78
End: 2023-07-24
Payer: MEDICARE

## 2023-07-24 DIAGNOSIS — E11.9 CONTROLLED TYPE 2 DIABETES MELLITUS WITHOUT COMPLICATION, WITHOUT LONG-TERM CURRENT USE OF INSULIN (HCC): ICD-10-CM

## 2023-07-24 DIAGNOSIS — M25.511 CHRONIC PAIN OF BOTH SHOULDERS: Primary | ICD-10-CM

## 2023-07-24 DIAGNOSIS — G89.29 CHRONIC PAIN OF BOTH SHOULDERS: Primary | ICD-10-CM

## 2023-07-24 DIAGNOSIS — M25.512 CHRONIC PAIN OF BOTH SHOULDERS: Primary | ICD-10-CM

## 2023-07-24 PROCEDURE — 97110 THERAPEUTIC EXERCISES: CPT | Performed by: PHYSICAL THERAPIST

## 2023-07-24 PROCEDURE — 97112 NEUROMUSCULAR REEDUCATION: CPT | Performed by: PHYSICAL THERAPIST

## 2023-07-24 RX ORDER — LIRAGLUTIDE 6 MG/ML
INJECTION SUBCUTANEOUS
Qty: 9 ML | Refills: 5 | Status: SHIPPED | OUTPATIENT
Start: 2023-07-24

## 2023-07-24 NOTE — PROGRESS NOTES
Daily Note     Today's date: 2023  Patient name: Christiano Delaney  : 1945  MRN: [id number]  Referring provider: Johnie Perez*  Dx:   Encounter Diagnosis     ICD-10-CM    1. Chronic pain of both shoulders  M25.511     G89.29     M25.512                      Subjective: Patient reports in more pain today in her hip and knees. Did a lot of driving. Objective: See treatment diary below    Assessment: Tolerated treatment well. Patient demonstrated fatigue post treatment and would benefit from continued PT. No shoulder pain throughout treatment session or post.    Plan: Continue per plan of care. Progress treatment as tolerated.        Precautions: DM, HTN, Depth Perception Issues, NEEDS TO LIE SEMI RECUMBENT       Manuals    B shoulder PROM          Shoulder mobs                               Neuro Re-Ed          Supine serratus punches          scap sets 10x 5" holds  10x 5" holds 10x 5" holds 10x 5" holds  6x 5" Holds 6x 5" holds   scap 4  dynamic balance 3 laps each dynamic balance 3 laps each  dynamic balance 3 laps each dynamic balance 3 laps each   dynamic balance 3 laps each dynamic balance 3 laps each                                           Ther Ex          Table slides 15x 5" holds B 15x 5" holds B 18x 5" holds B 18x 5" holds   21x 5" holds 21x 5" holds             isometrics          Supine B shoulder flexion AAROM w/ cane           Standing ER/IR w/ bands           treadmill 8 min  8 min  8 min  8 min   8min  8 min    Wall Walk/Finger Ladder -         Cane Lift Off          Ther Activity                              Gait Training                              Modalities

## 2023-07-31 ENCOUNTER — APPOINTMENT (OUTPATIENT)
Dept: PHYSICAL THERAPY | Facility: CLINIC | Age: 78
End: 2023-07-31
Payer: MEDICARE

## 2023-08-07 ENCOUNTER — APPOINTMENT (OUTPATIENT)
Dept: PHYSICAL THERAPY | Facility: CLINIC | Age: 78
End: 2023-08-07
Payer: MEDICARE

## 2023-08-14 ENCOUNTER — OFFICE VISIT (OUTPATIENT)
Dept: PHYSICAL THERAPY | Facility: CLINIC | Age: 78
End: 2023-08-14
Payer: MEDICARE

## 2023-08-14 DIAGNOSIS — M25.512 CHRONIC PAIN OF BOTH SHOULDERS: Primary | ICD-10-CM

## 2023-08-14 DIAGNOSIS — M25.511 CHRONIC PAIN OF BOTH SHOULDERS: Primary | ICD-10-CM

## 2023-08-14 DIAGNOSIS — G89.29 CHRONIC PAIN OF BOTH SHOULDERS: Primary | ICD-10-CM

## 2023-08-14 PROCEDURE — 97530 THERAPEUTIC ACTIVITIES: CPT | Performed by: PHYSICAL THERAPIST

## 2023-08-14 PROCEDURE — 97110 THERAPEUTIC EXERCISES: CPT | Performed by: PHYSICAL THERAPIST

## 2023-08-14 NOTE — PROGRESS NOTES
Daily Note     Today's date: 2023  Patient name: Christiano Delaney  : 1945  MRN: [id number]  Referring provider: Johnie Perez*  Dx:   Encounter Diagnosis     ICD-10-CM    1. Chronic pain of both shoulders  M25.511     G89.29     M25.512                      Subjective: Patient reports had injections in shoulders and R shoulder is responding better to them than L shoulder. Objective: See treatment diary below    Assessment: Tolerated treatment well. Patient demonstrated fatigue post treatment and would benefit from continued PT. Exercises stopped when shoulder pain increased. Plan: Continue per plan of care. Progress treatment as tolerated.        Precautions: DM, HTN, Depth Perception Issues, NEEDS TO LIE SEMI RECUMBENT       Manuals    B shoulder PROM         Shoulder mobs                            Neuro Re-Ed         Supine serratus punches         scap sets 10x 5" holds 10x 5" holds  6x 5" Holds 6x 5" holds 6x 5" holds   scap 4  dynamic balance 3 laps each dynamic balance 3 laps each   dynamic balance 3 laps each dynamic balance 3 laps each dynamic balance 3 laps each                                       Ther Ex         Table slides 18x 5" holds B 18x 5" holds   21x 5" holds 21x 5" holds 10x 5" holds             isometrics         Supine B shoulder flexion AAROM w/ cane          Standing ER/IR w/ bands          treadmill 8 min  8 min   8min  8 min  8 min    Wall Walk/Finger Ladder         Cane Lift Off         Ther Activity                           Gait Training                           Modalities

## 2023-08-17 ENCOUNTER — OFFICE VISIT (OUTPATIENT)
Dept: URGENT CARE | Facility: MEDICAL CENTER | Age: 78
End: 2023-08-17
Payer: COMMERCIAL

## 2023-08-17 VITALS
WEIGHT: 180.2 LBS | SYSTOLIC BLOOD PRESSURE: 148 MMHG | OXYGEN SATURATION: 97 % | HEIGHT: 67 IN | DIASTOLIC BLOOD PRESSURE: 81 MMHG | HEART RATE: 74 BPM | TEMPERATURE: 98.4 F | RESPIRATION RATE: 20 BRPM | BODY MASS INDEX: 28.28 KG/M2

## 2023-08-17 DIAGNOSIS — M54.2 NECK PAIN: ICD-10-CM

## 2023-08-17 DIAGNOSIS — S09.90XA INJURY OF HEAD, INITIAL ENCOUNTER: Primary | ICD-10-CM

## 2023-08-17 DIAGNOSIS — R11.11 VOMITING WITHOUT NAUSEA, UNSPECIFIED VOMITING TYPE: ICD-10-CM

## 2023-08-17 PROCEDURE — G0382 LEV 3 HOSP TYPE B ED VISIT: HCPCS
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
## 2023-08-21 ENCOUNTER — APPOINTMENT (OUTPATIENT)
Dept: PHYSICAL THERAPY | Facility: CLINIC | Age: 78
End: 2023-08-21
Payer: MEDICARE

## 2023-08-24 ENCOUNTER — HOSPITAL ENCOUNTER (OUTPATIENT)
Dept: CT IMAGING | Facility: HOSPITAL | Age: 78
Discharge: HOME | End: 2023-08-24
Payer: COMMERCIAL

## 2023-08-24 DIAGNOSIS — R93.89 ABNORMAL FINDINGS ON DIAGNOSTIC IMAGING OF OTHER SPECIFIED BODY STRUCTURES: ICD-10-CM

## 2023-08-24 PROCEDURE — G1004 CDSM NDSC: HCPCS

## 2023-08-24 PROCEDURE — 70491 CT SOFT TISSUE NECK W/DYE: CPT

## 2023-08-24 RX ADMIN — IOHEXOL 85 ML: 350 INJECTION, SOLUTION INTRAVENOUS at 19:50

## 2023-08-28 ENCOUNTER — APPOINTMENT (OUTPATIENT)
Dept: PHYSICAL THERAPY | Facility: CLINIC | Age: 78
End: 2023-08-28
Payer: MEDICARE

## 2023-08-29 ENCOUNTER — OFFICE VISIT (OUTPATIENT)
Dept: URGENT CARE | Facility: CLINIC | Age: 78
End: 2023-08-29
Payer: MEDICARE

## 2023-08-29 VITALS
HEART RATE: 103 BPM | TEMPERATURE: 98 F | SYSTOLIC BLOOD PRESSURE: 162 MMHG | WEIGHT: 182.4 LBS | HEIGHT: 67 IN | OXYGEN SATURATION: 95 % | RESPIRATION RATE: 18 BRPM | BODY MASS INDEX: 28.63 KG/M2 | DIASTOLIC BLOOD PRESSURE: 80 MMHG

## 2023-08-29 DIAGNOSIS — T14.8XXA BRUISING: Primary | ICD-10-CM

## 2023-08-29 PROCEDURE — 99213 OFFICE O/P EST LOW 20 MIN: CPT

## 2023-08-29 PROCEDURE — G0463 HOSPITAL OUTPT CLINIC VISIT: HCPCS

## 2023-08-29 RX ORDER — CYCLOBENZAPRINE HCL 10 MG
10 TABLET ORAL 3 TIMES DAILY PRN
COMMUNITY
Start: 2023-08-23

## 2023-08-29 NOTE — PATIENT INSTRUCTIONS
Recommend warm compresses  Acetaminophen OTC for pain.   PCP follow-up in 3-5 days  Proceed to the ER if symptoms worsen
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
## 2023-09-01 LAB
LEFT EYE DIABETIC RETINOPATHY: NORMAL
RIGHT EYE DIABETIC RETINOPATHY: NORMAL

## 2023-09-05 ENCOUNTER — APPOINTMENT (OUTPATIENT)
Dept: PHYSICAL THERAPY | Facility: CLINIC | Age: 78
End: 2023-09-05
Payer: COMMERCIAL

## 2023-09-11 ENCOUNTER — APPOINTMENT (OUTPATIENT)
Dept: PHYSICAL THERAPY | Facility: CLINIC | Age: 78
End: 2023-09-11
Payer: COMMERCIAL

## 2023-09-14 ENCOUNTER — EVALUATION (OUTPATIENT)
Dept: PHYSICAL THERAPY | Facility: CLINIC | Age: 78
End: 2023-09-14
Payer: MEDICARE

## 2023-09-14 ENCOUNTER — APPOINTMENT (OUTPATIENT)
Dept: PHYSICAL THERAPY | Facility: CLINIC | Age: 78
End: 2023-09-14
Payer: COMMERCIAL

## 2023-09-14 DIAGNOSIS — M25.511 CHRONIC PAIN OF BOTH SHOULDERS: ICD-10-CM

## 2023-09-14 DIAGNOSIS — G89.29 CHRONIC PAIN OF BOTH SHOULDERS: Primary | ICD-10-CM

## 2023-09-14 DIAGNOSIS — M25.512 CHRONIC PAIN OF BOTH SHOULDERS: Primary | ICD-10-CM

## 2023-09-14 DIAGNOSIS — G89.29 CHRONIC PAIN OF BOTH SHOULDERS: ICD-10-CM

## 2023-09-14 DIAGNOSIS — M54.2 NECK PAIN: Primary | ICD-10-CM

## 2023-09-14 DIAGNOSIS — M25.511 CHRONIC PAIN OF BOTH SHOULDERS: Primary | ICD-10-CM

## 2023-09-14 DIAGNOSIS — M54.2 NECK PAIN: ICD-10-CM

## 2023-09-14 DIAGNOSIS — M25.512 CHRONIC PAIN OF BOTH SHOULDERS: ICD-10-CM

## 2023-09-14 PROCEDURE — 97164 PT RE-EVAL EST PLAN CARE: CPT | Performed by: PHYSICAL THERAPIST

## 2023-09-14 PROCEDURE — 97112 NEUROMUSCULAR REEDUCATION: CPT | Performed by: PHYSICAL THERAPIST

## 2023-09-14 PROCEDURE — 97140 MANUAL THERAPY 1/> REGIONS: CPT | Performed by: PHYSICAL THERAPIST

## 2023-09-14 PROCEDURE — 97110 THERAPEUTIC EXERCISES: CPT | Performed by: PHYSICAL THERAPIST
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
## 2023-09-18 ENCOUNTER — APPOINTMENT (OUTPATIENT)
Dept: PHYSICAL THERAPY | Facility: CLINIC | Age: 78
End: 2023-09-18
Payer: COMMERCIAL

## 2023-09-19 ENCOUNTER — APPOINTMENT (OUTPATIENT)
Dept: PHYSICAL THERAPY | Facility: CLINIC | Age: 78
End: 2023-09-19
Payer: COMMERCIAL

## 2023-09-21 ENCOUNTER — APPOINTMENT (OUTPATIENT)
Dept: PHYSICAL THERAPY | Facility: CLINIC | Age: 78
End: 2023-09-21
Payer: COMMERCIAL

## 2023-09-25 ENCOUNTER — APPOINTMENT (OUTPATIENT)
Dept: PHYSICAL THERAPY | Facility: CLINIC | Age: 78
End: 2023-09-25
Payer: COMMERCIAL

## 2023-09-26 ENCOUNTER — APPOINTMENT (OUTPATIENT)
Dept: PHYSICAL THERAPY | Facility: CLINIC | Age: 78
End: 2023-09-26
Payer: COMMERCIAL

## 2023-09-28 ENCOUNTER — APPOINTMENT (OUTPATIENT)
Dept: PHYSICAL THERAPY | Facility: CLINIC | Age: 78
End: 2023-09-28
Payer: COMMERCIAL

## 2023-10-01 DIAGNOSIS — E11.9 CONTROLLED TYPE 2 DIABETES MELLITUS WITHOUT COMPLICATION, WITHOUT LONG-TERM CURRENT USE OF INSULIN (HCC): ICD-10-CM

## 2023-10-02 RX ORDER — LIRAGLUTIDE 6 MG/ML
INJECTION SUBCUTANEOUS
Qty: 9 ML | Refills: 5 | Status: SHIPPED | OUTPATIENT
Start: 2023-10-02

## 2023-10-05 ENCOUNTER — HOSPITAL ENCOUNTER (OUTPATIENT)
Dept: MRI IMAGING | Facility: HOSPITAL | Age: 78
Discharge: HOME | End: 2023-10-05
Payer: COMMERCIAL

## 2023-10-05 DIAGNOSIS — M54.12 RADICULOPATHY, CERVICAL: ICD-10-CM

## 2023-10-05 PROCEDURE — G1004 CDSM NDSC: HCPCS

## 2023-10-05 PROCEDURE — 72141 MRI NECK SPINE W/O DYE: CPT

## 2023-11-09 ENCOUNTER — OFFICE VISIT (OUTPATIENT)
Dept: FAMILY MEDICINE CLINIC | Facility: CLINIC | Age: 78
End: 2023-11-09
Payer: MEDICARE

## 2023-11-09 VITALS
HEIGHT: 66 IN | OXYGEN SATURATION: 97 % | WEIGHT: 181 LBS | HEART RATE: 78 BPM | SYSTOLIC BLOOD PRESSURE: 122 MMHG | BODY MASS INDEX: 29.09 KG/M2 | RESPIRATION RATE: 14 BRPM | DIASTOLIC BLOOD PRESSURE: 78 MMHG | TEMPERATURE: 97.5 F

## 2023-11-09 DIAGNOSIS — Z23 ENCOUNTER FOR IMMUNIZATION: ICD-10-CM

## 2023-11-09 DIAGNOSIS — E03.9 ACQUIRED HYPOTHYROIDISM: ICD-10-CM

## 2023-11-09 DIAGNOSIS — I10 BENIGN ESSENTIAL HYPERTENSION: ICD-10-CM

## 2023-11-09 DIAGNOSIS — E78.2 MIXED HYPERLIPIDEMIA: ICD-10-CM

## 2023-11-09 DIAGNOSIS — H61.92 EARLOBE LESION, LEFT: ICD-10-CM

## 2023-11-09 DIAGNOSIS — E11.9 CONTROLLED TYPE 2 DIABETES MELLITUS WITHOUT COMPLICATION, WITHOUT LONG-TERM CURRENT USE OF INSULIN (HCC): Primary | ICD-10-CM

## 2023-11-09 PROBLEM — E11.65 UNCONTROLLED TYPE 2 DIABETES MELLITUS WITH HYPERGLYCEMIA (HCC): Status: ACTIVE | Noted: 2023-11-09

## 2023-11-09 PROCEDURE — 99214 OFFICE O/P EST MOD 30 MIN: CPT | Performed by: FAMILY MEDICINE

## 2023-11-09 PROCEDURE — 90662 IIV NO PRSV INCREASED AG IM: CPT

## 2023-11-09 PROCEDURE — G0008 ADMIN INFLUENZA VIRUS VAC: HCPCS

## 2023-11-09 RX ORDER — CEPHALEXIN 500 MG/1
500 CAPSULE ORAL 2 TIMES DAILY
Qty: 14 CAPSULE | Refills: 0 | Status: SHIPPED | OUTPATIENT
Start: 2023-11-09 | End: 2023-11-16

## 2023-11-09 RX ORDER — LOSARTAN POTASSIUM AND HYDROCHLOROTHIAZIDE 12.5; 5 MG/1; MG/1
1 TABLET ORAL DAILY
Qty: 90 TABLET | Refills: 1 | Status: SHIPPED | OUTPATIENT
Start: 2023-11-09

## 2023-11-09 NOTE — ASSESSMENT & PLAN NOTE
Controlled, no changes needed. She'd like prescription for combined losartan-hctz rather than the two separate meds- prescription refilled.
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
## 2023-11-09 NOTE — ASSESSMENT & PLAN NOTE
Repeat labs due next month  She has increased her metformin to 1500 mg daily and her victoza to 1.8 mg daily while her BS is elevated after the steroid injection and will resume her usual metformin 1000 mg daily and victoza 1.2 mg daily when her BS return to normal- she is trending back in this direction.   Lab Results   Component Value Date    HGBA1C 7.0 (A) 05/19/2023

## 2023-11-10 ENCOUNTER — TELEPHONE (OUTPATIENT)
Dept: ADMINISTRATIVE | Facility: OTHER | Age: 78
End: 2023-11-10

## 2023-11-10 NOTE — LETTER
Diabetic Eye Exam Form    Date Requested: 23  Patient: Porter Morales  Patient : 1945   Referring Provider: Anice Dakin, MD      DIABETIC Eye Exam Date _______________________________      Type of Exam MUST be documented for Diabetic Eye Exams. Please CHECK ONE. Retinal Exam       Dilated Retinal Exam       OCT       Optomap-Iris Exam      Fundus Photography       Left Eye - Please check Retinopathy or No Retinopathy        Exam did show retinopathy    Exam did not show retinopathy       Right Eye - Please check Retinopathy or No Retinopathy       Exam did show retinopathy    Exam did not show retinopathy       Comments __________________________________________________________    Practice Providing Exam ______________________________________________    Exam Performed By (print name) _______________________________________      Provider Signature ___________________________________________________      These reports are needed for  compliance. Please fax this completed form and a copy of the Diabetic Eye Exam report to our office located at [address] [place] [address] as soon as possible via Fax [phone] tavares Arndt: Phone [phone]  We thank you for your assistance in treating our mutual patient.     [facility] - [phone] F [phone]

## 2023-11-10 NOTE — TELEPHONE ENCOUNTER
----- Message from [facility] sent at 11/9/2023 11:21 AM EST -----  11/09/23 11:21 AM    Hello, our patient Porter Morales has had Diabetic Eye Exam completed/performed. Please assist in updating the patient chart by making an External outreach to Dr. Kalin Cotto facility located in [facility]).     Thank you,  Ashley Burton  PG  Collette Cox

## 2023-11-13 NOTE — TELEPHONE ENCOUNTER
Upon review of the In Basket request and the patient's chart, initial outreach has been made via fax to facility. Please see Contacts section for details.      Thank you  Yamilet Camp

## 2023-11-20 NOTE — TELEPHONE ENCOUNTER
Upon review of the In Basket request we have found as a result of outreach that patient did not have the requested item(s) completed. Any additional questions or concerns should be emailed to the Practice Liaisons via the appropriate education email address, please do not reply via In Basket.     Thank you  Cj Jj

## 2023-11-30 ENCOUNTER — PATIENT MESSAGE (OUTPATIENT)
Dept: FAMILY MEDICINE CLINIC | Facility: CLINIC | Age: 78
End: 2023-11-30

## 2023-11-30 DIAGNOSIS — I10 BENIGN ESSENTIAL HYPERTENSION: ICD-10-CM

## 2023-11-30 DIAGNOSIS — E11.9 CONTROLLED TYPE 2 DIABETES MELLITUS WITHOUT COMPLICATION, WITHOUT LONG-TERM CURRENT USE OF INSULIN (HCC): ICD-10-CM

## 2023-11-30 DIAGNOSIS — R73.09 LABILE BLOOD GLUCOSE: ICD-10-CM

## 2023-11-30 DIAGNOSIS — E55.9 VITAMIN D DEFICIENCY: Primary | ICD-10-CM

## 2023-11-30 DIAGNOSIS — E03.9 HYPOTHYROIDISM, UNSPECIFIED TYPE: ICD-10-CM

## 2023-11-30 RX ORDER — BLOOD SUGAR DIAGNOSTIC
STRIP MISCELLANEOUS
Qty: 400 EACH | Refills: 0 | Status: SHIPPED | OUTPATIENT
Start: 2023-11-30

## 2023-11-30 RX ORDER — LOSARTAN POTASSIUM AND HYDROCHLOROTHIAZIDE 12.5; 5 MG/1; MG/1
1 TABLET ORAL DAILY
Qty: 90 TABLET | Refills: 1 | Status: SHIPPED | OUTPATIENT
Start: 2023-11-30

## 2023-11-30 RX ORDER — LEVOTHYROXINE SODIUM 100 MCG
100 TABLET ORAL DAILY
Qty: 90 TABLET | Refills: 0 | OUTPATIENT
Start: 2023-11-30

## 2023-12-04 ENCOUNTER — TELEPHONE (OUTPATIENT)
Dept: FAMILY MEDICINE CLINIC | Facility: CLINIC | Age: 78
End: 2023-12-04

## 2023-12-04 RX ORDER — PEN NEEDLE, DIABETIC 30 GX3/16"
NEEDLE, DISPOSABLE MISCELLANEOUS
Qty: 100 EACH | Refills: 3 | Status: SHIPPED | OUTPATIENT
Start: 2023-12-04

## 2023-12-04 NOTE — TELEPHONE ENCOUNTER
Patient is scheduled for a medicare annual wellness, but only for 20 minutes- needs 40 min time slot for awv

## 2023-12-05 ENCOUNTER — EVALUATION (OUTPATIENT)
Dept: PHYSICAL THERAPY | Facility: CLINIC | Age: 78
End: 2023-12-05
Payer: MEDICARE

## 2023-12-05 DIAGNOSIS — R53.81 PHYSICAL DECONDITIONING: ICD-10-CM

## 2023-12-05 DIAGNOSIS — R26.2 AMBULATORY DYSFUNCTION: Primary | ICD-10-CM

## 2023-12-05 DIAGNOSIS — G89.29 CHRONIC PAIN OF BOTH KNEES: ICD-10-CM

## 2023-12-05 DIAGNOSIS — M25.561 CHRONIC PAIN OF BOTH KNEES: ICD-10-CM

## 2023-12-05 DIAGNOSIS — M25.562 CHRONIC PAIN OF BOTH KNEES: ICD-10-CM

## 2023-12-05 PROCEDURE — 97162 PT EVAL MOD COMPLEX 30 MIN: CPT | Performed by: PHYSICAL THERAPIST

## 2023-12-05 PROCEDURE — 97112 NEUROMUSCULAR REEDUCATION: CPT | Performed by: PHYSICAL THERAPIST
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
## 2023-12-06 RX ORDER — LANCETS 30 GAUGE
EACH MISCELLANEOUS
Qty: 200 EACH | Refills: 3 | Status: SHIPPED | OUTPATIENT
Start: 2023-12-06

## 2023-12-06 RX ORDER — LIRAGLUTIDE 6 MG/ML
1.2 INJECTION SUBCUTANEOUS DAILY
Qty: 18 ML | Refills: 1 | Status: SHIPPED | OUTPATIENT
Start: 2023-12-06

## 2023-12-07 ENCOUNTER — PATIENT MESSAGE (OUTPATIENT)
Dept: FAMILY MEDICINE CLINIC | Facility: CLINIC | Age: 78
End: 2023-12-07

## 2023-12-07 DIAGNOSIS — M25.561 CHRONIC PAIN OF BOTH KNEES: ICD-10-CM

## 2023-12-07 DIAGNOSIS — R26.2 AMBULATORY DYSFUNCTION: Primary | ICD-10-CM

## 2023-12-07 DIAGNOSIS — M25.562 CHRONIC PAIN OF BOTH KNEES: ICD-10-CM

## 2023-12-07 DIAGNOSIS — G89.29 CHRONIC PAIN OF BOTH KNEES: ICD-10-CM

## 2023-12-11 ENCOUNTER — APPOINTMENT (OUTPATIENT)
Dept: PHYSICAL THERAPY | Facility: CLINIC | Age: 78
End: 2023-12-11
Payer: MEDICARE

## 2023-12-12 ENCOUNTER — OFFICE VISIT (OUTPATIENT)
Dept: PHYSICAL THERAPY | Facility: CLINIC | Age: 78
End: 2023-12-12
Payer: MEDICARE

## 2023-12-12 DIAGNOSIS — G89.29 CHRONIC PAIN OF BOTH KNEES: ICD-10-CM

## 2023-12-12 DIAGNOSIS — R53.81 PHYSICAL DECONDITIONING: ICD-10-CM

## 2023-12-12 DIAGNOSIS — M25.562 CHRONIC PAIN OF BOTH KNEES: ICD-10-CM

## 2023-12-12 DIAGNOSIS — M25.561 CHRONIC PAIN OF BOTH KNEES: ICD-10-CM

## 2023-12-12 DIAGNOSIS — R26.2 AMBULATORY DYSFUNCTION: Primary | ICD-10-CM

## 2023-12-12 PROCEDURE — 97110 THERAPEUTIC EXERCISES: CPT | Performed by: PHYSICAL THERAPIST

## 2023-12-12 PROCEDURE — 97530 THERAPEUTIC ACTIVITIES: CPT | Performed by: PHYSICAL THERAPIST

## 2023-12-12 PROCEDURE — 97112 NEUROMUSCULAR REEDUCATION: CPT | Performed by: PHYSICAL THERAPIST

## 2023-12-12 NOTE — PROGRESS NOTES
Daily Note     Today's date: 2023  Patient name: Steve Owen  : 1945  MRN: [id number]  Referring provider: Mery Roman MD  Dx:   Encounter Diagnosis     ICD-10-CM    1. Ambulatory dysfunction  R26.2       2. Physical deconditioning  R53.81       3. Chronic pain of both knees  M25.561     M25.562     G89.29                      Subjective: Patient notes legs are sore at night every night but feeling better today as she is able to walk on the treadmill for 10 minutes. Patient notes she was sore after last visit. Objective: See treatment diary below      Assessment: Tolerated treatment well. Patient would benefit from continued PT. Treatment plan initiated. Plan: Progress treatment as tolerated.        Precautions: recent 4 injections 23, DM, HTN, Depth Perception Issues, NEEDS TO LIE SEMI RECUMBENT       Manuals                                                                Neuro Re-Ed             Tandem stance 2x10" B 2x10" holds B            Side steps 2 laps  2 laps           Tandem walking  2 laps                                                                Ther Ex             TM 6 min 1 mph  11 min 1 mph            Seated hip flexion 20x B  20x B            LAQ * 20x  18x B           Bridges *                                                                 Ther Activity             STS 5x from big chair and 1 foam painful  8x from chair with 1 foam                        Gait Training                                       Modalities

## 2023-12-18 ENCOUNTER — APPOINTMENT (OUTPATIENT)
Dept: PHYSICAL THERAPY | Facility: CLINIC | Age: 78
End: 2023-12-18
Payer: MEDICARE

## 2023-12-20 ENCOUNTER — OFFICE VISIT (OUTPATIENT)
Dept: PLASTIC SURGERY | Facility: CLINIC | Age: 78
End: 2023-12-20

## 2023-12-20 VITALS
BODY MASS INDEX: 28.93 KG/M2 | WEIGHT: 180 LBS | TEMPERATURE: 98.1 F | DIASTOLIC BLOOD PRESSURE: 82 MMHG | HEART RATE: 82 BPM | HEIGHT: 66 IN | SYSTOLIC BLOOD PRESSURE: 140 MMHG

## 2023-12-20 DIAGNOSIS — H61.92 SKIN LESION OF LEFT EXTERNAL EAR: Primary | ICD-10-CM

## 2023-12-20 PROCEDURE — 88305 TISSUE EXAM BY PATHOLOGIST: CPT | Performed by: PATHOLOGY

## 2023-12-20 NOTE — PROGRESS NOTES
"Biopsy    Date/Time: 12/20/2023 3:00 PM    Performed by: Ayaan Cook PA-C  Authorized by: Ayaan Cook PA-C  Universal Protocol:  Procedure performed by:  Consent: Verbal consent obtained. Written consent not obtained.  Risks and benefits: risks, benefits and alternatives were discussed  Consent given by: patient  Time out: Immediately prior to procedure a \"time out\" was called to verify the correct patient, procedure, equipment, support staff and site/side marked as required.  Patient understanding: patient states understanding of the procedure being performed  Patient consent: the patient's understanding of the procedure matches consent given  Procedure consent: procedure consent matches procedure scheduled  Relevant documents: relevant documents present and verified  Test results: test results available and properly labeled  Site marked: the operative site was marked  Radiology Images displayed and confirmed. If images not available, report reviewed: imaging studies available  Required items: required blood products, implants, devices, and special equipment available  Patient identity confirmed: verbally with patient    Procedure Details - Lesion Biopsy:     Body area:  Head/neck    Head/neck location:  L ear    Biopsy method: punch biopsy      Biopsy tissue type: skin and subcutaneous    Initial size (mm):  6    Final defect size (mm):  6    Malignancy: malignancy unknown        "
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
## 2023-12-26 PROCEDURE — 88305 TISSUE EXAM BY PATHOLOGIST: CPT | Performed by: PATHOLOGY

## 2023-12-28 ENCOUNTER — APPOINTMENT (OUTPATIENT)
Dept: PHYSICAL THERAPY | Facility: CLINIC | Age: 78
End: 2023-12-28
Payer: MEDICARE

## 2024-01-02 ENCOUNTER — APPOINTMENT (OUTPATIENT)
Dept: PHYSICAL THERAPY | Facility: CLINIC | Age: 79
End: 2024-01-02
Payer: MEDICARE

## 2024-01-03 ENCOUNTER — OFFICE VISIT (OUTPATIENT)
Dept: PLASTIC SURGERY | Facility: CLINIC | Age: 79
End: 2024-01-03

## 2024-01-03 DIAGNOSIS — H61.92 SKIN LESION OF LEFT EXTERNAL EAR: Primary | ICD-10-CM
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
## 2024-01-04 ENCOUNTER — APPOINTMENT (OUTPATIENT)
Dept: PHYSICAL THERAPY | Facility: CLINIC | Age: 79
End: 2024-01-04
Payer: MEDICARE

## 2024-01-08 ENCOUNTER — APPOINTMENT (OUTPATIENT)
Dept: PHYSICAL THERAPY | Facility: CLINIC | Age: 79
End: 2024-01-08
Payer: MEDICARE

## 2024-01-11 ENCOUNTER — OFFICE VISIT (OUTPATIENT)
Dept: PHYSICAL THERAPY | Facility: CLINIC | Age: 79
End: 2024-01-11
Payer: MEDICARE

## 2024-01-11 DIAGNOSIS — M25.562 CHRONIC PAIN OF BOTH KNEES: ICD-10-CM

## 2024-01-11 DIAGNOSIS — G89.29 CHRONIC PAIN OF BOTH KNEES: ICD-10-CM

## 2024-01-11 DIAGNOSIS — R26.2 AMBULATORY DYSFUNCTION: Primary | ICD-10-CM

## 2024-01-11 DIAGNOSIS — M25.561 CHRONIC PAIN OF BOTH KNEES: ICD-10-CM

## 2024-01-11 DIAGNOSIS — R53.81 PHYSICAL DECONDITIONING: ICD-10-CM

## 2024-01-11 PROCEDURE — 97530 THERAPEUTIC ACTIVITIES: CPT | Performed by: PHYSICAL THERAPIST

## 2024-01-11 PROCEDURE — 97112 NEUROMUSCULAR REEDUCATION: CPT | Performed by: PHYSICAL THERAPIST

## 2024-01-11 PROCEDURE — 97110 THERAPEUTIC EXERCISES: CPT | Performed by: PHYSICAL THERAPIST

## 2024-01-11 NOTE — PROGRESS NOTES
"Daily Note     Today's date: 2024  Patient name: Chanell Bourgeois  : 1945  MRN: [id number]  Referring provider: Laine Dee MD  Dx:   Encounter Diagnosis     ICD-10-CM    1. Ambulatory dysfunction  R26.2       2. Physical deconditioning  R53.81       3. Chronic pain of both knees  M25.561     M25.562     G89.29                      Subjective: Patient notes legs feeling pretty good. Neck and shoulders are starting to hurt.      Objective: See treatment diary below      Assessment: Tolerated treatment well. Patient would benefit from continued PT. Treatment plan remained at same intensity as patient hasn't had PT services in a month.      Plan: Progress treatment as tolerated.       Precautions: recent 4 injections 23, DM, HTN, Depth Perception Issues, NEEDS TO LIE SEMI RECUMBENT       Manuals                                                               Neuro Re-Ed             Tandem stance 2x10\" B 2x10\" holds B  2x10\" holds B          Side steps 2 laps  2 laps 2 laps           Tandem walking  2 laps  2 laps                                                               Ther Ex             TM 6 min 1 mph  11 min 1 mph  8 min 1 mph           Seated hip flexion 20x B  20x B  20x B          LAQ * 20x  18x B 20x B          Bridges *                                                                 Ther Activity             STS 5x from big chair and 1 foam painful  8x from chair with 1 foam 4x from chair no foam                        Gait Training                                       Modalities                                            "

## 2024-01-12 ENCOUNTER — APPOINTMENT (OUTPATIENT)
Dept: LAB | Facility: CLINIC | Age: 79
End: 2024-01-12
Payer: MEDICARE

## 2024-01-12 DIAGNOSIS — E03.9 ACQUIRED HYPOTHYROIDISM: ICD-10-CM

## 2024-01-12 DIAGNOSIS — E11.9 CONTROLLED TYPE 2 DIABETES MELLITUS WITHOUT COMPLICATION, WITHOUT LONG-TERM CURRENT USE OF INSULIN (HCC): ICD-10-CM

## 2024-01-12 DIAGNOSIS — I10 BENIGN ESSENTIAL HYPERTENSION: ICD-10-CM

## 2024-01-12 DIAGNOSIS — E55.9 VITAMIN D DEFICIENCY: ICD-10-CM

## 2024-01-12 DIAGNOSIS — E78.2 MIXED HYPERLIPIDEMIA: ICD-10-CM

## 2024-01-12 LAB
25(OH)D3 SERPL-MCNC: 23.5 NG/ML (ref 30–100)
ALBUMIN SERPL BCP-MCNC: 4.2 G/DL (ref 3.5–5)
ALP SERPL-CCNC: 67 U/L (ref 34–104)
ALT SERPL W P-5'-P-CCNC: 14 U/L (ref 7–52)
ANION GAP SERPL CALCULATED.3IONS-SCNC: 10 MMOL/L
AST SERPL W P-5'-P-CCNC: 15 U/L (ref 13–39)
BASOPHILS # BLD AUTO: 0.09 THOUSANDS/ÂΜL (ref 0–0.1)
BASOPHILS NFR BLD AUTO: 1 % (ref 0–1)
BILIRUB SERPL-MCNC: 0.83 MG/DL (ref 0.2–1)
BUN SERPL-MCNC: 18 MG/DL (ref 5–25)
CALCIUM SERPL-MCNC: 9.2 MG/DL (ref 8.4–10.2)
CHLORIDE SERPL-SCNC: 99 MMOL/L (ref 96–108)
CHOLEST SERPL-MCNC: 163 MG/DL
CO2 SERPL-SCNC: 29 MMOL/L (ref 21–32)
CREAT SERPL-MCNC: 0.58 MG/DL (ref 0.6–1.3)
CREAT UR-MCNC: 109.1 MG/DL
EOSINOPHIL # BLD AUTO: 0.12 THOUSAND/ÂΜL (ref 0–0.61)
EOSINOPHIL NFR BLD AUTO: 1 % (ref 0–6)
ERYTHROCYTE [DISTWIDTH] IN BLOOD BY AUTOMATED COUNT: 13 % (ref 11.6–15.1)
EST. AVERAGE GLUCOSE BLD GHB EST-MCNC: 157 MG/DL
GFR SERPL CREATININE-BSD FRML MDRD: 89 ML/MIN/1.73SQ M
GLUCOSE P FAST SERPL-MCNC: 165 MG/DL (ref 65–99)
HBA1C MFR BLD: 7.1 %
HCT VFR BLD AUTO: 40.2 % (ref 34.8–46.1)
HDLC SERPL-MCNC: 53 MG/DL
HGB BLD-MCNC: 13.4 G/DL (ref 11.5–15.4)
IMM GRANULOCYTES # BLD AUTO: 0.03 THOUSAND/UL (ref 0–0.2)
IMM GRANULOCYTES NFR BLD AUTO: 0 % (ref 0–2)
LDLC SERPL CALC-MCNC: 81 MG/DL (ref 0–100)
LYMPHOCYTES # BLD AUTO: 1.84 THOUSANDS/ÂΜL (ref 0.6–4.47)
LYMPHOCYTES NFR BLD AUTO: 19 % (ref 14–44)
MCH RBC QN AUTO: 33.5 PG (ref 26.8–34.3)
MCHC RBC AUTO-ENTMCNC: 33.3 G/DL (ref 31.4–37.4)
MCV RBC AUTO: 101 FL (ref 82–98)
MICROALBUMIN UR-MCNC: 12.6 MG/L
MICROALBUMIN/CREAT 24H UR: 12 MG/G CREATININE (ref 0–30)
MONOCYTES # BLD AUTO: 0.87 THOUSAND/ÂΜL (ref 0.17–1.22)
MONOCYTES NFR BLD AUTO: 9 % (ref 4–12)
NEUTROPHILS # BLD AUTO: 6.58 THOUSANDS/ÂΜL (ref 1.85–7.62)
NEUTS SEG NFR BLD AUTO: 70 % (ref 43–75)
NRBC BLD AUTO-RTO: 0 /100 WBCS
PLATELET # BLD AUTO: 393 THOUSANDS/UL (ref 149–390)
PMV BLD AUTO: 10.2 FL (ref 8.9–12.7)
POTASSIUM SERPL-SCNC: 4.3 MMOL/L (ref 3.5–5.3)
PROT SERPL-MCNC: 6.8 G/DL (ref 6.4–8.4)
RBC # BLD AUTO: 4 MILLION/UL (ref 3.81–5.12)
SODIUM SERPL-SCNC: 138 MMOL/L (ref 135–147)
TRIGL SERPL-MCNC: 145 MG/DL
TSH SERPL DL<=0.05 MIU/L-ACNC: 0.97 UIU/ML (ref 0.45–4.5)
WBC # BLD AUTO: 9.53 THOUSAND/UL (ref 4.31–10.16)

## 2024-01-12 PROCEDURE — 85025 COMPLETE CBC W/AUTO DIFF WBC: CPT

## 2024-01-12 PROCEDURE — 36415 COLL VENOUS BLD VENIPUNCTURE: CPT

## 2024-01-12 PROCEDURE — 80061 LIPID PANEL: CPT

## 2024-01-12 PROCEDURE — 80053 COMPREHEN METABOLIC PANEL: CPT

## 2024-01-12 PROCEDURE — 83036 HEMOGLOBIN GLYCOSYLATED A1C: CPT

## 2024-01-12 PROCEDURE — 82306 VITAMIN D 25 HYDROXY: CPT

## 2024-01-12 PROCEDURE — 84443 ASSAY THYROID STIM HORMONE: CPT

## 2024-01-16 ENCOUNTER — APPOINTMENT (OUTPATIENT)
Dept: PHYSICAL THERAPY | Facility: CLINIC | Age: 79
End: 2024-01-16
Payer: MEDICARE

## 2024-01-17 ENCOUNTER — RA CDI HCC (OUTPATIENT)
Dept: OTHER | Facility: HOSPITAL | Age: 79
End: 2024-01-17

## 2024-01-18 ENCOUNTER — TELEPHONE (OUTPATIENT)
Age: 79
End: 2024-01-18

## 2024-01-18 ENCOUNTER — OFFICE VISIT (OUTPATIENT)
Dept: FAMILY MEDICINE CLINIC | Facility: CLINIC | Age: 79
End: 2024-01-18
Payer: MEDICARE

## 2024-01-18 VITALS
OXYGEN SATURATION: 97 % | BODY MASS INDEX: 28.22 KG/M2 | RESPIRATION RATE: 18 BRPM | HEART RATE: 68 BPM | TEMPERATURE: 97.7 F | HEIGHT: 66 IN | WEIGHT: 175.6 LBS | DIASTOLIC BLOOD PRESSURE: 72 MMHG | SYSTOLIC BLOOD PRESSURE: 106 MMHG

## 2024-01-18 DIAGNOSIS — E78.2 MIXED HYPERLIPIDEMIA: ICD-10-CM

## 2024-01-18 DIAGNOSIS — Z12.31 ENCOUNTER FOR SCREENING MAMMOGRAM FOR BREAST CANCER: ICD-10-CM

## 2024-01-18 DIAGNOSIS — J43.8 OTHER EMPHYSEMA (HCC): ICD-10-CM

## 2024-01-18 DIAGNOSIS — I73.9 SMALL VESSEL DISEASE (HCC): ICD-10-CM

## 2024-01-18 DIAGNOSIS — Z78.0 POSTMENOPAUSE: ICD-10-CM

## 2024-01-18 DIAGNOSIS — E03.9 HYPOTHYROIDISM, UNSPECIFIED TYPE: ICD-10-CM

## 2024-01-18 DIAGNOSIS — F17.211 CIGARETTE NICOTINE DEPENDENCE IN REMISSION: ICD-10-CM

## 2024-01-18 DIAGNOSIS — R26.9 GAIT DIFFICULTY: Primary | ICD-10-CM

## 2024-01-18 DIAGNOSIS — E11.9 CONTROLLED TYPE 2 DIABETES MELLITUS WITHOUT COMPLICATION, WITHOUT LONG-TERM CURRENT USE OF INSULIN (HCC): ICD-10-CM

## 2024-01-18 DIAGNOSIS — Z00.00 MEDICARE ANNUAL WELLNESS VISIT, SUBSEQUENT: ICD-10-CM

## 2024-01-18 DIAGNOSIS — R73.09 LABILE BLOOD GLUCOSE: ICD-10-CM

## 2024-01-18 DIAGNOSIS — I10 BENIGN ESSENTIAL HYPERTENSION: ICD-10-CM

## 2024-01-18 PROCEDURE — 99214 OFFICE O/P EST MOD 30 MIN: CPT | Performed by: FAMILY MEDICINE

## 2024-01-18 PROCEDURE — G0439 PPPS, SUBSEQ VISIT: HCPCS | Performed by: FAMILY MEDICINE

## 2024-01-18 RX ORDER — BLOOD SUGAR DIAGNOSTIC
STRIP MISCELLANEOUS
Qty: 400 EACH | Refills: 1 | Status: SHIPPED | OUTPATIENT
Start: 2024-01-18

## 2024-01-18 RX ORDER — CHOLECALCIFEROL (VITAMIN D3) 125 MCG
2000 CAPSULE ORAL DAILY
COMMUNITY

## 2024-01-18 RX ORDER — PEN NEEDLE, DIABETIC 30 GX3/16"
NEEDLE, DISPOSABLE MISCELLANEOUS
Qty: 100 EACH | Refills: 3 | Status: CANCELLED | OUTPATIENT
Start: 2024-01-18

## 2024-01-18 RX ORDER — LOSARTAN POTASSIUM AND HYDROCHLOROTHIAZIDE 12.5; 5 MG/1; MG/1
1 TABLET ORAL DAILY
Qty: 90 TABLET | Refills: 1 | Status: SHIPPED | OUTPATIENT
Start: 2024-01-18

## 2024-01-18 RX ORDER — LEVOTHYROXINE SODIUM 100 MCG
100 TABLET ORAL DAILY
Qty: 90 TABLET | Refills: 1 | Status: SHIPPED | OUTPATIENT
Start: 2024-01-18

## 2024-01-18 RX ORDER — METFORMIN HYDROCHLORIDE 500 MG/1
500 TABLET, EXTENDED RELEASE ORAL 2 TIMES DAILY WITH MEALS
Qty: 180 TABLET | Refills: 1 | Status: SHIPPED | OUTPATIENT
Start: 2024-01-18

## 2024-01-18 RX ORDER — LANCETS 30 GAUGE
EACH MISCELLANEOUS
Qty: 200 EACH | Refills: 3 | Status: SHIPPED | OUTPATIENT
Start: 2024-01-18

## 2024-01-18 NOTE — TELEPHONE ENCOUNTER
PA for tirzepatide (Mounjaro) 2.5 MG/0.5ML    Submitted via  []CMM-KEY   [x][employer]-Case ID # [id number]  []Faxed to plan   []Other website   []Phone call Case ID #     Office notes sent, clinical questions answered. Awaiting determination

## 2024-01-18 NOTE — ASSESSMENT & PLAN NOTE
Victoza no longer covered by insurance  Mounjaro is on formulary  Will start at 2.5 mg weekly, titrate every 4 weeks as tolerated.  If this is working well for her, she may be able to stop the metformin and use just mounjaro for BS control.  No known family or personal hx of medullary thyroid cancer or MEN-2, however does not really know family hx.  No personal hx of pancreatitis.  Discussed risks/benefits of medication        Lab Results   Component Value Date    HGBA1C 7.1 (H) 01/12/2024
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
## 2024-01-18 NOTE — TELEPHONE ENCOUNTER
PA for tirzepatide (Mounjaro) 2.5 MG/0.5ML Approved     Date(s) approved 1- to 12-        Patient advised by [x] [employer] Message                      [] Phone call       Pharmacy advised by [x]Fax                                     []Phone call    Approval letter scanned into [employer] Yes

## 2024-01-18 NOTE — PATIENT INSTRUCTIONS
Medicare Preventive Visit Patient Instructions  Thank you for completing your Welcome to Medicare Visit or Medicare Annual Wellness Visit today. Your next wellness visit will be due in one year (1/18/2025).  The screening/preventive services that you may require over the next 5-10 years are detailed below. Some tests may not apply to you based off risk factors and/or age. Screening tests ordered at today's visit but not completed yet may show as past due. Also, please note that scanned in results may not display below.  Preventive Screenings:  Service Recommendations Previous Testing/Comments   Colorectal Cancer Screening  * Colonoscopy    * Fecal Occult Blood Test (FOBT)/Fecal Immunochemical Test (FIT)  * Fecal DNA/Cologuard Test  * Flexible Sigmoidoscopy Age: 45-75 years old   Colonoscopy: every 10 years (may be performed more frequently if at higher risk)  OR  FOBT/FIT: every 1 year  OR  Cologuard: every 3 years  OR  Sigmoidoscopy: every 5 years  Screening may be recommended earlier than age 45 if at higher risk for colorectal cancer. Also, an individualized decision between you and your healthcare provider will decide whether screening between the ages of 76-85 would be appropriate. Colonoscopy: 06/27/2023  FOBT/FIT: 06/27/2023  Cologuard: 06/27/2023  Sigmoidoscopy: 06/27/2023          Breast Cancer Screening Age: 40+ years old  Frequency: every 1-2 years  Not required if history of left and right mastectomy Mammogram: 05/26/2021        Cervical Cancer Screening Between the ages of 21-29, pap smear recommended once every 3 years.   Between the ages of 30-65, can perform pap smear with HPV co-testing every 5 years.   Recommendations may differ for women with a history of total hysterectomy, cervical cancer, or abnormal pap smears in past. Pap Smear: Not on file    Screening Not Indicated   Hepatitis C Screening Once for adults born between 1945 and 1965  More frequently in patients at high risk for Hepatitis C  Hep C Antibody: 10/31/2017    Screening Current   Diabetes Screening 1-2 times per year if you're at risk for diabetes or have pre-diabetes Fasting glucose: 165 mg/dL (1/12/2024)  A1C: 7.1 % (1/12/2024)  Screening Not Indicated  History Diabetes   Cholesterol Screening Once every 5 years if you don't have a lipid disorder. May order more often based on risk factors. Lipid panel: 01/12/2024    Screening Not Indicated  History Lipid Disorder     Other Preventive Screenings Covered by Medicare:  Abdominal Aortic Aneurysm (AAA) Screening: covered once if your at risk. You're considered to be at risk if you have a family history of AAA.  Lung Cancer Screening: covers low dose CT scan once per year if you meet all of the following conditions: (1) Age 55-77; (2) No signs or symptoms of lung cancer; (3) Current smoker or have quit smoking within the last 15 years; (4) You have a tobacco smoking history of at least 20 pack years (packs per day multiplied by number of years you smoked); (5) You get a written order from a healthcare provider.  Glaucoma Screening: covered annually if you're considered high risk: (1) You have diabetes OR (2) Family history of glaucoma OR (3)  aged 50 and older OR (4)  American aged 65 and older  Osteoporosis Screening: covered every 2 years if you meet one of the following conditions: (1) You're estrogen deficient and at risk for osteoporosis based off medical history and other findings; (2) Have a vertebral abnormality; (3) On glucocorticoid therapy for more than 3 months; (4) Have primary hyperparathyroidism; (5) On osteoporosis medications and need to assess response to drug therapy.   Last bone density test (DXA Scan): 03/30/2017.  HIV Screening: covered annually if you're between the age of 15-65. Also covered annually if you are younger than 15 and older than 65 with risk factors for HIV infection. For pregnant patients, it is covered up to 3 times per  pregnancy.    Immunizations:  Immunization Recommendations   Influenza Vaccine Annual influenza vaccination during flu season is recommended for all persons aged >= 6 months who do not have contraindications   Pneumococcal Vaccine   * Pneumococcal conjugate vaccine = PCV13 (Prevnar 13), PCV15 (Vaxneuvance), PCV20 (Prevnar 20)  * Pneumococcal polysaccharide vaccine = PPSV23 (Pneumovax) Adults 19-65 yo with certain risk factors or if 65+ yo  If never received any pneumonia vaccine: recommend Prevnar 20 (PCV20)  Give PCV20 if previously received 1 dose of PCV13 or PPSV23   Hepatitis B Vaccine 3 dose series if at intermediate or high risk (ex: diabetes, end stage renal disease, liver disease)   Respiratory syncytial virus (RSV) Vaccine - COVERED BY MEDICARE PART D  * RSVPreF3 (Arexvy) CDC recommends that adults 60 years of age and older may receive a single dose of RSV vaccine using shared clinical decision-making (SCDM)   Tetanus (Td) Vaccine - COST NOT COVERED BY MEDICARE PART B Following completion of primary series, a booster dose should be given every 10 years to maintain immunity against tetanus. Td may also be given as tetanus wound prophylaxis.   Tdap Vaccine - COST NOT COVERED BY MEDICARE PART B Recommended at least once for all adults. For pregnant patients, recommended with each pregnancy.   Shingles Vaccine (Shingrix) - COST NOT COVERED BY MEDICARE PART B  2 shot series recommended in those 19 years and older who have or will have weakened immune systems or those 50 years and older     Health Maintenance Due:      Topic Date Due   • DXA SCAN  03/30/2022   • Breast Cancer Screening: Mammogram  05/26/2022   • Hepatitis C Screening  Completed   • Lung Cancer Screening  Discontinued   • Colorectal Cancer Screening  Discontinued     Immunizations Due:      Topic Date Due   • COVID-19 Vaccine (5 - 2023-24 season) 09/01/2023     Advance Directives   What are advance directives?  Advance directives are legal  documents that state your wishes and plans for medical care. These plans are made ahead of time in case you lose your ability to make decisions for yourself. Advance directives can apply to any medical decision, such as the treatments you want, and if you want to donate organs.   What are the types of advance directives?  There are many types of advance directives, and each state has rules about how to use them. You may choose a combination of any of the following:  Living will:  This is a written record of the treatment you want. You can also choose which treatments you do not want, which to limit, and which to stop at a certain time. This includes surgery, medicine, IV fluid, and tube feedings.   Durable power of  for healthcare (DPAHC):  This is a written record that states who you want to make healthcare choices for you when you are unable to make them for yourself. This person, called a proxy, is usually a family member or a friend. You may choose more than 1 proxy.  Do not resuscitate (DNR) order:  A DNR order is used in case your heart stops beating or you stop breathing. It is a request not to have certain forms of treatment, such as CPR. A DNR order may be included in other types of advance directives.  Medical directive:  This covers the care that you want if you are in a coma, near death, or unable to make decisions for yourself. You can list the treatments you want for each condition. Treatment may include pain medicine, surgery, blood transfusions, dialysis, IV or tube feedings, and a ventilator (breathing machine).  Values history:  This document has questions about your views, beliefs, and how you feel and think about life. This information can help others choose the care that you would choose.  Why are advance directives important?  An advance directive helps you control your care. Although spoken wishes may be used, it is better to have your wishes written down. Spoken wishes can be  misunderstood, or not followed. Treatments may be given even if you do not want them. An advance directive may make it easier for your family to make difficult choices about your care.   Fall Prevention    Fall prevention  includes ways to make your home and other areas safer. It also includes ways you can move more carefully to prevent a fall. Health conditions that cause changes in your blood pressure, vision, or muscle strength and coordination may increase your risk for falls. Medicines may also increase your risk for falls if they make you dizzy, weak, or sleepy.   Fall prevention tips:   Stand or sit up slowly.    Use assistive devices as directed.    Wear shoes that fit well and have soles that .    Wear a personal alarm.    Stay active.    Manage your medical conditions.    Home Safety Tips:  Add items to prevent falls in the bathroom.    Keep paths clear.    Install bright lights in your home.    Keep items you use often on shelves within reach.    Paint or place reflective tape on the edges of your stairs.    Weight Management   Why it is important to manage your weight:  Being overweight increases your risk of health conditions such as heart disease, high blood pressure, type 2 diabetes, and certain types of cancer. It can also increase your risk for osteoarthritis, sleep apnea, and other respiratory problems. Aim for a slow, steady weight loss. Even a small amount of weight loss can lower your risk of health problems.  How to lose weight safely:  A safe and healthy way to lose weight is to eat fewer calories and get regular exercise. You can lose up about 1 pound a week by decreasing the number of calories you eat by 500 calories each day.   Healthy meal plan for weight management:  A healthy meal plan includes a variety of foods, contains fewer calories, and helps you stay healthy. A healthy meal plan includes the following:  Eat whole-grain foods more often.  A healthy meal plan should contain  fiber. Fiber is the part of grains, fruits, and vegetables that is not broken down by your body. Whole-grain foods are healthy and provide extra fiber in your diet. Some examples of whole-grain foods are whole-wheat breads and pastas, oatmeal, brown rice, and bulgur.  Eat a variety of vegetables every day.  Include dark, leafy greens such as spinach, kale, avery greens, and mustard greens. Eat yellow and orange vegetables such as carrots, sweet potatoes, and winter squash.   Eat a variety of fruits every day.  Choose fresh or canned fruit (canned in its own juice or light syrup) instead of juice. Fruit juice has very little or no fiber.  Eat low-fat dairy foods.  Drink fat-free (skim) milk or 1% milk. Eat fat-free yogurt and low-fat cottage cheese. Try low-fat cheeses such as mozzarella and other reduced-fat cheeses.  Choose meat and other protein foods that are low in fat.  Choose beans or other legumes such as split peas or lentils. Choose fish, skinless poultry (chicken or turkey), or lean cuts of red meat (beef or pork). Before you cook meat or poultry, cut off any visible fat.   Use less fat and oil.  Try baking foods instead of frying them. Add less fat, such as margarine, sour cream, regular salad dressing and mayonnaise to foods. Eat fewer high-fat foods. Some examples of high-fat foods include french fries, doughnuts, ice cream, and cakes.  Eat fewer sweets.  Limit foods and drinks that are high in sugar. This includes candy, cookies, regular soda, and sweetened drinks.  Exercise:  Exercise at least 30 minutes per day on most days of the week. Some examples of exercise include walking, biking, dancing, and swimming. You can also fit in more physical activity by taking the stairs instead of the elevator or parking farther away from stores. Ask your healthcare provider about the best exercise plan for you.      © Copyright [employer] 2018 Information is for End User's use only and may not be sold,  redistributed or otherwise used for commercial purposes. All illustrations and images included in CareNotes® are the copyrighted property of [email or web address] or [employer]

## 2024-01-21 NOTE — ASSESSMENT & PLAN NOTE
Has taken crestor in the past but d/c'd   Prefers to stay off statin  Reviewed recent [facility] Screening- very mildly low MARJORIE (0.85, with normal starting at 0.89)  Discussed importance of blood pressure and cholesterol mgmt.

## 2024-01-23 ENCOUNTER — OFFICE VISIT (OUTPATIENT)
Dept: PHYSICAL THERAPY | Facility: CLINIC | Age: 79
End: 2024-01-23
Payer: MEDICARE

## 2024-01-23 DIAGNOSIS — G89.29 CHRONIC PAIN OF BOTH KNEES: ICD-10-CM

## 2024-01-23 DIAGNOSIS — M25.562 CHRONIC PAIN OF BOTH KNEES: ICD-10-CM

## 2024-01-23 DIAGNOSIS — R26.2 AMBULATORY DYSFUNCTION: Primary | ICD-10-CM

## 2024-01-23 DIAGNOSIS — R53.81 PHYSICAL DECONDITIONING: ICD-10-CM

## 2024-01-23 DIAGNOSIS — M25.561 CHRONIC PAIN OF BOTH KNEES: ICD-10-CM

## 2024-01-23 PROCEDURE — 97110 THERAPEUTIC EXERCISES: CPT | Performed by: PHYSICAL THERAPIST

## 2024-01-23 NOTE — PROGRESS NOTES
"Daily Note     Today's date: 2024  Patient name: Chanell Bourgeois  : 1945  MRN: [id number]  Referring provider: Laine Dee MD  Dx:   Encounter Diagnosis     ICD-10-CM    1. Ambulatory dysfunction  R26.2       2. Physical deconditioning  R53.81       3. Chronic pain of both knees  M25.561     M25.562     G89.29                      Subjective: Patient notes was sore for a few days after last time. Has to cut this visit short due to prior engagements.      Objective: See treatment diary below      Assessment: Tolerated treatment well. Patient would benefit from continued PT. Treatment plan remained at same intensity due to excessive soreness after last visit.      Plan: Progress treatment as tolerated.       Precautions: recent 4 injections 23, DM, HTN, Depth Perception Issues, NEEDS TO LIE SEMI RECUMBENT       Manuals                                                              Neuro Re-Ed             Tandem stance 2x10\" B 2x10\" holds B  2x10\" holds B          Side steps 2 laps  2 laps 2 laps           Tandem walking  2 laps  2 laps                                                               Ther Ex             TM 6 min 1 mph  11 min 1 mph  8 min 1 mph  8 min 1.2 mph          Seated hip flexion 20x B  20x B  20x B 25x B         LAQ * 20x  18x B 20x B 17x B         Bridges *             Seated hip add             Seated hip abd             Seated hamstring curls                           Ther Activity             STS 5x from big chair and 1 foam painful  8x from chair with 1 foam 4x from chair no foam  8x from chair with 1 foam                       Gait Training                                       Modalities                                            "

## 2024-01-25 ENCOUNTER — APPOINTMENT (OUTPATIENT)
Dept: PHYSICAL THERAPY | Facility: CLINIC | Age: 79
End: 2024-01-25
Payer: MEDICARE

## 2024-01-30 ENCOUNTER — OFFICE VISIT (OUTPATIENT)
Dept: PHYSICAL THERAPY | Facility: CLINIC | Age: 79
End: 2024-01-30
Payer: MEDICARE

## 2024-01-30 DIAGNOSIS — M25.562 CHRONIC PAIN OF BOTH KNEES: ICD-10-CM

## 2024-01-30 DIAGNOSIS — R26.2 AMBULATORY DYSFUNCTION: Primary | ICD-10-CM

## 2024-01-30 DIAGNOSIS — G89.29 CHRONIC PAIN OF BOTH KNEES: ICD-10-CM

## 2024-01-30 DIAGNOSIS — M25.561 CHRONIC PAIN OF BOTH KNEES: ICD-10-CM

## 2024-01-30 PROCEDURE — 97110 THERAPEUTIC EXERCISES: CPT | Performed by: PHYSICAL THERAPIST

## 2024-01-30 PROCEDURE — 97530 THERAPEUTIC ACTIVITIES: CPT | Performed by: PHYSICAL THERAPIST

## 2024-01-30 PROCEDURE — 97112 NEUROMUSCULAR REEDUCATION: CPT | Performed by: PHYSICAL THERAPIST

## 2024-01-30 NOTE — PROGRESS NOTES
"Daily Note     Today's date: 2024  Patient name: Chanell Bourgeois  : 1945  MRN: [id number]  Referring provider: Laine Dee MD  Dx:   Encounter Diagnosis     ICD-10-CM    1. Ambulatory dysfunction  R26.2       2. Chronic pain of both knees  M25.561     M25.562     G89.29                      Subjective: Patient notes     Objective: See treatment diary below      Assessment: Tolerated treatment well. Patient would benefit from continued PT. Treatment plan remained at same intensity due to excessive soreness after last visit.      Plan: Progress treatment as tolerated.       Precautions: recent 4 injections 23, DM, HTN, Depth Perception Issues, NEEDS TO LIE SEMI RECUMBENT       Manuals                                                             Neuro Re-Ed             Tandem stance 2x10\" B 2x10\" holds B  2x10\" holds B  2x 10\" holds B        Side steps 2 laps  2 laps 2 laps           Tandem walking  2 laps  2 laps                                                               Ther Ex             TM 6 min 1 mph  11 min 1 mph  8 min 1 mph  8 min 1.2 mph  8 min 1.2 mph         Seated hip flexion 20x B  20x B  20x B 25x B 25x B        LAQ * 20x  18x B 20x B 17x B 20x B        Bridges *             Seated hip add     10x 5\" holds         Seated hip abd             Seated hamstring curls      10x B grn                     Ther Activity             STS 5x from big chair and 1 foam painful  8x from chair with 1 foam 4x from chair no foam  8x from chair with 1 foam  8x from chair with 1 foam                      Gait Training                                       Modalities                                            "

## 2024-02-01 ENCOUNTER — APPOINTMENT (OUTPATIENT)
Dept: PHYSICAL THERAPY | Facility: CLINIC | Age: 79
End: 2024-02-01
Payer: MEDICARE

## 2024-02-05 DIAGNOSIS — E11.9 CONTROLLED TYPE 2 DIABETES MELLITUS WITHOUT COMPLICATION, WITHOUT LONG-TERM CURRENT USE OF INSULIN (HCC): ICD-10-CM

## 2024-02-06 ENCOUNTER — PATIENT MESSAGE (OUTPATIENT)
Dept: FAMILY MEDICINE CLINIC | Facility: CLINIC | Age: 79
End: 2024-02-06

## 2024-02-06 ENCOUNTER — APPOINTMENT (OUTPATIENT)
Dept: PHYSICAL THERAPY | Facility: CLINIC | Age: 79
End: 2024-02-06
Payer: MEDICARE

## 2024-02-06 RX ORDER — TIRZEPATIDE 2.5 MG/.5ML
INJECTION, SOLUTION SUBCUTANEOUS
Qty: 2 ML | Refills: 0 | OUTPATIENT
Start: 2024-02-06

## 2024-02-08 ENCOUNTER — APPOINTMENT (OUTPATIENT)
Dept: PHYSICAL THERAPY | Facility: CLINIC | Age: 79
End: 2024-02-08
Payer: MEDICARE

## 2024-02-13 ENCOUNTER — APPOINTMENT (OUTPATIENT)
Dept: PHYSICAL THERAPY | Facility: CLINIC | Age: 79
End: 2024-02-13
Payer: MEDICARE

## 2024-02-19 ENCOUNTER — APPOINTMENT (OUTPATIENT)
Dept: PHYSICAL THERAPY | Facility: CLINIC | Age: 79
End: 2024-02-19
Payer: MEDICARE

## 2024-02-22 ENCOUNTER — APPOINTMENT (OUTPATIENT)
Dept: PHYSICAL THERAPY | Facility: CLINIC | Age: 79
End: 2024-02-22
Payer: MEDICARE

## 2024-02-26 ENCOUNTER — OFFICE VISIT (OUTPATIENT)
Dept: PHYSICAL THERAPY | Facility: CLINIC | Age: 79
End: 2024-02-26
Payer: MEDICARE

## 2024-02-26 DIAGNOSIS — G89.29 CHRONIC PAIN OF BOTH KNEES: ICD-10-CM

## 2024-02-26 DIAGNOSIS — R26.2 AMBULATORY DYSFUNCTION: Primary | ICD-10-CM

## 2024-02-26 DIAGNOSIS — M25.562 CHRONIC PAIN OF BOTH KNEES: ICD-10-CM

## 2024-02-26 DIAGNOSIS — M25.561 CHRONIC PAIN OF BOTH KNEES: ICD-10-CM

## 2024-02-26 PROCEDURE — 97112 NEUROMUSCULAR REEDUCATION: CPT | Performed by: PHYSICAL THERAPIST

## 2024-02-26 PROCEDURE — 97110 THERAPEUTIC EXERCISES: CPT | Performed by: PHYSICAL THERAPIST

## 2024-02-26 NOTE — PROGRESS NOTES
"Daily Note     Today's date: 2024  Patient name: Chanell Bourgeois  : 1945  MRN: [id number]  Referring provider: Laine Dee MD  Dx:   Encounter Diagnosis     ICD-10-CM    1. Ambulatory dysfunction  R26.2       2. Chronic pain of both knees  M25.561     M25.562     G89.29                      Subjective: Patient notes had injections in neck but not as effective as last time got injections. Also still getting some pain in neck and headaches from injections but first day feeling better from them.    Objective: See treatment diary below      Assessment: Tolerated treatment well. Patient would benefit from continued PT. T    Plan: Progress treatment as tolerated.       Precautions: recent 4 injections 23, DM, HTN, Depth Perception Issues, NEEDS TO LIE SEMI RECUMBENT       Manuals                                                            Neuro Re-Ed             Tandem stance 2x10\" B 2x10\" holds B  2x10\" holds B  2x 10\" holds B        Side steps 2 laps  2 laps 2 laps           Tandem walking  2 laps  2 laps                                                               Ther Ex             TM 6 min 1 mph  11 min 1 mph  8 min 1 mph  8 min 1.2 mph  8 min 1.2 mph  5 min 1.2 mph        Seated hip flexion 20x B  20x B  20x B 25x B 25x B 25x B       LAQ * 20x  18x B 20x B 17x B 20x B 20x B       Bridges *             Seated hip add     10x 5\" holds  -       Seated hip abd             Seated hamstring curls      10x B grn -                    Ther Activity             STS 5x from big chair and 1 foam painful  8x from chair with 1 foam 4x from chair no foam  8x from chair with 1 foam  8x from chair with 1 foam  8x from chair with 1 foam                    Gait Training                                       Modalities                                            "

## 2024-02-29 ENCOUNTER — OFFICE VISIT (OUTPATIENT)
Dept: PHYSICAL THERAPY | Facility: CLINIC | Age: 79
End: 2024-02-29
Payer: MEDICARE

## 2024-02-29 DIAGNOSIS — R26.2 AMBULATORY DYSFUNCTION: Primary | ICD-10-CM

## 2024-02-29 DIAGNOSIS — M25.562 CHRONIC PAIN OF BOTH KNEES: ICD-10-CM

## 2024-02-29 DIAGNOSIS — M25.561 CHRONIC PAIN OF BOTH KNEES: ICD-10-CM

## 2024-02-29 DIAGNOSIS — G89.29 CHRONIC PAIN OF BOTH KNEES: ICD-10-CM

## 2024-02-29 PROCEDURE — 97112 NEUROMUSCULAR REEDUCATION: CPT | Performed by: PHYSICAL THERAPIST

## 2024-02-29 PROCEDURE — 97110 THERAPEUTIC EXERCISES: CPT | Performed by: PHYSICAL THERAPIST

## 2024-02-29 NOTE — PROGRESS NOTES
"Daily Note     Today's date: 2024  Patient name: Chanell Bourgeois  : 1945  MRN: [id number]  Referring provider: Laine Dee MD  Dx:   Encounter Diagnosis     ICD-10-CM    1. Ambulatory dysfunction  R26.2       2. Chronic pain of both knees  M25.561     M25.562     G89.29                      Subjective: Patient notes wasn't sore after last visit.    Objective: See treatment diary below      Assessment: Tolerated treatment well. Patient would benefit from continued PT. T    Plan: Progress treatment as tolerated.       Precautions: recent 4 injections 23, DM, HTN, Depth Perception Issues, NEEDS TO LIE SEMI RECUMBENT       Manuals                                                           Neuro Re-Ed             Tandem stance 2x10\" B 2x10\" holds B  2x10\" holds B  2x 10\" holds B        Side steps 2 laps  2 laps 2 laps           Tandem walking  2 laps  2 laps                                                               Ther Ex             TM 6 min 1 mph  11 min 1 mph  8 min 1 mph  8 min 1.2 mph  8 min 1.2 mph  5 min 1.2 mph  6 min 1.2 mph       Seated hip flexion 20x B  20x B  20x B 25x B 25x B 25x B 25x B      LAQ * 20x  18x B 20x B 17x B 20x B 20x B 20x B      Bridges *             Seated hip add     10x 5\" holds  -       Seated hip abd             Seated hamstring curls      10x B grn -                    Ther Activity             STS 5x from big chair and 1 foam painful  8x from chair with 1 foam 4x from chair no foam  8x from chair with 1 foam  8x from chair with 1 foam  8x from chair with 1 foam 8x from chair with 1 foam                    Gait Training                                       Modalities                                            "

## 2024-03-04 ENCOUNTER — OFFICE VISIT (OUTPATIENT)
Dept: PHYSICAL THERAPY | Facility: CLINIC | Age: 79
End: 2024-03-04
Payer: MEDICARE

## 2024-03-04 DIAGNOSIS — M25.562 CHRONIC PAIN OF BOTH KNEES: ICD-10-CM

## 2024-03-04 DIAGNOSIS — M25.561 CHRONIC PAIN OF BOTH KNEES: ICD-10-CM

## 2024-03-04 DIAGNOSIS — R26.2 AMBULATORY DYSFUNCTION: Primary | ICD-10-CM

## 2024-03-04 DIAGNOSIS — G89.29 CHRONIC PAIN OF BOTH KNEES: ICD-10-CM

## 2024-03-04 PROCEDURE — 97110 THERAPEUTIC EXERCISES: CPT | Performed by: PHYSICAL THERAPIST

## 2024-03-04 PROCEDURE — 97112 NEUROMUSCULAR REEDUCATION: CPT | Performed by: PHYSICAL THERAPIST

## 2024-03-04 NOTE — PROGRESS NOTES
"Daily Note     Today's date: 3/4/2024  Patient name: Chanell Bourgeois  : 1945  MRN: [id number]  Referring provider: Laine Dee MD  Dx:   Encounter Diagnosis     ICD-10-CM    1. Ambulatory dysfunction  R26.2       2. Chronic pain of both knees  M25.561     M25.562     G89.29                      Subjective: Patient notes upper body was sore after last visit due to clenching her neck when she did STS without use of UEs.    Objective: See treatment diary below      Assessment: Tolerated treatment well. Patient would benefit from continued PT.     Plan: Progress treatment as tolerated.       Precautions: recent 4 injections 23, DM, HTN, Depth Perception Issues, NEEDS TO LIE SEMI RECUMBENT       Manuals 12/5 12/12 1/11 1/23 1/30 2/26 2/29 3/4                                                         Neuro Re-Ed             Tandem stance 2x10\" B 2x10\" holds B  2x10\" holds B  2x 10\" holds B        Side steps 2 laps  2 laps 2 laps      2 laps     Tandem walking  2 laps  2 laps      2 laps                                                         Ther Ex             TM 6 min 1 mph  11 min 1 mph  8 min 1 mph  8 min 1.2 mph  8 min 1.2 mph  5 min 1.2 mph  6 min 1.2 mph  6 min 1.2 mph     Seated hip flexion 20x B  20x B  20x B 25x B 25x B 25x B 25x B 25x B     LAQ * 20x  18x B 20x B 17x B 20x B 20x B 20x B 25x B      Bridges *             Seated hip add     10x 5\" holds  -       Seated hip abd             Seated hamstring curls      10x B grn -                    Ther Activity             STS 5x from big chair and 1 foam painful  8x from chair with 1 foam 4x from chair no foam  8x from chair with 1 foam  8x from chair with 1 foam  8x from chair with 1 foam 8x from chair with 1 foam  8x from chair with 1 foam                  Gait Training                                       Modalities                                            "

## 2024-03-07 ENCOUNTER — APPOINTMENT (OUTPATIENT)
Dept: PHYSICAL THERAPY | Facility: CLINIC | Age: 79
End: 2024-03-07
Payer: MEDICARE

## 2024-03-11 ENCOUNTER — OFFICE VISIT (OUTPATIENT)
Dept: PHYSICAL THERAPY | Facility: CLINIC | Age: 79
End: 2024-03-11
Payer: MEDICARE

## 2024-03-11 DIAGNOSIS — G89.29 CHRONIC PAIN OF BOTH KNEES: ICD-10-CM

## 2024-03-11 DIAGNOSIS — M25.562 CHRONIC PAIN OF BOTH KNEES: ICD-10-CM

## 2024-03-11 DIAGNOSIS — R26.2 AMBULATORY DYSFUNCTION: Primary | ICD-10-CM

## 2024-03-11 DIAGNOSIS — M25.561 CHRONIC PAIN OF BOTH KNEES: ICD-10-CM

## 2024-03-11 PROCEDURE — 97112 NEUROMUSCULAR REEDUCATION: CPT | Performed by: PHYSICAL THERAPIST

## 2024-03-11 PROCEDURE — 97110 THERAPEUTIC EXERCISES: CPT | Performed by: PHYSICAL THERAPIST

## 2024-03-11 PROCEDURE — 97530 THERAPEUTIC ACTIVITIES: CPT | Performed by: PHYSICAL THERAPIST

## 2024-03-11 NOTE — PROGRESS NOTES
"Daily Note     Today's date: 3/11/2024  Patient name: Chanell Bourgeois  : 1945  MRN: [id number]  Referring provider: Laine Dee MD  Dx:   Encounter Diagnosis     ICD-10-CM    1. Ambulatory dysfunction  R26.2       2. Chronic pain of both knees  M25.561     M25.562     G89.29                      Subjective: Patient notes sore today as is having to  her dog more often.    Objective: See treatment diary below      Assessment: Tolerated treatment well. Patient would benefit from continued PT.     Plan: Progress treatment as tolerated.       Precautions: recent 4 injections 23, DM, HTN, Depth Perception Issues, NEEDS TO LIE SEMI RECUMBENT       Manuals 12/5 12/12 1/11 1/23 1/30 2/26 2/29 3/4 3/11                                                        Neuro Re-Ed             Tandem stance 2x10\" B 2x10\" holds B  2x10\" holds B  2x 10\" holds B        Side steps 2 laps  2 laps 2 laps      2 laps 2 laps    Tandem walking  2 laps  2 laps      2 laps 2 laps                                                        Ther Ex             TM 6 min 1 mph  11 min 1 mph  8 min 1 mph  8 min 1.2 mph  8 min 1.2 mph  5 min 1.2 mph  6 min 1.2 mph  6 min 1.2 mph 6 min 1.2 mph    Seated hip flexion 20x B  20x B  20x B 25x B 25x B 25x B 25x B 25x B 20x B     LAQ * 20x  18x B 20x B 17x B 20x B 20x B 20x B 25x B  20x b     Bridges *             Seated hip add     10x 5\" holds  -       Seated hip abd             Seated hamstring curls      10x B grn -                    Ther Activity             STS 5x from big chair and 1 foam painful  8x from chair with 1 foam 4x from chair no foam  8x from chair with 1 foam  8x from chair with 1 foam  8x from chair with 1 foam 8x from chair with 1 foam  8x from chair with 1 foam 10x from chair w/ 1 foam                  Gait Training                                       Modalities                                            "

## 2024-03-14 ENCOUNTER — APPOINTMENT (OUTPATIENT)
Dept: PHYSICAL THERAPY | Facility: CLINIC | Age: 79
End: 2024-03-14
Payer: MEDICARE

## 2024-03-18 ENCOUNTER — OFFICE VISIT (OUTPATIENT)
Dept: PHYSICAL THERAPY | Facility: CLINIC | Age: 79
End: 2024-03-18
Payer: MEDICARE

## 2024-03-18 DIAGNOSIS — M25.562 CHRONIC PAIN OF BOTH KNEES: ICD-10-CM

## 2024-03-18 DIAGNOSIS — R26.2 AMBULATORY DYSFUNCTION: Primary | ICD-10-CM

## 2024-03-18 DIAGNOSIS — M25.561 CHRONIC PAIN OF BOTH KNEES: ICD-10-CM

## 2024-03-18 DIAGNOSIS — G89.29 CHRONIC PAIN OF BOTH KNEES: ICD-10-CM

## 2024-03-18 PROCEDURE — 97110 THERAPEUTIC EXERCISES: CPT | Performed by: PHYSICAL THERAPIST

## 2024-03-18 PROCEDURE — 97530 THERAPEUTIC ACTIVITIES: CPT | Performed by: PHYSICAL THERAPIST

## 2024-03-18 PROCEDURE — 97112 NEUROMUSCULAR REEDUCATION: CPT | Performed by: PHYSICAL THERAPIST

## 2024-03-18 NOTE — PROGRESS NOTES
"Daily Note     Today's date: 3/18/2024  Patient name: Chanell Bourgeois  : 1945  MRN: [id number]  Referring provider: Laine Dee MD  Dx:   Encounter Diagnosis     ICD-10-CM    1. Ambulatory dysfunction  R26.2       2. Chronic pain of both knees  M25.561     M25.562     G89.29                      Subjective: Patient notes felt uneasy walking at the store over the weekend.     Objective: See treatment diary below      Assessment: Tolerated treatment well. Patient would benefit from continued PT.     Plan: Progress treatment as tolerated.       Precautions: recent 4 injections 23, DM, HTN, Depth Perception Issues, NEEDS TO LIE SEMI RECUMBENT       Manuals 12/5 12/12 1/11 1/23 1/30 2/26 2/29 3/4 3/11 3/18                                                       Neuro Re-Ed             Tandem stance 2x10\" B 2x10\" holds B  2x10\" holds B  2x 10\" holds B        Side steps 2 laps  2 laps 2 laps      2 laps 2 laps 3 laps   Tandem walking  2 laps  2 laps      2 laps 2 laps 2 laps                                                        Ther Ex             TM 6 min 1 mph  11 min 1 mph  8 min 1 mph  8 min 1.2 mph  8 min 1.2 mph  5 min 1.2 mph  6 min 1.2 mph  6 min 1.2 mph 6 min 1.2 mph 6 min 1.2 mph    Seated hip flexion 20x B  20x B  20x B 25x B 25x B 25x B 25x B 25x B 20x B  20x B   LAQ * 20x  18x B 20x B 17x B 20x B 20x B 20x B 25x B  20x b  25x B   Bridges *             Seated hip add     10x 5\" holds  -       Seated hip abd             Seated hamstring curls      10x B grn -                    Ther Activity             STS 5x from big chair and 1 foam painful  8x from chair with 1 foam 4x from chair no foam  8x from chair with 1 foam  8x from chair with 1 foam  8x from chair with 1 foam 8x from chair with 1 foam  8x from chair with 1 foam 10x from chair w/ 1 foam  10x from chair w/ 1 foam                 Gait Training                                       Modalities                                            "

## 2024-03-21 ENCOUNTER — APPOINTMENT (OUTPATIENT)
Dept: PHYSICAL THERAPY | Facility: CLINIC | Age: 79
End: 2024-03-21
Payer: MEDICARE

## 2024-03-25 ENCOUNTER — OFFICE VISIT (OUTPATIENT)
Dept: PHYSICAL THERAPY | Facility: CLINIC | Age: 79
End: 2024-03-25
Payer: MEDICARE

## 2024-03-25 DIAGNOSIS — M25.562 CHRONIC PAIN OF BOTH KNEES: ICD-10-CM

## 2024-03-25 DIAGNOSIS — M25.561 CHRONIC PAIN OF BOTH KNEES: ICD-10-CM

## 2024-03-25 DIAGNOSIS — R26.2 AMBULATORY DYSFUNCTION: Primary | ICD-10-CM

## 2024-03-25 DIAGNOSIS — G89.29 CHRONIC PAIN OF BOTH KNEES: ICD-10-CM

## 2024-03-25 PROCEDURE — 97110 THERAPEUTIC EXERCISES: CPT | Performed by: PHYSICAL THERAPIST

## 2024-03-25 PROCEDURE — 97530 THERAPEUTIC ACTIVITIES: CPT | Performed by: PHYSICAL THERAPIST

## 2024-03-25 PROCEDURE — 97112 NEUROMUSCULAR REEDUCATION: CPT | Performed by: PHYSICAL THERAPIST

## 2024-03-25 NOTE — PROGRESS NOTES
"Daily Note     Today's date: 3/25/2024  Patient name: Chanell Bourgeois  : 1945  MRN: [id number]  Referring provider: Laine Dee MD  Dx:   Encounter Diagnosis     ICD-10-CM    1. Ambulatory dysfunction  R26.2       2. Chronic pain of both knees  M25.561     M25.562     G89.29             Start Time: 1445          Subjective: Patient notes increased pain due to side effects from new meds.     Objective: See treatment diary below      Assessment: Tolerated treatment well. Patient would benefit from continued PT.     Plan: Progress treatment as tolerated.       Precautions: recent 4 injections 23, DM, HTN, Depth Perception Issues, NEEDS TO LIE SEMI RECUMBENT       Manuals 1/11 1/23 1/30 2/26 2/29 3/4 3/11 3/18 3/25                                                   Neuro Re-Ed            Tandem stance 2x10\" holds B  2x 10\" holds B         Side steps 2 laps      2 laps 2 laps 3 laps 4 laps   Tandem walking 2 laps      2 laps 2 laps 2 laps  4 laps                                                   Ther Ex            TM 8 min 1 mph  8 min 1.2 mph  8 min 1.2 mph  5 min 1.2 mph  6 min 1.2 mph  6 min 1.2 mph 6 min 1.2 mph 6 min 1.2 mph  6 min 1.2 mph    Seated hip flexion 20x B 25x B 25x B 25x B 25x B 25x B 20x B  20x B 20x B    LAQ * 20x B 17x B 20x B 20x B 20x B 25x B  20x b  25x B 19x B   Bridges *            Seated hip add   10x 5\" holds  -        Seated hip abd            Seated hamstring curls    10x B grn -                    Ther Activity            STS 4x from chair no foam  8x from chair with 1 foam  8x from chair with 1 foam  8x from chair with 1 foam 8x from chair with 1 foam  8x from chair with 1 foam 10x from chair w/ 1 foam  10x from chair w/ 1 foam  10x from chair w/ 1 foam               Gait Training                                    Modalities                                         "

## 2024-03-26 ENCOUNTER — TELEPHONE (OUTPATIENT)
Dept: ADMINISTRATIVE | Facility: OTHER | Age: 79
End: 2024-03-26

## 2024-03-26 NOTE — LETTER
Diabetic Eye Exam Form Can you please send over the consult note for the below dos or fill out below and fax back    Date Requested: 24  Patient: Chanell Bourgeois  Patient : 1945   Referring Provider: Laine Dee MD      DIABETIC Eye Exam Date ___________1-0-53____________________      Type of Exam MUST be documented for Diabetic Eye Exams. Please CHECK ONE.     Retinal Exam       Dilated Retinal Exam       OCT       Optomap-Iris Exam      Fundus Photography       Left Eye - Please check Retinopathy or No Retinopathy        Exam did show retinopathy    Exam did not show retinopathy       Right Eye - Please check Retinopathy or No Retinopathy       Exam did show retinopathy    Exam did not show retinopathy       Comments __________________________________________________________    Practice Providing Exam ______________________________________________    Exam Performed By (print name) _______________________________________      Provider Signature ___________________________________________________      These reports are needed for  compliance.  Please fax this completed form and a copy of the Diabetic Eye Exam report to our office located at [address] [place] [address] as soon as possible via Fax [phone] attention Shandra: Phone [phone]  We thank you for your assistance in treating our mutual patient.

## 2024-03-26 NOTE — LETTER
Diabetic Eye Exam Form We have the Consult note but below is missing. Please fill out and fax back tk u!    Date Requested: 24  Patient: Chanell Bourgeois  Patient : 1945   Referring Provider: Laine Dee MD      DIABETIC Eye Exam Date ____1-0-91___________________________      Type of Exam MUST be documented for Diabetic Eye Exams. Please CHECK ONE.     Retinal Exam       Dilated Retinal Exam       OCT       Optomap-Iris Exam      Fundus Photography       Left Eye - Please check Retinopathy or No Retinopathy        Exam did show retinopathy    Exam did not show retinopathy       Right Eye - Please check Retinopathy or No Retinopathy       Exam did show retinopathy    Exam did not show retinopathy       Comments __________________________________________________________    Practice Providing Exam ______________________________________________    Exam Performed By (print name) _______________________________________      Provider Signature ___________________________________________________      These reports are needed for  compliance.  Please fax this completed form and a copy of the Diabetic Eye Exam report to our office located at [address] [place] [address] as soon as possible via Fax [phone] attention Shandra: Phone [phone]  We thank you for your assistance in treating our mutual patient.

## 2024-03-26 NOTE — LETTER
Diabetic Eye Exam Form We have report but below was missing. Please answer and fax back tk u!    Date Requested: 24  Patient: Chanell Bourgeois  Patient : 1945   Referring Provider: Laine Dee MD      DIABETIC Eye Exam Date _____7-5-55__________________________      Type of Exam MUST be documented for Diabetic Eye Exams. Please CHECK ONE.     Retinal Exam       Dilated Retinal Exam       OCT       Optomap-Iris Exam      Fundus Photography       Left Eye - Please check Retinopathy or No Retinopathy        Exam did show retinopathy    Exam did not show retinopathy       Right Eye - Please check Retinopathy or No Retinopathy       Exam did show retinopathy    Exam did not show retinopathy       Comments __________________________________________________________    Practice Providing Exam ______________________________________________    Exam Performed By (print name) _______________________________________      Provider Signature ___________________________________________________      These reports are needed for  compliance.  Please fax this completed form and a copy of the Diabetic Eye Exam report to our office located at [address] [place] [address] as soon as possible via Fax [phone] tavares Devi: Phone [phone]  We thank you for your assistance in treating our mutual patient.

## 2024-03-26 NOTE — TELEPHONE ENCOUNTER
Upon review of the In Basket request and the patient's chart, initial outreach has been made via fax to facility. Please see Contacts section for details.  Letter sent for 9-1-23 dos &   3/15/2024 since the media is missing items on report.    Thank you  Shandra Pina

## 2024-03-26 NOTE — LETTER
Diabetic Eye Exam Form 2ND REQUEST! WE NEED CONSULT NOTES PLEASE    Date Requested: 24  Patient: Chanell Bourgeois  Patient : 1945   Referring Provider: Laine Dee MD      DIABETIC Eye Exam Date ___1-0-34____________________________      Type of Exam MUST be documented for Diabetic Eye Exams. Please CHECK ONE.     Retinal Exam       Dilated Retinal Exam       OCT       Optomap-Iris Exam      Fundus Photography       Left Eye - Please check Retinopathy or No Retinopathy        Exam did show retinopathy    Exam did not show retinopathy       Right Eye - Please check Retinopathy or No Retinopathy       Exam did show retinopathy    Exam did not show retinopathy       Comments __________________________________________________________    Practice Providing Exam ______________________________________________    Exam Performed By (print name) _______________________________________      Provider Signature ___________________________________________________      These reports are needed for  compliance.  Please fax this completed form and a copy of the Diabetic Eye Exam report to our office located at [address] [place] [address] as soon as possible via Fax [phone] tavares Devi: Phone [phone]  We thank you for your assistance in treating our mutual patient.

## 2024-03-26 NOTE — LETTER
Diabetic Eye Exam Form     Date Requested: 24  Patient: Chanell Bourgeois  Patient : 1945   Referring Provider: Laine Dee MD      DIABETIC Eye Exam Date _______________________________      Type of Exam MUST be documented for Diabetic Eye Exams. Please CHECK ONE.     Retinal Exam       Dilated Retinal Exam       OCT       Optomap-Iris Exam      Fundus Photography       Left Eye - Please check Retinopathy or No Retinopathy        Exam did show retinopathy    Exam did not show retinopathy       Right Eye - Please check Retinopathy or No Retinopathy       Exam did show retinopathy    Exam did not show retinopathy       Comments __________________________________________________________    Practice Providing Exam ______________________________________________    Exam Performed By (print name) _______________________________________      Provider Signature ___________________________________________________      These reports are needed for  compliance.  Please fax this completed form and a copy of the Diabetic Eye Exam report to our office located at [address] [place] [address] as soon as possible via Fax [phone] attention Shandra: Phone [phone]  We thank you for your assistance in treating our mutual patient.

## 2024-03-26 NOTE — TELEPHONE ENCOUNTER
----- Message from Ashley Burton sent at 3/25/2024 10:32 AM EDT -----  03/25/24 10:32 AM    Hello, our patient Chanell Bourgeois has had Diabetic Eye Exam completed/performed. Please assist in updating the patient chart by pulling the document from the Media Tab. The date of service is 03/20/2024.     Thank you,  Ashley Burton PG  PATRICK

## 2024-03-26 NOTE — LETTER
Diabetic Eye Exam Form 2ND REQUEST! WE NEED THE CONSULT NOTES!    Date Requested: 24  Patient: Chanell Bourgeois  Patient : 1945   Referring Provider: Laine Dee MD      DIABETIC Eye Exam Date ___3-54-44____________________________      Type of Exam MUST be documented for Diabetic Eye Exams. Please CHECK ONE.     Retinal Exam       Dilated Retinal Exam       OCT       Optomap-Iris Exam      Fundus Photography       Left Eye - Please check Retinopathy or No Retinopathy        Exam did show retinopathy    Exam did not show retinopathy       Right Eye - Please check Retinopathy or No Retinopathy       Exam did show retinopathy    Exam did not show retinopathy       Comments __________________________________________________________    Practice Providing Exam ______________________________________________    Exam Performed By (print name) _______________________________________      Provider Signature ___________________________________________________      These reports are needed for  compliance.  Please fax this completed form and a copy of the Diabetic Eye Exam report to our office located at [address] [place] [address] as soon as possible via Fax [phone] tavares Devi: Phone [phone]  We thank you for your assistance in treating our mutual patient.

## 2024-03-26 NOTE — LETTER
Diabetic Eye Exam Form We have the Consult note but below is missing. Please fill out and fax back tk u!    Date Requested: 24  Patient: Chanell Bourgeois  Patient : 1945   Referring Provider: Laine Dee MD      DIABETIC Eye Exam Date _____6-31-74__________________________      Type of Exam MUST be documented for Diabetic Eye Exams. Please CHECK ONE.     Retinal Exam       Dilated Retinal Exam       OCT       Optomap-Iris Exam      Fundus Photography       Left Eye - Please check Retinopathy or No Retinopathy        Exam did show retinopathy    Exam did not show retinopathy       Right Eye - Please check Retinopathy or No Retinopathy       Exam did show retinopathy    Exam did not show retinopathy       Comments __________________________________________________________    Practice Providing Exam ______________________________________________    Exam Performed By (print name) _______________________________________      Provider Signature ___________________________________________________      These reports are needed for  compliance.  Please fax this completed form and a copy of the Diabetic Eye Exam report to our office located at [address] [place] [address] as soon as possible via Fax [phone] attention Shandra: Phone [phone]  We thank you for your assistance in treating our mutual patient.

## 2024-03-28 ENCOUNTER — APPOINTMENT (OUTPATIENT)
Dept: PHYSICAL THERAPY | Facility: CLINIC | Age: 79
End: 2024-03-28
Payer: MEDICARE

## 2024-04-01 ENCOUNTER — OFFICE VISIT (OUTPATIENT)
Dept: PHYSICAL THERAPY | Facility: CLINIC | Age: 79
End: 2024-04-01
Payer: MEDICARE

## 2024-04-01 DIAGNOSIS — M25.561 CHRONIC PAIN OF BOTH KNEES: ICD-10-CM

## 2024-04-01 DIAGNOSIS — M25.562 CHRONIC PAIN OF BOTH KNEES: ICD-10-CM

## 2024-04-01 DIAGNOSIS — G89.29 CHRONIC PAIN OF BOTH KNEES: ICD-10-CM

## 2024-04-01 DIAGNOSIS — R26.2 AMBULATORY DYSFUNCTION: Primary | ICD-10-CM

## 2024-04-01 PROCEDURE — 97530 THERAPEUTIC ACTIVITIES: CPT

## 2024-04-01 PROCEDURE — 97112 NEUROMUSCULAR REEDUCATION: CPT

## 2024-04-01 PROCEDURE — 97110 THERAPEUTIC EXERCISES: CPT

## 2024-04-01 NOTE — PROGRESS NOTES
"Daily Note     Today's date: 2024  Patient name: Chanell Bourgeois  : 1945  MRN: [id number]  Referring provider: Laine Dee MD  Dx:   Encounter Diagnosis     ICD-10-CM    1. Ambulatory dysfunction  R26.2       2. Chronic pain of both knees  M25.561     M25.562     G89.29             Start Time: 1442  Stop Time: 1520  Total time in clinic (min): 38 minutes    Subjective: Patient notes she is feeling better compared to last week. Notes pain today in her back, hips, and knees from the rainy weather.    Objective: See treatment diary below      Assessment: Tolerated treatment well. Patient would benefit from continued PT. Able to increase duration on TM but decreased reps noted with side stepping and seated hip flexion due to fatigue.     Plan: Progress treatment as tolerated.       Precautions: recent 4 injections 23, DM, HTN, Depth Perception Issues, NEEDS TO LIE SEMI RECUMBENT       Manuals 1/11 1/23 1/30 2/26 2/29 3/4 3/11 3/18 3/25 4/1                                                       Neuro Re-Ed             Tandem stance 2x10\" holds B  2x 10\" holds B          Side steps 2 laps      2 laps 2 laps 3 laps 4 laps 3 laps   Tandem walking 2 laps      2 laps 2 laps 2 laps  4 laps 4 laps                                                       Ther Ex             TM 8 min 1 mph  8 min 1.2 mph  8 min 1.2 mph  5 min 1.2 mph  6 min 1.2 mph  6 min 1.2 mph 6 min 1.2 mph 6 min 1.2 mph  6 min 1.2 mph  7 min 1.2 mph    Seated hip flexion 20x B 25x B 25x B 25x B 25x B 25x B 20x B  20x B 20x B  17x B   LAQ * 20x B 17x B 20x B 20x B 20x B 25x B  20x b  25x B 19x B 20x B   Bridges *             Seated hip add   10x 5\" holds  -         Seated hip abd             Seated hamstring curls    10x B grn -                      Ther Activity             STS 4x from chair no foam  8x from chair with 1 foam  8x from chair with 1 foam  8x from chair with 1 foam 8x from chair with 1 foam  8x from chair with 1 foam 10x from " chair w/ 1 foam  10x from chair w/ 1 foam  10x from chair w/ 1 foam 10x from chair w/ 1 foam                Gait Training                                       Modalities

## 2024-04-03 NOTE — TELEPHONE ENCOUNTER
As a follow-up, a second attempt has been made for outreach via fax to facility and telephone call to facility. Please see Contacts section for details. 9-1-23 AND 3-15-24 CONSULT NOTES FAXING TODAY.    Thank you  Shandra Pina

## 2024-04-03 NOTE — TELEPHONE ENCOUNTER
Upon review of the In Basket request we have found/obtained the documentation. After careful review of the document we are unable to complete this request for Diabetic Eye Exam because the documentation does not have the proper verbiage (wording) needed to close the requested care gap(s) and the documentation does not have the result(s) needed to close the requested care gap(s). 9-1-23, 12-8-23, and 3-15-24 all of theses dos are in the media tab. I have fax over letters and called. If we get the letter filled out and faxed back, we will update HM.    Any additional questions or concerns should be emailed to the Practice Liaisons via the appropriate education email address, please do not reply via In Basket.    Thank you  Shandra Pina

## 2024-04-04 ENCOUNTER — TELEPHONE (OUTPATIENT)
Dept: ADMINISTRATIVE | Facility: OTHER | Age: 79
End: 2024-04-04

## 2024-04-04 ENCOUNTER — OFFICE VISIT (OUTPATIENT)
Dept: PHYSICAL THERAPY | Facility: CLINIC | Age: 79
End: 2024-04-04
Payer: MEDICARE

## 2024-04-04 DIAGNOSIS — M25.562 CHRONIC PAIN OF BOTH KNEES: ICD-10-CM

## 2024-04-04 DIAGNOSIS — M25.561 CHRONIC PAIN OF BOTH KNEES: ICD-10-CM

## 2024-04-04 DIAGNOSIS — G89.29 CHRONIC PAIN OF BOTH KNEES: ICD-10-CM

## 2024-04-04 DIAGNOSIS — R26.2 AMBULATORY DYSFUNCTION: Primary | ICD-10-CM

## 2024-04-04 PROCEDURE — 97110 THERAPEUTIC EXERCISES: CPT | Performed by: PHYSICAL THERAPIST

## 2024-04-04 PROCEDURE — 97530 THERAPEUTIC ACTIVITIES: CPT | Performed by: PHYSICAL THERAPIST

## 2024-04-04 NOTE — TELEPHONE ENCOUNTER
Medication refill request:   Lancets/testing strips  Levothyroxine 100 mcg  Hyzaar    Last office visit: 5/18/20 ill  Next office visit: none   Last refilled:   Lancets - 2/11/20 #100x5  Testing strips - 1/22/20 #90x11  Levothyroxine/Hyzaar - 1/22/20 #90x1      Pharmacy:   [facility] [address] [facility], [address] [facility]  [address] [facility] [address]  Phone: [phone] Fax: [phone]      Pended for 90 days x 1 refill  Updated testing strips and lancet sig and quantity  No

## 2024-04-04 NOTE — TELEPHONE ENCOUNTER
Upon review of the In Basket request we found  is up to date.    Any additional questions or concerns should be emailed to the Practice Liaisons via the appropriate education email address, please do not reply via In Basket.    Thank you  Demian Puentes MA

## 2024-04-04 NOTE — TELEPHONE ENCOUNTER
Upon review of the In Basket request we were able to locate, review, and update the patient chart as requested for Diabetic Eye Exam. 9-1-23 updated.    Any additional questions or concerns should be emailed to the Practice Liaisons via the appropriate education email address, please do not reply via In Basket.    Thank you  Shandra Pina

## 2024-04-04 NOTE — TELEPHONE ENCOUNTER
----- Message from Laine Dee MD sent at 4/3/2024  2:39 PM EDT -----  Regarding: DM eye scanned under media as consult  DM eye scanned under media as consult-- scan date 3/20/24, exam date is 9/1/23- no retinopathy.  Please update for HM, please.   Laine

## 2024-04-04 NOTE — PROGRESS NOTES
Daily Note     Today's date: 2024  Patient name: Chanell Bourgeois  : 1945  MRN: [id number]  Referring provider: Laine Dee MD  Dx:   Encounter Diagnosis     ICD-10-CM    1. Ambulatory dysfunction  R26.2       2. Chronic pain of both knees  M25.561     M25.562     G89.29                        Subjective: Patient notes is sore all over today.    Objective: See treatment diary below      Assessment: Tolerated treatment well. Patient would benefit from continued PT. Able to increase duration on TM but decreased reps noted with side stepping and seated hip flexion due to fatigue.     Plan: Progress treatment as tolerated.       Precautions: recent 4 injections 23, DM, HTN, Depth Perception Issues, NEEDS TO LIE SEMI RECUMBENT       Manuals 2/26 2/29 3/4 3/11 3/18 3/25 4/1 4/4                                               Neuro Re-Ed           Tandem stance           Side steps   2 laps 2 laps 3 laps 4 laps 3 laps 3 laps   Tandem walking   2 laps 2 laps 2 laps  4 laps 4 laps 4 laps                                                Ther Ex           TM 5 min 1.2 mph  6 min 1.2 mph  6 min 1.2 mph 6 min 1.2 mph 6 min 1.2 mph  6 min 1.2 mph  7 min 1.2 mph  7 min 1.2 mph   Seated hip flexion 25x B 25x B 25x B 20x B  20x B 20x B  17x B 18x B   LAQ * 20x B 20x B 25x B  20x b  25x B 19x B 20x B 25x B    Bridges *           Seated hip add -          Seated hip abd           Seated hamstring curls  -                     Ther Activity           STS 8x from chair with 1 foam 8x from chair with 1 foam  8x from chair with 1 foam 10x from chair w/ 1 foam  10x from chair w/ 1 foam  10x from chair w/ 1 foam 10x from chair w/ 1 foam 10x from chair w/ 1 foam               Gait Training                                 Modalities

## 2024-04-08 ENCOUNTER — APPOINTMENT (OUTPATIENT)
Dept: PHYSICAL THERAPY | Facility: CLINIC | Age: 79
End: 2024-04-08
Payer: MEDICARE

## 2024-04-11 ENCOUNTER — APPOINTMENT (OUTPATIENT)
Dept: PHYSICAL THERAPY | Facility: CLINIC | Age: 79
End: 2024-04-11
Payer: MEDICARE

## 2024-04-12 DIAGNOSIS — I10 BENIGN ESSENTIAL HYPERTENSION: ICD-10-CM

## 2024-04-12 DIAGNOSIS — E11.9 CONTROLLED TYPE 2 DIABETES MELLITUS WITHOUT COMPLICATION, WITHOUT LONG-TERM CURRENT USE OF INSULIN (HCC): ICD-10-CM

## 2024-04-12 RX ORDER — LOSARTAN POTASSIUM AND HYDROCHLOROTHIAZIDE 12.5; 5 MG/1; MG/1
1 TABLET ORAL DAILY
Qty: 90 TABLET | Refills: 1 | OUTPATIENT
Start: 2024-04-12

## 2024-04-12 RX ORDER — METFORMIN HYDROCHLORIDE 500 MG/1
500 TABLET, EXTENDED RELEASE ORAL 2 TIMES DAILY WITH MEALS
Qty: 180 TABLET | Refills: 1 | OUTPATIENT
Start: 2024-04-12

## 2024-04-15 ENCOUNTER — OFFICE VISIT (OUTPATIENT)
Dept: PHYSICAL THERAPY | Facility: CLINIC | Age: 79
End: 2024-04-15
Payer: MEDICARE

## 2024-04-15 DIAGNOSIS — G89.29 CHRONIC PAIN OF BOTH KNEES: ICD-10-CM

## 2024-04-15 DIAGNOSIS — R26.2 AMBULATORY DYSFUNCTION: Primary | ICD-10-CM

## 2024-04-15 DIAGNOSIS — M25.561 CHRONIC PAIN OF BOTH KNEES: ICD-10-CM

## 2024-04-15 DIAGNOSIS — M25.562 CHRONIC PAIN OF BOTH KNEES: ICD-10-CM

## 2024-04-15 PROCEDURE — 97110 THERAPEUTIC EXERCISES: CPT | Performed by: PHYSICAL THERAPIST

## 2024-04-15 PROCEDURE — 97530 THERAPEUTIC ACTIVITIES: CPT | Performed by: PHYSICAL THERAPIST

## 2024-04-15 PROCEDURE — 97112 NEUROMUSCULAR REEDUCATION: CPT | Performed by: PHYSICAL THERAPIST

## 2024-04-15 NOTE — PROGRESS NOTES
Daily Note     Today's date: 4/15/2024  Patient name: Chanell Bourgeois  : 1945  MRN: [id number]  Referring provider: Laine Dee MD  Dx:   Encounter Diagnosis     ICD-10-CM    1. Ambulatory dysfunction  R26.2       2. Chronic pain of both knees  M25.561     M25.562     G89.29                        Subjective: Patient notes walking more at home outside with dog so is sore.    Objective: See treatment diary below      Assessment: Tolerated treatment well. Patient would benefit from continued PT.     Plan: Progress treatment as tolerated.       Precautions: recent 4 injections 23, DM, HTN, Depth Perception Issues, NEEDS TO LIE SEMI RECUMBENT       Manuals 3/4 3/11 3/18 3/25 4/1 4/4 4/15                                           Neuro Re-Ed          Tandem stance          Side steps 2 laps 2 laps 3 laps 4 laps 3 laps 3 laps 3 laps   Tandem walking 2 laps 2 laps 2 laps  4 laps 4 laps 4 laps  4 laps                                           Ther Ex          TM 6 min 1.2 mph 6 min 1.2 mph 6 min 1.2 mph  6 min 1.2 mph  7 min 1.2 mph  7 min 1.2 mph 7 min 1.2 mph    Seated hip flexion 25x B 20x B  20x B 20x B  17x B 18x B 19x B   LAQ * 25x B  20x b  25x B 19x B 20x B 25x B  25x B   Bridges *          Seated hip add          Seated hip abd          Seated hamstring curls                     Ther Activity          STS 8x from chair with 1 foam 10x from chair w/ 1 foam  10x from chair w/ 1 foam  10x from chair w/ 1 foam 10x from chair w/ 1 foam 10x from chair w/ 1 foam  10x from chair w/ 1 foam             Gait Training                              Modalities

## 2024-04-18 ENCOUNTER — APPOINTMENT (OUTPATIENT)
Dept: PHYSICAL THERAPY | Facility: CLINIC | Age: 79
End: 2024-04-18
Payer: MEDICARE

## 2024-04-22 ENCOUNTER — OFFICE VISIT (OUTPATIENT)
Dept: PHYSICAL THERAPY | Facility: CLINIC | Age: 79
End: 2024-04-22
Payer: MEDICARE

## 2024-04-22 DIAGNOSIS — M25.561 CHRONIC PAIN OF BOTH KNEES: ICD-10-CM

## 2024-04-22 DIAGNOSIS — R26.2 AMBULATORY DYSFUNCTION: Primary | ICD-10-CM

## 2024-04-22 DIAGNOSIS — G89.29 CHRONIC PAIN OF BOTH KNEES: ICD-10-CM

## 2024-04-22 DIAGNOSIS — M25.562 CHRONIC PAIN OF BOTH KNEES: ICD-10-CM

## 2024-04-22 PROCEDURE — 97530 THERAPEUTIC ACTIVITIES: CPT | Performed by: PHYSICAL THERAPIST

## 2024-04-22 PROCEDURE — 97110 THERAPEUTIC EXERCISES: CPT | Performed by: PHYSICAL THERAPIST

## 2024-04-22 PROCEDURE — 97112 NEUROMUSCULAR REEDUCATION: CPT | Performed by: PHYSICAL THERAPIST

## 2024-04-22 NOTE — PROGRESS NOTES
Daily Note     Today's date: 2024  Patient name: Chanell Bourgeois  : 1945  MRN: [id number]  Referring provider: Laine Dee MD  Dx:   Encounter Diagnosis     ICD-10-CM    1. Ambulatory dysfunction  R26.2       2. Chronic pain of both knees  M25.561     M25.562     G89.29                        Subjective: Patient notes legs are really sore today and just woke up that way she is unsure why.     Objective: See treatment diary below      Assessment: Tolerated treatment well. Patient would benefit from continued PT.     Plan: Progress treatment as tolerated.       Precautions: recent 4 injections 23, DM, HTN, Depth Perception Issues, NEEDS TO LIE SEMI RECUMBENT       Manuals 3/11 3/18 3/25 4/1 4/4 4/15 4/22                                           Neuro Re-Ed          Tandem stance          Side steps 2 laps 3 laps 4 laps 3 laps 3 laps 3 laps 3 laps   Tandem walking 2 laps 2 laps  4 laps 4 laps 4 laps  4 laps 4 laps                                           Ther Ex          TM 6 min 1.2 mph 6 min 1.2 mph  6 min 1.2 mph  7 min 1.2 mph  7 min 1.2 mph 7 min 1.2 mph  6 min 1.2 mph    Seated hip flexion 20x B  20x B 20x B  17x B 18x B 19x B 16x B   LAQ * 20x b  25x B 19x B 20x B 25x B  25x B 15x B   Bridges *          Seated hip add          Seated hip abd          Seated hamstring curls                     Ther Activity          STS 10x from chair w/ 1 foam  10x from chair w/ 1 foam  10x from chair w/ 1 foam 10x from chair w/ 1 foam 10x from chair w/ 1 foam  10x from chair w/ 1 foam 10x from chair w/ 1 foam             Gait Training                              Modalities

## 2024-04-23 ENCOUNTER — APPOINTMENT (OUTPATIENT)
Dept: PHYSICAL THERAPY | Facility: CLINIC | Age: 79
End: 2024-04-23
Payer: MEDICARE

## 2024-04-25 ENCOUNTER — APPOINTMENT (OUTPATIENT)
Dept: PHYSICAL THERAPY | Facility: CLINIC | Age: 79
End: 2024-04-25
Payer: MEDICARE

## 2024-04-29 ENCOUNTER — OFFICE VISIT (OUTPATIENT)
Dept: PHYSICAL THERAPY | Facility: CLINIC | Age: 79
End: 2024-04-29
Payer: MEDICARE

## 2024-04-29 DIAGNOSIS — R26.2 AMBULATORY DYSFUNCTION: Primary | ICD-10-CM

## 2024-04-29 PROCEDURE — 97530 THERAPEUTIC ACTIVITIES: CPT | Performed by: PHYSICAL THERAPIST

## 2024-04-29 PROCEDURE — 97112 NEUROMUSCULAR REEDUCATION: CPT | Performed by: PHYSICAL THERAPIST

## 2024-04-29 PROCEDURE — 97110 THERAPEUTIC EXERCISES: CPT | Performed by: PHYSICAL THERAPIST

## 2024-04-29 NOTE — PROGRESS NOTES
Daily Note     Today's date: 2024  Patient name: Chanell Bourgeois  : 1945  MRN: [id number]  Referring provider: Laine Dee MD  Dx:   Encounter Diagnosis     ICD-10-CM    1. Ambulatory dysfunction  R26.2                        Subjective: Patient notes cleaned her patio recently and was able to do it but sore today from it. Notes is tired and hot today from weather.    Objective: See treatment diary below      Assessment: Tolerated treatment well. Patient would benefit from continued PT.     Plan: Progress treatment as tolerated.       Precautions: recent 4 injections 23, DM, HTN, Depth Perception Issues, NEEDS TO LIE SEMI RECUMBENT       Manuals 3/25 4/1 4/4 4/15 4/22 4/29                                       Neuro Re-Ed         Tandem stance         Side steps 4 laps 3 laps 3 laps 3 laps 3 laps 3 laps   Tandem walking 4 laps 4 laps 4 laps  4 laps 4 laps 3 laps                                       Ther Ex         TM 6 min 1.2 mph  7 min 1.2 mph  7 min 1.2 mph 7 min 1.2 mph  6 min 1.2 mph  6 min 1.2 mph   Seated hip flexion 20x B  17x B 18x B 19x B 16x B 11x B   LAQ * 19x B 20x B 25x B  25x B 15x B 8x B    Bridges *         Seated hip add         Seated hip abd         Seated hamstring curls                   Ther Activity         STS 10x from chair w/ 1 foam 10x from chair w/ 1 foam 10x from chair w/ 1 foam  10x from chair w/ 1 foam 10x from chair w/ 1 foam 13x from chair w/ 1 foam            Gait Training                           Modalities

## 2024-04-30 ENCOUNTER — OFFICE VISIT (OUTPATIENT)
Dept: URGENT CARE | Facility: CLINIC | Age: 79
End: 2024-04-30
Payer: MEDICARE

## 2024-04-30 ENCOUNTER — APPOINTMENT (OUTPATIENT)
Dept: PHYSICAL THERAPY | Facility: CLINIC | Age: 79
End: 2024-04-30
Payer: MEDICARE

## 2024-04-30 VITALS
OXYGEN SATURATION: 98 % | HEART RATE: 80 BPM | TEMPERATURE: 96.9 F | SYSTOLIC BLOOD PRESSURE: 108 MMHG | RESPIRATION RATE: 20 BRPM | DIASTOLIC BLOOD PRESSURE: 62 MMHG | WEIGHT: 179 LBS | BODY MASS INDEX: 28.89 KG/M2

## 2024-04-30 DIAGNOSIS — R31.9 URINARY TRACT INFECTION WITH HEMATURIA, SITE UNSPECIFIED: Primary | ICD-10-CM

## 2024-04-30 DIAGNOSIS — N39.0 URINARY TRACT INFECTION WITH HEMATURIA, SITE UNSPECIFIED: Primary | ICD-10-CM

## 2024-04-30 LAB
SL AMB  POCT GLUCOSE, UA: ABNORMAL
SL AMB LEUKOCYTE ESTERASE,UA: ABNORMAL
SL AMB POCT BILIRUBIN,UA: ABNORMAL
SL AMB POCT BLOOD,UA: ABNORMAL
SL AMB POCT CLARITY,UA: ABNORMAL
SL AMB POCT COLOR,UA: YELLOW
SL AMB POCT KETONES,UA: ABNORMAL
SL AMB POCT NITRITE,UA: POSITIVE
SL AMB POCT PH,UA: 6
SL AMB POCT SPECIFIC GRAVITY,UA: 1.01
SL AMB POCT URINE PROTEIN: ABNORMAL
SL AMB POCT UROBILINOGEN: 0.2

## 2024-04-30 PROCEDURE — 81002 URINALYSIS NONAUTO W/O SCOPE: CPT | Performed by: PREVENTIVE MEDICINE

## 2024-04-30 PROCEDURE — 87077 CULTURE AEROBIC IDENTIFY: CPT | Performed by: PREVENTIVE MEDICINE

## 2024-04-30 PROCEDURE — 87086 URINE CULTURE/COLONY COUNT: CPT | Performed by: PREVENTIVE MEDICINE

## 2024-04-30 PROCEDURE — 99213 OFFICE O/P EST LOW 20 MIN: CPT | Performed by: PREVENTIVE MEDICINE

## 2024-04-30 PROCEDURE — G0463 HOSPITAL OUTPT CLINIC VISIT: HCPCS | Performed by: PREVENTIVE MEDICINE

## 2024-04-30 PROCEDURE — 87186 SC STD MICRODIL/AGAR DIL: CPT | Performed by: PREVENTIVE MEDICINE

## 2024-04-30 RX ORDER — SULFAMETHOXAZOLE AND TRIMETHOPRIM 800; 160 MG/1; MG/1
1 TABLET ORAL EVERY 12 HOURS SCHEDULED
Qty: 10 TABLET | Refills: 0 | Status: SHIPPED | OUTPATIENT
Start: 2024-04-30 | End: 2024-05-05

## 2024-04-30 NOTE — PATIENT INSTRUCTIONS
If you are not getting relief in your symptoms please check on the results of the urine culture in 2 days.  If you keep getting urinary tract infections I think you should consider a visit to urologist.
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
## 2024-05-02 ENCOUNTER — APPOINTMENT (OUTPATIENT)
Dept: PHYSICAL THERAPY | Facility: CLINIC | Age: 79
End: 2024-05-02
Payer: MEDICARE

## 2024-05-02 LAB — BACTERIA UR CULT: ABNORMAL

## 2024-05-06 ENCOUNTER — OFFICE VISIT (OUTPATIENT)
Dept: PHYSICAL THERAPY | Facility: CLINIC | Age: 79
End: 2024-05-06
Payer: MEDICARE

## 2024-05-06 DIAGNOSIS — M25.561 CHRONIC PAIN OF BOTH KNEES: ICD-10-CM

## 2024-05-06 DIAGNOSIS — G89.29 CHRONIC PAIN OF BOTH KNEES: ICD-10-CM

## 2024-05-06 DIAGNOSIS — M25.562 CHRONIC PAIN OF BOTH KNEES: ICD-10-CM

## 2024-05-06 DIAGNOSIS — R26.2 AMBULATORY DYSFUNCTION: Primary | ICD-10-CM

## 2024-05-06 PROCEDURE — 97110 THERAPEUTIC EXERCISES: CPT | Performed by: PHYSICAL THERAPIST

## 2024-05-06 PROCEDURE — 97530 THERAPEUTIC ACTIVITIES: CPT | Performed by: PHYSICAL THERAPIST

## 2024-05-06 NOTE — PROGRESS NOTES
"Daily Note     Today's date: 2024  Patient name: Chanell Bourgeois  : 1945  MRN: [id number]  Referring provider: Laine Dee MD  Dx:   Encounter Diagnosis     ICD-10-CM    1. Ambulatory dysfunction  R26.2       2. Chronic pain of both knees  M25.561     M25.562     G89.29                        Subjective: Patient notes trying to walk her dog more so legs are a little tired.    Objective: See treatment diary below      Assessment: Tolerated treatment well. Patient would benefit from continued PT.     Plan: Progress treatment as tolerated.       Precautions: recent 4 injections 23, DM, HTN, Depth Perception Issues, NEEDS TO LIE SEMI RECUMBENT       Manuals 3/25 4/1 4/4 4/15 4/22 4/29 5/6                                           Neuro Re-Ed          Tandem stance          Side steps 4 laps 3 laps 3 laps 3 laps 3 laps 3 laps 3 laps   Tandem walking 4 laps 4 laps 4 laps  4 laps 4 laps 3 laps 3 laps                                            Ther Ex          TM 6 min 1.2 mph  7 min 1.2 mph  7 min 1.2 mph 7 min 1.2 mph  6 min 1.2 mph  6 min 1.2 mph 9 1.2 mph   Seated hip flexion 20x B  17x B 18x B 19x B 16x B 11x B 14x B   LAQ * 19x B 20x B 25x B  25x B 15x B 8x B  22 B   Bridges *          Seated hip add          Seated hip abd          Seated hamstring curls           Seated lumbar flexion       5x 5\" holds   Ther Activity          STS 10x from chair w/ 1 foam 10x from chair w/ 1 foam 10x from chair w/ 1 foam  10x from chair w/ 1 foam 10x from chair w/ 1 foam 13x from chair w/ 1 foam 10x from chair w/ 1 foam              Gait Training                              Modalities                                   "

## 2024-05-07 ENCOUNTER — APPOINTMENT (OUTPATIENT)
Dept: PHYSICAL THERAPY | Facility: CLINIC | Age: 79
End: 2024-05-07
Payer: MEDICARE

## 2024-05-13 ENCOUNTER — APPOINTMENT (OUTPATIENT)
Dept: PHYSICAL THERAPY | Facility: CLINIC | Age: 79
End: 2024-05-13
Payer: MEDICARE

## 2024-05-14 ENCOUNTER — APPOINTMENT (OUTPATIENT)
Dept: PHYSICAL THERAPY | Facility: CLINIC | Age: 79
End: 2024-05-14
Payer: MEDICARE

## 2024-05-20 ENCOUNTER — APPOINTMENT (OUTPATIENT)
Dept: PHYSICAL THERAPY | Facility: CLINIC | Age: 79
End: 2024-05-20
Payer: MEDICARE

## 2024-05-21 ENCOUNTER — APPOINTMENT (OUTPATIENT)
Dept: PHYSICAL THERAPY | Facility: CLINIC | Age: 79
End: 2024-05-21
Payer: MEDICARE

## 2024-06-29 ENCOUNTER — APPOINTMENT (OUTPATIENT)
Dept: LAB | Facility: CLINIC | Age: 79
End: 2024-06-29
Payer: MEDICARE

## 2024-06-29 DIAGNOSIS — I10 BENIGN ESSENTIAL HYPERTENSION: ICD-10-CM

## 2024-06-29 DIAGNOSIS — E11.9 CONTROLLED TYPE 2 DIABETES MELLITUS WITHOUT COMPLICATION, WITHOUT LONG-TERM CURRENT USE OF INSULIN (HCC): ICD-10-CM

## 2024-06-29 DIAGNOSIS — E78.2 MIXED HYPERLIPIDEMIA: ICD-10-CM

## 2024-06-29 DIAGNOSIS — E03.9 HYPOTHYROIDISM, UNSPECIFIED TYPE: ICD-10-CM

## 2024-06-29 LAB
ALBUMIN SERPL BCG-MCNC: 4.2 G/DL (ref 3.5–5)
ALP SERPL-CCNC: 62 U/L (ref 34–104)
ALT SERPL W P-5'-P-CCNC: 15 U/L (ref 7–52)
ANION GAP SERPL CALCULATED.3IONS-SCNC: 11 MMOL/L (ref 4–13)
AST SERPL W P-5'-P-CCNC: 15 U/L (ref 13–39)
BASOPHILS # BLD AUTO: 0.09 THOUSANDS/ÂΜL (ref 0–0.1)
BASOPHILS NFR BLD AUTO: 1 % (ref 0–1)
BILIRUB SERPL-MCNC: 0.79 MG/DL (ref 0.2–1)
BUN SERPL-MCNC: 22 MG/DL (ref 5–25)
CALCIUM SERPL-MCNC: 9.6 MG/DL (ref 8.4–10.2)
CHLORIDE SERPL-SCNC: 98 MMOL/L (ref 96–108)
CHOLEST SERPL-MCNC: 175 MG/DL
CO2 SERPL-SCNC: 29 MMOL/L (ref 21–32)
CREAT SERPL-MCNC: 0.63 MG/DL (ref 0.6–1.3)
EOSINOPHIL # BLD AUTO: 0.12 THOUSAND/ÂΜL (ref 0–0.61)
EOSINOPHIL NFR BLD AUTO: 1 % (ref 0–6)
ERYTHROCYTE [DISTWIDTH] IN BLOOD BY AUTOMATED COUNT: 12.8 % (ref 11.6–15.1)
EST. AVERAGE GLUCOSE BLD GHB EST-MCNC: 180 MG/DL
GFR SERPL CREATININE-BSD FRML MDRD: 86 ML/MIN/1.73SQ M
GLUCOSE P FAST SERPL-MCNC: 176 MG/DL (ref 65–99)
HBA1C MFR BLD: 7.9 %
HCT VFR BLD AUTO: 41.6 % (ref 34.8–46.1)
HDLC SERPL-MCNC: 55 MG/DL
HGB BLD-MCNC: 14 G/DL (ref 11.5–15.4)
IMM GRANULOCYTES # BLD AUTO: 0.05 THOUSAND/UL (ref 0–0.2)
IMM GRANULOCYTES NFR BLD AUTO: 0 % (ref 0–2)
LDLC SERPL CALC-MCNC: 93 MG/DL (ref 0–100)
LYMPHOCYTES # BLD AUTO: 2.21 THOUSANDS/ÂΜL (ref 0.6–4.47)
LYMPHOCYTES NFR BLD AUTO: 16 % (ref 14–44)
MCH RBC QN AUTO: 33.3 PG (ref 26.8–34.3)
MCHC RBC AUTO-ENTMCNC: 33.7 G/DL (ref 31.4–37.4)
MCV RBC AUTO: 99 FL (ref 82–98)
MONOCYTES # BLD AUTO: 1.18 THOUSAND/ÂΜL (ref 0.17–1.22)
MONOCYTES NFR BLD AUTO: 9 % (ref 4–12)
NEUTROPHILS # BLD AUTO: 9.87 THOUSANDS/ÂΜL (ref 1.85–7.62)
NEUTS SEG NFR BLD AUTO: 73 % (ref 43–75)
NRBC BLD AUTO-RTO: 0 /100 WBCS
PLATELET # BLD AUTO: 359 THOUSANDS/UL (ref 149–390)
PMV BLD AUTO: 10.7 FL (ref 8.9–12.7)
POTASSIUM SERPL-SCNC: 3.8 MMOL/L (ref 3.5–5.3)
PROT SERPL-MCNC: 7.1 G/DL (ref 6.4–8.4)
RBC # BLD AUTO: 4.2 MILLION/UL (ref 3.81–5.12)
SODIUM SERPL-SCNC: 138 MMOL/L (ref 135–147)
T4 FREE SERPL-MCNC: 1.22 NG/DL (ref 0.61–1.12)
TRIGL SERPL-MCNC: 135 MG/DL
TSH SERPL DL<=0.05 MIU/L-ACNC: 0.43 UIU/ML (ref 0.45–4.5)
WBC # BLD AUTO: 13.52 THOUSAND/UL (ref 4.31–10.16)

## 2024-06-29 PROCEDURE — 84443 ASSAY THYROID STIM HORMONE: CPT

## 2024-06-29 PROCEDURE — 80061 LIPID PANEL: CPT

## 2024-06-29 PROCEDURE — 36415 COLL VENOUS BLD VENIPUNCTURE: CPT

## 2024-06-29 PROCEDURE — 83036 HEMOGLOBIN GLYCOSYLATED A1C: CPT

## 2024-06-29 PROCEDURE — 80053 COMPREHEN METABOLIC PANEL: CPT

## 2024-06-29 PROCEDURE — 85025 COMPLETE CBC W/AUTO DIFF WBC: CPT

## 2024-06-29 PROCEDURE — 84439 ASSAY OF FREE THYROXINE: CPT

## 2024-07-02 ENCOUNTER — OFFICE VISIT (OUTPATIENT)
Dept: FAMILY MEDICINE CLINIC | Facility: CLINIC | Age: 79
End: 2024-07-02
Payer: MEDICARE

## 2024-07-02 VITALS
HEIGHT: 66 IN | HEART RATE: 84 BPM | BODY MASS INDEX: 28.03 KG/M2 | TEMPERATURE: 98.1 F | DIASTOLIC BLOOD PRESSURE: 68 MMHG | RESPIRATION RATE: 18 BRPM | SYSTOLIC BLOOD PRESSURE: 116 MMHG | WEIGHT: 174.4 LBS | OXYGEN SATURATION: 97 %

## 2024-07-02 DIAGNOSIS — E78.2 MIXED HYPERLIPIDEMIA: ICD-10-CM

## 2024-07-02 DIAGNOSIS — L81.9 CHANGE IN MULTIPLE PIGMENTED SKIN LESIONS: ICD-10-CM

## 2024-07-02 DIAGNOSIS — E03.9 ACQUIRED HYPOTHYROIDISM: ICD-10-CM

## 2024-07-02 DIAGNOSIS — I10 BENIGN ESSENTIAL HYPERTENSION: ICD-10-CM

## 2024-07-02 DIAGNOSIS — E03.9 HYPOTHYROIDISM, UNSPECIFIED TYPE: ICD-10-CM

## 2024-07-02 DIAGNOSIS — E11.9 CONTROLLED TYPE 2 DIABETES MELLITUS WITHOUT COMPLICATION, WITHOUT LONG-TERM CURRENT USE OF INSULIN (HCC): Primary | ICD-10-CM

## 2024-07-02 DIAGNOSIS — D72.829 LEUKOCYTOSIS, UNSPECIFIED TYPE: ICD-10-CM

## 2024-07-02 PROCEDURE — 99214 OFFICE O/P EST MOD 30 MIN: CPT | Performed by: FAMILY MEDICINE

## 2024-07-02 PROCEDURE — G2211 COMPLEX E/M VISIT ADD ON: HCPCS | Performed by: FAMILY MEDICINE

## 2024-07-02 RX ORDER — LOSARTAN POTASSIUM AND HYDROCHLOROTHIAZIDE 12.5; 5 MG/1; MG/1
1 TABLET ORAL DAILY
Qty: 100 TABLET | Refills: 1 | Status: SHIPPED | OUTPATIENT
Start: 2024-07-02

## 2024-07-02 RX ORDER — METFORMIN HYDROCHLORIDE 500 MG/1
1500 TABLET, EXTENDED RELEASE ORAL
Qty: 300 TABLET | Refills: 1 | Status: SHIPPED | OUTPATIENT
Start: 2024-07-02

## 2024-07-02 RX ORDER — LEVOTHYROXINE SODIUM 88 UG/1
88 TABLET ORAL DAILY
Qty: 100 TABLET | Refills: 1 | Status: SHIPPED | OUTPATIENT
Start: 2024-07-02

## 2024-07-02 NOTE — ASSESSMENT & PLAN NOTE
Decrease synthroid to 88 mcg and repeat TSH in 2 months  Lab Results   Component Value Date    [id number] 0.426 (L) 06/29/2024    TSH 1.45 12/24/2018

## 2024-07-02 NOTE — PROGRESS NOTES
Assessment/Plan:       Problem List Items Addressed This Visit        Cardiovascular and Mediastinum    Benign essential hypertension     Well controlled  No changes today  Reviewed labs.         Relevant Medications    losartan-hydrochlorothiazide (HYZAAR) 50-12.5 mg per tablet    Other Relevant Orders    CBC and differential    Comprehensive metabolic panel       Endocrine    Controlled type 2 diabetes mellitus without complication, without long-term current use of insulin (HCC) - Primary    Relevant Medications    metFORMIN (GLUCOPHAGE-XR) 500 mg 24 hr tablet    Other Relevant Orders    Comprehensive metabolic panel    Hemoglobin A1C    Lipid Panel with Direct LDL reflex    Albumin / creatinine urine ratio    TSH, 3rd generation with Free T4 reflex    Hypothyroidism     Decrease synthroid to 88 mcg and repeat TSH in 2 months  Lab Results   Component Value Date    [id number] 0.426 (L) 06/29/2024    TSH 1.45 12/24/2018            Relevant Medications    levothyroxine (Synthroid) 88 mcg tablet    Other Relevant Orders    TSH, 3rd generation with Free T4 reflex    TSH, 3rd generation with Free T4 reflex       Other    Mixed hyperlipidemia     LDL a bit higher than previous.  Declines statin  Working on diet        Other Visit Diagnoses     Leukocytosis, unspecified type        likely secondary to recent steroid injections in shoulders  repeat ordered    Relevant Orders    CBC and differential    Change in multiple pigmented skin lesions        refer for derm eval.  she will have ear looked at as well.    Relevant Orders    Ambulatory Referral to Dermatology               Most recent labs reviewed       Subjective:     Chanell is a 78 y.o. female here today with chief complaint below:  Chief Complaint   Patient presents with   • Follow-up     6 MFU - go over labs  Patient would like to discuss left ear scar tissue and pain. 8/10 pain.      - CC above per clinical staff and reviewed.    HPI:  Pt here to f/u on DM and  chronic conditions.   Her A1C is higher than previous at 7.9 with .  She actually thought this would be worse.  Had steroid injections bilateral shoulders which tends to increase her BS readings.  This was about three weeks ago.  She had been lifting her dog Ion who is 40 lbs after her surgery.    She had been taking just metformin xr 1000 mg, then increased to 1500 mg daily when her BS readings were higher.  She notes her highest reading 296, now she is back down to the 150s-160s for her BS readings.    She continues on the metformin 1500 mg daily.    Her BP has been running 110s/50-60s.  She doesn't feel dizzy or lightheaded.      She notes that she is tired during the day.  Will need to take a nap during the day.  She notes her nighttime sleep is not good.    Wakes several times at night to urinate.  This is x several years. She is able to fall back to sleep well.     She takes the losartan-hctz around 6 PM, but notes it wasn't any different when she took it in the morning one day, but never took it consistently in the morning.    Her dog has been recovering from surgery- he wakes up whenever he hears her so her sleep hasn't been as good.    She worries she is getting UTIs due to diarrhea. She notes when she took trulicity, she was getting abd pain.  She took it for one week only, then after stopping it for a few days, she felt better.  No current symptoms.  She does not wish to take trulicity or any similar med at this point.    Prefers not to take statin.    Would like derm referral  Site of prior skin lesion biopsy L ear still peels/is dry  Has other skin lesions she would like checked.    The following portions of the patient's history were reviewed and updated as appropriate: allergies, current medications, past family history, past medical history, past social history, past surgical history and problem list.    ROS:  Review of Systems     Objective:      /68 (BP Location: Left arm, Patient  "Position: Sitting, Cuff Size: Standard)   Pulse 84   Temp 98.1 °F (36.7 °C) (Temporal)   Resp 18   Ht 5' 6\" (1.676 m)   Wt 79.1 kg (174 lb 6.4 oz)   SpO2 97%   BMI 28.15 kg/m²   BP Readings from Last 3 Encounters:   07/02/24 116/68   04/30/24 108/62   01/18/24 106/72     Wt Readings from Last 3 Encounters:   07/02/24 79.1 kg (174 lb 6.4 oz)   04/30/24 81.2 kg (179 lb)   01/18/24 79.7 kg (175 lb 9.6 oz)               Physical Exam:   Physical Exam  Vitals and nursing note reviewed.   Constitutional:       General: She is not in acute distress.     Appearance: Normal appearance. She is well-developed. She is not ill-appearing.   HENT:      Head: Normocephalic and atraumatic.      Ears:      Comments: L ear with dry, flaking skin at helix (site of prior skin lesion removal)    Neck:      Vascular: No carotid bruit.   Cardiovascular:      Rate and Rhythm: Normal rate and regular rhythm.      Heart sounds: Normal heart sounds. No murmur heard.  Pulmonary:      Effort: Pulmonary effort is normal. No respiratory distress.      Breath sounds: Normal breath sounds. No wheezing.   Abdominal:      Palpations: Abdomen is soft.      Tenderness: There is no abdominal tenderness. There is no guarding or rebound.   Musculoskeletal:      Cervical back: Neck supple.      Right lower leg: No edema.      Left lower leg: No edema.   Lymphadenopathy:      Cervical: No cervical adenopathy.   Skin:     General: Skin is warm and dry.   Neurological:      Mental Status: She is alert and oriented to person, place, and time.   Psychiatric:         Mood and Affect: Mood normal.         Behavior: Behavior normal.                "

## 2024-07-02 NOTE — PATIENT INSTRUCTIONS
Switch the losartan-hctz to the morning for at least two weeks to see how this affects your bladder at night.  Repeat the blood count test in 1 month (CBC)- to make sure your white blood cell count comes back to normal.   Decrease your synthroid dose to 88 mcg daily and repeat the TSH in 2 months.  Repeat the routine labs in December or early January prior to your next appointment.

## 2024-09-05 ENCOUNTER — PATIENT MESSAGE (OUTPATIENT)
Dept: FAMILY MEDICINE CLINIC | Facility: CLINIC | Age: 79
End: 2024-09-05

## 2024-09-10 DIAGNOSIS — E11.9 CONTROLLED TYPE 2 DIABETES MELLITUS WITHOUT COMPLICATION, WITHOUT LONG-TERM CURRENT USE OF INSULIN (HCC): ICD-10-CM

## 2024-09-10 DIAGNOSIS — R73.09 LABILE BLOOD GLUCOSE: ICD-10-CM

## 2024-09-10 RX ORDER — BLOOD SUGAR DIAGNOSTIC
STRIP MISCELLANEOUS
Qty: 400 STRIP | Refills: 1 | Status: SHIPPED | OUTPATIENT
Start: 2024-09-10

## 2024-10-02 ENCOUNTER — OFFICE VISIT (OUTPATIENT)
Dept: FAMILY MEDICINE CLINIC | Facility: CLINIC | Age: 79
End: 2024-10-02
Payer: MEDICARE

## 2024-10-02 VITALS
SYSTOLIC BLOOD PRESSURE: 116 MMHG | TEMPERATURE: 97.5 F | WEIGHT: 171.4 LBS | DIASTOLIC BLOOD PRESSURE: 60 MMHG | HEIGHT: 66 IN | HEART RATE: 78 BPM | BODY MASS INDEX: 27.55 KG/M2 | OXYGEN SATURATION: 97 % | RESPIRATION RATE: 16 BRPM

## 2024-10-02 DIAGNOSIS — Z23 ENCOUNTER FOR IMMUNIZATION: ICD-10-CM

## 2024-10-02 DIAGNOSIS — E03.9 ACQUIRED HYPOTHYROIDISM: ICD-10-CM

## 2024-10-02 DIAGNOSIS — K11.9 SALIVARY GLAND DISTURBANCE: ICD-10-CM

## 2024-10-02 DIAGNOSIS — E11.9 CONTROLLED TYPE 2 DIABETES MELLITUS WITHOUT COMPLICATION, WITHOUT LONG-TERM CURRENT USE OF INSULIN (HCC): ICD-10-CM

## 2024-10-02 DIAGNOSIS — I10 BENIGN ESSENTIAL HYPERTENSION: ICD-10-CM

## 2024-10-02 DIAGNOSIS — K21.9 GASTROESOPHAGEAL REFLUX DISEASE WITHOUT ESOPHAGITIS: Primary | ICD-10-CM

## 2024-10-02 DIAGNOSIS — R05.9 COUGH, UNSPECIFIED TYPE: ICD-10-CM

## 2024-10-02 DIAGNOSIS — R73.09 LABILE BLOOD GLUCOSE: ICD-10-CM

## 2024-10-02 PROCEDURE — 99214 OFFICE O/P EST MOD 30 MIN: CPT | Performed by: FAMILY MEDICINE

## 2024-10-02 PROCEDURE — 90662 IIV NO PRSV INCREASED AG IM: CPT | Performed by: FAMILY MEDICINE

## 2024-10-02 PROCEDURE — G0008 ADMIN INFLUENZA VIRUS VAC: HCPCS | Performed by: FAMILY MEDICINE

## 2024-10-02 RX ORDER — BLOOD SUGAR DIAGNOSTIC
STRIP MISCELLANEOUS
Qty: 400 STRIP | Refills: 1 | Status: SHIPPED | OUTPATIENT
Start: 2024-10-02

## 2024-10-02 RX ORDER — METFORMIN HCL 500 MG
1500 TABLET, EXTENDED RELEASE 24 HR ORAL
Qty: 300 TABLET | Refills: 1 | Status: SHIPPED | OUTPATIENT
Start: 2024-10-02

## 2024-10-02 RX ORDER — ALBUTEROL SULFATE 90 UG/1
2 INHALANT RESPIRATORY (INHALATION) EVERY 4 HOURS PRN
Qty: 18 G | Refills: 1 | Status: SHIPPED | OUTPATIENT
Start: 2024-10-02

## 2024-10-02 RX ORDER — LEVOTHYROXINE SODIUM 88 UG/1
88 TABLET ORAL DAILY
Qty: 100 TABLET | Refills: 1 | Status: SHIPPED | OUTPATIENT
Start: 2024-10-02

## 2024-10-02 RX ORDER — FAMOTIDINE 20 MG/1
TABLET, FILM COATED ORAL
Qty: 180 TABLET | Refills: 1 | Status: SHIPPED | OUTPATIENT
Start: 2024-10-02

## 2024-10-02 RX ORDER — LOSARTAN POTASSIUM AND HYDROCHLOROTHIAZIDE 12.5; 5 MG/1; MG/1
1 TABLET ORAL DAILY
Qty: 100 TABLET | Refills: 1 | Status: SHIPPED | OUTPATIENT
Start: 2024-10-02

## 2024-10-02 NOTE — PROGRESS NOTES
Ambulatory Visit  Name: Chanell Bourgeois      : 1945      MRN: [id number]  Encounter Provider: Laine Dee MD  Encounter Date: 10/2/2024   Encounter department: [facility]    Assessment & Plan  Gastroesophageal reflux disease without esophagitis  Currently on pepcid complete (which is famotidine 10 mg, calcium 800 mg, magnesium).   Advised to switch to pepcid 20 mg twice a day, once symptoms improving further, switch to daily with a second dose prn.  Orders:    famotidine (PEPCID) 20 mg tablet; Take one tablet daily to twice a day for heartburn    Acquired hypothyroidism  Dose adjusted last OV  Repeat ordered.  Lab Results   Component Value Date    [id number] 0.426 (L) 2024    TSH 1.45 2018       Orders:    levothyroxine (Synthroid) 88 mcg tablet; Take 1 tablet (88 mcg total) by mouth daily    Benign essential hypertension  Well controlled    Orders:    losartan-hydrochlorothiazide (HYZAAR) 50-12.5 mg per tablet; Take 1 tablet by mouth daily    Controlled type 2 diabetes mellitus without complication, without long-term current use of insulin ([facility])  Med refills sent per request  She self-adjusts if blood glucose is higher or lower    Lab Results   Component Value Date    HGBA1C 7.9 (H) 2024       Orders:    metFORMIN (GLUCOPHAGE-XR) 500 mg 24 hr tablet; Take 3 tablets (1,500 mg total) by mouth daily with dinner    glucose blood (OneTouch Verio) test strip; USE TO TEST BLOOD SUGAR UP TO FOUR TIMES DAILY    Labile blood glucose  Test strips refilled, to test up to four times daily  Orders:    glucose blood (OneTouch Verio) test strip; USE TO TEST BLOOD SUGAR UP TO FOUR TIMES DAILY    Salivary gland disturbance  She notes five episodes in the past year- refer to ENT  She is currently asymptomatic    Orders:    Ambulatory Referral to Otolaryngology; Future    Cough, unspecified type  Refill albuterol for prn use, rare use.  Orders:    albuterol (PROVENTIL  "HFA,VENTOLIN HFA) 90 mcg/act inhaler; Inhale 2 puffs every 4 (four) hours as needed for wheezing or shortness of breath    Encounter for immunization    Orders:    influenza vaccine, high-dose, PF 0.5 mL (Fluzone High Dose)       Chief Complaint   Patient presents with    Follow-up     F/u for DM.     GI Problem     Pt c/o worsening reflux/heartburn. Epigastric pain. Pt has tried avoiding certain foods with no improvement. Using Pepcid prn. Reports waking up vomiting in her mouth some nights.        History of Present Illness     HPI  Pt here for flu vaccine and to review a few concerns.   She would like refill of meds.   Would like these printed as she has had trouble w meds being transmitted to pharmacy in the past.    She notes she is having GI issues.  She notes if she eats at 3 PM, goes to bed around 7:30 PM and if she doesn't take a pepcid, she will get bad heartburn.  Sometimes wakes an hour later vomiting up acidic material    After eating, gets a dull stomachache around the waistline or just above.    Takes pepcid every evening.   The pepcid chewables work better than the tablets  Takes it daily    She notes that her salivary glands get blocked. She notes this has happened five times already this year.  She notes that the pain is very bothersome.  She says when she gets the pain, she feels pain and swelling in her L face. Pain can be severe.  Has been to urgent care in the past for this.    She saw derm in July.  Biopsy of ear.  No cancer.      She has been working on losing weight  Cutting down carbs  Trying to eat healthfully.   BS are up and down. Worse after steroid injection.   She increases metformin dose to four tabs daily instead of 3 when needed.   Sometimes reduces to 2 tabs when her numbers are lower.         Review of Systems        Objective     /60   Pulse 78   Temp 97.5 °F (36.4 °C)   Resp 16   Ht 5' 6\" (1.676 m)   Wt 77.7 kg (171 lb 6.4 oz)   SpO2 97%   BMI 27.66 kg/m² "     Physical Exam  Vitals and nursing note reviewed.   Constitutional:       Appearance: Normal appearance. She is not ill-appearing.   Neck:      Vascular: No carotid bruit.   Cardiovascular:      Rate and Rhythm: Normal rate and regular rhythm.   Pulmonary:      Effort: Pulmonary effort is normal.      Breath sounds: Normal breath sounds. No wheezing or rhonchi.   Abdominal:      Palpations: Abdomen is soft.      Tenderness: There is abdominal tenderness (mild epigastric).   Musculoskeletal:      Right lower leg: No edema.      Left lower leg: No edema.   Lymphadenopathy:      Cervical: No cervical adenopathy.   Neurological:      Mental Status: She is alert and oriented to person, place, and time.      Comments: Ambulates w cane   Psychiatric:         Mood and Affect: Mood normal.         Behavior: Behavior normal.

## 2024-10-02 NOTE — ASSESSMENT & PLAN NOTE
Dose adjusted last OV  Repeat ordered.  Lab Results   Component Value Date    [id number] 0.426 (L) 06/29/2024    TSH 1.45 12/24/2018       Orders:    levothyroxine (Synthroid) 88 mcg tablet; Take 1 tablet (88 mcg total) by mouth daily    
Med refills sent per request  She self-adjusts if blood glucose is higher or lower    Lab Results   Component Value Date    HGBA1C 7.9 (H) 06/29/2024       Orders:    metFORMIN (GLUCOPHAGE-XR) 500 mg 24 hr tablet; Take 3 tablets (1,500 mg total) by mouth daily with dinner    glucose blood (OneTouch Verio) test strip; USE TO TEST BLOOD SUGAR UP TO FOUR TIMES DAILY    
Well controlled    Orders:    losartan-hydrochlorothiazide (HYZAAR) 50-12.5 mg per tablet; Take 1 tablet by mouth daily    
- - -

## 2024-10-07 ENCOUNTER — EVALUATION (OUTPATIENT)
Dept: PHYSICAL THERAPY | Facility: CLINIC | Age: 79
End: 2024-10-07
Payer: MEDICARE

## 2024-10-07 DIAGNOSIS — R26.2 AMBULATORY DYSFUNCTION: ICD-10-CM

## 2024-10-07 DIAGNOSIS — M25.512 CHRONIC LEFT SHOULDER PAIN: Primary | ICD-10-CM

## 2024-10-07 DIAGNOSIS — G89.29 CHRONIC LEFT SHOULDER PAIN: Primary | ICD-10-CM

## 2024-10-07 DIAGNOSIS — M54.50 CHRONIC BILATERAL LOW BACK PAIN, UNSPECIFIED WHETHER SCIATICA PRESENT: ICD-10-CM

## 2024-10-07 DIAGNOSIS — G89.29 CHRONIC BILATERAL LOW BACK PAIN, UNSPECIFIED WHETHER SCIATICA PRESENT: ICD-10-CM

## 2024-10-07 PROCEDURE — 97112 NEUROMUSCULAR REEDUCATION: CPT | Performed by: PHYSICAL THERAPIST

## 2024-10-07 PROCEDURE — 97162 PT EVAL MOD COMPLEX 30 MIN: CPT | Performed by: PHYSICAL THERAPIST

## 2024-10-07 NOTE — LETTER
2024    Norman Villatoro MD  [address] [facility] [address]    Patient: Chanell Bourgeois   YOB: 1945   Date of Visit: 10/7/2024     Encounter Diagnosis     ICD-10-CM    1. Chronic left shoulder pain  M25.512     G89.29       2. Chronic bilateral low back pain, unspecified whether sciatica present  M54.50     G89.29       3. Ambulatory dysfunction  R26.2           Dear Dr. Villatoro:    Thank you for your recent referral of Chanell Bourgeois. Please review the attached evaluation summary from Chanell's recent visit.     Please verify that you agree with the plan of care by signing the attached order.     If you have any questions or concerns, please do not hesitate to call.     I sincerely appreciate the opportunity to share in the care of one of your patients and hope to have another opportunity to work with you in the near future.       Sincerely,    Kalin Merida, PT      Referring Provider:      I certify that I have read the below Plan of Care and certify the need for these services furnished under this plan of treatment while under my care.                    Norman Villatoro MD  [address] [facility] [address]  Via Fax: [phone]          PT Evaluation     Today's date: 10/7/2024  Patient name: Chanell Bourgeois  : 1945  MRN: [id number]  Referring provider: Norman Villatoro*  Dx:   Encounter Diagnosis     ICD-10-CM    1. Chronic left shoulder pain  M25.512     G89.29       2. Chronic bilateral low back pain, unspecified whether sciatica present  M54.50     G89.29       3. Ambulatory dysfunction  R26.2           Start Time: 1450  Stop Time: 1530  Total time in clinic (min): 40 minutes    Assessment  Impairments: abnormal gait, abnormal muscle firing, abnormal muscle tone, abnormal or restricted ROM, abnormal movement, activity intolerance, impaired balance, impaired physical strength, lacks appropriate home exercise program, pain with function,  weight-bearing intolerance, participation limitations and activity limitations  Symptom irritability: high    Assessment details: 78 year old female patient reports to PT with chronic L shoulder pain and ambulatory dysfunction. No red flags noted. Patient presents with decreased motor coordination of L shoulder/scapular musculature, decreased balance, and decreased L shoulder mobility as primary movement impairments. Patient will benefit from skilled PT services to address current impairments and functional limitations to help patient return to her PLOF.       Understanding of Dx/Px/POC: good     Prognosis: good    Goals  STG  1. Patient will be independent with completion of HEP throughout therapy  2. Patient will have at worst 5/10 pain so patient can sleep with less discomfort in 3 weeks.  LTG  1. Patient will increase L shoulder flexion and abduction AROM by at least 50% so patient can reach overhead with less difficulty in 6 weeks.   2. Patient will increase L shoulder strength by at least 1/2 grade so patient can lift with less discomfort in 6 weeks.  3. Patient will increase 5x STS to at least 18 seconds indicating improvement in power and decrease in fall risk in 6 weeks.   4. Patient will increase tandem stance balance time in each direction to at least 20 second sin 6 weeks.     Plan  Patient would benefit from: skilled physical therapy    Planned therapy interventions: abdominal trunk stabilization, manual therapy, joint mobilization, balance, activity modification, motor coordination training, nerve gliding, neuromuscular re-education, patient/caregiver education, therapeutic exercise, therapeutic activities, stretching, strengthening, home exercise program, functional ROM exercises and flexibility    Frequency: 2x week  Duration in weeks: 8  Treatment plan discussed with: patient      Subjective Evaluation    History of Present Illness  Mechanism of injury: Patient reports with continued L shoulder  pain. Patient reports since last PT sessions her L shoulder hasn't been great but recently it got worse again. Patient thinks her shoulder might've gotten worse from having to take care of her dog since he needed surgery. Patient has pain lifting and completing overhead activity.       Patient would also like to work on her balance as she had difficulty walking her dog without an SPC over weekend.   Patient Goals  Patient goals for therapy: decreased pain, increased motion, return to sport/leisure activities and increased strength    Pain  Current pain ratin  At best pain ratin  At worst pain ratin  Quality: dull ache and sharp  Relieving factors: change in position and medications  Aggravating factors: lifting and overhead activity    Treatments  Previous treatment: physical therapy and injection treatment  Current treatment: physical therapy      Objective     Active Range of Motion   Left Shoulder   Flexion: 90 degrees with pain  Abduction: 60 degrees with pain  External rotation 0°: 35 degrees with pain    Right Shoulder   Flexion: 135 degrees   Abduction: 135 degrees   External rotation 0°: 60 degrees     Passive Range of Motion   Left Shoulder   Flexion: 100 degrees with pain  Abduction: 80 degrees with pain  External rotation 0°: 40 degrees with pain    Right Shoulder   Flexion: 150 degrees   Abduction: 150 degrees   External rotation 0°: 60 degrees     Strength/Myotome Testing     Left Shoulder     Planes of Motion   Flexion: 3+   Abduction: 3+   External rotation at 0°: 3+   Internal rotation at 0°: 3+     Right Shoulder     Planes of Motion   Flexion: 4-   Abduction: 4-   External rotation at 0°: 4-   Internal rotation at 0°: 4-     Left Hip   Planes of Motion   Flexion: 3+  Extension: 3-  Abduction: 2+    Right Hip   Planes of Motion   Flexion: 3+  Extension: 3-  Abduction: 2+    Left Knee   Flexion: 3+  Extension: 3+    Right Knee   Flexion: 3+  Extension: 3+    Left Ankle/Foot  "  Dorsiflexion: 4  Great toe extension: 4    Right Ankle/Foot   Dorsiflexion: 4  Great toe extension: 4    Functional Assessment        Comments  5x STS: 25 seconds with use of hands and 1 airex foam on chair  Tandem stance: R and L foot behind both 11 seconds  TU seconds             Precautions: none      Manuals 10/7                                                                Neuro Re-Ed             Scap retraction 20x 3\" holds            Scap 4 8x rows black                                                                             Ther Ex             LAQ 20x B            Seated hip flexion 20x B            pulleys 8x            Standing ER/IR L shoulder 10x single black each                                                                 Ther Activity             STS 2x5 w/ 1 foam                          Gait Training                                       Modalities                                                             "

## 2024-10-07 NOTE — PROGRESS NOTES
PT Evaluation     Today's date: 10/7/2024  Patient name: Chanell Bourgeois  : 1945  MRN: [id number]  Referring provider: Norman Villatoro*  Dx:   Encounter Diagnosis     ICD-10-CM    1. Chronic left shoulder pain  M25.512     G89.29       2. Chronic bilateral low back pain, unspecified whether sciatica present  M54.50     G89.29       3. Ambulatory dysfunction  R26.2           Start Time: 1450  Stop Time: 1530  Total time in clinic (min): 40 minutes    Assessment  Impairments: abnormal gait, abnormal muscle firing, abnormal muscle tone, abnormal or restricted ROM, abnormal movement, activity intolerance, impaired balance, impaired physical strength, lacks appropriate home exercise program, pain with function, weight-bearing intolerance, participation limitations and activity limitations  Symptom irritability: high    Assessment details: 78 year old female patient reports to PT with chronic L shoulder pain and ambulatory dysfunction. No red flags noted. Patient presents with decreased motor coordination of L shoulder/scapular musculature, decreased balance, and decreased L shoulder mobility as primary movement impairments. Patient will benefit from skilled PT services to address current impairments and functional limitations to help patient return to her PLOF.       Understanding of Dx/Px/POC: good     Prognosis: good    Goals  STG  1. Patient will be independent with completion of HEP throughout therapy  2. Patient will have at worst 5/10 pain so patient can sleep with less discomfort in 3 weeks.  LTG  1. Patient will increase L shoulder flexion and abduction AROM by at least 50% so patient can reach overhead with less difficulty in 6 weeks.   2. Patient will increase L shoulder strength by at least 1/2 grade so patient can lift with less discomfort in 6 weeks.  3. Patient will increase 5x STS to at least 18 seconds indicating improvement in power and decrease in fall risk in 6 weeks.   4. Patient will  increase tandem stance balance time in each direction to at least 20 second sin 6 weeks.     Plan  Patient would benefit from: skilled physical therapy    Planned therapy interventions: abdominal trunk stabilization, manual therapy, joint mobilization, balance, activity modification, motor coordination training, nerve gliding, neuromuscular re-education, patient/caregiver education, therapeutic exercise, therapeutic activities, stretching, strengthening, home exercise program, functional ROM exercises and flexibility    Frequency: 2x week  Duration in weeks: 8  Treatment plan discussed with: patient      Subjective Evaluation    History of Present Illness  Mechanism of injury: Patient reports with continued L shoulder pain. Patient reports since last PT sessions her L shoulder hasn't been great but recently it got worse again. Patient thinks her shoulder might've gotten worse from having to take care of her dog since he needed surgery. Patient has pain lifting and completing overhead activity.       Patient would also like to work on her balance as she had difficulty walking her dog without an SPC over weekend.   Patient Goals  Patient goals for therapy: decreased pain, increased motion, return to sport/leisure activities and increased strength    Pain  Current pain ratin  At best pain ratin  At worst pain ratin  Quality: dull ache and sharp  Relieving factors: change in position and medications  Aggravating factors: lifting and overhead activity    Treatments  Previous treatment: physical therapy and injection treatment  Current treatment: physical therapy      Objective     Active Range of Motion   Left Shoulder   Flexion: 90 degrees with pain  Abduction: 60 degrees with pain  External rotation 0°: 35 degrees with pain    Right Shoulder   Flexion: 135 degrees   Abduction: 135 degrees   External rotation 0°: 60 degrees     Passive Range of Motion   Left Shoulder   Flexion: 100 degrees with  "pain  Abduction: 80 degrees with pain  External rotation 0°: 40 degrees with pain    Right Shoulder   Flexion: 150 degrees   Abduction: 150 degrees   External rotation 0°: 60 degrees     Strength/Myotome Testing     Left Shoulder     Planes of Motion   Flexion: 3+   Abduction: 3+   External rotation at 0°: 3+   Internal rotation at 0°: 3+     Right Shoulder     Planes of Motion   Flexion: 4-   Abduction: 4-   External rotation at 0°: 4-   Internal rotation at 0°: 4-     Left Hip   Planes of Motion   Flexion: 3+  Extension: 3-  Abduction: 2+    Right Hip   Planes of Motion   Flexion: 3+  Extension: 3-  Abduction: 2+    Left Knee   Flexion: 3+  Extension: 3+    Right Knee   Flexion: 3+  Extension: 3+    Left Ankle/Foot   Dorsiflexion: 4  Great toe extension: 4    Right Ankle/Foot   Dorsiflexion: 4  Great toe extension: 4    Functional Assessment        Comments  5x STS: 25 seconds with use of hands and 1 airex foam on chair  Tandem stance: R and L foot behind both 11 seconds  TU seconds             Precautions: none      Manuals 10/7                                                                Neuro Re-Ed             Scap retraction 20x 3\" holds            Scap 4 8x rows black                                                                             Ther Ex             LAQ 20x B            Seated hip flexion 20x B            pulleys 8x            Standing ER/IR L shoulder 10x single black each                                                                 Ther Activity             STS 2x5 w/ 1 foam                          Gait Training                                       Modalities                                            "

## 2024-10-10 ENCOUNTER — OFFICE VISIT (OUTPATIENT)
Dept: PHYSICAL THERAPY | Facility: CLINIC | Age: 79
End: 2024-10-10
Payer: MEDICARE

## 2024-10-10 DIAGNOSIS — R26.2 AMBULATORY DYSFUNCTION: ICD-10-CM

## 2024-10-10 DIAGNOSIS — G89.29 CHRONIC BILATERAL LOW BACK PAIN, UNSPECIFIED WHETHER SCIATICA PRESENT: ICD-10-CM

## 2024-10-10 DIAGNOSIS — M25.512 CHRONIC LEFT SHOULDER PAIN: Primary | ICD-10-CM

## 2024-10-10 DIAGNOSIS — M54.50 CHRONIC BILATERAL LOW BACK PAIN, UNSPECIFIED WHETHER SCIATICA PRESENT: ICD-10-CM

## 2024-10-10 DIAGNOSIS — G89.29 CHRONIC LEFT SHOULDER PAIN: Primary | ICD-10-CM

## 2024-10-10 PROCEDURE — 97112 NEUROMUSCULAR REEDUCATION: CPT | Performed by: PHYSICAL THERAPIST

## 2024-10-10 PROCEDURE — 97110 THERAPEUTIC EXERCISES: CPT | Performed by: PHYSICAL THERAPIST

## 2024-10-10 NOTE — PROGRESS NOTES
"Daily Note     Today's date: 10/10/2024  Patient name: Chanell Bourgeois  : 1945  MRN: [id number]  Referring provider: Norman Villatoro*  Dx:   Encounter Diagnosis     ICD-10-CM    1. Chronic left shoulder pain  M25.512     G89.29       2. Chronic bilateral low back pain, unspecified whether sciatica present  M54.50     G89.29       3. Ambulatory dysfunction  R26.2                      Subjective: Patient reports felt really good last visit for a couple days.      Objective: See treatment diary below      Assessment: Tolerated treatment well. Patient would benefit from continued PT. Treatment plan initiated. Follow up with soreness to know how to further progress. Patient had no adverse reactions to treatment this visit. Had transient increases in symptoms as fatigued but no worse post.      Plan: Progress treatment as tolerated.       Precautions: none      Manuals 10/7 10/10                                                               Neuro Re-Ed             Scap retraction 20x 3\" holds 17x 3\" holds            Scap 4 8x rows black 10x rows black                                                                            Ther Ex             LAQ 20x B 20x B           Seated hip flexion 20x B 20x B           pulleys 8x 10x           Standing ER/IR L shoulder 10x single black each  10x single black each            Table slides             treadmill  6 min 1.2 pmh                                      Ther Activity             STS 2x5 w/ 1 foam  2x5 w/ 1 foam                         Gait Training                                       Modalities                                            "

## 2024-10-14 ENCOUNTER — TELEPHONE (OUTPATIENT)
Dept: FAMILY MEDICINE CLINIC | Facility: CLINIC | Age: 79
End: 2024-10-14

## 2024-10-14 NOTE — TELEPHONE ENCOUNTER
Fax received from [facility] requesting prior authorization for Pepcid 20 mg tablets. Patient instructions are Take one tablet daily to twice a day for heartburn     No key provided. Please contact plan at [phone] to initiate prior authorization.

## 2024-10-14 NOTE — TELEPHONE ENCOUNTER
PA for famotidine 20 mg SUBMITTED     via    []CMM-KEY:   []Surescripts-Case ID #     [id number]     []Availity-Auth ID # NDC #   []Faxed to plan   []Other website   []Phone call Case ID #     Office notes sent, clinical questions answered. Awaiting determination    Turnaround time for your insurance to make a decision on your Prior Authorization can take 7-21 business days.

## 2024-10-15 ENCOUNTER — OFFICE VISIT (OUTPATIENT)
Dept: PHYSICAL THERAPY | Facility: CLINIC | Age: 79
End: 2024-10-15
Payer: MEDICARE

## 2024-10-15 DIAGNOSIS — G89.29 CHRONIC LEFT SHOULDER PAIN: Primary | ICD-10-CM

## 2024-10-15 DIAGNOSIS — G89.29 CHRONIC BILATERAL LOW BACK PAIN, UNSPECIFIED WHETHER SCIATICA PRESENT: ICD-10-CM

## 2024-10-15 DIAGNOSIS — M54.50 CHRONIC BILATERAL LOW BACK PAIN, UNSPECIFIED WHETHER SCIATICA PRESENT: ICD-10-CM

## 2024-10-15 DIAGNOSIS — R26.2 AMBULATORY DYSFUNCTION: ICD-10-CM

## 2024-10-15 DIAGNOSIS — M25.512 CHRONIC LEFT SHOULDER PAIN: Primary | ICD-10-CM

## 2024-10-15 PROCEDURE — 97112 NEUROMUSCULAR REEDUCATION: CPT

## 2024-10-15 PROCEDURE — 97110 THERAPEUTIC EXERCISES: CPT

## 2024-10-15 NOTE — PROGRESS NOTES
"Daily Note     Today's date: 10/15/2024  Patient name: Chanell Bourgeois  : 1945  MRN: [id number]  Referring provider: Norman Villatoro*  Dx:   Encounter Diagnosis     ICD-10-CM    1. Chronic left shoulder pain  M25.512     G89.29       2. Chronic bilateral low back pain, unspecified whether sciatica present  M54.50     G89.29       3. Ambulatory dysfunction  R26.2           Start Time: 1445  Stop Time: 1525  Total time in clinic (min): 40 minutes    Subjective: Patient reports felt better after last session but not as \"amazing as after the first time\".      Objective: See treatment diary below      Assessment: Tolerated treatment well. Patient would benefit from continued PT. Able to increase time on TM demonstrating improvements in functional endurance. Added table slides with decreased neck and upper back tension noted following.      Plan: Progress treatment as tolerated.       Precautions: none      Manuals 10/7 10/10 10/15                                                              Neuro Re-Ed             Scap retraction 20x 3\" holds 17x 3\" holds  20x 3\" holds          Scap 4 8x rows black 10x rows black 10x rows black                                                                           Ther Ex             LAQ 20x B 20x B 20x B          Seated hip flexion 20x B 20x B 20x B          pulleys 8x 10x 10x          Standing ER/IR L shoulder 10x single black each  10x single black each  10x single black each           Table slides   10x B          treadmill  6 min 1.2 pmh  8 min 1.2 mph                                     Ther Activity             STS 2x5 w/ 1 foam  2x5 w/ 1 foam  2x5 w/ 1 foam                        Gait Training                                       Modalities                                            "

## 2024-10-15 NOTE — TELEPHONE ENCOUNTER
PA for famotidine 20 mg  NOT REQUIRED     Reason (screenshot if applicable)          Patient advised by          [x] MyChart Message  [] Phone call   []LMOM  []L/M to call office as no active Communication consent on file  []Unable to leave detailed message as VM not approved on Communication consent       Pharmacy advised by    []Fax  [x]Phone call

## 2024-10-22 ENCOUNTER — OFFICE VISIT (OUTPATIENT)
Dept: PHYSICAL THERAPY | Facility: CLINIC | Age: 79
End: 2024-10-22
Payer: MEDICARE

## 2024-10-22 DIAGNOSIS — M25.512 CHRONIC LEFT SHOULDER PAIN: Primary | ICD-10-CM

## 2024-10-22 DIAGNOSIS — R26.2 AMBULATORY DYSFUNCTION: ICD-10-CM

## 2024-10-22 DIAGNOSIS — M54.50 CHRONIC BILATERAL LOW BACK PAIN, UNSPECIFIED WHETHER SCIATICA PRESENT: ICD-10-CM

## 2024-10-22 DIAGNOSIS — G89.29 CHRONIC LEFT SHOULDER PAIN: Primary | ICD-10-CM

## 2024-10-22 DIAGNOSIS — G89.29 CHRONIC BILATERAL LOW BACK PAIN, UNSPECIFIED WHETHER SCIATICA PRESENT: ICD-10-CM

## 2024-10-22 PROCEDURE — 97110 THERAPEUTIC EXERCISES: CPT | Performed by: PHYSICAL THERAPIST

## 2024-10-22 PROCEDURE — 97112 NEUROMUSCULAR REEDUCATION: CPT | Performed by: PHYSICAL THERAPIST

## 2024-10-22 NOTE — PROGRESS NOTES
"Daily Note     Today's date: 10/22/2024  Patient name: Chanell Bourgeois  : 1945  MRN: [id number]  Referring provider: Norman Villatoro*  Dx:   Encounter Diagnosis     ICD-10-CM    1. Chronic left shoulder pain  M25.512     G89.29       2. Chronic bilateral low back pain, unspecified whether sciatica present  M54.50     G89.29       3. Ambulatory dysfunction  R26.2                      Subjective: Patient reports fell over weekend accidentally and has increased pain in legs and shoulders. Notes she didn't fracture anything.      Objective: See treatment diary below    Educated to go for xrays if symptoms from fall don't improve.    Assessment: Tolerated treatment well. Patient would benefit from continued PT. Decreased intensity of exercises due to patient having increased pain from falling over weekend.     Plan: Progress treatment as tolerated.       Precautions: none      Manuals 10/7 10/10 10/15 10/22                                                             Neuro Re-Ed             Scap retraction 20x 3\" holds 17x 3\" holds  20x 3\" holds 20x 3\" holds         Scap 4 8x rows black 10x rows black 10x rows black                                                                           Ther Ex             LAQ 20x B 20x B 20x B 18x          Seated hip flexion 20x B 20x B 20x B 20x         pulleys 8x 10x 10x 10x         Standing ER/IR L shoulder 10x single black each  10x single black each  10x single black each  -         Table slides   10x B 10x B         treadmill  6 min 1.2 pmh  8 min 1.2 mph  6 min                                   Ther Activity             STS 2x5 w/ 1 foam  2x5 w/ 1 foam  2x5 w/ 1 foam  2x4 w/ 1 foam                       Gait Training                                       Modalities                                            "

## 2024-10-28 ENCOUNTER — APPOINTMENT (OUTPATIENT)
Dept: PHYSICAL THERAPY | Facility: CLINIC | Age: 79
End: 2024-10-28
Payer: MEDICARE

## 2024-10-29 ENCOUNTER — OFFICE VISIT (OUTPATIENT)
Dept: PHYSICAL THERAPY | Facility: CLINIC | Age: 79
End: 2024-10-29
Payer: MEDICARE

## 2024-10-29 DIAGNOSIS — G89.29 CHRONIC LEFT SHOULDER PAIN: Primary | ICD-10-CM

## 2024-10-29 DIAGNOSIS — M54.50 CHRONIC BILATERAL LOW BACK PAIN, UNSPECIFIED WHETHER SCIATICA PRESENT: ICD-10-CM

## 2024-10-29 DIAGNOSIS — R26.2 AMBULATORY DYSFUNCTION: ICD-10-CM

## 2024-10-29 DIAGNOSIS — M25.512 CHRONIC LEFT SHOULDER PAIN: Primary | ICD-10-CM

## 2024-10-29 DIAGNOSIS — G89.29 CHRONIC BILATERAL LOW BACK PAIN, UNSPECIFIED WHETHER SCIATICA PRESENT: ICD-10-CM

## 2024-10-29 PROCEDURE — 97110 THERAPEUTIC EXERCISES: CPT | Performed by: PHYSICAL THERAPIST

## 2024-10-29 PROCEDURE — 97112 NEUROMUSCULAR REEDUCATION: CPT | Performed by: PHYSICAL THERAPIST

## 2024-10-29 NOTE — PROGRESS NOTES
"Daily Note     Today's date: 10/29/2024  Patient name: Chanell Bourgeois  : 1945  MRN: [id number]  Referring provider: Norman Villatoro*  Dx:   Encounter Diagnosis     ICD-10-CM    1. Chronic left shoulder pain  M25.512     G89.29       2. Chronic bilateral low back pain, unspecified whether sciatica present  M54.50     G89.29       3. Ambulatory dysfunction  R26.2                      Subjective: Patient reports pain going down into hand on left side now and has some decreased strength in left hand. Patient notes R knee pretty bad still from fall.       Objective: See treatment diary below    Educated to go for xrays if symptoms from fall don't improve.    Assessment: Tolerated treatment well. Patient would benefit from continued PT.     Plan: Progress treatment as tolerated.       Precautions: none      Manuals 10/7 10/10 10/15 10/22 10/29                                                            Neuro Re-Ed             Scap retraction 20x 3\" holds 17x 3\" holds  20x 3\" holds 20x 3\" holds 20x 3\" holds         Scap 4 8x rows black 10x rows black 10x rows black                       Cervial retraction seated     10x                                                Ther Ex             LAQ 20x B 20x B 20x B 18x  20x         Seated hip flexion 20x B 20x B 20x B 20x 20x        pulleys 8x 10x 10x 10x 10x        Standing ER/IR L shoulder 10x single black each  10x single black each  10x single black each  -         Table slides   10x B 10x B 20x B         treadmill  6 min 1.2 pmh  8 min 1.2 mph  6 min 8 min                                  Ther Activity             STS 2x5 w/ 1 foam  2x5 w/ 1 foam  2x5 w/ 1 foam  2x4 w/ 1 foam  2x5 w/ 1 foam                      Gait Training                                       Modalities                                            "

## 2024-10-31 ENCOUNTER — OFFICE VISIT (OUTPATIENT)
Dept: PHYSICAL THERAPY | Facility: CLINIC | Age: 79
End: 2024-10-31
Payer: MEDICARE

## 2024-10-31 DIAGNOSIS — M54.50 CHRONIC BILATERAL LOW BACK PAIN, UNSPECIFIED WHETHER SCIATICA PRESENT: ICD-10-CM

## 2024-10-31 DIAGNOSIS — M25.512 CHRONIC LEFT SHOULDER PAIN: Primary | ICD-10-CM

## 2024-10-31 DIAGNOSIS — R26.2 AMBULATORY DYSFUNCTION: ICD-10-CM

## 2024-10-31 DIAGNOSIS — G89.29 CHRONIC BILATERAL LOW BACK PAIN, UNSPECIFIED WHETHER SCIATICA PRESENT: ICD-10-CM

## 2024-10-31 DIAGNOSIS — G89.29 CHRONIC LEFT SHOULDER PAIN: Primary | ICD-10-CM

## 2024-10-31 PROCEDURE — 97112 NEUROMUSCULAR REEDUCATION: CPT | Performed by: PHYSICAL THERAPIST

## 2024-10-31 PROCEDURE — 97110 THERAPEUTIC EXERCISES: CPT | Performed by: PHYSICAL THERAPIST

## 2024-10-31 NOTE — PROGRESS NOTES
"Daily Note     Today's date: 10/31/2024  Patient name: Chanell Bourgeois  : 1945  MRN: [id number]  Referring provider: Norman Villatoro*  Dx:   Encounter Diagnosis     ICD-10-CM    1. Chronic left shoulder pain  M25.512     G89.29       2. Chronic bilateral low back pain, unspecified whether sciatica present  M54.50     G89.29       3. Ambulatory dysfunction  R26.2                      Subjective: Patient reports had relief of symptoms after last visit.    Objective: See treatment diary below    Treadmill  STS  LAQ/seated hip flexion  Scap squeezes  Table slides  Pulleys  STM  Cervical retraction    Assessment: Tolerated treatment well. Patient would benefit from continued PT.     Plan: Progress treatment as tolerated.       Precautions: none      Manuals 10/7 10/10 10/15 10/22 10/29 10/31       Seated posterior rotator cuff/mid thoracic STM     After pulleys MK After pulleys MK                                               Neuro Re-Ed             Scap retraction 20x 3\" holds 17x 3\" holds  20x 3\" holds 20x 3\" holds 20x 3\" holds  20x 3\" holds        Scap 4 8x rows black 10x rows black 10x rows black                       Cervial retraction seated     10x  10x                                              Ther Ex             LAQ 20x B 20x B 20x B 18x  20x  20x        Seated hip flexion 20x B 20x B 20x B 20x 20x 20x        pulleys 8x 10x 10x 10x 10x 15x       Standing ER/IR L shoulder 10x single black each  10x single black each  10x single black each  -         Table slides   10x B 10x B 20x B  20x B       treadmill  6 min 1.2 pmh  8 min 1.2 mph  6 min 8 min 8 min                                 Ther Activity             STS 2x5 w/ 1 foam  2x5 w/ 1 foam  2x5 w/ 1 foam  2x4 w/ 1 foam  2x5 w/ 1 foam  2x5 w/ 1 foam                     Gait Training                                       Modalities                                            "

## 2024-11-04 ENCOUNTER — OFFICE VISIT (OUTPATIENT)
Dept: PHYSICAL THERAPY | Facility: CLINIC | Age: 79
End: 2024-11-04
Payer: MEDICARE

## 2024-11-04 ENCOUNTER — APPOINTMENT (OUTPATIENT)
Dept: PHYSICAL THERAPY | Facility: CLINIC | Age: 79
End: 2024-11-04
Payer: MEDICARE

## 2024-11-04 DIAGNOSIS — G89.29 CHRONIC LEFT SHOULDER PAIN: Primary | ICD-10-CM

## 2024-11-04 DIAGNOSIS — G89.29 CHRONIC BILATERAL LOW BACK PAIN, UNSPECIFIED WHETHER SCIATICA PRESENT: ICD-10-CM

## 2024-11-04 DIAGNOSIS — R26.2 AMBULATORY DYSFUNCTION: ICD-10-CM

## 2024-11-04 DIAGNOSIS — M54.50 CHRONIC BILATERAL LOW BACK PAIN, UNSPECIFIED WHETHER SCIATICA PRESENT: ICD-10-CM

## 2024-11-04 DIAGNOSIS — M25.512 CHRONIC LEFT SHOULDER PAIN: Primary | ICD-10-CM

## 2024-11-04 PROCEDURE — 97112 NEUROMUSCULAR REEDUCATION: CPT

## 2024-11-04 PROCEDURE — 97110 THERAPEUTIC EXERCISES: CPT

## 2024-11-04 NOTE — PROGRESS NOTES
"Daily Note     Today's date: 2024  Patient name: Chanell Bourgeois  : 1945  MRN: [id number]  Referring provider: Norman Villatoro*  Dx:     Encounter Diagnosis     ICD-10-CM    1. Chronic left shoulder pain  M25.512     G89.29       2. Chronic bilateral low back pain, unspecified whether sciatica present  M54.50     G89.29       3. Ambulatory dysfunction  R26.2             Start Time: 1400  Stop Time: 1430  Total time in clinic (min): 30 minutes    Subjective: Patient offers no new complaints. States she has recovered from her fall 3 weeks ago, just her knees are more bothersome.     Objective: See treatment diary below    Treadmill  STS  LAQ/seated hip flexion  Scap squeezes  Table slides  Pulleys  STM  Cervical retraction    Assessment: Tolerated treatment well. Patient performed TM to increase blood flow to the area being treated, prepare the muscle for strength training and lengthening and to improve overall tolerance to activity. Patient demonstrated adequate technique during TE's. Patient is appropriately challenged, no progression this session. Patient would benefit from ongoing skilled PT to address impairments, improve mobility, ADL's and to maintain independence.          Plan: Progress treatment as tolerated.       Precautions: none      Manuals 10/7 10/10 10/15 10/22 10/29 10/31 11/4      Seated posterior rotator cuff/mid thoracic STM     After pulleys MK After pulleys MK  After pulleys MK                                             Neuro Re-Ed             Scap retraction 20x 3\" holds 17x 3\" holds  20x 3\" holds 20x 3\" holds 20x 3\" holds  20x 3\" holds  20x 3\" holds       Scap 4 8x rows black 10x rows black 10x rows black                       Cervial retraction seated     10x  10x 10x                                             Ther Ex             LAQ 20x B 20x B 20x B 18x  20x  20x  20x      Seated hip flexion 20x B 20x B 20x B 20x 20x 20x  20x      pulleys 8x 10x 10x 10x 10x 15x 10x    "   Standing ER/IR L shoulder 10x single black each  10x single black each  10x single black each  -         Table slides   10x B 10x B 20x B  20x B 20x B      treadmill  6 min 1.2 pmh  8 min 1.2 mph  6 min 8 min 8 min 8 min                                Ther Activity             STS 2x5 w/ 1 foam  2x5 w/ 1 foam  2x5 w/ 1 foam  2x4 w/ 1 foam  2x5 w/ 1 foam  2x5 w/ 1 foam  2x5 w/ 1 foam                    Gait Training                                       Modalities

## 2024-11-05 ENCOUNTER — APPOINTMENT (OUTPATIENT)
Dept: PHYSICAL THERAPY | Facility: CLINIC | Age: 79
End: 2024-11-05
Payer: MEDICARE

## 2024-11-07 ENCOUNTER — APPOINTMENT (OUTPATIENT)
Dept: PHYSICAL THERAPY | Facility: CLINIC | Age: 79
End: 2024-11-07
Payer: MEDICARE

## 2024-11-11 ENCOUNTER — OFFICE VISIT (OUTPATIENT)
Dept: PHYSICAL THERAPY | Facility: CLINIC | Age: 79
End: 2024-11-11
Payer: MEDICARE

## 2024-11-11 ENCOUNTER — APPOINTMENT (OUTPATIENT)
Dept: PHYSICAL THERAPY | Facility: CLINIC | Age: 79
End: 2024-11-11
Payer: MEDICARE

## 2024-11-11 DIAGNOSIS — M54.50 CHRONIC BILATERAL LOW BACK PAIN, UNSPECIFIED WHETHER SCIATICA PRESENT: ICD-10-CM

## 2024-11-11 DIAGNOSIS — G89.29 CHRONIC LEFT SHOULDER PAIN: Primary | ICD-10-CM

## 2024-11-11 DIAGNOSIS — G89.29 CHRONIC BILATERAL LOW BACK PAIN, UNSPECIFIED WHETHER SCIATICA PRESENT: ICD-10-CM

## 2024-11-11 DIAGNOSIS — R26.2 AMBULATORY DYSFUNCTION: ICD-10-CM

## 2024-11-11 DIAGNOSIS — M25.512 CHRONIC LEFT SHOULDER PAIN: Primary | ICD-10-CM

## 2024-11-11 PROCEDURE — 97112 NEUROMUSCULAR REEDUCATION: CPT

## 2024-11-11 PROCEDURE — 97110 THERAPEUTIC EXERCISES: CPT

## 2024-11-11 PROCEDURE — 97530 THERAPEUTIC ACTIVITIES: CPT

## 2024-11-11 NOTE — PROGRESS NOTES
"Daily Note     Today's date: 2024  Patient name: Chanell Bourgeois  : 1945  MRN: [id number]  Referring provider: Norman Villatoro*  Dx:     Encounter Diagnosis     ICD-10-CM    1. Chronic left shoulder pain  M25.512     G89.29       2. Chronic bilateral low back pain, unspecified whether sciatica present  M54.50     G89.29       3. Ambulatory dysfunction  R26.2           Start Time: 1355  Stop Time: 1433  Total time in clinic (min): 38 minutes    Subjective: Patient reports she is tired and in more pain today in her neck and shoulders possibly from being sick over the weekend.    Objective: See treatment diary below    Treadmill  STS  LAQ/seated hip flexion  Scap squeezes  Table slides  Pulleys  STM  Cervical retraction    Assessment: Tolerated treatment well. Added weight to LAQ and seated march with challenge and fatigue noted. Increased tightness noted in her L posterior shoulder and thoracic region with improvements noted following STM. Patient would benefit from ongoing skilled PT to address impairments, improve mobility, ADL's and to maintain independence.        Plan: Progress treatment as tolerated.       Precautions: none      Manuals 10/7 10/10 10/15 10/22 10/29 10/31 11/4 11/11     Seated posterior rotator cuff/mid thoracic STM     After pulleys MK After pulleys MK  After pulleys MK After pulleys MK                                            Neuro Re-Ed             Scap retraction 20x 3\" holds 17x 3\" holds  20x 3\" holds 20x 3\" holds 20x 3\" holds  20x 3\" holds  20x 3\" holds  20x 3\" holds      Scap 4 8x rows black 10x rows black 10x rows black                       Cervial retraction seated     10x  10x 10x 10x                                            Ther Ex             LAQ 20x B 20x B 20x B 18x  20x  20x  20x 2lb 20x     Seated hip flexion 20x B 20x B 20x B 20x 20x 20x  20x 2lb 10x     pulleys 8x 10x 10x 10x 10x 15x 10x 10x     Standing ER/IR L shoulder 10x single black each  10x " single black each  10x single black each  -         Table slides   10x B 10x B 20x B  20x B 20x B 20x B     treadmill  6 min 1.2 pmh  8 min 1.2 mph  6 min 8 min 8 min 8 min 8 min                               Ther Activity             STS 2x5 w/ 1 foam  2x5 w/ 1 foam  2x5 w/ 1 foam  2x4 w/ 1 foam  2x5 w/ 1 foam  2x5 w/ 1 foam  2x5 w/ 1 foam  2x5 w/ 1 foam                   Gait Training                                       Modalities

## 2024-11-12 ENCOUNTER — APPOINTMENT (OUTPATIENT)
Dept: PHYSICAL THERAPY | Facility: CLINIC | Age: 79
End: 2024-11-12
Payer: MEDICARE

## 2024-11-14 ENCOUNTER — APPOINTMENT (OUTPATIENT)
Dept: PHYSICAL THERAPY | Facility: CLINIC | Age: 79
End: 2024-11-14
Payer: MEDICARE

## 2024-11-19 ENCOUNTER — APPOINTMENT (OUTPATIENT)
Dept: PHYSICAL THERAPY | Facility: CLINIC | Age: 79
End: 2024-11-19
Payer: MEDICARE

## 2024-11-21 ENCOUNTER — APPOINTMENT (OUTPATIENT)
Dept: PHYSICAL THERAPY | Facility: CLINIC | Age: 79
End: 2024-11-21
Payer: MEDICARE

## 2024-11-25 ENCOUNTER — OFFICE VISIT (OUTPATIENT)
Dept: PHYSICAL THERAPY | Facility: CLINIC | Age: 79
End: 2024-11-25
Payer: MEDICARE

## 2024-11-25 DIAGNOSIS — M25.512 CHRONIC LEFT SHOULDER PAIN: Primary | ICD-10-CM

## 2024-11-25 DIAGNOSIS — G89.29 CHRONIC BILATERAL LOW BACK PAIN, UNSPECIFIED WHETHER SCIATICA PRESENT: ICD-10-CM

## 2024-11-25 DIAGNOSIS — R26.2 AMBULATORY DYSFUNCTION: ICD-10-CM

## 2024-11-25 DIAGNOSIS — G89.29 CHRONIC LEFT SHOULDER PAIN: Primary | ICD-10-CM

## 2024-11-25 DIAGNOSIS — M54.50 CHRONIC BILATERAL LOW BACK PAIN, UNSPECIFIED WHETHER SCIATICA PRESENT: ICD-10-CM

## 2024-11-25 PROCEDURE — 97530 THERAPEUTIC ACTIVITIES: CPT | Performed by: PHYSICAL THERAPIST

## 2024-11-25 PROCEDURE — 97112 NEUROMUSCULAR REEDUCATION: CPT | Performed by: PHYSICAL THERAPIST

## 2024-11-25 PROCEDURE — 97110 THERAPEUTIC EXERCISES: CPT | Performed by: PHYSICAL THERAPIST

## 2024-11-25 NOTE — PROGRESS NOTES
"Daily Note     Today's date: 2024  Patient name: Chanell Bourgeois  : 1945  MRN: [id number]  Referring provider: Norman Villatoro*  Dx:     Encounter Diagnosis     ICD-10-CM    1. Chronic left shoulder pain  M25.512     G89.29       2. Chronic bilateral low back pain, unspecified whether sciatica present  M54.50     G89.29       3. Ambulatory dysfunction  R26.2                      Subjective: Patient reports had bad pain in shoulders especially L over weekend.    Objective: See treatment diary below    Treadmill  STS  LAQ/seated hip flexion  Scap squeezes  Table slides  Pulleys  STM  Cervical retraction    Assessment: Tolerated treatment well. Patient would benefit from ongoing skilled PT to address impairments, improve mobility, ADL's and to maintain independence.        Plan: Progress treatment as tolerated.       Precautions: none      Manuals 10/7 10/10 10/15 10/22 10/29 10/31 11/4 11/11 11/25    Seated posterior rotator cuff/mid thoracic STM     After pulleys MK After pulleys MK  After pulleys MK After pulleys MK MK                                           Neuro Re-Ed             Scap retraction 20x 3\" holds 17x 3\" holds  20x 3\" holds 20x 3\" holds 20x 3\" holds  20x 3\" holds  20x 3\" holds  20x 3\" holds  20x 3\" holds    Scap 4 8x rows black 10x rows black 10x rows black                       Cervial retraction seated     10x  10x 10x 10x 10x                                           Ther Ex             LAQ 20x B 20x B 20x B 18x  20x  20x  20x 2lb 20x 20x 2 lbs B    Seated hip flexion 20x B 20x B 20x B 20x 20x 20x  20x 2lb 10x 10x 2 lbs B     pulleys 8x 10x 10x 10x 10x 15x 10x 10x 12x     Standing ER/IR L shoulder 10x single black each  10x single black each  10x single black each  -         Table slides   10x B 10x B 20x B  20x B 20x B 20x B 20x B     treadmill  6 min 1.2 pmh  8 min 1.2 mph  6 min 8 min 8 min 8 min 8 min 8 min                              Ther Activity             STS 2x5 w/ 1 " foam  2x5 w/ 1 foam  2x5 w/ 1 foam  2x4 w/ 1 foam  2x5 w/ 1 foam  2x5 w/ 1 foam  2x5 w/ 1 foam  2x5 w/ 1 foam  2x5 w/ 1 foam                  Gait Training                                       Modalities

## 2024-11-26 ENCOUNTER — APPOINTMENT (OUTPATIENT)
Dept: PHYSICAL THERAPY | Facility: CLINIC | Age: 79
End: 2024-11-26
Payer: MEDICARE

## 2024-12-03 ENCOUNTER — OFFICE VISIT (OUTPATIENT)
Dept: PHYSICAL THERAPY | Facility: CLINIC | Age: 79
End: 2024-12-03
Payer: MEDICARE

## 2024-12-03 DIAGNOSIS — G89.29 CHRONIC BILATERAL LOW BACK PAIN, UNSPECIFIED WHETHER SCIATICA PRESENT: ICD-10-CM

## 2024-12-03 DIAGNOSIS — M54.50 CHRONIC BILATERAL LOW BACK PAIN, UNSPECIFIED WHETHER SCIATICA PRESENT: ICD-10-CM

## 2024-12-03 DIAGNOSIS — M25.512 CHRONIC LEFT SHOULDER PAIN: Primary | ICD-10-CM

## 2024-12-03 DIAGNOSIS — G89.29 CHRONIC LEFT SHOULDER PAIN: Primary | ICD-10-CM

## 2024-12-03 DIAGNOSIS — R26.2 AMBULATORY DYSFUNCTION: ICD-10-CM

## 2024-12-03 PROCEDURE — 97112 NEUROMUSCULAR REEDUCATION: CPT | Performed by: PHYSICAL THERAPIST

## 2024-12-03 PROCEDURE — 97110 THERAPEUTIC EXERCISES: CPT | Performed by: PHYSICAL THERAPIST

## 2024-12-03 PROCEDURE — 97530 THERAPEUTIC ACTIVITIES: CPT | Performed by: PHYSICAL THERAPIST

## 2024-12-03 NOTE — PROGRESS NOTES
"Daily Note     Today's date: 12/3/2024  Patient name: Chanell Bourgeois  : 1945  MRN: [id number]  Referring provider: Norman Villatoro*  Dx:     Encounter Diagnosis     ICD-10-CM    1. Chronic left shoulder pain  M25.512     G89.29       2. Chronic bilateral low back pain, unspecified whether sciatica present  M54.50     G89.29       3. Ambulatory dysfunction  R26.2                      Subjective: Patient reports not walking with SPC today. Notes shoulders sore especially L one.    Objective: See treatment diary below    Treadmill  STS  LAQ/seated hip flexion  Scap squeezes  Table slides  Pulleys  STM  Cervical retraction    Assessment: Tolerated treatment well. Patient would benefit from ongoing skilled PT to address impairments, improve mobility, ADL's and to maintain independence.        Plan: Progress treatment as tolerated.       Precautions: none      Manuals 10/15 10/22 10/29 10/31 11/4 11/11 11/25 12/3   Seated posterior rotator cuff/mid thoracic STM   After pulleys MK After pulleys MK  After pulleys MK After pulleys MK MK MK                                    Neuro Re-Ed           Scap retraction 20x 3\" holds 20x 3\" holds 20x 3\" holds  20x 3\" holds  20x 3\" holds  20x 3\" holds  20x 3\" holds 20x 3\" holds   Scap 4 10x rows black                     Cervical retraction seated   10x  10x 10x 10x 10x 10x                                    Ther Ex           LAQ 20x B 18x  20x  20x  20x 2lb 20x 20x 2 lbs B 22x 2.5 lbs B   Seated hip flexion 20x B 20x 20x 20x  20x 2lb 10x 10x 2 lbs B  12x 2.5 lbs B    pulleys 10x 10x 10x 15x 10x 10x 12x  12x   Standing ER/IR L shoulder 10x single black each  -      trialed   Table slides 10x B 10x B 20x B  20x B 20x B 20x B 20x B  20x B    treadmill 8 min 1.2 mph  6 min 8 min 8 min 8 min 8 min 8 min 8 min                         Ther Activity           STS 2x5 w/ 1 foam  2x4 w/ 1 foam  2x5 w/ 1 foam  2x5 w/ 1 foam  2x5 w/ 1 foam  2x5 w/ 1 foam  2x5 w/ 1 foam  2x5 w/ 1 " foam               Gait Training                                 Modalities

## 2024-12-05 ENCOUNTER — APPOINTMENT (OUTPATIENT)
Dept: PHYSICAL THERAPY | Facility: CLINIC | Age: 79
End: 2024-12-05
Payer: MEDICARE

## 2024-12-09 ENCOUNTER — APPOINTMENT (OUTPATIENT)
Dept: PHYSICAL THERAPY | Facility: CLINIC | Age: 79
End: 2024-12-09
Payer: MEDICARE

## 2024-12-10 ENCOUNTER — OFFICE VISIT (OUTPATIENT)
Dept: PHYSICAL THERAPY | Facility: CLINIC | Age: 79
End: 2024-12-10
Payer: MEDICARE

## 2024-12-10 ENCOUNTER — APPOINTMENT (OUTPATIENT)
Dept: LAB | Facility: CLINIC | Age: 79
End: 2024-12-10
Payer: MEDICARE

## 2024-12-10 DIAGNOSIS — M25.512 CHRONIC LEFT SHOULDER PAIN: Primary | ICD-10-CM

## 2024-12-10 DIAGNOSIS — E11.9 CONTROLLED TYPE 2 DIABETES MELLITUS WITHOUT COMPLICATION, WITHOUT LONG-TERM CURRENT USE OF INSULIN (HCC): ICD-10-CM

## 2024-12-10 DIAGNOSIS — M54.50 CHRONIC BILATERAL LOW BACK PAIN, UNSPECIFIED WHETHER SCIATICA PRESENT: ICD-10-CM

## 2024-12-10 DIAGNOSIS — G89.29 CHRONIC LEFT SHOULDER PAIN: Primary | ICD-10-CM

## 2024-12-10 DIAGNOSIS — E03.9 ACQUIRED HYPOTHYROIDISM: ICD-10-CM

## 2024-12-10 DIAGNOSIS — R26.2 AMBULATORY DYSFUNCTION: ICD-10-CM

## 2024-12-10 DIAGNOSIS — G89.29 CHRONIC BILATERAL LOW BACK PAIN, UNSPECIFIED WHETHER SCIATICA PRESENT: ICD-10-CM

## 2024-12-10 DIAGNOSIS — I10 BENIGN ESSENTIAL HYPERTENSION: ICD-10-CM

## 2024-12-10 LAB
BASOPHILS # BLD AUTO: 0.15 THOUSANDS/ÂΜL (ref 0–0.1)
BASOPHILS NFR BLD AUTO: 2 % (ref 0–1)
EOSINOPHIL # BLD AUTO: 0.19 THOUSAND/ÂΜL (ref 0–0.61)
EOSINOPHIL NFR BLD AUTO: 2 % (ref 0–6)
ERYTHROCYTE [DISTWIDTH] IN BLOOD BY AUTOMATED COUNT: 13.1 % (ref 11.6–15.1)
EST. AVERAGE GLUCOSE BLD GHB EST-MCNC: 160 MG/DL
HBA1C MFR BLD: 7.2 %
HCT VFR BLD AUTO: 40.2 % (ref 34.8–46.1)
HGB BLD-MCNC: 13.4 G/DL (ref 11.5–15.4)
IMM GRANULOCYTES # BLD AUTO: 0.04 THOUSAND/UL (ref 0–0.2)
IMM GRANULOCYTES NFR BLD AUTO: 0 % (ref 0–2)
LYMPHOCYTES # BLD AUTO: 1.52 THOUSANDS/ÂΜL (ref 0.6–4.47)
LYMPHOCYTES NFR BLD AUTO: 15 % (ref 14–44)
MCH RBC QN AUTO: 34.2 PG (ref 26.8–34.3)
MCHC RBC AUTO-ENTMCNC: 33.3 G/DL (ref 31.4–37.4)
MCV RBC AUTO: 103 FL (ref 82–98)
MONOCYTES # BLD AUTO: 1.06 THOUSAND/ÂΜL (ref 0.17–1.22)
MONOCYTES NFR BLD AUTO: 10 % (ref 4–12)
NEUTROPHILS # BLD AUTO: 7.2 THOUSANDS/ÂΜL (ref 1.85–7.62)
NEUTS SEG NFR BLD AUTO: 71 % (ref 43–75)
NRBC BLD AUTO-RTO: 0 /100 WBCS
PLATELET # BLD AUTO: 392 THOUSANDS/UL (ref 149–390)
PMV BLD AUTO: 10.5 FL (ref 8.9–12.7)
RBC # BLD AUTO: 3.92 MILLION/UL (ref 3.81–5.12)
TSH SERPL DL<=0.05 MIU/L-ACNC: 2.47 UIU/ML (ref 0.45–4.5)
WBC # BLD AUTO: 10.16 THOUSAND/UL (ref 4.31–10.16)

## 2024-12-10 PROCEDURE — 97110 THERAPEUTIC EXERCISES: CPT | Performed by: PHYSICAL THERAPIST

## 2024-12-10 PROCEDURE — 85025 COMPLETE CBC W/AUTO DIFF WBC: CPT

## 2024-12-10 PROCEDURE — 97112 NEUROMUSCULAR REEDUCATION: CPT | Performed by: PHYSICAL THERAPIST

## 2024-12-10 PROCEDURE — 83036 HEMOGLOBIN GLYCOSYLATED A1C: CPT

## 2024-12-10 PROCEDURE — 80061 LIPID PANEL: CPT

## 2024-12-10 PROCEDURE — 36415 COLL VENOUS BLD VENIPUNCTURE: CPT

## 2024-12-10 PROCEDURE — 97530 THERAPEUTIC ACTIVITIES: CPT | Performed by: PHYSICAL THERAPIST

## 2024-12-10 PROCEDURE — 84443 ASSAY THYROID STIM HORMONE: CPT

## 2024-12-10 PROCEDURE — 80053 COMPREHEN METABOLIC PANEL: CPT

## 2024-12-10 NOTE — PROGRESS NOTES
"Daily Note     Today's date: 12/10/2024  Patient name: Chanell Bourgeois  : 1945  MRN: [id number]  Referring provider: Norman Villatoro*  Dx:     Encounter Diagnosis     ICD-10-CM    1. Chronic left shoulder pain  M25.512     G89.29       2. Chronic bilateral low back pain, unspecified whether sciatica present  M54.50     G89.29       3. Ambulatory dysfunction  R26.2                      Subjective: Patient reports getting injections in shoulder tomorrow.    Objective: See treatment diary below    Treadmill  STS  LAQ/seated hip flexion  Scap squeezes  Table slides  Pulleys  STM  Cervical retraction    Assessment: Tolerated treatment well. Patient would benefit from ongoing skilled PT to address impairments, improve mobility, ADL's and to maintain independence.        Plan: Progress treatment as tolerated.       Precautions: none      Manuals 10/22 10/29 10/31 11/4 11/11 11/25 12/3 12/10   Seated posterior rotator cuff/mid thoracic STM  After pulleys MK After pulleys MK  After pulleys MK After pulleys MK MK MK MK                                    Neuro Re-Ed           Scap retraction 20x 3\" holds 20x 3\" holds  20x 3\" holds  20x 3\" holds  20x 3\" holds  20x 3\" holds 20x 3\" holds 20x 5\" holds    Scap 4                      Cervical retraction seated  10x  10x 10x 10x 10x 10x -                                    Ther Ex           LAQ 18x  20x  20x  20x 2lb 20x 20x 2 lbs B 22x 2.5 lbs B 22x 2.5 lbs B   Seated hip flexion 20x 20x 20x  20x 2lb 10x 10x 2 lbs B  12x 2.5 lbs B  12x 2.5 lbs B    pulleys 10x 10x 15x 10x 10x 12x  12x 5x due to pain    Standing ER/IR L shoulder -      trialed    Table slides 10x B 20x B  20x B 20x B 20x B 20x B  20x B  20x B    treadmill 6 min 8 min 8 min 8 min 8 min 8 min 8 min 8 min                         Ther Activity           STS 2x4 w/ 1 foam  2x5 w/ 1 foam  2x5 w/ 1 foam  2x5 w/ 1 foam  2x5 w/ 1 foam  2x5 w/ 1 foam  2x5 w/ 1 foam  2x6 w/ 1 foam               Gait Training      "                            Modalities

## 2024-12-11 ENCOUNTER — RESULTS FOLLOW-UP (OUTPATIENT)
Dept: FAMILY MEDICINE CLINIC | Facility: CLINIC | Age: 79
End: 2024-12-11

## 2024-12-11 LAB
ALBUMIN SERPL BCG-MCNC: 4.2 G/DL (ref 3.5–5)
ALP SERPL-CCNC: 64 U/L (ref 34–104)
ALT SERPL W P-5'-P-CCNC: 10 U/L (ref 7–52)
ANION GAP SERPL CALCULATED.3IONS-SCNC: 11 MMOL/L (ref 4–13)
AST SERPL W P-5'-P-CCNC: 15 U/L (ref 13–39)
BILIRUB SERPL-MCNC: 0.65 MG/DL (ref 0.2–1)
BUN SERPL-MCNC: 18 MG/DL (ref 5–25)
CALCIUM SERPL-MCNC: 9.2 MG/DL (ref 8.4–10.2)
CHLORIDE SERPL-SCNC: 100 MMOL/L (ref 96–108)
CHOLEST SERPL-MCNC: 192 MG/DL (ref ?–200)
CO2 SERPL-SCNC: 27 MMOL/L (ref 21–32)
CREAT SERPL-MCNC: 0.61 MG/DL (ref 0.6–1.3)
GFR SERPL CREATININE-BSD FRML MDRD: 87 ML/MIN/1.73SQ M
GLUCOSE P FAST SERPL-MCNC: 166 MG/DL (ref 65–99)
HDLC SERPL-MCNC: 50 MG/DL
LDLC SERPL CALC-MCNC: 106 MG/DL (ref 0–100)
POTASSIUM SERPL-SCNC: 4.2 MMOL/L (ref 3.5–5.3)
PROT SERPL-MCNC: 6.6 G/DL (ref 6.4–8.4)
SODIUM SERPL-SCNC: 138 MMOL/L (ref 135–147)
TRIGL SERPL-MCNC: 180 MG/DL (ref ?–150)

## 2024-12-12 ENCOUNTER — APPOINTMENT (OUTPATIENT)
Dept: PHYSICAL THERAPY | Facility: CLINIC | Age: 79
End: 2024-12-12
Payer: MEDICARE

## 2024-12-12 ENCOUNTER — TELEMEDICINE (OUTPATIENT)
Dept: FAMILY MEDICINE CLINIC | Facility: CLINIC | Age: 79
End: 2024-12-12
Payer: MEDICARE

## 2024-12-12 VITALS
BODY MASS INDEX: 26.37 KG/M2 | HEART RATE: 88 BPM | WEIGHT: 168 LBS | RESPIRATION RATE: 16 BRPM | HEIGHT: 67 IN | OXYGEN SATURATION: 99 % | TEMPERATURE: 96.6 F

## 2024-12-12 DIAGNOSIS — E78.2 MIXED HYPERLIPIDEMIA: ICD-10-CM

## 2024-12-12 DIAGNOSIS — D53.9 MACROCYTIC ANEMIA: ICD-10-CM

## 2024-12-12 DIAGNOSIS — Z99.89 DEPENDENCE ON CANE: ICD-10-CM

## 2024-12-12 DIAGNOSIS — I51.7 LEFT ATRIAL DILATION: ICD-10-CM

## 2024-12-12 DIAGNOSIS — K21.9 GASTROESOPHAGEAL REFLUX DISEASE WITHOUT ESOPHAGITIS: ICD-10-CM

## 2024-12-12 DIAGNOSIS — I10 BENIGN ESSENTIAL HYPERTENSION: ICD-10-CM

## 2024-12-12 DIAGNOSIS — E55.9 VITAMIN D DEFICIENCY: ICD-10-CM

## 2024-12-12 DIAGNOSIS — E11.9 CONTROLLED TYPE 2 DIABETES MELLITUS WITHOUT COMPLICATION, WITHOUT LONG-TERM CURRENT USE OF INSULIN (HCC): Primary | ICD-10-CM

## 2024-12-12 DIAGNOSIS — D75.89 MACROCYTOSIS WITHOUT ANEMIA: ICD-10-CM

## 2024-12-12 DIAGNOSIS — E03.9 ACQUIRED HYPOTHYROIDISM: ICD-10-CM

## 2024-12-12 PROCEDURE — G2211 COMPLEX E/M VISIT ADD ON: HCPCS | Performed by: FAMILY MEDICINE

## 2024-12-12 PROCEDURE — 99214 OFFICE O/P EST MOD 30 MIN: CPT | Performed by: FAMILY MEDICINE

## 2024-12-12 RX ORDER — FAMOTIDINE 20 MG/1
TABLET, FILM COATED ORAL
Qty: 180 TABLET | Refills: 1 | Status: SHIPPED | OUTPATIENT
Start: 2024-12-12

## 2024-12-12 RX ORDER — LOSARTAN POTASSIUM AND HYDROCHLOROTHIAZIDE 12.5; 5 MG/1; MG/1
1 TABLET ORAL DAILY
Qty: 90 TABLET | Refills: 1 | Status: SHIPPED | OUTPATIENT
Start: 2024-12-12

## 2024-12-12 RX ORDER — METFORMIN HYDROCHLORIDE 500 MG/1
1500 TABLET, EXTENDED RELEASE ORAL
Qty: 270 TABLET | Refills: 1 | Status: SHIPPED | OUTPATIENT
Start: 2024-12-12

## 2024-12-12 RX ORDER — LEVOTHYROXINE SODIUM 88 UG/1
88 TABLET ORAL DAILY
Qty: 90 TABLET | Refills: 1 | Status: SHIPPED | OUTPATIENT
Start: 2024-12-12

## 2024-12-12 NOTE — ASSESSMENT & PLAN NOTE
A1C reasonable for age  The steroid injection she just rec'd is cauing some elevated BS readings, but this should be brief, transient  Repeat labs in four months  Continue lower carb diet  Lab Results   Component Value Date    HGBA1C 7.2 (H) 12/10/2024     Orders:  •  Comprehensive metabolic panel; Future  •  Hemoglobin A1C; Future  •  Albumin / creatinine urine ratio; Future  •  metFORMIN (GLUCOPHAGE-XR) 500 mg 24 hr tablet; Take 3 tablets (1,500 mg total) by mouth daily with dinner

## 2024-12-12 NOTE — ASSESSMENT & PLAN NOTE
Reviewed labs.  Declines statin  Orders:  •  Comprehensive metabolic panel; Future  •  Lipid Panel with Direct LDL reflex; Future

## 2024-12-12 NOTE — ASSESSMENT & PLAN NOTE
Orders:  •  CBC and differential; Future  •  losartan-hydrochlorothiazide (HYZAAR) 50-12.5 mg per tablet; Take 1 tablet by mouth daily  •  Echo complete w/ contrast if indicated; Future

## 2024-12-12 NOTE — PROGRESS NOTES
Virtual Regular Visit  Name: Chanell Bourgeois      : 1945      MRN: [id number]  Encounter Provider: Laine Dee MD  Encounter Date: 2024   Encounter department: [facility]      Verification of patient location:  Patient is located at Home in the following state in which I hold an active license [place] :  Assessment & Plan  Controlled type 2 diabetes mellitus without complication, without long-term current use of insulin (HCC)  A1C reasonable for age  The steroid injection she just rec'd is cauing some elevated BS readings, but this should be brief, transient  Repeat labs in four months  Continue lower carb diet  Lab Results   Component Value Date    HGBA1C 7.2 (H) 12/10/2024     Orders:  •  Comprehensive metabolic panel; Future  •  Hemoglobin A1C; Future  •  Albumin / creatinine urine ratio; Future  •  metFORMIN (GLUCOPHAGE-XR) 500 mg 24 hr tablet; Take 3 tablets (1,500 mg total) by mouth daily with dinner    Benign essential hypertension    Orders:  •  CBC and differential; Future  •  losartan-hydrochlorothiazide (HYZAAR) 50-12.5 mg per tablet; Take 1 tablet by mouth daily  •  Echo complete w/ contrast if indicated; Future    Mixed hyperlipidemia  Reviewed labs.  Declines statin  Orders:  •  Comprehensive metabolic panel; Future  •  Lipid Panel with Direct LDL reflex; Future    Acquired hypothyroidism  Lab Results   Component Value Date    TGT7DIUWKKSU 2.466 12/10/2024    TSH 1.45 2018   Repeat w next labs  Continuing levothyroxine      Orders:  •  levothyroxine (Synthroid) 88 mcg tablet; Take 1 tablet (88 mcg total) by mouth daily    Gastroesophageal reflux disease without esophagitis  Pepcid prn  Avoid trigger foods    Orders:  •  famotidine (PEPCID) 20 mg tablet; Take one tablet daily to twice a day for heartburn    Macrocytosis without anemia  Check b12 and folate w labs  Orders:  •  Vitamin B12; Future  •  Folate; Future  •  CBC and differential; Future    Vitamin  "D deficiency  Check labs, continue supplement  Orders:  •  Vitamin D 25 hydroxy; Future    Left atrial dilation  On prev echo- repeat current   Orders:  •  Echo complete w/ contrast if indicated; Future    Macrocytic anemia    Orders:  •  Vitamin B12; Future  •  Folate; Future    Dependence on cane             Encounter provider Laine Dee MD    The patient was identified by name and date of birth. Chanell Bourgeois was informed that this is a telemedicine visit and that the visit is being conducted through the Epic Embedded platform. She agrees to proceed..  My office door was closed. No one else was in the room.  She acknowledged consent and understanding of privacy and security of the video platform. The patient has agreed to participate and understands they can discontinue the visit at any time.    Patient is aware this is a billable service.     History of Present Illness     HPI  Pt here for virtual f/u.    She had steroid injections in shoulders yesterday   prior to injection.  After injection- BS have been elevated which is typical for her after injections.  2 hours after the injections BS was 305.    Today 205 fasting  Then BS was 185, 272, 151, 149, 148, 167 through the day today    She notes that she is taking the regular release metformin for right now b/c it seems to act faster for her BS.     The steroid injections and the PT has been helping.     Would like meds all sent to [facility] pharmacy    Would like echo - last was in 2021. Wants to be sure everything looks ok.  No edema  BP yesterday was 101/64  Says this is typical for her lately.  No lightheadedness.    Needed inhaler x 1. Gets a cough in the AM.    Will schedule lung CT screen    Going to PT  Using cane when she is out of the house. Cautious to avoid falls.    She works from home M-Th, off Fridays.    Review of Systems    Objective   Pulse 88   Temp (!) 96.6 °F (35.9 °C) (Temporal)   Resp 16   Ht 5' 6.5\" (1.689 m)   Wt 76.2 kg " (168 lb)   SpO2 99%   BMI 26.71 kg/m²     Gen WDWN   Rep: no resp distress  Beh Sci- stable, affect appropriate to conversation    Visit Time  Total Visit Duration: 20

## 2024-12-12 NOTE — ASSESSMENT & PLAN NOTE
Lab Results   Component Value Date    EXQ1MFCGQYQI 2.466 12/10/2024    TSH 1.45 12/24/2018   Repeat w next labs  Continuing levothyroxine      Orders:  •  levothyroxine (Synthroid) 88 mcg tablet; Take 1 tablet (88 mcg total) by mouth daily

## 2024-12-17 ENCOUNTER — OFFICE VISIT (OUTPATIENT)
Dept: PHYSICAL THERAPY | Facility: CLINIC | Age: 79
End: 2024-12-17
Payer: MEDICARE

## 2024-12-17 DIAGNOSIS — M25.512 CHRONIC LEFT SHOULDER PAIN: Primary | ICD-10-CM

## 2024-12-17 DIAGNOSIS — G89.29 CHRONIC LEFT SHOULDER PAIN: Primary | ICD-10-CM

## 2024-12-17 DIAGNOSIS — R26.2 AMBULATORY DYSFUNCTION: ICD-10-CM

## 2024-12-17 DIAGNOSIS — G89.29 CHRONIC BILATERAL LOW BACK PAIN, UNSPECIFIED WHETHER SCIATICA PRESENT: ICD-10-CM

## 2024-12-17 DIAGNOSIS — M54.50 CHRONIC BILATERAL LOW BACK PAIN, UNSPECIFIED WHETHER SCIATICA PRESENT: ICD-10-CM

## 2024-12-17 PROCEDURE — 97110 THERAPEUTIC EXERCISES: CPT | Performed by: PHYSICAL THERAPIST

## 2024-12-17 PROCEDURE — 97530 THERAPEUTIC ACTIVITIES: CPT | Performed by: PHYSICAL THERAPIST

## 2024-12-17 PROCEDURE — 97112 NEUROMUSCULAR REEDUCATION: CPT | Performed by: PHYSICAL THERAPIST

## 2024-12-17 NOTE — PROGRESS NOTES
"Daily Note     Today's date: 2024  Patient name: Chanell Bourgeois  : 1945  MRN: [id number]  Referring provider: Norman Villatoro*  Dx:     Encounter Diagnosis     ICD-10-CM    1. Chronic left shoulder pain  M25.512     G89.29       2. Chronic bilateral low back pain, unspecified whether sciatica present  M54.50     G89.29       3. Ambulatory dysfunction  R26.2                      Subjective: Patient reports got injections and had a little relief after. Pain slowly coming back.    Objective: See treatment diary below    Treadmill  STS  LAQ/seated hip flexion  Scap squeezes  Table slides  Pulleys  STM  Cervical retraction    Assessment: Tolerated treatment well. Patient would benefit from ongoing skilled PT to address impairments, improve mobility, ADL's and to maintain independence.        Plan: Progress treatment as tolerated.       Precautions: none      Manuals 10/29 10/31 11/4 11/11 11/25 12/3 12/10 12/17   Seated posterior rotator cuff/mid thoracic STM After pulleys MK After pulleys MK  After pulleys MK After pulleys MK MK MK MK MK                                    Neuro Re-Ed           Scap retraction 20x 3\" holds  20x 3\" holds  20x 3\" holds  20x 3\" holds  20x 3\" holds 20x 3\" holds 20x 5\" holds  20x 5\" holds   Scap 4                      Cervical retraction seated 10x  10x 10x 10x 10x 10x -                                     Ther Ex           LAQ 20x  20x  20x 2lb 20x 20x 2 lbs B 22x 2.5 lbs B 22x 2.5 lbs B 25x 2.5 lbs B    Seated hip flexion 20x 20x  20x 2lb 10x 10x 2 lbs B  12x 2.5 lbs B  12x 2.5 lbs B  12x 2.5 lbs B   pulleys 10x 15x 10x 10x 12x  12x 5x due to pain     Standing ER/IR L shoulder      trialed     Table slides 20x B  20x B 20x B 20x B 20x B  20x B  20x B  20x B    treadmill 8 min 8 min 8 min 8 min 8 min 8 min 8 min 8 min                         Ther Activity           STS 2x5 w/ 1 foam  2x5 w/ 1 foam  2x5 w/ 1 foam  2x5 w/ 1 foam  2x5 w/ 1 foam  2x5 w/ 1 foam  2x6 w/ 1 " foam  2x6 w/ 1 foam               Gait Training                                 Modalities

## 2024-12-18 NOTE — PROGRESS NOTES
Diabetic Foot Exam    Patient's shoes and socks removed. Right Foot/Ankle   Right Foot Inspection  Skin Exam: skin normal and skin intact. No dry skin, no warmth, no callus, no erythema, no maceration, no abnormal color, no pre-ulcer, no ulcer and no callus. Toe Exam: ROM and strength within normal limits. No swelling, no tenderness, erythema and  no right toe deformity    Sensory   Monofilament testing: intact    Vascular  Capillary refills: < 3 seconds  The right PT pulse is 2+. Left Foot/Ankle  Left Foot Inspection  Skin Exam: skin normal and skin intact. No dry skin, no warmth, no erythema, no maceration, normal color, no pre-ulcer, no ulcer and no callus. Toe Exam: ROM and strength within normal limits. No swelling, no tenderness, no erythema and no left toe deformity. Sensory   Monofilament testing: intact    Vascular  Capillary refills: < 3 seconds  The left PT pulse is 2+.      Assign Risk Category  No deformity present  No loss of protective sensation  No weak pulses  Risk: 0 Patient placed back to ER lobby due to no room availability. Patient aware of wait for room. Denies questions or concerns at this time. Patient made aware to notify registration or RN for any problems or concerns while waiting in the lobby. Patient instructed to refrain from eating or drinking until seen by the ED doctor/practitioner.

## 2024-12-19 ENCOUNTER — APPOINTMENT (OUTPATIENT)
Dept: PHYSICAL THERAPY | Facility: CLINIC | Age: 79
End: 2024-12-19
Payer: MEDICARE

## 2024-12-26 ENCOUNTER — APPOINTMENT (OUTPATIENT)
Dept: PHYSICAL THERAPY | Facility: CLINIC | Age: 79
End: 2024-12-26
Payer: MEDICARE

## 2024-12-31 ENCOUNTER — APPOINTMENT (OUTPATIENT)
Dept: PHYSICAL THERAPY | Facility: CLINIC | Age: 79
End: 2024-12-31
Payer: MEDICARE

## 2025-01-02 ENCOUNTER — APPOINTMENT (OUTPATIENT)
Dept: PHYSICAL THERAPY | Facility: CLINIC | Age: 80
End: 2025-01-02
Payer: MEDICARE

## 2025-01-07 ENCOUNTER — APPOINTMENT (OUTPATIENT)
Dept: PHYSICAL THERAPY | Facility: CLINIC | Age: 80
End: 2025-01-07
Payer: MEDICARE

## 2025-01-09 ENCOUNTER — OFFICE VISIT (OUTPATIENT)
Dept: PHYSICAL THERAPY | Facility: CLINIC | Age: 80
End: 2025-01-09
Payer: MEDICARE

## 2025-01-09 DIAGNOSIS — M25.512 CHRONIC LEFT SHOULDER PAIN: Primary | ICD-10-CM

## 2025-01-09 DIAGNOSIS — G89.29 CHRONIC BILATERAL LOW BACK PAIN, UNSPECIFIED WHETHER SCIATICA PRESENT: ICD-10-CM

## 2025-01-09 DIAGNOSIS — G89.29 CHRONIC LEFT SHOULDER PAIN: Primary | ICD-10-CM

## 2025-01-09 DIAGNOSIS — R26.2 AMBULATORY DYSFUNCTION: ICD-10-CM

## 2025-01-09 DIAGNOSIS — M54.50 CHRONIC BILATERAL LOW BACK PAIN, UNSPECIFIED WHETHER SCIATICA PRESENT: ICD-10-CM

## 2025-01-09 PROCEDURE — 97530 THERAPEUTIC ACTIVITIES: CPT | Performed by: PHYSICAL THERAPIST

## 2025-01-09 PROCEDURE — 97112 NEUROMUSCULAR REEDUCATION: CPT | Performed by: PHYSICAL THERAPIST

## 2025-01-09 PROCEDURE — 97110 THERAPEUTIC EXERCISES: CPT | Performed by: PHYSICAL THERAPIST

## 2025-01-09 NOTE — PROGRESS NOTES
"Daily Note     Today's date: 2025  Patient name: Chanell Bourgeois  : 1945  MRN: [id number]  Referring provider: Norman Villatoro*  Dx:     Encounter Diagnosis     ICD-10-CM    1. Chronic left shoulder pain  M25.512     G89.29       2. Chronic bilateral low back pain, unspecified whether sciatica present  M54.50     G89.29       3. Ambulatory dysfunction  R26.2                      Subjective: Patient reports feels like she missed coming to PT since she hasn't been here in awhile. Doing some exercises at home.    Objective: See treatment diary below    Treadmill  STS  LAQ/seated hip flexion  Scap squeezes  Table slides  Pulleys  STM  Cervical retraction    Assessment: Tolerated treatment well. Patient would benefit from ongoing skilled PT to address impairments, improve mobility, ADL's and to maintain independence.        Plan: Progress treatment as tolerated.       Precautions: none      Manuals 10/29 10/31 11/4 11/11 11/25 12/3 12/10 12/17 1/9   Seated posterior rotator cuff/mid thoracic STM After pulleys MK After pulleys MK  After pulleys MK After pulleys MK MK MK MK MK MK                                       Neuro Re-Ed            Scap retraction 20x 3\" holds  20x 3\" holds  20x 3\" holds  20x 3\" holds  20x 3\" holds 20x 3\" holds 20x 5\" holds  20x 5\" holds 20x 5\" holds   Scap 4                        Cervical retraction seated 10x  10x 10x 10x 10x 10x -                                         Ther Ex            LAQ 20x  20x  20x 2lb 20x 20x 2 lbs B 22x 2.5 lbs B 22x 2.5 lbs B 25x 2.5 lbs B  25x 2.5 lbs B   Seated hip flexion 20x 20x  20x 2lb 10x 10x 2 lbs B  12x 2.5 lbs B  12x 2.5 lbs B  12x 2.5 lbs B 12x 2.5 lbs B   pulleys 10x 15x 10x 10x 12x  12x 5x due to pain   20x    Standing ER/IR L shoulder      trialed      Table slides 20x B  20x B 20x B 20x B 20x B  20x B  20x B  20x B  20x B   treadmill 8 min 8 min 8 min 8 min 8 min 8 min 8 min 8 min 8 min                           Ther Activity         "    STS 2x5 w/ 1 foam  2x5 w/ 1 foam  2x5 w/ 1 foam  2x5 w/ 1 foam  2x5 w/ 1 foam  2x5 w/ 1 foam  2x6 w/ 1 foam  2x6 w/ 1 foam  2x8 w/ 1 foam                Gait Training                                    Modalities

## 2025-01-14 ENCOUNTER — OFFICE VISIT (OUTPATIENT)
Dept: PHYSICAL THERAPY | Facility: CLINIC | Age: 80
End: 2025-01-14
Payer: MEDICARE

## 2025-01-14 DIAGNOSIS — G89.29 CHRONIC LEFT SHOULDER PAIN: Primary | ICD-10-CM

## 2025-01-14 DIAGNOSIS — M25.512 CHRONIC LEFT SHOULDER PAIN: Primary | ICD-10-CM

## 2025-01-14 DIAGNOSIS — M54.50 CHRONIC BILATERAL LOW BACK PAIN, UNSPECIFIED WHETHER SCIATICA PRESENT: ICD-10-CM

## 2025-01-14 DIAGNOSIS — G89.29 CHRONIC BILATERAL LOW BACK PAIN, UNSPECIFIED WHETHER SCIATICA PRESENT: ICD-10-CM

## 2025-01-14 DIAGNOSIS — R26.2 AMBULATORY DYSFUNCTION: ICD-10-CM

## 2025-01-14 PROCEDURE — 97112 NEUROMUSCULAR REEDUCATION: CPT | Performed by: PHYSICAL THERAPIST

## 2025-01-14 PROCEDURE — 97110 THERAPEUTIC EXERCISES: CPT | Performed by: PHYSICAL THERAPIST

## 2025-01-14 PROCEDURE — 97530 THERAPEUTIC ACTIVITIES: CPT | Performed by: PHYSICAL THERAPIST

## 2025-01-14 NOTE — PROGRESS NOTES
"Daily Note     Today's date: 2025  Patient name: Chanell Bourgeois  : 1945  MRN: [id number]  Referring provider: Norman Villatoro*  Dx:     Encounter Diagnosis     ICD-10-CM    1. Chronic left shoulder pain  M25.512     G89.29       2. Chronic bilateral low back pain, unspecified whether sciatica present  M54.50     G89.29       3. Ambulatory dysfunction  R26.2                      Subjective: Patient reports feels a little better with walking and having some more shoulder stability.    Objective: See treatment diary below    Treadmill  STS  LAQ/seated hip flexion  Scap squeezes  Table slides  Pulleys  STM  Cervical retraction    Assessment: Tolerated treatment well. Patient would benefit from ongoing skilled PT to address impairments, improve mobility, ADL's and to maintain independence.        Plan: Progress treatment as tolerated.       Precautions: none      Manuals 11/11 11/25 12/3 12/10 12/17 1/9 1/14   Seated posterior rotator cuff/mid thoracic STM After pulleys MK [facility] MK                                  Neuro Re-Ed          Scap retraction 20x 3\" holds  20x 3\" holds 20x 3\" holds 20x 5\" holds  20x 5\" holds 20x 5\" holds 20x 5\" holds    Scap 4                    Cervical retraction seated 10x 10x 10x -                                    Ther Ex          LAQ 2lb 20x 20x 2 lbs B 22x 2.5 lbs B 22x 2.5 lbs B 25x 2.5 lbs B  25x 2.5 lbs B 25x 2.5 lbs B    Seated hip flexion 2lb 10x 10x 2 lbs B  12x 2.5 lbs B  12x 2.5 lbs B  12x 2.5 lbs B 12x 2.5 lbs B 15x 2.5 lbs B   pulleys 10x 12x  12x 5x due to pain   20x  20x    Standing ER/IR L shoulder   trialed       Table slides 20x B 20x B  20x B  20x B  20x B  20x B 20x B   treadmill 8 min 8 min 8 min 8 min 8 min 8 min 9 min                        Ther Activity          STS 2x5 w/ 1 foam  2x5 w/ 1 foam  2x5 w/ 1 foam  2x6 w/ 1 foam  2x6 w/ 1 foam  2x8 w/ 1 foam  2x10 w/ 1 foam              Gait Training                              Modalities        "

## 2025-01-16 ENCOUNTER — APPOINTMENT (OUTPATIENT)
Dept: PHYSICAL THERAPY | Facility: CLINIC | Age: 80
End: 2025-01-16
Payer: MEDICARE

## 2025-01-21 ENCOUNTER — APPOINTMENT (OUTPATIENT)
Dept: PHYSICAL THERAPY | Facility: CLINIC | Age: 80
End: 2025-01-21
Payer: MEDICARE

## 2025-01-23 ENCOUNTER — OFFICE VISIT (OUTPATIENT)
Dept: PHYSICAL THERAPY | Facility: CLINIC | Age: 80
End: 2025-01-23
Payer: MEDICARE

## 2025-01-23 DIAGNOSIS — R26.2 AMBULATORY DYSFUNCTION: ICD-10-CM

## 2025-01-23 DIAGNOSIS — G89.29 CHRONIC BILATERAL LOW BACK PAIN, UNSPECIFIED WHETHER SCIATICA PRESENT: ICD-10-CM

## 2025-01-23 DIAGNOSIS — G89.29 CHRONIC LEFT SHOULDER PAIN: Primary | ICD-10-CM

## 2025-01-23 DIAGNOSIS — M54.50 CHRONIC BILATERAL LOW BACK PAIN, UNSPECIFIED WHETHER SCIATICA PRESENT: ICD-10-CM

## 2025-01-23 DIAGNOSIS — M25.512 CHRONIC LEFT SHOULDER PAIN: Primary | ICD-10-CM

## 2025-01-23 PROCEDURE — 97112 NEUROMUSCULAR REEDUCATION: CPT

## 2025-01-23 PROCEDURE — 97110 THERAPEUTIC EXERCISES: CPT

## 2025-01-23 PROCEDURE — 97530 THERAPEUTIC ACTIVITIES: CPT

## 2025-01-23 NOTE — PROGRESS NOTES
"Daily Note     Today's date: 2025  Patient name: Chanell Bourgeois  : 1945  MRN: [id number]  Referring provider: Norman Villatoro*  Dx:     Encounter Diagnosis     ICD-10-CM    1. Chronic left shoulder pain  M25.512     G89.29       2. Chronic bilateral low back pain, unspecified whether sciatica present  M54.50     G89.29       3. Ambulatory dysfunction  R26.2                      Subjective: Patient reports feels a little better with walking and having some more shoulder stability.    Objective: See treatment diary below    Treadmill  STS  LAQ/seated hip flexion  Scap squeezes  Table slides  Pulleys  STM  Cervical retraction    Assessment: Tolerated treatment well. Patient would benefit from ongoing skilled PT to address impairments, improve mobility, ADL's and to maintain independence.        Plan: Progress treatment as tolerated.       Precautions: none      Manuals 11/11 11/25 12/3 12/10 12/17 1/9 1/14 1/23   Seated posterior rotator cuff/mid thoracic STM After pulleys MK [facility]                                      Neuro Re-Ed           Scap retraction 20x 3\" holds  20x 3\" holds 20x 3\" holds 20x 5\" holds  20x 5\" holds 20x 5\" holds 20x 5\" holds  20x5\" holds   Scap 4                      Cervical retraction seated 10x 10x 10x -                                        Ther Ex           LAQ 2lb 20x 20x 2 lbs B 22x 2.5 lbs B 22x 2.5 lbs B 25x 2.5 lbs B  25x 2.5 lbs B 25x 2.5 lbs B  25x 2.5 lbs B   Seated hip flexion 2lb 10x 10x 2 lbs B  12x 2.5 lbs B  12x 2.5 lbs B  12x 2.5 lbs B 12x 2.5 lbs B 15x 2.5 lbs B 15x 2.5 lbs B   pulleys 10x 12x  12x 5x due to pain   20x  20x  20x   Standing ER/IR L shoulder   trialed        Table slides 20x B 20x B  20x B  20x B  20x B  20x B 20x B 20x B   treadmill 8 min 8 min 8 min 8 min 8 min 8 min 9 min  8 min                         Ther Activity           STS 2x5 w/ 1 foam  2x5 w/ 1 foam  2x5 w/ 1 foam  2x6 w/ 1 foam  2x6 w/ 1 foam  2x8 w/ 1 foam  2x10 w/ 1 " foam  2x10 w/ 1 foam              Gait Training                                 Modalities

## 2025-01-28 ENCOUNTER — OFFICE VISIT (OUTPATIENT)
Dept: PHYSICAL THERAPY | Facility: CLINIC | Age: 80
End: 2025-01-28
Payer: MEDICARE

## 2025-01-28 DIAGNOSIS — G89.29 CHRONIC LEFT SHOULDER PAIN: Primary | ICD-10-CM

## 2025-01-28 DIAGNOSIS — R26.2 AMBULATORY DYSFUNCTION: ICD-10-CM

## 2025-01-28 DIAGNOSIS — M54.50 CHRONIC BILATERAL LOW BACK PAIN, UNSPECIFIED WHETHER SCIATICA PRESENT: ICD-10-CM

## 2025-01-28 DIAGNOSIS — G89.29 CHRONIC BILATERAL LOW BACK PAIN, UNSPECIFIED WHETHER SCIATICA PRESENT: ICD-10-CM

## 2025-01-28 DIAGNOSIS — M25.512 CHRONIC LEFT SHOULDER PAIN: Primary | ICD-10-CM

## 2025-01-28 PROCEDURE — 97110 THERAPEUTIC EXERCISES: CPT | Performed by: PHYSICAL THERAPIST

## 2025-01-28 PROCEDURE — 97530 THERAPEUTIC ACTIVITIES: CPT | Performed by: PHYSICAL THERAPIST

## 2025-01-28 PROCEDURE — 97112 NEUROMUSCULAR REEDUCATION: CPT | Performed by: PHYSICAL THERAPIST

## 2025-01-28 NOTE — PROGRESS NOTES
"Daily Note     Today's date: 2025  Patient name: Chanell Bourgeois  : 1945  MRN: [id number]  Referring provider: Norman Villatoro*  Dx:     Encounter Diagnosis     ICD-10-CM    1. Chronic left shoulder pain  M25.512     G89.29       2. Chronic bilateral low back pain, unspecified whether sciatica present  M54.50     G89.29       3. Ambulatory dysfunction  R26.2                      Subjective: Patient reports neck bothering her and thinks maybe due to table slides    Objective: See treatment diary below    Treadmill  STS  LAQ/seated hip flexion  Scap squeezes  Table slides  Pulleys  STM  Cervical retraction    Assessment: Tolerated treatment well. Patient would benefit from ongoing skilled PT to address impairments, improve mobility, ADL's and to maintain independence.        Plan: Progress treatment as tolerated.       Precautions: none      Manuals 12/3 12/10 12/17 1/9 1/14 1/23 1/28   Seated posterior rotator cuff/mid thoracic STM MK MK MK MK                                    Neuro Re-Ed          Scap retraction 20x 3\" holds 20x 5\" holds  20x 5\" holds 20x 5\" holds 20x 5\" holds  20x5\" holds 20x 5\" holds   Scap 4                    Cervical retraction seated 10x -                                      Ther Ex          LAQ 22x 2.5 lbs B 22x 2.5 lbs B 25x 2.5 lbs B  25x 2.5 lbs B 25x 2.5 lbs B  25x 2.5 lbs B 25x 2.5 lbs B   Seated hip flexion 12x 2.5 lbs B  12x 2.5 lbs B  12x 2.5 lbs B 12x 2.5 lbs B 15x 2.5 lbs B 15x 2.5 lbs B 18x 2.5 lbs B   pulleys 12x 5x due to pain   20x  20x  20x 20x    Standing ER/IR L shoulder trialed         Table slides 20x B  20x B  20x B  20x B 20x B 20x B -   treadmill 8 min 8 min 8 min 8 min 9 min  8 min 8 min                       Ther Activity          STS 2x5 w/ 1 foam  2x6 w/ 1 foam  2x6 w/ 1 foam  2x8 w/ 1 foam  2x10 w/ 1 foam  2x10 w/ 1 foam 2x10 w/ 1 foam              Gait Training                              Modalities                                   "

## 2025-01-30 ENCOUNTER — OFFICE VISIT (OUTPATIENT)
Dept: PHYSICAL THERAPY | Facility: CLINIC | Age: 80
End: 2025-01-30
Payer: MEDICARE

## 2025-01-30 DIAGNOSIS — M54.50 CHRONIC BILATERAL LOW BACK PAIN, UNSPECIFIED WHETHER SCIATICA PRESENT: ICD-10-CM

## 2025-01-30 DIAGNOSIS — G89.29 CHRONIC LEFT SHOULDER PAIN: Primary | ICD-10-CM

## 2025-01-30 DIAGNOSIS — M25.512 CHRONIC LEFT SHOULDER PAIN: Primary | ICD-10-CM

## 2025-01-30 DIAGNOSIS — R26.2 AMBULATORY DYSFUNCTION: ICD-10-CM

## 2025-01-30 DIAGNOSIS — G89.29 CHRONIC BILATERAL LOW BACK PAIN, UNSPECIFIED WHETHER SCIATICA PRESENT: ICD-10-CM

## 2025-01-30 PROCEDURE — 97110 THERAPEUTIC EXERCISES: CPT | Performed by: PHYSICAL THERAPIST

## 2025-01-30 PROCEDURE — 97530 THERAPEUTIC ACTIVITIES: CPT | Performed by: PHYSICAL THERAPIST

## 2025-01-30 PROCEDURE — 97112 NEUROMUSCULAR REEDUCATION: CPT | Performed by: PHYSICAL THERAPIST

## 2025-01-30 NOTE — PROGRESS NOTES
"Daily Note     Today's date: 2025  Patient name: Chanell Bourgeois  : 1945  MRN: [id number]  Referring provider: Norman Villatoro*  Dx:     Encounter Diagnosis     ICD-10-CM    1. Chronic left shoulder pain  M25.512     G89.29       2. Ambulatory dysfunction  R26.2       3. Chronic bilateral low back pain, unspecified whether sciatica present  M54.50     G89.29                      Subjective: Patient reports increased neck pain and having some symptoms in hands.    Objective: See treatment diary below    Treadmill  STS  LAQ/seated hip flexion  Scap squeezes  Table slides  Pulleys  STM  Cervical retraction    Assessment: Tolerated treatment well. Patient would benefit from ongoing skilled PT to address impairments, improve mobility, ADL's and to maintain independence.        Plan: Progress treatment as tolerated.       Precautions: none      Manuals 12/3 12/10 12/17 1/9 1/14 1/23 1/28    Seated posterior rotator cuff/mid thoracic STM MK MK MK MK                                        Neuro Re-Ed           Scap retraction 20x 3\" holds 20x 5\" holds  20x 5\" holds 20x 5\" holds 20x 5\" holds  20x5\" holds 20x 5\" holds 20x 5\" holds   Scap 4                      Cervical retraction seated 10x -                                          Ther Ex           LAQ 22x 2.5 lbs B 22x 2.5 lbs B 25x 2.5 lbs B  25x 2.5 lbs B 25x 2.5 lbs B  25x 2.5 lbs B 25x 2.5 lbs B 12x 2.5 lbs B   Seated hip flexion 12x 2.5 lbs B  12x 2.5 lbs B  12x 2.5 lbs B 12x 2.5 lbs B 15x 2.5 lbs B 15x 2.5 lbs B 18x 2.5 lbs B 20x 2.5 lbs B   pulleys 12x 5x due to pain   20x  20x  20x 20x     Standing ER/IR L shoulder trialed          Table slides 20x B  20x B  20x B  20x B 20x B 20x B -    treadmill 8 min 8 min 8 min 8 min 9 min  8 min 8 min 8 min                         Ther Activity           STS 2x5 w/ 1 foam  2x6 w/ 1 foam  2x6 w/ 1 foam  2x8 w/ 1 foam  2x10 w/ 1 foam  2x10 w/ 1 foam 2x10 w/ 1 foam  2x12 w/ 1 foam              Gait Training    "                              Modalities

## 2025-02-04 ENCOUNTER — APPOINTMENT (OUTPATIENT)
Dept: PHYSICAL THERAPY | Facility: CLINIC | Age: 80
End: 2025-02-04
Payer: MEDICARE

## 2025-02-06 ENCOUNTER — APPOINTMENT (OUTPATIENT)
Dept: PHYSICAL THERAPY | Facility: CLINIC | Age: 80
End: 2025-02-06
Payer: MEDICARE

## 2025-02-10 ENCOUNTER — APPOINTMENT (OUTPATIENT)
Dept: PHYSICAL THERAPY | Facility: CLINIC | Age: 80
End: 2025-02-10
Payer: MEDICARE

## 2025-02-11 ENCOUNTER — APPOINTMENT (OUTPATIENT)
Dept: PHYSICAL THERAPY | Facility: CLINIC | Age: 80
End: 2025-02-11
Payer: MEDICARE

## 2025-02-11 ENCOUNTER — OFFICE VISIT (OUTPATIENT)
Dept: PHYSICAL THERAPY | Facility: CLINIC | Age: 80
End: 2025-02-11
Payer: MEDICARE

## 2025-02-11 DIAGNOSIS — R26.2 AMBULATORY DYSFUNCTION: ICD-10-CM

## 2025-02-11 DIAGNOSIS — M25.512 CHRONIC LEFT SHOULDER PAIN: Primary | ICD-10-CM

## 2025-02-11 DIAGNOSIS — G89.29 CHRONIC LEFT SHOULDER PAIN: Primary | ICD-10-CM

## 2025-02-11 DIAGNOSIS — G89.29 CHRONIC BILATERAL LOW BACK PAIN, UNSPECIFIED WHETHER SCIATICA PRESENT: ICD-10-CM

## 2025-02-11 DIAGNOSIS — M54.50 CHRONIC BILATERAL LOW BACK PAIN, UNSPECIFIED WHETHER SCIATICA PRESENT: ICD-10-CM

## 2025-02-11 PROCEDURE — 97110 THERAPEUTIC EXERCISES: CPT | Performed by: PHYSICAL THERAPIST

## 2025-02-11 PROCEDURE — 97112 NEUROMUSCULAR REEDUCATION: CPT | Performed by: PHYSICAL THERAPIST

## 2025-02-11 PROCEDURE — 97530 THERAPEUTIC ACTIVITIES: CPT | Performed by: PHYSICAL THERAPIST

## 2025-02-11 NOTE — PROGRESS NOTES
"Daily Note     Today's date: 2025  Patient name: Chanell Bourgeois  : 1945  MRN: [id number]  Referring provider: Norman Villatoro*  Dx:     Encounter Diagnosis     ICD-10-CM    1. Chronic left shoulder pain  M25.512     G89.29       2. Ambulatory dysfunction  R26.2       3. Chronic bilateral low back pain, unspecified whether sciatica present  M54.50     G89.29                      Subjective: Patient reports increase in pain due to shoveling ice yesterday and today.    Objective: See treatment diary below    Treadmill  STS  LAQ/seated hip flexion  Scap squeezes  Table slides  Pulleys  STM  Cervical retraction    Assessment: Tolerated treatment well. Patient would benefit from ongoing skilled PT to address impairments, improve mobility, ADL's and to maintain independence.        Plan: Progress treatment as tolerated.       Precautions: none      Manuals    Seated posterior rotator cuff/mid thoracic STM MK                                   Neuro Re-Ed         Scap retraction 20x 5\" holds 20x 5\" holds  20x5\" holds 20x 5\" holds 20x 5\" holds 20x 5\" holds    Scap 4                  Cervical retraction seated                                    Ther Ex         LAQ 25x 2.5 lbs B 25x 2.5 lbs B  25x 2.5 lbs B 25x 2.5 lbs B 12x 2.5 lbs B 10x 2.5 lbs B    Seated hip flexion 12x 2.5 lbs B 15x 2.5 lbs B 15x 2.5 lbs B 18x 2.5 lbs B 20x 2.5 lbs B 20x 2.5 lbs B    pulleys 20x  20x  20x 20x   20x    Standing ER/IR L shoulder         Table slides 20x B 20x B 20x B -     treadmill 8 min 9 min  8 min 8 min 8 min 8 min                     Ther Activity         STS 2x8 w/ 1 foam  2x10 w/ 1 foam  2x10 w/ 1 foam 2x10 w/ 1 foam  2x12 w/ 1 foam 2x10 w/ 1 foam             Gait Training                           Modalities                                "

## 2025-02-13 ENCOUNTER — APPOINTMENT (OUTPATIENT)
Dept: PHYSICAL THERAPY | Facility: CLINIC | Age: 80
End: 2025-02-13
Payer: MEDICARE

## 2025-02-18 ENCOUNTER — OFFICE VISIT (OUTPATIENT)
Dept: PHYSICAL THERAPY | Facility: CLINIC | Age: 80
End: 2025-02-18
Payer: MEDICARE

## 2025-02-18 DIAGNOSIS — G89.29 CHRONIC BILATERAL LOW BACK PAIN, UNSPECIFIED WHETHER SCIATICA PRESENT: ICD-10-CM

## 2025-02-18 DIAGNOSIS — R26.2 AMBULATORY DYSFUNCTION: ICD-10-CM

## 2025-02-18 DIAGNOSIS — G89.29 CHRONIC LEFT SHOULDER PAIN: Primary | ICD-10-CM

## 2025-02-18 DIAGNOSIS — M25.512 CHRONIC LEFT SHOULDER PAIN: Primary | ICD-10-CM

## 2025-02-18 DIAGNOSIS — M54.50 CHRONIC BILATERAL LOW BACK PAIN, UNSPECIFIED WHETHER SCIATICA PRESENT: ICD-10-CM

## 2025-02-18 PROCEDURE — 97112 NEUROMUSCULAR REEDUCATION: CPT | Performed by: PHYSICAL THERAPIST

## 2025-02-18 PROCEDURE — 97110 THERAPEUTIC EXERCISES: CPT | Performed by: PHYSICAL THERAPIST

## 2025-02-18 PROCEDURE — 97530 THERAPEUTIC ACTIVITIES: CPT | Performed by: PHYSICAL THERAPIST

## 2025-02-18 NOTE — PROGRESS NOTES
"Daily Note     Today's date: 2025  Patient name: Chanell Bourgeois  : 1945  MRN: [id number]  Referring provider: Norman Villatoro*  Dx:     Encounter Diagnosis     ICD-10-CM    1. Chronic left shoulder pain  M25.512     G89.29       2. Ambulatory dysfunction  R26.2       3. Chronic bilateral low back pain, unspecified whether sciatica present  M54.50     G89.29                      Subjective: Patient reports increase in pain due to shoveling ice yesterday and today.    Objective: See treatment diary below    Treadmill  STS  LAQ/seated hip flexion  Scap squeezes  Table slides  Pulleys  STM  Cervical retraction    Assessment: Tolerated treatment well. Patient would benefit from ongoing skilled PT to address impairments, improve mobility, ADL's and to maintain independence.        Plan: Progress treatment as tolerated.       Precautions: none      Manuals    Seated posterior rotator cuff/mid thoracic STM MK                                       Neuro Re-Ed          Scap retraction 20x 5\" holds 20x 5\" holds  20x5\" holds 20x 5\" holds 20x 5\" holds 20x 5\" holds  20x 5\" holds   Scap 4                    Cervical retraction seated                                        Ther Ex          LAQ 25x 2.5 lbs B 25x 2.5 lbs B  25x 2.5 lbs B 25x 2.5 lbs B 12x 2.5 lbs B 10x 2.5 lbs B  20x 3 lbs B   Seated hip flexion 12x 2.5 lbs B 15x 2.5 lbs B 15x 2.5 lbs B 18x 2.5 lbs B 20x 2.5 lbs B 20x 2.5 lbs B  15x 3 lbs B   pulleys 20x  20x  20x 20x   20x  20x   Standing ER/IR L shoulder          Table slides 20x B 20x B 20x B -      treadmill 8 min 9 min  8 min 8 min 8 min 8 min 8 min                       Ther Activity          STS 2x8 w/ 1 foam  2x10 w/ 1 foam  2x10 w/ 1 foam 2x10 w/ 1 foam  2x12 w/ 1 foam 2x10 w/ 1 foam  2x12 w/ 1 foam              Gait Training                              Modalities                                   "

## 2025-02-20 ENCOUNTER — APPOINTMENT (OUTPATIENT)
Dept: PHYSICAL THERAPY | Facility: CLINIC | Age: 80
End: 2025-02-20
Payer: MEDICARE

## 2025-02-25 ENCOUNTER — OFFICE VISIT (OUTPATIENT)
Dept: PHYSICAL THERAPY | Facility: CLINIC | Age: 80
End: 2025-02-25
Payer: MEDICARE

## 2025-02-25 DIAGNOSIS — M25.512 CHRONIC LEFT SHOULDER PAIN: Primary | ICD-10-CM

## 2025-02-25 DIAGNOSIS — G89.29 CHRONIC LEFT SHOULDER PAIN: Primary | ICD-10-CM

## 2025-02-25 DIAGNOSIS — R26.2 AMBULATORY DYSFUNCTION: ICD-10-CM

## 2025-02-25 DIAGNOSIS — M54.50 CHRONIC BILATERAL LOW BACK PAIN, UNSPECIFIED WHETHER SCIATICA PRESENT: ICD-10-CM

## 2025-02-25 DIAGNOSIS — G89.29 CHRONIC BILATERAL LOW BACK PAIN, UNSPECIFIED WHETHER SCIATICA PRESENT: ICD-10-CM

## 2025-02-25 PROCEDURE — 97112 NEUROMUSCULAR REEDUCATION: CPT | Performed by: PHYSICAL THERAPIST

## 2025-02-25 PROCEDURE — 97110 THERAPEUTIC EXERCISES: CPT | Performed by: PHYSICAL THERAPIST

## 2025-02-25 NOTE — PROGRESS NOTES
"Daily Note     Today's date: 2025  Patient name: Chanell Bourgeois  : 1945  MRN: [id number]  Referring provider: Norman Villatoro*  Dx:     Encounter Diagnosis     ICD-10-CM    1. Chronic left shoulder pain  M25.512     G89.29       2. Ambulatory dysfunction  R26.2       3. Chronic bilateral low back pain, unspecified whether sciatica present  M54.50     G89.29                      Subjective: Patient reports increase in pain due to shoveling ice yesterday and today.    Objective: See treatment diary below    Treadmill  STS  LAQ/seated hip flexion  Scap squeezes  Table slides  Pulleys  STM  Cervical retraction    Assessment: Tolerated treatment well. Patient would benefit from ongoing skilled PT to address impairments, improve mobility, ADL's and to maintain independence.        Plan: Progress treatment as tolerated.       Precautions: none      Manuals    Seated posterior rotator cuff/mid thoracic STM                                    Neuro Re-Ed         Scap retraction 20x5\" holds 20x 5\" holds 20x 5\" holds 20x 5\" holds  20x 5\" holds 20x 5\" holds   Scap 4                  Cervical retraction seated                                    Ther Ex         LAQ 25x 2.5 lbs B 25x 2.5 lbs B 12x 2.5 lbs B 10x 2.5 lbs B  20x 3 lbs B 20x 3 lbs B   Seated hip flexion 15x 2.5 lbs B 18x 2.5 lbs B 20x 2.5 lbs B 20x 2.5 lbs B  15x 3 lbs B 15x 3 lbs B   pulleys 20x 20x   20x  20x 20x   Standing ER/IR L shoulder         Table slides 20x B -       treadmill 8 min 8 min 8 min 8 min 8 min 8 min                     Ther Activity         STS 2x10 w/ 1 foam 2x10 w/ 1 foam  2x12 w/ 1 foam 2x10 w/ 1 foam  2x12 w/ 1 foam  2x10 w/ 1 foam            Gait Training                           Modalities                                "

## 2025-03-04 ENCOUNTER — APPOINTMENT (OUTPATIENT)
Dept: PHYSICAL THERAPY | Facility: CLINIC | Age: 80
End: 2025-03-04
Payer: MEDICARE

## 2025-03-06 ENCOUNTER — APPOINTMENT (OUTPATIENT)
Dept: PHYSICAL THERAPY | Facility: CLINIC | Age: 80
End: 2025-03-06
Payer: MEDICARE

## 2025-03-11 ENCOUNTER — APPOINTMENT (OUTPATIENT)
Dept: PHYSICAL THERAPY | Facility: CLINIC | Age: 80
End: 2025-03-11
Payer: MEDICARE

## 2025-03-13 ENCOUNTER — OFFICE VISIT (OUTPATIENT)
Dept: PHYSICAL THERAPY | Facility: CLINIC | Age: 80
End: 2025-03-13
Payer: MEDICARE

## 2025-03-13 DIAGNOSIS — M25.512 CHRONIC LEFT SHOULDER PAIN: Primary | ICD-10-CM

## 2025-03-13 DIAGNOSIS — R26.2 AMBULATORY DYSFUNCTION: ICD-10-CM

## 2025-03-13 DIAGNOSIS — G89.29 CHRONIC BILATERAL LOW BACK PAIN, UNSPECIFIED WHETHER SCIATICA PRESENT: ICD-10-CM

## 2025-03-13 DIAGNOSIS — G89.29 CHRONIC LEFT SHOULDER PAIN: Primary | ICD-10-CM

## 2025-03-13 DIAGNOSIS — M54.50 CHRONIC BILATERAL LOW BACK PAIN, UNSPECIFIED WHETHER SCIATICA PRESENT: ICD-10-CM

## 2025-03-13 PROCEDURE — 97110 THERAPEUTIC EXERCISES: CPT | Performed by: PHYSICAL THERAPIST

## 2025-03-13 PROCEDURE — 97112 NEUROMUSCULAR REEDUCATION: CPT | Performed by: PHYSICAL THERAPIST

## 2025-03-13 PROCEDURE — 97530 THERAPEUTIC ACTIVITIES: CPT | Performed by: PHYSICAL THERAPIST

## 2025-03-13 NOTE — PROGRESS NOTES
"Daily Note     Today's date: 3/13/2025  Patient name: Chanell Bourgeois  : 1945  MRN: [id number]  Referring provider: Norman Villatoro*  Dx:     Encounter Diagnosis     ICD-10-CM    1. Chronic left shoulder pain  M25.512     G89.29       2. Ambulatory dysfunction  R26.2       3. Chronic bilateral low back pain, unspecified whether sciatica present  M54.50     G89.29                      Subjective: Patient reports shoulders in a lot of pain today. Getting injections Wednesday.    Objective: See treatment diary below    Treadmill  STS  LAQ/seated hip flexion  Scap squeezes  Table slides  Pulleys  STM  Cervical retraction    Assessment: Tolerated treatment well. Patient would benefit from ongoing skilled PT to address impairments, improve mobility, ADL's and to maintain independence.        Plan: Progress treatment as tolerated.       Precautions: none      Manuals 1/23 1/28  2/11 2/18 2/25 3/13   Seated posterior rotator cuff/mid thoracic STM                                        Neuro Re-Ed          Scap retraction 20x5\" holds 20x 5\" holds 20x 5\" holds 20x 5\" holds  20x 5\" holds 20x 5\" holds    Scap 4                    Cervical retraction seated                                        Ther Ex          LAQ 25x 2.5 lbs B 25x 2.5 lbs B 12x 2.5 lbs B 10x 2.5 lbs B  20x 3 lbs B 20x 3 lbs B 20x 3 lbs B   Seated hip flexion 15x 2.5 lbs B 18x 2.5 lbs B 20x 2.5 lbs B 20x 2.5 lbs B  15x 3 lbs B 15x 3 lbs B 15x 3 lbs B   pulleys 20x 20x   20x  20x 20x    Standing ER/IR L shoulder          Table slides 20x B -        treadmill 8 min 8 min 8 min 8 min 8 min 8 min 8 min                       Ther Activity          STS 2x10 w/ 1 foam 2x10 w/ 1 foam  2x12 w/ 1 foam 2x10 w/ 1 foam  2x12 w/ 1 foam  2x10 w/ 1 foam 2x10 w/ 1 foam              Gait Training                              Modalities                                   "

## 2025-03-18 ENCOUNTER — APPOINTMENT (OUTPATIENT)
Dept: PHYSICAL THERAPY | Facility: CLINIC | Age: 80
End: 2025-03-18
Payer: MEDICARE

## 2025-03-20 ENCOUNTER — APPOINTMENT (OUTPATIENT)
Dept: PHYSICAL THERAPY | Facility: CLINIC | Age: 80
End: 2025-03-20
Payer: MEDICARE

## 2025-03-25 ENCOUNTER — APPOINTMENT (OUTPATIENT)
Dept: PHYSICAL THERAPY | Facility: CLINIC | Age: 80
End: 2025-03-25
Payer: MEDICARE

## 2025-03-27 ENCOUNTER — OFFICE VISIT (OUTPATIENT)
Dept: PHYSICAL THERAPY | Facility: CLINIC | Age: 80
End: 2025-03-27
Payer: MEDICARE

## 2025-03-27 DIAGNOSIS — G89.29 CHRONIC LEFT SHOULDER PAIN: Primary | ICD-10-CM

## 2025-03-27 DIAGNOSIS — M25.512 CHRONIC LEFT SHOULDER PAIN: Primary | ICD-10-CM

## 2025-03-27 DIAGNOSIS — G89.29 CHRONIC BILATERAL LOW BACK PAIN, UNSPECIFIED WHETHER SCIATICA PRESENT: ICD-10-CM

## 2025-03-27 DIAGNOSIS — M54.50 CHRONIC BILATERAL LOW BACK PAIN, UNSPECIFIED WHETHER SCIATICA PRESENT: ICD-10-CM

## 2025-03-27 DIAGNOSIS — R26.2 AMBULATORY DYSFUNCTION: ICD-10-CM

## 2025-03-27 PROCEDURE — 97112 NEUROMUSCULAR REEDUCATION: CPT | Performed by: PHYSICAL THERAPIST

## 2025-03-27 PROCEDURE — 97530 THERAPEUTIC ACTIVITIES: CPT | Performed by: PHYSICAL THERAPIST

## 2025-03-27 PROCEDURE — 97110 THERAPEUTIC EXERCISES: CPT | Performed by: PHYSICAL THERAPIST

## 2025-03-27 NOTE — PROGRESS NOTES
"Daily Note     Today's date: 3/27/2025  Patient name: Chanell Bourgeois  : 1945  MRN: [id number]  Referring provider: Norman Villatoro*  Dx:     Encounter Diagnosis     ICD-10-CM    1. Chronic left shoulder pain  M25.512     G89.29       2. Ambulatory dysfunction  R26.2       3. Chronic bilateral low back pain, unspecified whether sciatica present  M54.50     G89.29                      Subjective: Patient reports had injections in shoulders and L shoulder isn't as good as normal.    Objective: See treatment diary below    Treadmill  STS  LAQ/seated hip flexion  Scap squeezes  Table slides  Pulleys  STM  Cervical retraction    Assessment: Tolerated treatment well. Patient would benefit from ongoing skilled PT to address impairments, improve mobility, ADL's and to maintain independence.        Plan: Progress treatment as tolerated.       Precautions: none      Manuals 1/28  2/11 2/18 2/25 3/13 3/27   Seated posterior rotator cuff/mid thoracic STM                                        Neuro Re-Ed          Scap retraction 20x 5\" holds 20x 5\" holds 20x 5\" holds  20x 5\" holds 20x 5\" holds  20x 5\" holds   Scap 4       20x grn rows, red low rows             Cervical retraction seated                                        Ther Ex          LAQ 25x 2.5 lbs B 12x 2.5 lbs B 10x 2.5 lbs B  20x 3 lbs B 20x 3 lbs B 20x 3 lbs B 25x 3 lbs B    Seated hip flexion 18x 2.5 lbs B 20x 2.5 lbs B 20x 2.5 lbs B  15x 3 lbs B 15x 3 lbs B 15x 3 lbs B 20x 3 lbs B   pulleys 20x   20x  20x 20x  20x    Standing ER/IR L shoulder          Table slides -         treadmill 8 min 8 min 8 min 8 min 8 min 8 min 9 min                       Ther Activity          STS 2x10 w/ 1 foam  2x12 w/ 1 foam 2x10 w/ 1 foam  2x12 w/ 1 foam  2x10 w/ 1 foam 2x10 w/ 1 foam  2x10 w/ 1 foam             Gait Training                              Modalities                                   "

## 2025-03-31 ENCOUNTER — APPOINTMENT (OUTPATIENT)
Dept: LAB | Facility: CLINIC | Age: 80
End: 2025-03-31
Payer: MEDICARE

## 2025-03-31 DIAGNOSIS — E11.9 CONTROLLED TYPE 2 DIABETES MELLITUS WITHOUT COMPLICATION, WITHOUT LONG-TERM CURRENT USE OF INSULIN (HCC): ICD-10-CM

## 2025-03-31 DIAGNOSIS — I10 BENIGN ESSENTIAL HYPERTENSION: ICD-10-CM

## 2025-03-31 DIAGNOSIS — D53.9 MACROCYTIC ANEMIA: ICD-10-CM

## 2025-03-31 DIAGNOSIS — E55.9 VITAMIN D DEFICIENCY: ICD-10-CM

## 2025-03-31 DIAGNOSIS — D72.829 LEUKOCYTOSIS, UNSPECIFIED TYPE: ICD-10-CM

## 2025-03-31 DIAGNOSIS — D75.89 MACROCYTOSIS WITHOUT ANEMIA: ICD-10-CM

## 2025-03-31 DIAGNOSIS — E78.2 MIXED HYPERLIPIDEMIA: ICD-10-CM

## 2025-03-31 LAB
25(OH)D3 SERPL-MCNC: 25.6 NG/ML (ref 30–100)
ALBUMIN SERPL BCG-MCNC: 4.4 G/DL (ref 3.5–5)
ALP SERPL-CCNC: 65 U/L (ref 34–104)
ALT SERPL W P-5'-P-CCNC: 9 U/L (ref 7–52)
ANION GAP SERPL CALCULATED.3IONS-SCNC: 10 MMOL/L (ref 4–13)
AST SERPL W P-5'-P-CCNC: 11 U/L (ref 13–39)
BASOPHILS # BLD AUTO: 0.13 THOUSANDS/ÂΜL (ref 0–0.1)
BASOPHILS NFR BLD AUTO: 1 % (ref 0–1)
BILIRUB SERPL-MCNC: 0.8 MG/DL (ref 0.2–1)
BUN SERPL-MCNC: 23 MG/DL (ref 5–25)
CALCIUM SERPL-MCNC: 9.8 MG/DL (ref 8.4–10.2)
CHLORIDE SERPL-SCNC: 101 MMOL/L (ref 96–108)
CHOLEST SERPL-MCNC: 172 MG/DL (ref ?–200)
CO2 SERPL-SCNC: 28 MMOL/L (ref 21–32)
CREAT SERPL-MCNC: 0.74 MG/DL (ref 0.6–1.3)
EOSINOPHIL # BLD AUTO: 0.14 THOUSAND/ÂΜL (ref 0–0.61)
EOSINOPHIL NFR BLD AUTO: 1 % (ref 0–6)
ERYTHROCYTE [DISTWIDTH] IN BLOOD BY AUTOMATED COUNT: 13 % (ref 11.6–15.1)
EST. AVERAGE GLUCOSE BLD GHB EST-MCNC: 163 MG/DL
FOLATE SERPL-MCNC: 11.7 NG/ML
GFR SERPL CREATININE-BSD FRML MDRD: 77 ML/MIN/1.73SQ M
GLUCOSE P FAST SERPL-MCNC: 169 MG/DL (ref 65–99)
HBA1C MFR BLD: 7.3 %
HCT VFR BLD AUTO: 41.2 % (ref 34.8–46.1)
HDLC SERPL-MCNC: 58 MG/DL
HGB BLD-MCNC: 14 G/DL (ref 11.5–15.4)
IMM GRANULOCYTES # BLD AUTO: 0.03 THOUSAND/UL (ref 0–0.2)
IMM GRANULOCYTES NFR BLD AUTO: 0 % (ref 0–2)
LDLC SERPL CALC-MCNC: 87 MG/DL (ref 0–100)
LYMPHOCYTES # BLD AUTO: 2.85 THOUSANDS/ÂΜL (ref 0.6–4.47)
LYMPHOCYTES NFR BLD AUTO: 21 % (ref 14–44)
MCH RBC QN AUTO: 34.2 PG (ref 26.8–34.3)
MCHC RBC AUTO-ENTMCNC: 34 G/DL (ref 31.4–37.4)
MCV RBC AUTO: 101 FL (ref 82–98)
MONOCYTES # BLD AUTO: 1.24 THOUSAND/ÂΜL (ref 0.17–1.22)
MONOCYTES NFR BLD AUTO: 9 % (ref 4–12)
NEUTROPHILS # BLD AUTO: 8.93 THOUSANDS/ÂΜL (ref 1.85–7.62)
NEUTS SEG NFR BLD AUTO: 68 % (ref 43–75)
NRBC BLD AUTO-RTO: 0 /100 WBCS
PLATELET # BLD AUTO: 393 THOUSANDS/UL (ref 149–390)
PMV BLD AUTO: 10.5 FL (ref 8.9–12.7)
POTASSIUM SERPL-SCNC: 4.4 MMOL/L (ref 3.5–5.3)
PROT SERPL-MCNC: 7.1 G/DL (ref 6.4–8.4)
RBC # BLD AUTO: 4.09 MILLION/UL (ref 3.81–5.12)
SODIUM SERPL-SCNC: 139 MMOL/L (ref 135–147)
TRIGL SERPL-MCNC: 135 MG/DL (ref ?–150)
VIT B12 SERPL-MCNC: 498 PG/ML (ref 180–914)
WBC # BLD AUTO: 13.32 THOUSAND/UL (ref 4.31–10.16)

## 2025-03-31 PROCEDURE — 82306 VITAMIN D 25 HYDROXY: CPT

## 2025-03-31 PROCEDURE — 80061 LIPID PANEL: CPT

## 2025-03-31 PROCEDURE — 83036 HEMOGLOBIN GLYCOSYLATED A1C: CPT

## 2025-03-31 PROCEDURE — 80053 COMPREHEN METABOLIC PANEL: CPT

## 2025-03-31 PROCEDURE — 36415 COLL VENOUS BLD VENIPUNCTURE: CPT

## 2025-03-31 PROCEDURE — 82607 VITAMIN B-12: CPT

## 2025-03-31 PROCEDURE — 82746 ASSAY OF FOLIC ACID SERUM: CPT

## 2025-03-31 PROCEDURE — 85025 COMPLETE CBC W/AUTO DIFF WBC: CPT

## 2025-04-01 ENCOUNTER — APPOINTMENT (OUTPATIENT)
Dept: PHYSICAL THERAPY | Facility: CLINIC | Age: 80
End: 2025-04-01
Payer: MEDICARE

## 2025-04-02 ENCOUNTER — OFFICE VISIT (OUTPATIENT)
Dept: FAMILY MEDICINE CLINIC | Facility: CLINIC | Age: 80
End: 2025-04-02
Payer: MEDICARE

## 2025-04-02 VITALS
HEIGHT: 67 IN | DIASTOLIC BLOOD PRESSURE: 62 MMHG | WEIGHT: 165.6 LBS | HEART RATE: 80 BPM | OXYGEN SATURATION: 98 % | RESPIRATION RATE: 16 BRPM | TEMPERATURE: 97.9 F | SYSTOLIC BLOOD PRESSURE: 104 MMHG | BODY MASS INDEX: 25.99 KG/M2

## 2025-04-02 DIAGNOSIS — K21.9 GASTROESOPHAGEAL REFLUX DISEASE WITHOUT ESOPHAGITIS: ICD-10-CM

## 2025-04-02 DIAGNOSIS — E03.9 ACQUIRED HYPOTHYROIDISM: ICD-10-CM

## 2025-04-02 DIAGNOSIS — Z00.00 MEDICARE ANNUAL WELLNESS VISIT, SUBSEQUENT: Primary | ICD-10-CM

## 2025-04-02 DIAGNOSIS — Z12.31 BREAST CANCER SCREENING BY MAMMOGRAM: ICD-10-CM

## 2025-04-02 DIAGNOSIS — Z99.89 DEPENDENCE ON CANE: ICD-10-CM

## 2025-04-02 DIAGNOSIS — E55.9 VITAMIN D DEFICIENCY: ICD-10-CM

## 2025-04-02 DIAGNOSIS — I10 BENIGN ESSENTIAL HYPERTENSION: ICD-10-CM

## 2025-04-02 DIAGNOSIS — E78.2 MIXED HYPERLIPIDEMIA: ICD-10-CM

## 2025-04-02 DIAGNOSIS — J43.8 OTHER EMPHYSEMA (HCC): ICD-10-CM

## 2025-04-02 DIAGNOSIS — E11.9 CONTROLLED TYPE 2 DIABETES MELLITUS WITHOUT COMPLICATION, WITHOUT LONG-TERM CURRENT USE OF INSULIN (HCC): ICD-10-CM

## 2025-04-02 DIAGNOSIS — Z78.0 POSTMENOPAUSAL: ICD-10-CM

## 2025-04-02 PROCEDURE — 99214 OFFICE O/P EST MOD 30 MIN: CPT | Performed by: FAMILY MEDICINE

## 2025-04-02 PROCEDURE — G2211 COMPLEX E/M VISIT ADD ON: HCPCS | Performed by: FAMILY MEDICINE

## 2025-04-02 PROCEDURE — G0439 PPPS, SUBSEQ VISIT: HCPCS | Performed by: FAMILY MEDICINE

## 2025-04-02 RX ORDER — LEVOTHYROXINE SODIUM 88 UG/1
88 TABLET ORAL DAILY
Qty: 90 TABLET | Refills: 1 | Status: SHIPPED | OUTPATIENT
Start: 2025-04-02

## 2025-04-02 RX ORDER — LOSARTAN POTASSIUM 50 MG/1
50 TABLET ORAL DAILY
Qty: 90 TABLET | Refills: 1 | Status: SHIPPED | OUTPATIENT
Start: 2025-04-02

## 2025-04-02 RX ORDER — METFORMIN HYDROCHLORIDE 500 MG/1
1500 TABLET, EXTENDED RELEASE ORAL DAILY
Qty: 270 TABLET | Refills: 1 | Status: SHIPPED | OUTPATIENT
Start: 2025-04-02

## 2025-04-02 RX ORDER — LOSARTAN POTASSIUM AND HYDROCHLOROTHIAZIDE 12.5; 5 MG/1; MG/1
1 TABLET ORAL DAILY
Qty: 90 TABLET | Refills: 1 | Status: SHIPPED | OUTPATIENT
Start: 2025-04-02 | End: 2025-04-02

## 2025-04-02 NOTE — ASSESSMENT & PLAN NOTE
- Blood pressure readings consistently lower, likely due to recent weight loss  - Adjust current losartan prescription by removing hydrochlorothiazide component  - Continue with plain losartan 50 mg  - If blood pressure remains low, advised to halve losartan tablet and inform us for dosage reduction to 25 mg  Orders:  •  CBC and differential; Future  •  losartan (COZAAR) 50 mg tablet; Take 1 tablet (50 mg total) by mouth daily  
  Orders:  •  levothyroxine (Synthroid) 88 mcg tablet; Take 1 tablet (88 mcg total) by mouth daily  •  TSH, 3rd generation with Free T4 reflex; Future  
- Reports difficulty managing blood glucose levels due to steroid injections for shoulder pain  - Using extended-release metformin- at times self-adjusts dosing based on BS (particularly after steroid injections)  - Prescription refill for metformin provided.  Lab Results   Component Value Date    HGBA1C 7.3 (H) 03/31/2025       Orders:  •  metFORMIN (GLUCOPHAGE-XR) 500 mg 24 hr tablet; Take 3 tablets (1,500 mg total) by mouth in the morning  •  Comprehensive metabolic panel; Future  •  Hemoglobin A1C; Future  •  Albumin / creatinine urine ratio; Future  
- Vitamin D levels mildly low, advised to continue taking vitamin D supplements  Orders:  •  Vitamin D 25 hydroxy; Future  
Declines statin  Continue to monitor  Continue working on healthy diet  (Intentional weight loss in the past year)  Orders:  •  Comprehensive metabolic panel; Future  •  Lipid Panel with Direct LDL reflex; Future  
Has albuterol MDI for prn use     
no

## 2025-04-02 NOTE — PROGRESS NOTES
Diabetic Foot Exam    Patient's shoes and socks removed.    Right Foot/Ankle   Right Foot Inspection  Skin Exam: skin normal and skin intact. No dry skin, no warmth, no callus, no erythema, no maceration, no abnormal color, no pre-ulcer, no ulcer and no callus.     Toe Exam: ROM and strength within normal limits.     Sensory   Monofilament testing: intact    Vascular  Capillary refills: < 3 seconds  The right DP pulse is 2+.     Left Foot/Ankle  Left Foot Inspection  Skin Exam: skin normal and skin intact. No dry skin, no warmth, no erythema, no maceration, normal color, no pre-ulcer, no ulcer and no callus.     Toe Exam: ROM and strength within normal limits.     Sensory   Monofilament testing: intact    Vascular  Capillary refills: elevated  The left DP pulse is 2+.     Assign Risk Category  No deformity present  No loss of protective sensation  No weak pulses  Risk: 0      Name: Chanell Bourgeois      : 1945      MRN: [id number]  Encounter Provider: Laine Dee MD  Encounter Date: 2025   Encounter department: [facility]    :  Assessment & Plan  Medicare annual wellness visit, subsequent  See below       Controlled type 2 diabetes mellitus without complication, without long-term current use of insulin (HCC)  - Reports difficulty managing blood glucose levels due to steroid injections for shoulder pain  - Using extended-release metformin- at times self-adjusts dosing based on BS (particularly after steroid injections)  - Prescription refill for metformin provided.  Lab Results   Component Value Date    HGBA1C 7.3 (H) 2025       Orders:  •  metFORMIN (GLUCOPHAGE-XR) 500 mg 24 hr tablet; Take 3 tablets (1,500 mg total) by mouth in the morning  •  Comprehensive metabolic panel; Future  •  Hemoglobin A1C; Future  •  Albumin / creatinine urine ratio; Future    Gastroesophageal reflux disease without esophagitis  Pepcid prn       Acquired hypothyroidism    Orders:  •   levothyroxine (Synthroid) 88 mcg tablet; Take 1 tablet (88 mcg total) by mouth daily  •  TSH, 3rd generation with Free T4 reflex; Future    Benign essential hypertension    - Blood pressure readings consistently lower, likely due to recent weight loss  - Adjust current losartan prescription by removing hydrochlorothiazide component  - Continue with plain losartan 50 mg  - If blood pressure remains low, advised to halve losartan tablet and inform us for dosage reduction to 25 mg  Orders:  •  CBC and differential; Future  •  losartan (COZAAR) 50 mg tablet; Take 1 tablet (50 mg total) by mouth daily    Mixed hyperlipidemia  Declines statin  Continue to monitor  Continue working on healthy diet  (Intentional weight loss in the past year)  Orders:  •  Comprehensive metabolic panel; Future  •  Lipid Panel with Direct LDL reflex; Future    Vitamin D deficiency  - Vitamin D levels mildly low, advised to continue taking vitamin D supplements  Orders:  •  Vitamin D 25 hydroxy; Future    Other emphysema (HCC)  Has albuterol MDI for prn use       Postmenopausal  Encouraged to schedule  Orders:  •  DXA bone density spine hip and pelvis; Future    Breast cancer screening by mammogram  Has only had mammogram once in lifetime  She will consider this.  Orders:  •  Mammo screening bilateral w 3d and cad; Future    Dependence on cane  Uses cane for ambulatory support                   Urinary Incontinence Plan of Care: counseling topics discussed: improving blood sugar control.       Preventive health issues were discussed with patient, and age appropriate screening tests were ordered as noted in patient's After Visit Summary. Personalized health advice and appropriate referrals for health education or preventive services given if needed, as noted in patient's After Visit Summary.    History of Present Illness       History of Present Illness  The patient is a 79-year-old female who presents for evaluation of diabetes, blood pressure  management, and health maintenance.    Diabetes  - The patient has been grappling with maintaining her glucose levels below 200, attributing the difficulty to the steroid injections she receives every 3 months for her shoulders.  - Approximately 3 days ago, her blood sugar spiked to 330, prompting her to take an additional dose of metformin, which subsequently reduced her blood sugar to 138 within an hour..  - She recalls a period of hypoglycemia prior to her injections, with difficulty maintaining her blood sugar above 100, often dropping to 98. She would consume carbohydrates to elevate her blood sugar, but the issue persisted, leading to days where she would abstain from metformin. This problem has since resolved.  - She has experienced a weight loss of 20 pounds over the past year. This is intentional with decreasing portions, watching carbs.  - She typically takes 2.5 tablets of metformin daily, occasionally reducing to 2 daily.  - She has recently started taking half tablets to assess their efficacy in lowering her blood sugar.  - She took half a tablet of metformin this morning after breakfast, and her blood sugar was 216 an hour later.    - She has taken 2 doses of Paz Milk of Magnesia today.    - She has previously tried Ozempic but discontinued it due to GI upset lasting approximately 2 weeks.    Blood Pressure  - Her blood pressure has been consistently low, averaging around 100/60s, a significant decrease from her usual 120/70.  - She expresses concern about this drop in blood pressure.  - She is currently on losartan-hctz    Health Maintenance  - She has an old living will and power of , which she plans to update.  Laure Neumann remains her POA.  - She has never been hospitalized or undergone surgery.  - She is considering undergoing a DEXA scan and is exploring options for a 3D mammogram, having experienced discomfort during her previous mammogram in 2021.    Supplemental information:  She has 3 severely torn tendons and a damaged ACL, which she believes occurred while lifting her dog, Ion, for a period of 2 weeks. She is unable to undergo surgery due to the lack of a caregiver for both herself and Ion (her dog). She was unable to use her arm for 3 months and is currently undergoing physical therapy. She receives injections in both shoulders every 3 months and was hoping to have them after the labs, but they were administered 2 weeks ago. She experiences instability, particularly in the mornings, and fears falling due to her non-functional knees. She uses a cane for support when walking outside but does not always use it indoors as she can hold onto objects for stability. She is currently undergoing physical therapy for her knees and strength training and also performs these exercises at home. She has tested her ability to rise from a kneeling position without support and found it challenging. She uses a ski pole instead of a cane when outdoors as it provides better traction and stability. She recalls a fall incident approximately 2 years ago when Ion pulled her down, and she was unable to get up until assisted by her neighbors.          Patient Care Team:  Laine Dee MD as PCP - General (Family Medicine)    Review of Systems  Annual Wellness Visit Questionnaire   Chanell is here for her Subsequent Wellness visit.     Health Risk Assessment:   Patient rates overall health as good. Patient feels that their physical health rating is same. Patient is very satisfied with their life. Eyesight was rated as same. Hearing was rated as same. Patient feels that their emotional and mental health rating is same. Patients states they are never, rarely angry. Patient states they are often unusually tired/fatigued. Pain experienced in the last 7 days has been a lot. Patient's pain rating has been 7/10. Patient states that she has experienced no weight loss or gain in last 6 months. Shoulder pain  (getting injections)    Fall Risk Screening:   In the past year, patient has experienced: no history of falling in past year      Urinary Incontinence Screening:   Patient has leaked urine accidently in the last six months.     Home Safety:  Patient has trouble with stairs inside or outside of their home. Patient has working smoke alarms and has working carbon monoxide detector. Home safety hazards include: none.     Nutrition:   Current diet is Diabetic and Low Carb.     Medications:   Patient is currently taking over-the-counter supplements. OTC medications include: see medication list. Patient is able to manage medications.     Activities of Daily Living (ADLs)/Instrumental Activities of Daily Living (IADLs):   Walk and transfer into and out of bed and chair?: Yes  Dress and groom yourself?: Yes    Bathe or shower yourself?: Yes    Feed yourself? Yes  Do your laundry/housekeeping?: Yes  Manage your money, pay your bills and track your expenses?: Yes  Make your own meals?: Yes    Do your own shopping?: Yes    Previous Hospitalizations:   Any hospitalizations or ED visits within the last 12 months?: No      Advance Care Planning:   Living will: No    Durable POA for healthcare: No    Advanced directive: No    Advanced directive counseling given: Yes      Comments: Pt notes she would not want resuscitation or intubation fis he would not have hope at meaningful recovery.  Laure NICE    Cognitive Screening:   Provider or family/friend/caregiver concerned regarding cognition?: No    PREVENTIVE SCREENINGS      Cardiovascular Screening:    General: Screening Not Indicated and History Lipid Disorder      Diabetes Screening:     General: Screening Not Indicated and History Diabetes      Colorectal Cancer Screening:     General: Screening Current      Cervical Cancer Screening:    General: Screening Not Indicated      Lung Cancer Screening:     General: Screening Not Indicated      Hepatitis C Screening:     "General: Screening Current    Screening, Brief Intervention, and Referral to Treatment (SBIRT)     Screening  Typical number of drinks in a day: 0  Typical number of drinks in a week: 0  Interpretation: Low risk drinking behavior.    Single Item Drug Screening:  How often have you used an illegal drug (including marijuana) or a prescription medication for non-medical reasons in the past year? never    Single Item Drug Screen Score: 0  Interpretation: Negative screen for possible drug use disorder    Social Drivers of Health     Financial Resource Strain: Low Risk  (11/9/2023)    Overall Financial Resource Strain (CARDIA)    • Difficulty of Paying Living Expenses: Not hard at all   Food Insecurity: No Food Insecurity (4/2/2025)    Hunger Vital Sign    • Worried About Running Out of Food in the Last Year: Never true    • Ran Out of Food in the Last Year: Never true   Transportation Needs: No Transportation Needs (4/2/2025)    PRAPARE - Transportation    • Lack of Transportation (Medical): No    • Lack of Transportation (Non-Medical): No   Housing Stability: Low Risk  (4/2/2025)    Housing Stability Vital Sign    • Unable to Pay for Housing in the Last Year: No    • Number of Times Moved in the Last Year: 0    • Homeless in the Last Year: No   Utilities: Not At Risk (4/2/2025)    [facility] Utilities    • Threatened with loss of utilities: No     No results found.    Objective   /62   Pulse 80   Temp 97.9 °F (36.6 °C) (Temporal)   Resp 16   Ht 5' 6.5\" (1.689 m)   Wt 75.1 kg (165 lb 9.6 oz)   SpO2 98%   BMI 26.33 kg/m²       Physical Exam  Vitals and nursing note reviewed.   Constitutional:       General: She is not in acute distress.     Appearance: Normal appearance. She is well-developed. She is not ill-appearing.   HENT:      Head: Normocephalic and atraumatic.      Mouth/Throat:      Mouth: Mucous membranes are moist.   Eyes:      Conjunctiva/sclera: Conjunctivae normal.   Neck:      Vascular: No carotid " bruit.   Cardiovascular:      Rate and Rhythm: Normal rate and regular rhythm.      Pulses: no weak pulses.           Dorsalis pedis pulses are 2+ on the right side and 2+ on the left side.      Heart sounds: No murmur heard.  Pulmonary:      Effort: Pulmonary effort is normal. No respiratory distress.      Breath sounds: Normal breath sounds.   Abdominal:      Palpations: Abdomen is soft.      Tenderness: There is no abdominal tenderness.   Musculoskeletal:      Cervical back: Neck supple.      Right lower leg: No edema.      Left lower leg: No edema.   Feet:      Right foot:      Skin integrity: No ulcer, skin breakdown, erythema, warmth, callus or dry skin.      Left foot:      Skin integrity: No ulcer, skin breakdown, erythema, warmth, callus or dry skin.   Lymphadenopathy:      Cervical: No cervical adenopathy.   Skin:     General: Skin is warm and dry.   Neurological:      Mental Status: She is alert and oriented to person, place, and time.      Comments: Ambulates with cane   Psychiatric:         Mood and Affect: Mood normal.         Behavior: Behavior normal.

## 2025-04-07 ENCOUNTER — PATIENT MESSAGE (OUTPATIENT)
Dept: FAMILY MEDICINE CLINIC | Facility: CLINIC | Age: 80
End: 2025-04-07

## 2025-04-07 DIAGNOSIS — E11.9 CONTROLLED TYPE 2 DIABETES MELLITUS WITHOUT COMPLICATION, WITHOUT LONG-TERM CURRENT USE OF INSULIN (HCC): Primary | ICD-10-CM

## 2025-04-08 ENCOUNTER — APPOINTMENT (OUTPATIENT)
Dept: PHYSICAL THERAPY | Facility: CLINIC | Age: 80
End: 2025-04-08
Payer: MEDICARE

## 2025-04-08 ENCOUNTER — PATIENT MESSAGE (OUTPATIENT)
Dept: FAMILY MEDICINE CLINIC | Facility: CLINIC | Age: 80
End: 2025-04-08

## 2025-04-08 RX ORDER — LIRAGLUTIDE 6 MG/ML
INJECTION SUBCUTANEOUS
Qty: 15 ML | Refills: 1 | Status: SHIPPED | OUTPATIENT
Start: 2025-04-08 | End: 2025-04-16 | Stop reason: SDUPTHER

## 2025-04-10 ENCOUNTER — APPOINTMENT (OUTPATIENT)
Dept: PHYSICAL THERAPY | Facility: CLINIC | Age: 80
End: 2025-04-10
Payer: MEDICARE

## 2025-04-14 ENCOUNTER — TELEPHONE (OUTPATIENT)
Dept: FAMILY MEDICINE CLINIC | Facility: CLINIC | Age: 80
End: 2025-04-14

## 2025-04-14 DIAGNOSIS — E11.9 CONTROLLED TYPE 2 DIABETES MELLITUS WITHOUT COMPLICATION, WITHOUT LONG-TERM CURRENT USE OF INSULIN (HCC): ICD-10-CM

## 2025-04-14 NOTE — TELEPHONE ENCOUNTER
PA liraglutide (VICTOZA) injection DENIED    Reason:(Screenshot if applicable)        Message sent to office clinical pool Yes    Denial letter scanned into Media Yes    Appeal started No (Provider will need to decide if appeal is warranted and send clinical documentation to Prior Authorization Team for initiation.)    **Please follow up with your patient regarding denial and next steps**

## 2025-04-14 NOTE — TELEPHONE ENCOUNTER
PA liraglutide (VICTOZA) injection SUBMITTED    to [employer]     via    []CMM-KEY:    [x]Surescripts-Case ID # [id number]  []Availity-Auth ID #  NDC #    []Faxed to plan   []Other website    []Phone call Case ID #      []PA sent as URGENT    All office notes, labs and other pertaining documents and studies sent. Clinical questions answered. Awaiting determination from insurance company.     Turnaround time for your insurance to make a decision on your Prior Authorization can take 7-21 business days.

## 2025-04-15 ENCOUNTER — OFFICE VISIT (OUTPATIENT)
Dept: PHYSICAL THERAPY | Facility: CLINIC | Age: 80
End: 2025-04-15
Attending: FAMILY MEDICINE
Payer: MEDICARE

## 2025-04-15 DIAGNOSIS — G89.29 CHRONIC LEFT SHOULDER PAIN: Primary | ICD-10-CM

## 2025-04-15 DIAGNOSIS — E11.9 CONTROLLED TYPE 2 DIABETES MELLITUS WITHOUT COMPLICATION, WITHOUT LONG-TERM CURRENT USE OF INSULIN (HCC): ICD-10-CM

## 2025-04-15 DIAGNOSIS — R73.09 LABILE BLOOD GLUCOSE: ICD-10-CM

## 2025-04-15 DIAGNOSIS — M25.512 CHRONIC LEFT SHOULDER PAIN: Primary | ICD-10-CM

## 2025-04-15 DIAGNOSIS — G89.29 CHRONIC BILATERAL LOW BACK PAIN, UNSPECIFIED WHETHER SCIATICA PRESENT: ICD-10-CM

## 2025-04-15 DIAGNOSIS — R26.2 AMBULATORY DYSFUNCTION: ICD-10-CM

## 2025-04-15 DIAGNOSIS — M54.50 CHRONIC BILATERAL LOW BACK PAIN, UNSPECIFIED WHETHER SCIATICA PRESENT: ICD-10-CM

## 2025-04-15 PROCEDURE — 97110 THERAPEUTIC EXERCISES: CPT | Performed by: PHYSICAL THERAPIST

## 2025-04-15 PROCEDURE — 97112 NEUROMUSCULAR REEDUCATION: CPT | Performed by: PHYSICAL THERAPIST

## 2025-04-15 PROCEDURE — 97530 THERAPEUTIC ACTIVITIES: CPT | Performed by: PHYSICAL THERAPIST

## 2025-04-15 RX ORDER — BLOOD SUGAR DIAGNOSTIC
STRIP MISCELLANEOUS
Qty: 400 STRIP | Refills: 1 | Status: SHIPPED | OUTPATIENT
Start: 2025-04-15

## 2025-04-15 NOTE — PROGRESS NOTES
"Daily Note     Today's date: 4/15/2025  Patient name: Chanell Bourgeois  : 1945  MRN: [id number]  Referring provider: Norman Villatoro*  Dx:     Encounter Diagnosis     ICD-10-CM    1. Chronic left shoulder pain  M25.512     G89.29       2. Ambulatory dysfunction  R26.2       3. Chronic bilateral low back pain, unspecified whether sciatica present  M54.50     G89.29                      Subjective: Patient reports injections no longer helping her shoulders and R shoulder is worsening as well.     Objective: See treatment diary below    Treadmill  STS  LAQ/seated hip flexion  Scap squeezes  Table slides  Pulleys  STM  Cervical retraction    Assessment: Tolerated treatment well. Patient would benefit from ongoing skilled PT to address impairments, improve mobility, ADL's and to maintain independence.        Plan: Progress treatment as tolerated.       Precautions: none      Manuals 1/28  2/11 2/18 2/25 3/13 3/27 4/15   Seated posterior rotator cuff/mid thoracic STM                                            Neuro Re-Ed           Scap retraction 20x 5\" holds 20x 5\" holds 20x 5\" holds  20x 5\" holds 20x 5\" holds  20x 5\" holds -   Scap 4       20x grn rows, red low rows 10x grn rows, red low rows               Cervical retraction seated                                            Ther Ex           LAQ 25x 2.5 lbs B 12x 2.5 lbs B 10x 2.5 lbs B  20x 3 lbs B 20x 3 lbs B 20x 3 lbs B 25x 3 lbs B  25x 3 lbs B   Seated hip flexion 18x 2.5 lbs B 20x 2.5 lbs B 20x 2.5 lbs B  15x 3 lbs B 15x 3 lbs B 15x 3 lbs B 20x 3 lbs B 20x 3 lbs B   pulleys 20x   20x  20x 20x  20x  20x   Standing ER/IR L shoulder           Table slides -          treadmill 8 min 8 min 8 min 8 min 8 min 8 min 9 min 7 min                          Ther Activity           STS 2x10 w/ 1 foam  2x12 w/ 1 foam 2x10 w/ 1 foam  2x12 w/ 1 foam  2x10 w/ 1 foam 2x10 w/ 1 foam  2x10 w/ 1 foam 2x10 hi/lo              Gait Training                               "   Modalities

## 2025-04-15 NOTE — TELEPHONE ENCOUNTER
Reason for call:   [x] Refill   [] Prior Auth  [] Other:     Office:   [x] PCP/Provider -   [] Specialty/Provider -     glucose blood (OneTouch Verio) test strip     Quantity: 90    Pharmacy: [facility]   Does the patient have enough for 3 days?   [] Yes   [x] No - Send as HP to POD    [facility] Pharm[facility]   Does the patient have enough for 10 days?   [] Yes   [] No - Send as HP to POD

## 2025-04-16 RX ORDER — LIRAGLUTIDE 6 MG/ML
INJECTION SUBCUTANEOUS
Qty: 6 ML | Refills: 5 | Status: SHIPPED | OUTPATIENT
Start: 2025-04-16 | End: 2026-05-28

## 2025-04-16 NOTE — TELEPHONE ENCOUNTER
Appears that the prior auth was due to the quantity- switched to a one month supply which should go through.

## 2025-04-22 ENCOUNTER — APPOINTMENT (OUTPATIENT)
Dept: PHYSICAL THERAPY | Facility: CLINIC | Age: 80
End: 2025-04-22
Attending: FAMILY MEDICINE
Payer: MEDICARE

## 2025-04-29 ENCOUNTER — OFFICE VISIT (OUTPATIENT)
Dept: PHYSICAL THERAPY | Facility: CLINIC | Age: 80
End: 2025-04-29
Attending: FAMILY MEDICINE
Payer: MEDICARE

## 2025-04-29 DIAGNOSIS — M54.50 CHRONIC BILATERAL LOW BACK PAIN, UNSPECIFIED WHETHER SCIATICA PRESENT: ICD-10-CM

## 2025-04-29 DIAGNOSIS — G89.29 CHRONIC LEFT SHOULDER PAIN: Primary | ICD-10-CM

## 2025-04-29 DIAGNOSIS — G89.29 CHRONIC BILATERAL LOW BACK PAIN, UNSPECIFIED WHETHER SCIATICA PRESENT: ICD-10-CM

## 2025-04-29 DIAGNOSIS — M25.512 CHRONIC LEFT SHOULDER PAIN: Primary | ICD-10-CM

## 2025-04-29 DIAGNOSIS — R26.2 AMBULATORY DYSFUNCTION: ICD-10-CM

## 2025-04-29 PROCEDURE — 97110 THERAPEUTIC EXERCISES: CPT | Performed by: PHYSICAL THERAPIST

## 2025-04-29 PROCEDURE — 97530 THERAPEUTIC ACTIVITIES: CPT | Performed by: PHYSICAL THERAPIST

## 2025-04-29 NOTE — PROGRESS NOTES
"Daily Note     Today's date: 2025  Patient name: Chanell Bourgeois  : 1945  MRN: [id number]  Referring provider: Norman Villatoro*  Dx:     Encounter Diagnosis     ICD-10-CM    1. Chronic left shoulder pain  M25.512     G89.29       2. Ambulatory dysfunction  R26.2       3. Chronic bilateral low back pain, unspecified whether sciatica present  M54.50     G89.29                      Subjective: Patient reports was sore for a few days after last visit. Doesn't want to do shoulder exercises.    Objective: See treatment diary below    Treadmill  STS  LAQ/seated hip flexion  Scap squeezes  Table slides  Pulleys  STM  Cervical retraction    Assessment: Tolerated treatment well. Patient would benefit from ongoing skilled PT to address impairments, improve mobility, ADL's and to maintain independence.        Plan: Progress treatment as tolerated.       Precautions: none      Manuals 2/11 2/18 2/25 3/13 3/27 4/15 4/29   Seated posterior rotator cuff/mid thoracic STM                                        Neuro Re-Ed          Scap retraction 20x 5\" holds  20x 5\" holds 20x 5\" holds  20x 5\" holds -    Scap 4     20x grn rows, red low rows 10x grn rows, red low rows               Cervical retraction seated                                        Ther Ex          LAQ 10x 2.5 lbs B  20x 3 lbs B 20x 3 lbs B 20x 3 lbs B 25x 3 lbs B  25x 3 lbs B 25x 3 lbs B   Seated hip flexion 20x 2.5 lbs B  15x 3 lbs B 15x 3 lbs B 15x 3 lbs B 20x 3 lbs B 20x 3 lbs B 12x 3 lbs B   pulleys 20x  20x 20x  20x  20x    Standing ER/IR L shoulder          Table slides          treadmill 8 min 8 min 8 min 8 min 9 min 7 min  8 min                       Ther Activity          STS 2x10 w/ 1 foam  2x12 w/ 1 foam  2x10 w/ 1 foam 2x10 w/ 1 foam  2x10 w/ 1 foam 2x10 hi/lo 12x             Gait Training                              Modalities                                   "

## 2025-05-06 ENCOUNTER — APPOINTMENT (OUTPATIENT)
Dept: PHYSICAL THERAPY | Facility: CLINIC | Age: 80
End: 2025-05-06
Attending: FAMILY MEDICINE
Payer: MEDICARE

## 2025-05-08 ENCOUNTER — APPOINTMENT (OUTPATIENT)
Dept: PHYSICAL THERAPY | Facility: CLINIC | Age: 80
End: 2025-05-08
Attending: FAMILY MEDICINE
Payer: MEDICARE

## 2025-05-12 ENCOUNTER — PATIENT MESSAGE (OUTPATIENT)
Dept: FAMILY MEDICINE CLINIC | Facility: CLINIC | Age: 80
End: 2025-05-12

## 2025-05-12 DIAGNOSIS — E11.9 CONTROLLED TYPE 2 DIABETES MELLITUS WITHOUT COMPLICATION, WITHOUT LONG-TERM CURRENT USE OF INSULIN (HCC): Primary | ICD-10-CM

## 2025-05-13 ENCOUNTER — APPOINTMENT (OUTPATIENT)
Dept: PHYSICAL THERAPY | Facility: CLINIC | Age: 80
End: 2025-05-13
Attending: FAMILY MEDICINE
Payer: MEDICARE

## 2025-05-13 RX ORDER — PEN NEEDLE, DIABETIC 32GX 5/32"
NEEDLE, DISPOSABLE MISCELLANEOUS DAILY
Qty: 90 EACH | Refills: 3 | Status: SHIPPED | OUTPATIENT
Start: 2025-05-13

## 2025-05-20 ENCOUNTER — OFFICE VISIT (OUTPATIENT)
Dept: PHYSICAL THERAPY | Facility: CLINIC | Age: 80
End: 2025-05-20
Attending: FAMILY MEDICINE
Payer: MEDICARE

## 2025-05-20 DIAGNOSIS — M25.512 CHRONIC LEFT SHOULDER PAIN: Primary | ICD-10-CM

## 2025-05-20 DIAGNOSIS — G89.29 CHRONIC BILATERAL LOW BACK PAIN, UNSPECIFIED WHETHER SCIATICA PRESENT: ICD-10-CM

## 2025-05-20 DIAGNOSIS — M54.50 CHRONIC BILATERAL LOW BACK PAIN, UNSPECIFIED WHETHER SCIATICA PRESENT: ICD-10-CM

## 2025-05-20 DIAGNOSIS — G89.29 CHRONIC LEFT SHOULDER PAIN: Primary | ICD-10-CM

## 2025-05-20 DIAGNOSIS — R26.2 AMBULATORY DYSFUNCTION: ICD-10-CM

## 2025-05-20 PROCEDURE — 97112 NEUROMUSCULAR REEDUCATION: CPT | Performed by: PHYSICAL THERAPIST

## 2025-05-20 PROCEDURE — 97110 THERAPEUTIC EXERCISES: CPT | Performed by: PHYSICAL THERAPIST

## 2025-05-20 PROCEDURE — 97530 THERAPEUTIC ACTIVITIES: CPT | Performed by: PHYSICAL THERAPIST

## 2025-05-20 NOTE — PROGRESS NOTES
"Daily Note     Today's date: 2025  Patient name: Chanell Bourgeois  : 1945  MRN: [id number]  Referring provider: Norman Villatoro*  Dx:     Encounter Diagnosis     ICD-10-CM    1. Chronic left shoulder pain  M25.512     G89.29       2. Ambulatory dysfunction  R26.2       3. Chronic bilateral low back pain, unspecified whether sciatica present  M54.50     G89.29                      Subjective: Patient reports shoulder and neck are sore. Also having issues with both arms.     Objective: See treatment diary below    Treadmill  STS  LAQ/seated hip flexion  Scap squeezes  Table slides  Pulleys  STM  Cervical retraction    Assessment: Tolerated treatment well. Patient would benefit from ongoing skilled PT to address impairments, improve mobility, ADL's and to maintain independence.        Plan: Progress treatment as tolerated.       Precautions: none      Manuals 2/11 2/18 2/25 3/13 3/27 4/15 4/29 5/20   Seated posterior rotator cuff/mid thoracic STM                                            Neuro Re-Ed           Scap retraction 20x 5\" holds  20x 5\" holds 20x 5\" holds  20x 5\" holds -     Scap 4     20x grn rows, red low rows 10x grn rows, red low rows   20x red low rows, 10x grn rows               Cervical retraction seated                                            Ther Ex           LAQ 10x 2.5 lbs B  20x 3 lbs B 20x 3 lbs B 20x 3 lbs B 25x 3 lbs B  25x 3 lbs B 25x 3 lbs B 25x 3 lbs B   Seated hip flexion 20x 2.5 lbs B  15x 3 lbs B 15x 3 lbs B 15x 3 lbs B 20x 3 lbs B 20x 3 lbs B 12x 3 lbs B 15x 3 lbs B    pulleys 20x  20x 20x  20x  20x     Standing ER/IR L shoulder        IR 10x single grn B    Table slides        15x    treadmill 8 min 8 min 8 min 8 min 9 min 7 min  8 min 8 min                         Ther Activity           STS 2x10 w/ 1 foam  2x12 w/ 1 foam  2x10 w/ 1 foam 2x10 w/ 1 foam  2x10 w/ 1 foam 2x10 hi/lo 12x 18x              Gait Training                                 Modalities         "

## 2025-05-27 ENCOUNTER — APPOINTMENT (OUTPATIENT)
Dept: PHYSICAL THERAPY | Facility: CLINIC | Age: 80
End: 2025-05-27
Attending: FAMILY MEDICINE
Payer: MEDICARE

## 2025-06-03 ENCOUNTER — APPOINTMENT (OUTPATIENT)
Dept: PHYSICAL THERAPY | Facility: CLINIC | Age: 80
End: 2025-06-03
Attending: FAMILY MEDICINE
Payer: MEDICARE

## 2025-06-05 ENCOUNTER — OFFICE VISIT (OUTPATIENT)
Dept: PHYSICAL THERAPY | Facility: CLINIC | Age: 80
End: 2025-06-05
Attending: FAMILY MEDICINE
Payer: MEDICARE

## 2025-06-05 DIAGNOSIS — G89.29 CHRONIC LEFT SHOULDER PAIN: Primary | ICD-10-CM

## 2025-06-05 DIAGNOSIS — G89.29 CHRONIC BILATERAL LOW BACK PAIN, UNSPECIFIED WHETHER SCIATICA PRESENT: ICD-10-CM

## 2025-06-05 DIAGNOSIS — R26.2 AMBULATORY DYSFUNCTION: ICD-10-CM

## 2025-06-05 DIAGNOSIS — M54.50 CHRONIC BILATERAL LOW BACK PAIN, UNSPECIFIED WHETHER SCIATICA PRESENT: ICD-10-CM

## 2025-06-05 DIAGNOSIS — M25.512 CHRONIC LEFT SHOULDER PAIN: Primary | ICD-10-CM

## 2025-06-05 PROCEDURE — 97110 THERAPEUTIC EXERCISES: CPT | Performed by: PHYSICAL THERAPIST

## 2025-06-05 NOTE — PROGRESS NOTES
"Daily Note     Today's date: 2025  Patient name: Chanell Bourgeois  : 1945  MRN: [id number]  Referring provider: Norman Villatoro*  Dx:     Encounter Diagnosis     ICD-10-CM    1. Chronic left shoulder pain  M25.512     G89.29       2. Ambulatory dysfunction  R26.2       3. Chronic bilateral low back pain, unspecified whether sciatica present  M54.50     G89.29                      Subjective: Patient reports everything hurts a lot today. Doesn't want to do any shoulder exercises.     Objective: See treatment diary below    Treadmill  STS  LAQ/seated hip flexion  Scap squeezes  Table slides  Pulleys  STM  Cervical retraction    Assessment: Tolerated treatment well. Patient would benefit from ongoing skilled PT to address impairments, improve mobility, ADL's and to maintain independence.        Plan: Progress treatment as tolerated.       Precautions: none      Manuals 2/11 2/18 2/25 3/13 3/27 4/15 4/29 5/20 6/5   Seated posterior rotator cuff/mid thoracic STM                                                Neuro Re-Ed            Scap retraction 20x 5\" holds  20x 5\" holds 20x 5\" holds  20x 5\" holds -      Scap 4     20x grn rows, red low rows 10x grn rows, red low rows   20x red low rows, 10x grn rows                 Cervical retraction seated                                                Ther Ex            LAQ 10x 2.5 lbs B  20x 3 lbs B 20x 3 lbs B 20x 3 lbs B 25x 3 lbs B  25x 3 lbs B 25x 3 lbs B 25x 3 lbs B 13x 4 lbs B   Seated hip flexion 20x 2.5 lbs B  15x 3 lbs B 15x 3 lbs B 15x 3 lbs B 20x 3 lbs B 20x 3 lbs B 12x 3 lbs B 15x 3 lbs B  10x 4 lbs B   pulleys 20x  20x 20x  20x  20x      Standing ER/IR L shoulder        IR 10x single grn B     Table slides        15x     treadmill 8 min 8 min 8 min 8 min 9 min 7 min  8 min 8 min 5 min                           Ther Activity            STS 2x10 w/ 1 foam  2x12 w/ 1 foam  2x10 w/ 1 foam 2x10 w/ 1 foam  2x10 w/ 1 foam 2x10 hi/lo 12x 18x 10x             "   Gait Training                                    Modalities

## 2025-06-06 ENCOUNTER — PATIENT MESSAGE (OUTPATIENT)
Dept: FAMILY MEDICINE CLINIC | Facility: CLINIC | Age: 80
End: 2025-06-06

## 2025-06-06 DIAGNOSIS — I10 BENIGN ESSENTIAL HYPERTENSION: Primary | ICD-10-CM

## 2025-06-06 RX ORDER — LOSARTAN POTASSIUM AND HYDROCHLOROTHIAZIDE 12.5; 5 MG/1; MG/1
1 TABLET ORAL DAILY
Qty: 90 TABLET | Refills: 1 | Status: SHIPPED | OUTPATIENT
Start: 2025-06-06

## 2025-06-10 ENCOUNTER — OFFICE VISIT (OUTPATIENT)
Dept: PHYSICAL THERAPY | Facility: CLINIC | Age: 80
End: 2025-06-10
Attending: FAMILY MEDICINE
Payer: MEDICARE

## 2025-06-10 DIAGNOSIS — G89.29 CHRONIC LEFT SHOULDER PAIN: Primary | ICD-10-CM

## 2025-06-10 DIAGNOSIS — M54.50 CHRONIC BILATERAL LOW BACK PAIN, UNSPECIFIED WHETHER SCIATICA PRESENT: ICD-10-CM

## 2025-06-10 DIAGNOSIS — G89.29 CHRONIC BILATERAL LOW BACK PAIN, UNSPECIFIED WHETHER SCIATICA PRESENT: ICD-10-CM

## 2025-06-10 DIAGNOSIS — R26.2 AMBULATORY DYSFUNCTION: ICD-10-CM

## 2025-06-10 DIAGNOSIS — M25.512 CHRONIC LEFT SHOULDER PAIN: Primary | ICD-10-CM

## 2025-06-10 PROCEDURE — 97112 NEUROMUSCULAR REEDUCATION: CPT | Performed by: PHYSICAL THERAPIST

## 2025-06-10 PROCEDURE — 97530 THERAPEUTIC ACTIVITIES: CPT | Performed by: PHYSICAL THERAPIST

## 2025-06-10 PROCEDURE — 97110 THERAPEUTIC EXERCISES: CPT | Performed by: PHYSICAL THERAPIST

## 2025-06-10 NOTE — PROGRESS NOTES
"Daily Note     Today's date: 6/10/2025  Patient name: Chanell Bourgeois  : 1945  MRN: [id number]  Referring provider: Norman Villatoro*  Dx:     Encounter Diagnosis     ICD-10-CM    1. Chronic left shoulder pain  M25.512     G89.29       2. Ambulatory dysfunction  R26.2       3. Chronic bilateral low back pain, unspecified whether sciatica present  M54.50     G89.29                      Subjective: Patient reports everything still hurts but not as bad.     Objective: See treatment diary below    Treadmill  STS  LAQ/seated hip flexion  Scap squeezes  Table slides  Pulleys  STM  Cervical retraction    Assessment: Tolerated treatment well. Patient would benefit from ongoing skilled PT to address impairments, improve mobility, ADL's and to maintain independence.        Plan: Progress treatment as tolerated.       Precautions: none      Manuals 3/13 3/27 4/15 4/29 5/20 6/5 6/10   Seated posterior rotator cuff/mid thoracic STM                                        Neuro Re-Ed          Scap retraction  20x 5\" holds -    20x 5\" holds   Scap 4  20x grn rows, red low rows 10x grn rows, red low rows   20x red low rows, 10x grn rows   20x red low rows              Cervical retraction seated                                        Ther Ex          LAQ 20x 3 lbs B 25x 3 lbs B  25x 3 lbs B 25x 3 lbs B 25x 3 lbs B 13x 4 lbs B 13x 4 lbs B   Seated hip flexion 15x 3 lbs B 20x 3 lbs B 20x 3 lbs B 12x 3 lbs B 15x 3 lbs B  10x 4 lbs B 10x 4 lbs B   pulleys  20x  20x       Standing ER/IR L shoulder     IR 10x single grn B      Table slides     15x      treadmill 8 min 9 min 7 min  8 min 8 min 5 min 5 min                       Ther Activity          STS 2x10 w/ 1 foam  2x10 w/ 1 foam 2x10 hi/lo 12x 18x 10x 13x              Gait Training                              Modalities                                   "

## 2025-06-17 ENCOUNTER — APPOINTMENT (OUTPATIENT)
Dept: PHYSICAL THERAPY | Facility: CLINIC | Age: 80
End: 2025-06-17
Attending: FAMILY MEDICINE
Payer: MEDICARE

## 2025-06-24 ENCOUNTER — APPOINTMENT (OUTPATIENT)
Dept: PHYSICAL THERAPY | Facility: CLINIC | Age: 80
End: 2025-06-24
Attending: FAMILY MEDICINE
Payer: MEDICARE

## 2025-07-01 ENCOUNTER — APPOINTMENT (OUTPATIENT)
Dept: PHYSICAL THERAPY | Facility: CLINIC | Age: 80
End: 2025-07-01
Attending: FAMILY MEDICINE
Payer: MEDICARE

## 2025-07-08 ENCOUNTER — APPOINTMENT (OUTPATIENT)
Dept: PHYSICAL THERAPY | Facility: CLINIC | Age: 80
End: 2025-07-08
Attending: FAMILY MEDICINE
Payer: MEDICARE

## 2025-07-15 ENCOUNTER — OFFICE VISIT (OUTPATIENT)
Dept: PHYSICAL THERAPY | Facility: CLINIC | Age: 80
End: 2025-07-15
Attending: FAMILY MEDICINE
Payer: MEDICARE

## 2025-07-15 DIAGNOSIS — G89.29 CHRONIC BILATERAL LOW BACK PAIN, UNSPECIFIED WHETHER SCIATICA PRESENT: ICD-10-CM

## 2025-07-15 DIAGNOSIS — G89.29 CHRONIC LEFT SHOULDER PAIN: Primary | ICD-10-CM

## 2025-07-15 DIAGNOSIS — M54.50 CHRONIC BILATERAL LOW BACK PAIN, UNSPECIFIED WHETHER SCIATICA PRESENT: ICD-10-CM

## 2025-07-15 DIAGNOSIS — M25.512 CHRONIC LEFT SHOULDER PAIN: Primary | ICD-10-CM

## 2025-07-15 DIAGNOSIS — R26.2 AMBULATORY DYSFUNCTION: ICD-10-CM

## 2025-07-15 PROCEDURE — 97530 THERAPEUTIC ACTIVITIES: CPT | Performed by: PHYSICAL THERAPIST

## 2025-07-15 PROCEDURE — 97112 NEUROMUSCULAR REEDUCATION: CPT | Performed by: PHYSICAL THERAPIST

## 2025-07-15 PROCEDURE — 97110 THERAPEUTIC EXERCISES: CPT | Performed by: PHYSICAL THERAPIST

## 2025-07-17 ENCOUNTER — APPOINTMENT (OUTPATIENT)
Dept: PHYSICAL THERAPY | Facility: CLINIC | Age: 80
End: 2025-07-17
Attending: FAMILY MEDICINE
Payer: MEDICARE

## 2025-07-22 ENCOUNTER — OFFICE VISIT (OUTPATIENT)
Dept: PHYSICAL THERAPY | Facility: CLINIC | Age: 80
End: 2025-07-22
Attending: FAMILY MEDICINE
Payer: MEDICARE

## 2025-07-22 DIAGNOSIS — M54.50 CHRONIC BILATERAL LOW BACK PAIN, UNSPECIFIED WHETHER SCIATICA PRESENT: ICD-10-CM

## 2025-07-22 DIAGNOSIS — G89.29 CHRONIC BILATERAL LOW BACK PAIN, UNSPECIFIED WHETHER SCIATICA PRESENT: ICD-10-CM

## 2025-07-22 DIAGNOSIS — M25.512 CHRONIC LEFT SHOULDER PAIN: Primary | ICD-10-CM

## 2025-07-22 DIAGNOSIS — R26.2 AMBULATORY DYSFUNCTION: ICD-10-CM

## 2025-07-22 DIAGNOSIS — G89.29 CHRONIC LEFT SHOULDER PAIN: Primary | ICD-10-CM

## 2025-07-22 PROCEDURE — 97110 THERAPEUTIC EXERCISES: CPT | Performed by: PHYSICAL THERAPIST

## 2025-07-22 PROCEDURE — 97112 NEUROMUSCULAR REEDUCATION: CPT | Performed by: PHYSICAL THERAPIST

## 2025-07-22 PROCEDURE — 97530 THERAPEUTIC ACTIVITIES: CPT | Performed by: PHYSICAL THERAPIST

## 2025-07-22 NOTE — PROGRESS NOTES
"Daily Note     Today's date: 2025  Patient name: Chanell Bourgeois  : 1945  MRN: [id number]  Referring provider: Norman Villatoro*  Dx:   Encounter Diagnosis     ICD-10-CM    1. Chronic left shoulder pain  M25.512     G89.29       2. Ambulatory dysfunction  R26.2       3. Chronic bilateral low back pain, unspecified whether sciatica present  M54.50     G89.29           Start Time: 1500  Stop Time: 1538  Total time in clinic (min): 38 minutes    Subjective: Patient reports B shoulder pain from washing porch yesterday. Requests not to do any shoulder exercises today.       Objective: See treatment diary below      Assessment: Tolerated treatment well. Patient demonstrated fatigue post treatment, exhibited good technique with therapeutic exercises, and would benefit from continued PT      Plan: Continue per plan of care.      Precautions: none      Manuals 3/13 3/27 4/15 4/29 5/20 6/5 6/10 7/15 7/22               Seated posterior rotator cuff/mid thoracic STM                                                Neuro Re-Ed            Scap retraction  20x 5\" holds -    20x 5\" holds     Scap 4  20x grn rows, red low rows 10x grn rows, red low rows   20x red low rows, 10x grn rows   20x red low rows  20x low rows red -               Cervical retraction seated                                                Ther Ex            LAQ 20x 3 lbs B 25x 3 lbs B  25x 3 lbs B 25x 3 lbs B 25x 3 lbs B 13x 4 lbs B 13x 4 lbs B 14x 4 lbs B 14x 4 lbs B   Seated hip flexion 15x 3 lbs B 20x 3 lbs B 20x 3 lbs B 12x 3 lbs B 15x 3 lbs B  10x 4 lbs B 10x 4 lbs B 18x 4 lbs B  18x 4 lbs B   pulleys  20x  20x         Standing ER/IR L shoulder     IR 10x single grn B        Table slides     15x        treadmill 8 min 9 min 7 min  8 min 8 min 5 min 5 min 8 min  8 min                           Ther Activity            STS 2x10 w/ 1 foam  2x10 w/ 1 foam 2x10 hi/lo 12x 18x 10x 13x  15x  15x               Gait Training                          "           Modalities

## 2025-07-24 ENCOUNTER — APPOINTMENT (OUTPATIENT)
Dept: PHYSICAL THERAPY | Facility: CLINIC | Age: 80
End: 2025-07-24
Attending: FAMILY MEDICINE
Payer: MEDICARE

## 2025-07-28 ENCOUNTER — OFFICE VISIT (OUTPATIENT)
Dept: PHYSICAL THERAPY | Facility: CLINIC | Age: 80
End: 2025-07-28
Attending: FAMILY MEDICINE
Payer: MEDICARE

## 2025-07-28 DIAGNOSIS — M25.512 CHRONIC LEFT SHOULDER PAIN: Primary | ICD-10-CM

## 2025-07-28 DIAGNOSIS — M54.50 CHRONIC BILATERAL LOW BACK PAIN, UNSPECIFIED WHETHER SCIATICA PRESENT: ICD-10-CM

## 2025-07-28 DIAGNOSIS — R26.2 AMBULATORY DYSFUNCTION: ICD-10-CM

## 2025-07-28 DIAGNOSIS — G89.29 CHRONIC LEFT SHOULDER PAIN: Primary | ICD-10-CM

## 2025-07-28 DIAGNOSIS — G89.29 CHRONIC BILATERAL LOW BACK PAIN, UNSPECIFIED WHETHER SCIATICA PRESENT: ICD-10-CM

## 2025-07-28 PROCEDURE — 97112 NEUROMUSCULAR REEDUCATION: CPT | Performed by: PHYSICAL THERAPIST

## 2025-07-28 PROCEDURE — 97110 THERAPEUTIC EXERCISES: CPT | Performed by: PHYSICAL THERAPIST

## 2025-07-28 PROCEDURE — 97530 THERAPEUTIC ACTIVITIES: CPT | Performed by: PHYSICAL THERAPIST

## 2025-07-29 ENCOUNTER — APPOINTMENT (OUTPATIENT)
Dept: PHYSICAL THERAPY | Facility: CLINIC | Age: 80
End: 2025-07-29
Attending: FAMILY MEDICINE
Payer: MEDICARE

## 2025-08-07 ENCOUNTER — OFFICE VISIT (OUTPATIENT)
Dept: PHYSICAL THERAPY | Facility: CLINIC | Age: 80
End: 2025-08-07
Attending: FAMILY MEDICINE
Payer: MEDICARE

## 2025-08-07 DIAGNOSIS — M25.512 CHRONIC LEFT SHOULDER PAIN: Primary | ICD-10-CM

## 2025-08-07 DIAGNOSIS — M54.50 CHRONIC BILATERAL LOW BACK PAIN, UNSPECIFIED WHETHER SCIATICA PRESENT: ICD-10-CM

## 2025-08-07 DIAGNOSIS — G89.29 CHRONIC BILATERAL LOW BACK PAIN, UNSPECIFIED WHETHER SCIATICA PRESENT: ICD-10-CM

## 2025-08-07 DIAGNOSIS — R26.2 AMBULATORY DYSFUNCTION: ICD-10-CM

## 2025-08-07 DIAGNOSIS — G89.29 CHRONIC LEFT SHOULDER PAIN: Primary | ICD-10-CM

## 2025-08-07 PROCEDURE — 97112 NEUROMUSCULAR REEDUCATION: CPT | Performed by: PHYSICAL THERAPIST

## 2025-08-07 PROCEDURE — 97110 THERAPEUTIC EXERCISES: CPT | Performed by: PHYSICAL THERAPIST

## 2025-08-19 ENCOUNTER — OFFICE VISIT (OUTPATIENT)
Dept: PHYSICAL THERAPY | Facility: CLINIC | Age: 80
End: 2025-08-19
Attending: FAMILY MEDICINE
Payer: MEDICARE

## 2025-08-19 DIAGNOSIS — M54.50 CHRONIC BILATERAL LOW BACK PAIN, UNSPECIFIED WHETHER SCIATICA PRESENT: ICD-10-CM

## 2025-08-19 DIAGNOSIS — G89.29 CHRONIC BILATERAL LOW BACK PAIN, UNSPECIFIED WHETHER SCIATICA PRESENT: ICD-10-CM

## 2025-08-19 DIAGNOSIS — R26.2 AMBULATORY DYSFUNCTION: ICD-10-CM

## 2025-08-19 DIAGNOSIS — M25.512 CHRONIC LEFT SHOULDER PAIN: Primary | ICD-10-CM

## 2025-08-19 DIAGNOSIS — G89.29 CHRONIC LEFT SHOULDER PAIN: Primary | ICD-10-CM

## 2025-08-19 PROCEDURE — 97112 NEUROMUSCULAR REEDUCATION: CPT | Performed by: PHYSICAL THERAPIST

## 2025-08-19 PROCEDURE — 97530 THERAPEUTIC ACTIVITIES: CPT | Performed by: PHYSICAL THERAPIST

## 2025-08-19 PROCEDURE — 97110 THERAPEUTIC EXERCISES: CPT | Performed by: PHYSICAL THERAPIST
